# Patient Record
Sex: FEMALE | Race: WHITE | NOT HISPANIC OR LATINO | Employment: FULL TIME | URBAN - METROPOLITAN AREA
[De-identification: names, ages, dates, MRNs, and addresses within clinical notes are randomized per-mention and may not be internally consistent; named-entity substitution may affect disease eponyms.]

---

## 2017-01-30 ENCOUNTER — OFFICE VISIT (OUTPATIENT)
Dept: BARIATRICS | Facility: CLINIC | Age: 57
End: 2017-01-30
Payer: COMMERCIAL

## 2017-01-30 VITALS
DIASTOLIC BLOOD PRESSURE: 70 MMHG | SYSTOLIC BLOOD PRESSURE: 118 MMHG | HEIGHT: 62 IN | BODY MASS INDEX: 33.95 KG/M2 | WEIGHT: 184.5 LBS

## 2017-01-30 DIAGNOSIS — E66.9 OBESITY (BMI 30.0-34.9): ICD-10-CM

## 2017-01-30 DIAGNOSIS — Z98.84 HX OF BARIATRIC SURGERY: Primary | ICD-10-CM

## 2017-01-30 PROCEDURE — 99213 OFFICE O/P EST LOW 20 MIN: CPT | Mod: S$GLB,,, | Performed by: INTERNAL MEDICINE

## 2017-01-30 PROCEDURE — 99999 PR PBB SHADOW E&M-EST. PATIENT-LVL III: CPT | Mod: PBBFAC,,, | Performed by: INTERNAL MEDICINE

## 2017-01-30 PROCEDURE — 1159F MED LIST DOCD IN RCRD: CPT | Mod: S$GLB,,, | Performed by: INTERNAL MEDICINE

## 2017-01-30 RX ORDER — PHENTERMINE HYDROCHLORIDE 37.5 MG/1
TABLET ORAL
Qty: 30 TABLET | Refills: 0 | Status: SHIPPED | OUTPATIENT
Start: 2017-01-30 | End: 2017-03-06 | Stop reason: SDUPTHER

## 2017-01-30 NOTE — PROGRESS NOTES
"Subjective:       Patient ID: Kateryna Weber is a 56 y.o. female.    Chief Complaint: Follow-up    HPI Comments: Pt presents today for follow up. Getting adequate protein. She has lost 6 lbs in the past 6 weeks with getting back on track with diet. 14 lbs total. Was not quite as strict over the holidays with diet and exercise. Has been taking care of her sick mom. She has been on all of her vitamins.     Review of Systems   Constitutional: Negative for activity change, appetite change and unexpected weight change.   HENT: Negative for sore throat and voice change.    Eyes: Negative for pain and visual disturbance.   Respiratory: Negative for shortness of breath and wheezing.    Cardiovascular: Negative for palpitations and leg swelling.   Genitourinary: Negative for difficulty urinating and dysuria.        S/p hyst   Musculoskeletal: Positive for arthralgias. Negative for back pain and myalgias.        Hip and hand   Skin: Negative for pallor and rash.   Neurological: Negative for syncope, light-headedness and headaches.   Psychiatric/Behavioral: Negative for dysphoric mood and sleep disturbance. The patient is not nervous/anxious.        Objective:       Visit Vitals    /70    Ht 5' 2" (1.575 m)    Wt 83.7 kg (184 lb 8.4 oz)    BMI 33.75 kg/m2       Physical Exam   Constitutional: She is oriented to person, place, and time. She appears well-developed. No distress.   Obese   HENT:   Head: Normocephalic and atraumatic.   Eyes: EOM are normal. Pupils are equal, round, and reactive to light. No scleral icterus.   Neck: Normal range of motion. Neck supple. No thyromegaly present.   Cardiovascular: Normal rate and normal heart sounds.  Exam reveals no gallop and no friction rub.    No murmur heard.  Pulmonary/Chest: Effort normal and breath sounds normal. No respiratory distress. She has no wheezes.   Musculoskeletal: Normal range of motion. She exhibits no edema.   Neurological: She is alert and oriented to " person, place, and time. No cranial nerve deficit.   Skin: Skin is warm and dry. No erythema.   Psychiatric: She has a normal mood and affect. Her behavior is normal. Judgment normal.   Vitals reviewed.      Assessment:       1. Hx of bariatric surgery    2. Obesity (BMI 30.0-34.9)        Plan:         1. Hx of bariatric surgery  Needs to continue to cut back snacking.     2. Obesity (BMI 30.0-34.9)    - phentermine (ADIPEX-P) 37.5 mg tablet; 1/2 to one pill po daily.  Dispense: 30 tablet; Refill: 0      protein shake (at least 20-30 g, no more than 5 g)  for breakfast and lunch and 1 snack  Sensible meal-lean protein(4-6 oz fish, chicken, turkey, lean beef) and 1 cup green vegetables for dinner.     No drinking while eating. Wait 30 min.     Continue trulicity once a week.     Sip on water throughout day between meals.     Cut back artificial sweeteners by half.     Increase weight training to 2 days a week. Continue/Increase cardio.     Return in about 6 weeks               protein shake (at least 20-30 g, no more than 5 g)  for breakfast and lunch and 1 snack  Sensible meal-lean protein(4-6 oz fish, chicken, turkey, lean beef) and 1 cup green vegetables for dinner.     No drinking while eating. Wait 30 min.     Continue trulicity once a week.     Sip on water throughout day between meals.     Cut back artificial sweeteners by half.     Increase weight training to 2 days a week. Continue/Increase cardio.     Return in about 6 weeks

## 2017-01-30 NOTE — PATIENT INSTRUCTIONS
protein shake (at least 20-30 g, no more than 5 g)  for breakfast and lunch and 1 snack  Sensible meal-lean protein(4-6 oz fish, chicken, turkey, lean beef) and 1 cup green vegetables for dinner.     No drinking while eating. Wait 30 min.     Continue trulicity once a week.     Sip on water throughout day between meals.     Cut back artificial sweeteners by half.     Increase weight training to 2 days a week. Continue/Increase cardio.     Return in about 6 weeks

## 2017-01-30 NOTE — MR AVS SNAPSHOT
[facility] - Bariatric Surgery  [address] [address]  Phone: [phone]  Fax: [phone]                  Kateryna Weber   2017 9:00 AM   Office Visit    Description:  Female : 1960   Provider:  Sherry Poon MD   Department:  [facility] - Bariatric Surgery           Reason for Visit     Follow-up           Diagnoses this Visit        Comments    Hx of bariatric surgery    -  Primary     Obesity (BMI 30.0-34.9)                To Do List           Future Appointments        Provider Department Dept Phone    2017 7:00 AM Patria Yao MD [facility] - Internal Medicine [phone]    3/13/2017 9:00 AM Sherry Poon MD [facility] Bariatric Surgery [phone]      Goals (5 Years of Data)     None       These Medications        Disp Refills Start End    phentermine (ADIPEX-P) 37.5 mg tablet 30 tablet 0 2017     1/2 to one pill po daily.    Pharmacy: [place] [id number] Ph #: [phone]         [facility]     [facility] Nurse Care Line -  Assistance  Registered nurses in the [facility] provide clinical advisement, health education, appointment booking, and other advisory services.  Call for this free service at [phone].             Medications           Message regarding Medications     Verify the changes and/or additions to your medication regime listed below are the same as discussed with your clinician today.  If any of these changes or additions are incorrect, please notify your healthcare provider.        START taking these NEW medications        Refills    phentermine (ADIPEX-P) 37.5 mg tablet 0    Si/2 to one pill po daily.    Class: Print           Verify that the below list of medications is an accurate representation of the medications you are currently taking.  If none reported, the list may be blank. If incorrect, please contact your healthcare provider. Carry this  "list with you in case of emergency.           Current Medications     alprazolam (XANAX) 0.25 MG tablet TAKE ONE TABLET BY MOUTH ONCE DAILY AT NIGHT AS NEEDED FOR ANXIETY    buPROPion (WELLBUTRIN XL) 150 MG TB24 tablet TAKE ONE TABLET BY MOUTH ONCE DAILY    cholecalciferol, vitamin D3, 5,000 unit capsule Take 1 capsule by mouth Daily.    cyanocobalamin 1,000 mcg/mL injection 1 Solution Injection biweekly    dulaglutide (TRULICITY) 1.5 mg/0.5 mL PnIj Inject 1.5 mg into the skin every 7 days.    meloxicam (MOBIC) 15 MG tablet Take 1 tablet (15 mg total) by mouth once daily.    multivitamin capsule Take 1 capsule by mouth once daily.    phentermine (ADIPEX-P) 37.5 mg tablet 1/2 to one pill po daily.    scopolamine (TRANSDERM-SCOP) 1.5 mg Place 1 patch (1.5 mg total) onto the skin As instructed. Behind ear every 3 days           Clinical Reference Information           Vital Signs - Last Recorded  Most recent update: 1/30/2017  9:09 AM by Ayde Clifton MA    BP Ht Wt BMI       118/70 5' 2" (1.575 m) 83.7 kg (184 lb 8.4 oz) 33.75 kg/m2       Blood Pressure          Most Recent Value    BP  118/70      Allergies as of 1/30/2017     No Known Drug Allergies      Immunizations Administered on Date of Encounter - 1/30/2017     None      Instructions    protein shake (at least 20-30 g, no more than 5 g)  for breakfast and lunch and 1 snack  Sensible meal-lean protein(4-6 oz fish, chicken, turkey, lean beef) and 1 cup green vegetables for dinner.     No drinking while eating. Wait 30 min.     Start trulicity once a week.     Sip on water throughout day between meals.     Cut back artificial sweeteners by half.     Increase weight training to 2 days a week. Continue/Increase cardio.     Return in about 6 weeks       "

## 2017-02-06 RX ORDER — DULAGLUTIDE 1.5 MG/.5ML
INJECTION, SOLUTION SUBCUTANEOUS
Qty: 4 SYRINGE | Refills: 3 | Status: SHIPPED | OUTPATIENT
Start: 2017-02-06 | End: 2017-05-22 | Stop reason: SDUPTHER

## 2017-02-16 RX ORDER — ALPRAZOLAM 0.25 MG/1
TABLET ORAL
Qty: 30 TABLET | Refills: 0 | Status: SHIPPED | OUTPATIENT
Start: 2017-02-16 | End: 2017-02-20 | Stop reason: SDUPTHER

## 2017-02-20 ENCOUNTER — OFFICE VISIT (OUTPATIENT)
Dept: INTERNAL MEDICINE | Facility: CLINIC | Age: 57
End: 2017-02-20
Payer: COMMERCIAL

## 2017-02-20 VITALS
WEIGHT: 179.44 LBS | DIASTOLIC BLOOD PRESSURE: 82 MMHG | HEIGHT: 62 IN | BODY MASS INDEX: 33.02 KG/M2 | HEART RATE: 76 BPM | SYSTOLIC BLOOD PRESSURE: 118 MMHG

## 2017-02-20 DIAGNOSIS — M25.551 RIGHT HIP PAIN: ICD-10-CM

## 2017-02-20 DIAGNOSIS — Z82.49 FAMILY HISTORY OF EARLY CAD: ICD-10-CM

## 2017-02-20 DIAGNOSIS — Z12.83 SKIN CANCER SCREENING: ICD-10-CM

## 2017-02-20 PROCEDURE — 99999 PR PBB SHADOW E&M-EST. PATIENT-LVL III: CPT | Mod: PBBFAC,,, | Performed by: INTERNAL MEDICINE

## 2017-02-20 PROCEDURE — 99213 OFFICE O/P EST LOW 20 MIN: CPT | Mod: S$GLB,,, | Performed by: INTERNAL MEDICINE

## 2017-02-20 RX ORDER — ALPRAZOLAM 0.25 MG/1
TABLET ORAL
Qty: 30 TABLET | Refills: 0 | Status: SHIPPED | OUTPATIENT
Start: 2017-02-20 | End: 2018-02-18 | Stop reason: SDUPTHER

## 2017-02-20 RX ORDER — SCOLOPAMINE TRANSDERMAL SYSTEM 1 MG/1
1 PATCH, EXTENDED RELEASE TRANSDERMAL SEE ADMIN INSTRUCTIONS
Qty: 10 PATCH | Refills: 0 | Status: SHIPPED | OUTPATIENT
Start: 2017-02-20 | End: 2018-06-15

## 2017-02-20 NOTE — MR AVS SNAPSHOT
[facility] - Internal Medicine  [address]  [facility] [address]  Phone: [phone]  Fax: [phone]                  Kateryna Weber   2017 7:00 AM   Office Visit    Description:  Female : 1960   Provider:  Patria Yao MD   Department:  [facility] - Internal Medicine           Reason for Visit     Follow-up           Diagnoses this Visit        Comments    BMI 32.0-32.9,adult    -  Primary     Right hip pain         Skin cancer screening         Family history of early CAD                To Do List           Future Appointments        Provider Department Dept Phone    3/13/2017 9:00 AM Sherry Poon MD [facility] - Bariatric Surgery [phone]      Goals (5 Years of Data)     None      Follow-Up and Disposition     Return in about 7 months (around 2017) for Annual exam.    Follow-up and Disposition History      [facility]     [facility] -  Assistance  Registered nurses in the [facility] provide clinical advisement, health education, appointment booking, and other advisory services.  Call for this free service at [phone].             Medications           Message regarding Medications     Verify the changes and/or additions to your medication regime listed below are the same as discussed with your clinician today.  If any of these changes or additions are incorrect, please notify your healthcare provider.             Verify that the below list of medications is an accurate representation of the medications you are currently taking.  If none reported, the list may be blank. If incorrect, please contact your healthcare provider. Carry this list with you in case of emergency.           Current Medications     alprazolam (XANAX) 0.25 MG tablet TAKE ONE TABLET BY MOUTH ONCE DAILY AT  NIGHT  AS  NEEDED  FOR  ANXIETY.    buPROPion (WELLBUTRIN XL) 150 MG TB24 tablet TAKE ONE TABLET BY MOUTH ONCE DAILY    cyanocobalamin 1,000 mcg/mL  "injection 1 Solution Injection biweekly    multivitamin capsule Take 1 capsule by mouth once daily.    phentermine (ADIPEX-P) 37.5 mg tablet 1/2 to one pill po daily.    scopolamine (TRANSDERM-SCOP) 1.5 mg Place 1 patch (1.5 mg total) onto the skin As instructed. Behind ear every 3 days    TRULICITY 1.5 mg/0.5 mL PnIj INJECT ONE SYRINGE (1.5 MG) SUBCUTANEOUSLY ONCE A WEEK    cholecalciferol, vitamin D3, 5,000 unit capsule Take 1 capsule by mouth Daily.    meloxicam (MOBIC) 15 MG tablet Take 1 tablet (15 mg total) by mouth once daily.           Clinical Reference Information           Your Vitals Were     BP Pulse Height Weight BMI    118/82 76 5' 2" (1.575 m) 81.4 kg (179 lb 7.3 oz) 32.82 kg/m2      Blood Pressure          Most Recent Value    BP  118/82      Allergies as of 2/20/2017     No Known Drug Allergies      Immunizations Administered on Date of Encounter - 2/20/2017     None      Orders Placed During Today's Visit      Normal Orders This Visit    Ambulatory consult to Dermatology     Future Labs/Procedures Expected by Expires    CT Cardiac Scoring  2/20/2017 2/20/2018    CAR CT Cardiac Scoring  As directed 2/20/2018      Language Assistance Services     ATTENTION: Language assistance services are available, free of charge. Please call [phone].      ATENCIÓN: Si habla milton, tiene a huitron disposición servicios gratuitos de asistencia lingüística. Llame al [phone].     CLAUDIA Ý: N?u b?n nói Ti?ng Vi?t, có các d?ch v? h? tr? ngôn ng? mi?n phí dành cho b?n. G?i s? [phone].         [facility] complies with applicable Federal civil rights laws and does not discriminate on the basis of race, color, national origin, age, disability, or sex.        "

## 2017-02-20 NOTE — PROGRESS NOTES
Subjective:       Patient ID: Kateryna Weber is a 56 y.o. female.    Chief Complaint: Follow-up    HPI   Patient is here for a review of recent recommendations:    Bariatric Medicine: Reviewed notes and patient has lost about 18 pounds on medication and has follow up.    Orthopedic Medicine: Reviewed notes:  Quadriceps strengthening  2 injections completed and is better  Patient is told not to sit with leg under her  MRI completed in the past      Review of Systems   Constitutional: Negative for activity change, chills, fever and unexpected weight change.   Respiratory: Negative for cough, chest tightness, shortness of breath and wheezing.    Cardiovascular: Negative for chest pain, palpitations and leg swelling.       Objective:      Physical Exam   Constitutional: She appears well-developed and well-nourished.   Neck: No JVD present. No thyromegaly present.   Cardiovascular: Normal rate, normal heart sounds and intact distal pulses.    Pulmonary/Chest: Effort normal and breath sounds normal. No respiratory distress.       Assessment:       1. BMI 32.0-32.9,adult    2. Right hip pain    3. Skin cancer screening    4. Family history of early CAD        Plan:       Kateryna was seen today for follow-up.    Diagnoses and all orders for this visit:    BMI 32.0-32.9,adult    Right hip pain    Skin cancer screening  -     Ambulatory consult to Dermatology    Family history of early CAD  -     CT Cardiac Scoring; Future  -     CAR CT Cardiac Scoring; Future      Return in about 7 months (around 9/20/2017) for Annual exam.    New Prescriptions    No medications on file       Modified Medications    No medications on file       Orders Placed This Encounter   Procedures    CT Cardiac Scoring     Standing Status:   Future     Standing Expiration Date:   2/20/2018    Ambulatory consult to Dermatology     Referral Priority:   Routine     Referral Type:   Consultation     Referral Reason:   Specialty Services Required     Requested  Specialty:   Dermatology     Number of Visits Requested:   1    CAR CT Cardiac Scoring     Standing Status:   Future     Standing Expiration Date:   2/20/2018       Labs, studies and consults associated with this visit were reviewed

## 2017-03-06 ENCOUNTER — OFFICE VISIT (OUTPATIENT)
Dept: BARIATRICS | Facility: CLINIC | Age: 57
End: 2017-03-06
Payer: COMMERCIAL

## 2017-03-06 VITALS
SYSTOLIC BLOOD PRESSURE: 126 MMHG | BODY MASS INDEX: 32.22 KG/M2 | HEIGHT: 62 IN | DIASTOLIC BLOOD PRESSURE: 70 MMHG | WEIGHT: 175.06 LBS | HEART RATE: 76 BPM

## 2017-03-06 DIAGNOSIS — E66.9 OBESITY (BMI 30.0-34.9): ICD-10-CM

## 2017-03-06 DIAGNOSIS — Z98.84 HX OF BARIATRIC SURGERY: Primary | ICD-10-CM

## 2017-03-06 PROCEDURE — 99999 PR PBB SHADOW E&M-EST. PATIENT-LVL III: CPT | Mod: PBBFAC,,, | Performed by: INTERNAL MEDICINE

## 2017-03-06 PROCEDURE — 1160F RVW MEDS BY RX/DR IN RCRD: CPT | Mod: S$GLB,,, | Performed by: INTERNAL MEDICINE

## 2017-03-06 PROCEDURE — 99213 OFFICE O/P EST LOW 20 MIN: CPT | Mod: S$GLB,,, | Performed by: INTERNAL MEDICINE

## 2017-03-06 RX ORDER — PHENTERMINE HYDROCHLORIDE 37.5 MG/1
TABLET ORAL
Qty: 30 TABLET | Refills: 0 | Status: SHIPPED | OUTPATIENT
Start: 2017-03-06 | End: 2017-04-10 | Stop reason: SDUPTHER

## 2017-03-06 NOTE — PROGRESS NOTES
"Subjective:       Patient ID: Kateryna Weber is a 56 y.o. female.    Chief Complaint: Follow-up    HPI Comments: Pt presents today for follow up. Getting adequate protein. She has lost 9 lbs in the past 6 weeks with getting back on track with diet. 23 lbs total.     Review of Systems   Constitutional: Negative for activity change, appetite change and unexpected weight change.   HENT: Negative for sore throat and voice change.    Eyes: Negative for pain and visual disturbance.   Respiratory: Negative for shortness of breath and wheezing.    Cardiovascular: Negative for palpitations and leg swelling.   Genitourinary: Negative for difficulty urinating and dysuria.        S/p hyst   Musculoskeletal: Positive for arthralgias. Negative for back pain and myalgias.        Hip and hand   Skin: Negative for pallor and rash.   Neurological: Negative for syncope, light-headedness and headaches.   Psychiatric/Behavioral: Negative for dysphoric mood and sleep disturbance. The patient is not nervous/anxious.        Objective:       /70  Pulse 76  Ht 5' 2" (1.575 m)  Wt 79.4 kg (175 lb 0.7 oz)  BMI 32.02 kg/m2    Physical Exam   Constitutional: She is oriented to person, place, and time. She appears well-developed. No distress.   Obese   HENT:   Head: Normocephalic and atraumatic.   Eyes: EOM are normal. Pupils are equal, round, and reactive to light. No scleral icterus.   Neck: Normal range of motion. Neck supple.   Cardiovascular: Normal rate.    Pulmonary/Chest: Effort normal.   Musculoskeletal: Normal range of motion. She exhibits no edema.   Neurological: She is alert and oriented to person, place, and time. No cranial nerve deficit.   Skin: Skin is warm and dry. No erythema.   Psychiatric: She has a normal mood and affect. Her behavior is normal. Judgment normal.   Vitals reviewed.      Assessment:       1. Hx of bariatric surgery    2. Obesity (BMI 30.0-34.9)        Plan:         1. Hx of bariatric surgery  DOing " well with making changes. .     2. Obesity (BMI 30.0-34.9)      protein shake (at least 20-30 g, no more than 5 g)  for breakfast and lunch and 1 snack  Sensible meal-lean protein(4-6 oz fish, chicken, turkey, lean beef) and 1 cup green vegetables for dinner.     No drinking while eating. Wait 30 min.     Continue trulicity once a week.     Sip on water throughout day between meals.     Cut back artificial sweeteners by half.     Increase weight training to 2 days a week. Continue/Increase cardio.     Return in about 6 weeks

## 2017-03-06 NOTE — MR AVS SNAPSHOT
[facility] - Bariatric Surgery  [address] [facility] [address]  Phone: [phone]  Fax: [phone]                  Kateryna Weber   3/6/2017 8:45 AM   Office Visit    Description:  Female : 1960   Provider:  Sherry Poon MD   Department:  [facility] - Bariatric Surgery           Reason for Visit     Follow-up           Diagnoses this Visit        Comments    Obesity (BMI 30.0-34.9)                To Do List           Future Appointments        Provider Department Dept Phone    4/10/2017 8:45 AM Sherry Poon MD [facility] - Bariatric Surgery [phone]    4/10/2017 10:30 AM [facility] CT1 64- LIMIT 450 LBS [facility] [phone]    2017 8:30 AM Alessia Greenwood MD [place] - Dermatology [phone]      Goals (5 Years of Data)     None       These Medications        Disp Refills Start End    phentermine (ADIPEX-P) 37.5 mg tablet 30 tablet 0 3/6/2017     1/2 to one pill po daily.    Pharmacy: [facility] [address] [place] [address] #: [phone]         [facility] On Call     [facility] Care Line -  Assistance  Registered nurses in the [facility] provide clinical advisement, health education, appointment booking, and other advisory services.  Call for this free service at [phone].             Medications           Message regarding Medications     Verify the changes and/or additions to your medication regime listed below are the same as discussed with your clinician today.  If any of these changes or additions are incorrect, please notify your healthcare provider.             Verify that the below list of medications is an accurate representation of the medications you are currently taking.  If none reported, the list may be blank. If incorrect, please contact your healthcare provider. Carry this list with you in case of emergency.           Current Medications     alprazolam (XANAX) 0.25 MG tablet  "TAKE ONE TABLET BY MOUTH ONCE DAILY AT  NIGHT  AS  NEEDED  FOR  ANXIETY.    buPROPion (WELLBUTRIN XL) 150 MG TB24 tablet TAKE ONE TABLET BY MOUTH ONCE DAILY    cholecalciferol, vitamin D3, 5,000 unit capsule Take 1 capsule by mouth Daily.    cyanocobalamin 1,000 mcg/mL injection 1 Solution Injection biweekly    meloxicam (MOBIC) 15 MG tablet Take 1 tablet (15 mg total) by mouth once daily.    multivitamin capsule Take 1 capsule by mouth once daily.    phentermine (ADIPEX-P) 37.5 mg tablet 1/2 to one pill po daily.    scopolamine (TRANSDERM-SCOP) 1.5 mg (1 mg over 3 days) Place 1 patch (1.5 mg total) onto the skin As instructed. Behind ear every 3 days    TRULICITY 1.5 mg/0.5 mL PnIj INJECT ONE SYRINGE (1.5 MG) SUBCUTANEOUSLY ONCE A WEEK           Clinical Reference Information           Your Vitals Were     BP Pulse Height Weight BMI    126/70 76 5' 2" (1.575 m) 79.4 kg (175 lb 0.7 oz) 32.02 kg/m2      Blood Pressure          Most Recent Value    BP  126/70      Allergies as of 3/6/2017     No Known Drug Allergies      Immunizations Administered on Date of Encounter - 3/6/2017     None      Instructions    protein shake (at least 20-30 g, no more than 5 g)  for breakfast and lunch and 1 snack  Sensible meal-lean protein(4-6 oz fish, chicken, turkey, lean beef) and 1 cup green vegetables for dinner.     No drinking while eating. Wait 30 min.     Continue trulicity once a week.     Sip on water throughout day between meals.     Cut back artificial sweeteners by half.     Increase weight training to 2 days a week. Continue/Increase cardio.     Return in about 6 weeks           Language Assistance Services     ATTENTION: Language assistance services are available, free of charge. Please call [phone].      ATENCIÓN: Si ida rose, tiene a huitron disposición servicios gratuitos de asistencia lingüística. Llame al [phone].     CLAUDIA Ý: N?u b?n nói Ti?ng Vi?t, có các d?ch v? h? tr? ngôn ng? mi?n phí dành cho b?n. " G?i s? [phone].         [facility] complies with applicable Federal civil rights laws and does not discriminate on the basis of race, color, national origin, age, disability, or sex.

## 2017-04-10 ENCOUNTER — OFFICE VISIT (OUTPATIENT)
Dept: BARIATRICS | Facility: CLINIC | Age: 57
End: 2017-04-10
Payer: COMMERCIAL

## 2017-04-10 VITALS
DIASTOLIC BLOOD PRESSURE: 78 MMHG | WEIGHT: 173.06 LBS | HEIGHT: 62 IN | HEART RATE: 82 BPM | SYSTOLIC BLOOD PRESSURE: 130 MMHG | BODY MASS INDEX: 31.85 KG/M2

## 2017-04-10 DIAGNOSIS — E66.9 OBESITY (BMI 30.0-34.9): ICD-10-CM

## 2017-04-10 DIAGNOSIS — Z98.84 HX OF BARIATRIC SURGERY: Primary | ICD-10-CM

## 2017-04-10 PROCEDURE — 1160F RVW MEDS BY RX/DR IN RCRD: CPT | Mod: S$GLB,,, | Performed by: INTERNAL MEDICINE

## 2017-04-10 PROCEDURE — 99213 OFFICE O/P EST LOW 20 MIN: CPT | Mod: S$GLB,,, | Performed by: INTERNAL MEDICINE

## 2017-04-10 PROCEDURE — 99999 PR PBB SHADOW E&M-EST. PATIENT-LVL III: CPT | Mod: PBBFAC,,, | Performed by: INTERNAL MEDICINE

## 2017-04-10 RX ORDER — PHENTERMINE HYDROCHLORIDE 37.5 MG/1
TABLET ORAL
Qty: 30 TABLET | Refills: 0 | Status: SHIPPED | OUTPATIENT
Start: 2017-04-10 | End: 2017-05-22 | Stop reason: SDUPTHER

## 2017-04-10 NOTE — MR AVS SNAPSHOT
[facility] - Bariatric Surgery  [address]  [facility] [address]  Phone: [phone]  Fax: [phone]                  Kateryna Weber   4/10/2017 8:45 AM   Office Visit    Description:  Female : 1960   Provider:  Sherry Poon MD   Department:  [facility] - Bariatric Surgery           Reason for Visit     Follow-up           Diagnoses this Visit        Comments    Hx of bariatric surgery    -  Primary     Obesity (BMI 30.0-34.9)                To Do List           Future Appointments        Provider Department Dept Phone    4/10/2017 10:30 AM [facility]- LIMIT 450 LBS [facility] [phone]    2017 8:30 AM Alessia Greenwood MD [place] - Dermatology [phone]    2017 8:45 AM Sherry Poon MD [facility] - Bariatric Surgery [phone]      Goals (5 Years of Data)     None       These Medications        Disp Refills Start End    phentermine (ADIPEX-P) 37.5 mg tablet 30 tablet 0 4/10/2017     1/2 to one pill po daily.    Pharmacy: [facility] [address] [facility] [id number] [place] [address] #: [phone]         [facility]     [facility] Care Line -  Assistance  Unless otherwise directed by your provider, please contact [facility], our nurse care line that is available for  assistance.     Registered nurses in the [facility] provide: appointment scheduling, clinical advisement, health education, and other advisory services.  Call: [phone] (toll free)               Medications           Message regarding Medications     Verify the changes and/or additions to your medication regime listed below are the same as discussed with your clinician today.  If any of these changes or additions are incorrect, please notify your healthcare provider.             Verify that the below list of medications is an accurate representation of the medications you are currently taking.  If none reported, the list may be  "blank. If incorrect, please contact your healthcare provider. Carry this list with you in case of emergency.           Current Medications     alprazolam (XANAX) 0.25 MG tablet TAKE ONE TABLET BY MOUTH ONCE DAILY AT  NIGHT  AS  NEEDED  FOR  ANXIETY.    buPROPion (WELLBUTRIN XL) 150 MG TB24 tablet TAKE ONE TABLET BY MOUTH ONCE DAILY    cholecalciferol, vitamin D3, 5,000 unit capsule Take 1 capsule by mouth Daily.    cyanocobalamin 1,000 mcg/mL injection 1 Solution Injection biweekly    meloxicam (MOBIC) 15 MG tablet Take 1 tablet (15 mg total) by mouth once daily.    multivitamin capsule Take 1 capsule by mouth once daily.    phentermine (ADIPEX-P) 37.5 mg tablet 1/2 to one pill po daily.    scopolamine (TRANSDERM-SCOP) 1.5 mg (1 mg over 3 days) Place 1 patch (1.5 mg total) onto the skin As instructed. Behind ear every 3 days    TRULICITY 1.5 mg/0.5 mL PnIj INJECT ONE SYRINGE (1.5 MG) SUBCUTANEOUSLY ONCE A WEEK           Clinical Reference Information           Your Vitals Were     BP Pulse Height Weight BMI    130/78 82 5' 2" (1.575 m) 78.5 kg (173 lb 1 oz) 31.65 kg/m2      Blood Pressure          Most Recent Value    BP  130/78      Allergies as of 4/10/2017     No Known Drug Allergies      Immunizations Administered on Date of Encounter - 4/10/2017     None      Instructions    protein shake (at least 20-30 g, no more than 5 g)  for breakfast and lunch and 1 snack  Sensible meal-lean protein(4-6 oz fish, chicken, turkey, lean beef) and 1 cup green vegetables for dinner.     No drinking while eating. Wait 30 min.     Continue trulicity once a week.     Sip on water throughout day between meals.     Cut back artificial sweeteners by half.     Increase weight training to 2 days a week. Continue/Increase cardio.     Return in about 6 weeks       Language Assistance Services     ATTENTION: Language assistance services are available, free of charge. Please call [phone].      ATENCIÓN: leanne Padilla " disposición servicios gratuitos de asistencia lingüística. Darnelllitzy al [phone].     CLAUDIA Ý: N?u b?n nói Ti?ng Vi?t, có các d?ch v? h? tr? ngôn ng? mi?n phí dành cho b?n. G?i s? [phone].         [facility] complies with applicable Federal civil rights laws and does not discriminate on the basis of race, color, national origin, age, disability, or sex.

## 2017-04-10 NOTE — PROGRESS NOTES
"Subjective:       Patient ID: Kateryna Weber is a 56 y.o. female.    Chief Complaint: Follow-up    HPI Comments: Pt presents today for follow up. Getting adequate protein. She has lost 2 lbs in the past 5 weeks with getting back on track with diet. 25 lbs total. Has been on phentermine.WAs out of town for 3 weeks. Did miss one week of trulicity (problem with device).         Review of Systems   Constitutional: Negative for activity change, appetite change and unexpected weight change.   HENT: Negative for sore throat and voice change.    Eyes: Negative for pain and visual disturbance.   Respiratory: Negative for shortness of breath and wheezing.    Cardiovascular: Negative for palpitations and leg swelling.   Genitourinary: Negative for difficulty urinating and dysuria.        S/p hyst   Musculoskeletal: Positive for arthralgias. Negative for back pain and myalgias.        Hip and hand   Skin: Negative for pallor and rash.   Neurological: Negative for syncope, light-headedness and headaches.   Psychiatric/Behavioral: Negative for dysphoric mood and sleep disturbance. The patient is not nervous/anxious.        Objective:       /78  Pulse 82  Ht 5' 2" (1.575 m)  Wt 78.5 kg (173 lb 1 oz)  BMI 31.65 kg/m2    Physical Exam   Constitutional: She is oriented to person, place, and time. She appears well-developed. No distress.   Obese   HENT:   Head: Normocephalic and atraumatic.   Eyes: EOM are normal. Pupils are equal, round, and reactive to light. No scleral icterus.   Neck: Normal range of motion. Neck supple.   Cardiovascular: Normal rate.    Pulmonary/Chest: Effort normal.   Musculoskeletal: Normal range of motion. She exhibits no edema.   Neurological: She is alert and oriented to person, place, and time. No cranial nerve deficit.   Skin: Skin is warm and dry. No erythema.   Psychiatric: She has a normal mood and affect. Her behavior is normal. Judgment normal.   Vitals reviewed.      Assessment:       1. Hx " of bariatric surgery    2. Obesity (BMI 30.0-34.9)        Plan:         1. Hx of bariatric surgery  DOing well with making changes. .     2. Obesity (BMI 30.0-34.9)      protein shake (at least 20-30 g, no more than 5 g)  for breakfast and lunch and 1 snack  Sensible meal-lean protein(4-6 oz fish, chicken, turkey, lean beef) and 1 cup green vegetables for dinner.     No drinking while eating. Wait 30 min.     Continue trulicity once a week.     Sip on water throughout day between meals.     Cut back artificial sweeteners by half.     Increase weight training to 2 days a week. Continue/Increase cardio.     Return in about 6 weeks

## 2017-04-17 ENCOUNTER — INITIAL CONSULT (OUTPATIENT)
Dept: DERMATOLOGY | Facility: CLINIC | Age: 57
End: 2017-04-17
Payer: COMMERCIAL

## 2017-04-17 DIAGNOSIS — D22.9 NEVUS: Primary | ICD-10-CM

## 2017-04-17 DIAGNOSIS — L81.4 LENTIGO: ICD-10-CM

## 2017-04-17 DIAGNOSIS — L82.1 SK (SEBORRHEIC KERATOSIS): ICD-10-CM

## 2017-04-17 DIAGNOSIS — D18.00 ANGIOMA: ICD-10-CM

## 2017-04-17 PROCEDURE — 1160F RVW MEDS BY RX/DR IN RCRD: CPT | Mod: S$GLB,,, | Performed by: DERMATOLOGY

## 2017-04-17 PROCEDURE — 99999 PR PBB SHADOW E&M-EST. PATIENT-LVL II: CPT | Mod: PBBFAC,,, | Performed by: DERMATOLOGY

## 2017-04-17 PROCEDURE — 99203 OFFICE O/P NEW LOW 30 MIN: CPT | Mod: S$GLB,,, | Performed by: DERMATOLOGY

## 2017-04-17 NOTE — PROGRESS NOTES
"  Subjective:       Patient ID:  Kateryna Weber is a 56 y.o. female who presents for   Chief Complaint   Patient presents with    Skin Check     tbse     HPI Comments: Patient is here today for a "mole" check.   Pt has a history of extensive sun exposure in the past.   Pt recalls several blistering sunburns in the past- some  Pt has history of tanning bed use- some  Pt has  had moles removed in the past- none  Pt has history of melanoma in first degree relatives-  None    C/o lesion on right scalp months ago.  Scaly and then peeled off.  Has not recurred. Was large and raised.  Does not feel it anymore.   Uncle with melanoma on his scalp.       Review of Systems   Constitutional: Negative for fever, chills, weight loss, weight gain, fatigue, night sweats and malaise.   Musculoskeletal: Positive for arthralgias.   Skin: Positive for activity-related sunscreen use. Negative for daily sunscreen use.   Hematologic/Lymphatic: Does not bruise/bleed easily.        Objective:    Physical Exam   Constitutional: She appears well-developed and well-nourished. No distress.   Neurological: She is alert and oriented to person, place, and time. She is not disoriented.   Psychiatric: She has a normal mood and affect.   Skin:   Areas Examined (abnormalities noted in diagram):   Scalp / Hair Palpated and Inspected  Head / Face Inspection Performed  Neck Inspection Performed  Chest / Axilla Inspection Performed  Abdomen Inspection Performed  Genitals / Buttocks / Groin Inspection Performed  Back Inspection Performed  RUE Inspected  LUE Inspection Performed  RLE Inspected  LLE Inspection Performed  Nails and Digits Inspection Performed                   Diagram Legend     Erythematous scaling macule/papule c/w actinic keratosis       Vascular papule c/w angioma      Pigmented verrucoid papule/plaque c/w seborrheic keratosis      Yellow umbilicated papule c/w sebaceous hyperplasia      Irregularly shaped tan macule c/w lentigo     1-2 " mm smooth white papules consistent with Milia      Movable subcutaneous cyst with punctum c/w epidermal inclusion cyst      Subcutaneous movable cyst c/w pilar cyst      Firm pink to brown papule c/w dermatofibroma      Pedunculated fleshy papule(s) c/w skin tag(s)      Evenly pigmented macule c/w junctional nevus     Mildly variegated pigmented, slightly irregular-bordered macule c/w mildly atypical nevus      Flesh colored to evenly pigmented papule c/w intradermal nevus       Pink pearly papule/plaque c/w basal cell carcinoma      Erythematous hyperkeratotic cursted plaque c/w SCC      Surgical scar with no sign of skin cancer recurrence      Open and closed comedones      Inflammatory papules and pustules      Verrucoid papule consistent consistent with wart     Erythematous eczematous patches and plaques     Dystrophic onycholytic nail with subungual debris c/w onychomycosis     Umbilicated papule    Erythematous-base heme-crusted tan verrucoid plaque consistent with inflamed seborrheic keratosis     Erythematous Silvery Scaling Plaque c/w Psoriasis     See annotation      Assessment / Plan:        Nevus  Discussed ABCDE's of nevi.  Monitor for new mole or moles that are becoming bigger, darker, irritated, or developing irregular borders. Brochure provided.    Angioma  These are benign vascular lesions that are inherited.  Treatment is not necessary.    Lentigo  This is a benign hyperpigmented sun induced lesion. Daily sun protection will reduce the number of new lesions. Treatment of these benign lesions are considered cosmetic.  The nature of sun-induced photo-aging and skin cancers is discussed.  Sun avoidance, protective clothing, and the use of 30-SPF sunscreens is advised. Observe closely for skin damage/changes, and call if such occurs.    SK (seborrheic keratosis)  These are benign inherited growths without a malignant potential. Reassurance given to patient. No treatment is necessary.     Total body  skin examination performed today including at least 12 points as noted in physical examination. No lesions suspicious for malignancy noted.           Return in about 2 years (around 4/17/2019).

## 2017-04-17 NOTE — LETTER
April 17, 2017      Patria Yao MD  [address]  [place] [address]           [place] [facility] D[facility]ology  [address] [facility]  [place] [address]  Phone: [phone]  Fax: [phone]          Patient: Kateryna Weber   MR Number: [id number]   YOB: 1960   Date of Visit: 4/17/2017       Dear Dr. Patria Yao:    Thank you for referring Kateryna Weber to me for evaluation. Attached you will find relevant portions of my assessment and plan of care.    If you have questions, please do not hesitate to call me. I look forward to following Kateryna Weber along with you.    Sincerely,    Alessia Greenwood MD    Enclosure  CC:  No Recipients    If you would like to receive this communication electronically, please contact [email or web address] or [phone] to request more information on [employer] Link access.    For providers and/or their staff who would like to refer a patient to [facility], please contact us through our one-stop-shop provider referral line, [facility], at [phone].    If you feel you have received this communication in error or would no longer like to receive these types of communications, please e-mail [email or web address]

## 2017-05-19 ENCOUNTER — TELEPHONE (OUTPATIENT)
Dept: BARIATRICS | Facility: CLINIC | Age: 57
End: 2017-05-19

## 2017-05-22 ENCOUNTER — OFFICE VISIT (OUTPATIENT)
Dept: BARIATRICS | Facility: CLINIC | Age: 57
End: 2017-05-22
Payer: COMMERCIAL

## 2017-05-22 VITALS
WEIGHT: 167.56 LBS | BODY MASS INDEX: 30.83 KG/M2 | HEART RATE: 76 BPM | DIASTOLIC BLOOD PRESSURE: 70 MMHG | SYSTOLIC BLOOD PRESSURE: 122 MMHG | HEIGHT: 62 IN

## 2017-05-22 DIAGNOSIS — E53.8 VITAMIN B12 DEFICIENCY: ICD-10-CM

## 2017-05-22 DIAGNOSIS — E66.9 OBESITY (BMI 30.0-34.9): ICD-10-CM

## 2017-05-22 DIAGNOSIS — Z98.84 HX OF BARIATRIC SURGERY: Primary | ICD-10-CM

## 2017-05-22 PROCEDURE — 99213 OFFICE O/P EST LOW 20 MIN: CPT | Mod: S$GLB,,, | Performed by: INTERNAL MEDICINE

## 2017-05-22 PROCEDURE — 99999 PR PBB SHADOW E&M-EST. PATIENT-LVL III: CPT | Mod: PBBFAC,,, | Performed by: INTERNAL MEDICINE

## 2017-05-22 PROCEDURE — 1160F RVW MEDS BY RX/DR IN RCRD: CPT | Mod: S$GLB,,, | Performed by: INTERNAL MEDICINE

## 2017-05-22 RX ORDER — PHENTERMINE HYDROCHLORIDE 37.5 MG/1
TABLET ORAL
Qty: 30 TABLET | Refills: 0 | Status: SHIPPED | OUTPATIENT
Start: 2017-05-22 | End: 2017-07-17 | Stop reason: SDUPTHER

## 2017-05-22 NOTE — PROGRESS NOTES
"Subjective:       Patient ID: Kateryna Weber is a 56 y.o. female.    Chief Complaint: Follow-up    Pt presents today for follow up. Getting adequate protein. She has lost 5 lbs in the past 5 weeks with getting back on track with diet. 30 lbs total. Has been on phentermine. 3 Rxes in past 5 months. HAs not been taking B12 lately.      Review of Systems   Constitutional: Negative for activity change, appetite change and unexpected weight change.   HENT: Negative for sore throat and voice change.    Eyes: Negative for pain and visual disturbance.   Respiratory: Negative for shortness of breath and wheezing.    Cardiovascular: Negative for palpitations and leg swelling.   Genitourinary: Negative for difficulty urinating and dysuria.        S/p hyst   Musculoskeletal: Positive for arthralgias. Negative for back pain and myalgias.        Hip and hand   Skin: Negative for pallor and rash.   Neurological: Negative for syncope, light-headedness and headaches.   Psychiatric/Behavioral: Negative for dysphoric mood and sleep disturbance. The patient is not nervous/anxious.        Objective:       /70   Pulse 76   Ht 5' 2" (1.575 m)   Wt 76 kg (167 lb 8.8 oz)   BMI 30.65 kg/m²     Physical Exam   Constitutional: She is oriented to person, place, and time. She appears well-developed. No distress.   Obese   HENT:   Head: Normocephalic and atraumatic.   Eyes: EOM are normal. Pupils are equal, round, and reactive to light. No scleral icterus.   Neck: Normal range of motion. Neck supple.   Cardiovascular: Normal rate.    Pulmonary/Chest: Effort normal.   Musculoskeletal: Normal range of motion. She exhibits no edema.   Neurological: She is alert and oriented to person, place, and time. No cranial nerve deficit.   Skin: Skin is warm and dry. No erythema.   Psychiatric: She has a normal mood and affect. Her behavior is normal. Judgment normal.   Vitals reviewed.      Assessment:       1. Hx of bariatric surgery    2. Vitamin " B12 deficiency    3. Obesity (BMI 30.0-34.9)        Plan:             Kateryna was seen today for follow-up.    Diagnoses and all orders for this visit:    Hx of bariatric surgery    Vitamin B12 deficiency    Obesity (BMI 30.0-34.9)  -     phentermine (ADIPEX-P) 37.5 mg tablet; 1/2 to one pill po daily.    Other orders  -     dulaglutide (TRULICITY) 1.5 mg/0.5 mL PnIj; Inject 1.5 mg into the skin every 30 days.  -     cyanocobalamin, vitamin B-12, 1,000 mcg/mL Kit; Inject 1,000 mcg as directed every 28 days.        protein shake (at least 20-30 g, no more than 5 g)  for breakfast and lunch and 1 snack  Sensible meal-lean protein(4-6 oz fish, chicken, turkey, lean beef) and 1 cup green vegetables for dinner.     No drinking while eating. Wait 30 min.     Continue trulicity once a week.     Sip on water throughout day between meals.     Cut back artificial sweeteners by half.     Increase weight training to 2 days a week. Continue/Increase cardio.     Return in about 6 weeks

## 2017-05-22 NOTE — PATIENT INSTRUCTIONS
Patient warned of common side effects of phentermine including anxiety, insomnia, palpitations and increased blood pressure. It was also explained that it is for short-term usage along with diet and exercise, and that stopping the medication without making lifestyle changes will result in regain of weight. Patient states understanding.    Return in about 6 weeks (around 7/3/2017).

## 2017-06-26 RX ORDER — DULAGLUTIDE 1.5 MG/.5ML
INJECTION, SOLUTION SUBCUTANEOUS
Qty: 4 SYRINGE | Refills: 5 | Status: SHIPPED | OUTPATIENT
Start: 2017-06-26 | End: 2017-09-11

## 2017-07-17 ENCOUNTER — OFFICE VISIT (OUTPATIENT)
Dept: BARIATRICS | Facility: CLINIC | Age: 57
End: 2017-07-17
Payer: COMMERCIAL

## 2017-07-17 VITALS
BODY MASS INDEX: 30.1 KG/M2 | WEIGHT: 163.56 LBS | DIASTOLIC BLOOD PRESSURE: 78 MMHG | SYSTOLIC BLOOD PRESSURE: 110 MMHG | HEART RATE: 82 BPM | HEIGHT: 62 IN

## 2017-07-17 DIAGNOSIS — E66.3 OVERWEIGHT (BMI 25.0-29.9): Primary | ICD-10-CM

## 2017-07-17 DIAGNOSIS — E53.8 B12 DEFICIENCY: ICD-10-CM

## 2017-07-17 PROCEDURE — 99999 PR PBB SHADOW E&M-EST. PATIENT-LVL III: CPT | Mod: PBBFAC,,, | Performed by: INTERNAL MEDICINE

## 2017-07-17 PROCEDURE — 99213 OFFICE O/P EST LOW 20 MIN: CPT | Mod: S$GLB,,, | Performed by: INTERNAL MEDICINE

## 2017-07-17 RX ORDER — PHENTERMINE HYDROCHLORIDE 37.5 MG/1
TABLET ORAL
Qty: 30 TABLET | Refills: 0 | Status: SHIPPED | OUTPATIENT
Start: 2017-07-17 | End: 2017-09-11 | Stop reason: SDUPTHER

## 2017-07-17 NOTE — PATIENT INSTRUCTIONS
5-Day Menu Plan: 800-1000 Calories    DAY 1     Breakfast  4 slices deli turkey  Small apple    Snack  ¼ cup almonds    Lunch  Lean hamburger kenan  1 cup green beans    Dinner  2 skinless chicken thighs  1 cup cooked carrots    Snack  SF jello    DAY 2    Breakfast  2 eggs with 2 tbsp salsa    Snack  2 slices deli turkey  ½ cup Peaches canned (in its own juice)    Lunch  [place]: Deli chicken and mustard   Pear cup (no sugar added)    Dinner  Baked fish  1 cup cooked yellow squash    Snack  SF popsicle      DAY 3    Breakfast   Protein drink: 1 scoop whey powder + 6oz soy milk    Snack  ¼ cup almonds    Lunch  Tuna Salad: 3oz canned tuna in water, 1 egg, and 1 tsp light wellington)  Pineapple cup (no sugar added)    Dinner  Baked chicken breast  1 cup grilled eggplant    Snack  8oz Chocolate Soy Milk      DAY 4    Breakfast  2 eggs, turkey sausage kenan    Snack  4 slices deli turkey    Lunch    Snack  1/4 cup almonds  Peach cup (no sugar added)    Dinner  3oz baked pork chop  1 cup cooked green beans      DAY 5    Breakfast  Protein drink: 1 scoop whey powder + 6oz soy milk    Snack   ¼ cup almonds  1 apple    Lunch  1 cup Chicken salad: (3oz canned chicken in water, 1 egg, 1 tsp light wellington)    Snack  4 slices deli turkey  Fruit cup (no sugar added)    Dinner  Omelet: 2 eggs, grilled shrimp, bell pepper and onion        No drinking while eating. Wait 30 min.     Continue trulicity once a week.     Sip on water throughout day between meals.     Cut back artificial sweeteners by half.     Increase weight training to 2 days a week. Continue/Increase cardio.     Return in about 6-8 weeks      Patient warned of common side effects of phentermine including anxiety, insomnia, palpitations and increased blood pressure. It was also explained that it is for short-term usage along with diet and exercise, and that stopping the medication without making lifestyle changes will result in regain of weight. Patient states  understanding.     Weight loss medications are controlled substances.  They require routine follow up. Prescription or pills that are lost or destroyed will not be replaced.

## 2017-07-17 NOTE — PROGRESS NOTES
"Subjective:       Patient ID: Kateryna Weber is a 56 y.o. female.    Chief Complaint: Follow-up    Pt presents today for follow up. Getting adequate protein. She has lost 4 lbs in the past 8 weeks with getting back on track with diet. 34 lbs total. Has been on phentermine. Last Rx in May.  Has not B12 with her today.       Review of Systems   Constitutional: Negative for activity change, appetite change and unexpected weight change.   HENT: Negative for sore throat and voice change.    Eyes: Negative for pain and visual disturbance.   Respiratory: Negative for shortness of breath and wheezing.    Cardiovascular: Negative for palpitations and leg swelling.   Genitourinary: Negative for difficulty urinating and dysuria.        S/p hyst   Musculoskeletal: Positive for arthralgias. Negative for back pain and myalgias.        Hip and hand   Skin: Negative for pallor and rash.   Neurological: Negative for syncope, light-headedness and headaches.   Psychiatric/Behavioral: Negative for dysphoric mood and sleep disturbance. The patient is not nervous/anxious.        Objective:       /78   Pulse 82   Ht 5' 2" (1.575 m)   Wt 74.2 kg (163 lb 9.3 oz)   BMI 29.92 kg/m²     Physical Exam   Constitutional: She is oriented to person, place, and time. She appears well-developed. No distress.   Obese   HENT:   Head: Normocephalic and atraumatic.   Eyes: EOM are normal. Pupils are equal, round, and reactive to light. No scleral icterus.   Neck: Normal range of motion. Neck supple.   Cardiovascular: Normal rate.    Pulmonary/Chest: Effort normal.   Musculoskeletal: Normal range of motion. She exhibits no edema.   Neurological: She is alert and oriented to person, place, and time. No cranial nerve deficit.   Skin: Skin is warm and dry. No erythema.   Psychiatric: She has a normal mood and affect. Her behavior is normal. Judgment normal.   Vitals reviewed.      Assessment:       1. Overweight (BMI 25.0-29.9)    2. B12 deficiency  "       Plan:             Kateryna was seen today for follow-up.    Diagnoses and all orders for this visit:    Overweight (BMI 25.0-29.9)  Doing well. She will work on making better choices.   B12 deficiency  Had teaching from Anitra today for injection.     -     phentermine (ADIPEX-P) 37.5 mg tablet; 1/2 to one pill po daily.    800-1000 deric menu and meal ideas given.       No drinking while eating. Wait 30 min.     Continue trulicity once a week.     Sip on water throughout day between meals.     Cut back artificial sweeteners by half.     Increase weight training to 2 days a week. Continue/Increase cardio.     Return in about 6 weeks

## 2017-09-11 ENCOUNTER — OFFICE VISIT (OUTPATIENT)
Dept: BARIATRICS | Facility: CLINIC | Age: 57
End: 2017-09-11
Payer: COMMERCIAL

## 2017-09-11 VITALS
HEART RATE: 79 BPM | WEIGHT: 159.81 LBS | BODY MASS INDEX: 29.23 KG/M2 | SYSTOLIC BLOOD PRESSURE: 123 MMHG | DIASTOLIC BLOOD PRESSURE: 84 MMHG

## 2017-09-11 DIAGNOSIS — E66.3 OVERWEIGHT (BMI 25.0-29.9): ICD-10-CM

## 2017-09-11 DIAGNOSIS — Z98.84 HX OF BARIATRIC SURGERY: Primary | ICD-10-CM

## 2017-09-11 PROCEDURE — 3008F BODY MASS INDEX DOCD: CPT | Mod: S$GLB,,, | Performed by: INTERNAL MEDICINE

## 2017-09-11 PROCEDURE — 99999 PR PBB SHADOW E&M-EST. PATIENT-LVL III: CPT | Mod: PBBFAC,,, | Performed by: INTERNAL MEDICINE

## 2017-09-11 PROCEDURE — 99213 OFFICE O/P EST LOW 20 MIN: CPT | Mod: S$GLB,,, | Performed by: INTERNAL MEDICINE

## 2017-09-11 RX ORDER — PHENTERMINE HYDROCHLORIDE 37.5 MG/1
TABLET ORAL
Qty: 30 TABLET | Refills: 1 | Status: SHIPPED | OUTPATIENT
Start: 2017-09-11 | End: 2017-12-11

## 2017-09-11 NOTE — PROGRESS NOTES
Subjective:       Patient ID: Kateryna Weber is a 56 y.o. female.    Chief Complaint: Follow-up    Pt presents today for follow up. Getting adequate protein. She has lost 4 lbs in the past 8 weeks with getting back on track with diet. 38 lbs total. Has been on phentermine. Last Rx in May.  Has B12 with her today. Phentermine last filled 7/18/17.   checked today       Review of Systems   Constitutional: Negative for activity change, appetite change and unexpected weight change.   HENT: Negative for sore throat and voice change.    Eyes: Negative for pain and visual disturbance.   Respiratory: Negative for shortness of breath and wheezing.    Cardiovascular: Negative for palpitations and leg swelling.   Genitourinary: Negative for difficulty urinating and dysuria.        S/p hyst   Musculoskeletal: Positive for arthralgias. Negative for back pain and myalgias.        Hip and hand   Skin: Negative for pallor and rash.   Neurological: Negative for syncope, light-headedness and headaches.   Psychiatric/Behavioral: Negative for dysphoric mood and sleep disturbance. The patient is not nervous/anxious.        Objective:       /84   Pulse 79   Wt 72.5 kg (159 lb 13.3 oz)   BMI 29.23 kg/m²     Physical Exam   Constitutional: She is oriented to person, place, and time. She appears well-developed. No distress.   Obese   HENT:   Head: Normocephalic and atraumatic.   Eyes: EOM are normal. Pupils are equal, round, and reactive to light. No scleral icterus.   Neck: Normal range of motion. Neck supple.   Cardiovascular: Normal rate.    Pulmonary/Chest: Effort normal.   Musculoskeletal: Normal range of motion. She exhibits no edema.   Neurological: She is alert and oriented to person, place, and time. No cranial nerve deficit.   Skin: Skin is warm and dry. No erythema.   Psychiatric: She has a normal mood and affect. Her behavior is normal. Judgment normal.   Vitals reviewed.      Assessment:       1. Hx of bariatric  surgery    2. Overweight (BMI 25.0-29.9)        Plan:           1. Hx of bariatric surgery  CC results to Patria Yao MD   - CBC auto differential; Future  - Comprehensive metabolic panel; Future  - Iron and TIBC; Future  - Vitamin B1; Future  - Vitamin D; Future  - Vitamin B6; Future  - Vitamin B12; Future  - Hemoglobin A1c; Future  - TSH; Future  - Lipid panel; Future    2. Overweight (BMI 25.0-29.9)    - phentermine (ADIPEX-P) 37.5 mg tablet; 1/2 to one pill po daily.  Dispense: 30 tablet; Refill: 1      No drinking while eating. Wait 30 min.     Continue trulicity once a week.     Sip on water throughout day between meals.     Cut back artificial sweeteners by half.     Increase weight training to 2 days a week. Continue/Increase cardio.     Return in about 6 weeks

## 2017-09-11 NOTE — PATIENT INSTRUCTIONS
No drinking while eating. Wait 30 min.     Continue trulicity once a week.     Sip on water throughout day between meals.     Cut back artificial sweeteners by half.     Increase weight training to 2 days a week. Continue/Increase cardio.     Patient warned of common side effects of phentermine including anxiety, insomnia, palpitations and increased blood pressure. It was also explained that it is for short-term usage along with diet and exercise, and that stopping the medication without making lifestyle changes will result in regain of weight. Patient states understanding.     Weight loss medications are controlled substances.  They require routine follow up. Prescription or pills that are lost or destroyed will not be replaced.         Lower Carb Comfort Food Dupes      Skinny Bell Pepper Omid Boats  Yields: 18 boats  Servin boats  Calories: 145  Total Fat: 9g  Saturated Fat: 4g  Trans Fat: 0g  Cholesterol: 50mg  Sodium: 293mg  Carbohydrates: 4g  Fiber: 1g  Sugars: 2g  Protein: 13g  SmartPoints: 4     Ingredients   1 pound lean ground turkey   1 teaspoons chili powder   1 teaspoon cumin   1/2 teaspoon black pepper   1/4 teaspoon kosher or sea salt   3/4 cup salsa, no sugar added   1 cup grated cheddar cheese, reduced-fat   3 bell peppers  Directions  Remove seeds, core, and membrane from bell peppers then slice each one into 6 verticle pieces where they dip down. Set sliced bell peppers aside.  Cook ground turkey over medium-high heat, breaking up as it cooks. Cook until the turkey loses it's pink color and is cooked through. Drain off any fat.  Preheat oven to 375 degrees.  Combine cooked turkey with spices and salsa. Evenly distribute mixture into the bell pepper boats, top with cheese. Bake on a parchment lined baking sheet for 10 minutes or until cheese is melted and peppers are hot.  NOTE: If you prefer much softer bell peppers, add a few tablespoons water to the bottom of a large  casserole dish, add filled nachos, cover tightly with foil and bake 15 minutes.  Remove from the oven and add additional toppings, If desired.  Optional ingredients: sliced Jalepeno peppers, diced avocado, fat-free Greek yogurt or sour cream, or sliced green onions.    [place] Chicken Spaghetti Squash  Yields: 4 servings  Calories: 457  Total Fat: 23g  Saturated Fat: 8g  Trans Fat: 0g  Cholesterol: 201mg  Sodium: 1146mg  Carbohydrates: 19g  Fiber: 4g  Sugar: 9g  Protein: 44g  SmartPoints: 13    Ingredients   1 large spaghetti squash   1 small onion, diced   2 medium carrots, diced   2 celery stalks, diced   1 pound cooked chicken, shredded   1/2 cup hot sauce   1/4 cup Homemade Ranch dressing   1/2 teaspoon garlic powder   salt and pepper to taste   1 cup low-fat shredded cheddar cheese   2 eggs   1/4 cup green onion, chopped  Directions  Preheat oven to 400 degrees F and spray a baking sheet with cooking spray.  Slice your spaghetti squash in half lengthwise and scoop out the seeds, then spray the cut side of the squash with a little olive oil cooking spray and place cut side down on the baking pan.  Roast spaghetti squash for 30-45 minutes or until it is tender.  While the squash is cooking, sauté the onion, celery, and carrots until softened and mostly cooked through.  In a large bowl, combine the sauteed vegetables, chicken, hot sauce, ranch dressing, garlic powder, salt, pepper, eggs, and cheese.  Once the squash is cooked through, allow to cool slightly then use a fork to scrape the insides into your chicken mixture, making sure not to tear the skins.  Make sure that the spaghetti squash is well incorporated with the other ingredients, then divide the mixture between the spaghetti squash halves.  Bake at 350 degrees for 30-35 minutes, or until hot and bubbly.  Remove from the oven and garnish with the green onions and additional ranch or hot sauce if desired.    Lasagna Zucchini  Boats  Servings: 8 zucchini boats  Ingredients   Report this ad    4 medium zucchini (2 1/2 lbs), sliced into halves through the length*   1 cup (8.6 oz) part-skim ricotta cheese   1 large egg   1 1/2 Tbsp chopped fresh parsley , plus more for garnish   1 1/4 cups (5 oz) shredded mozzarella cheese   1/2 cup (2 oz) finely shredded parmesan cheese   8 oz 93% lean ground beef or lean ground turkey   4 tsp olive oil , divided   Salt and freshly ground black pepper   1 3/4 cup roasted garlic marinara sauce (low sugar)   1 Tbsp chopped fresh basil , plus more for garnish  Instructions  1. Preheat oven to 400 degrees. Using a spoon, scoop centers from zucchini while leaving a 1/4-inch rim to create boats. Set aside.  2. In a mixing bowl stir together ricotta cheese, egg and 1 1/2 Tbsp of the parsley. Season lightly with salt and pepper. Stir in 1/2 cup of the mozzarella cheese and the parmesan cheese. Set aside.  3. Heat 2 tsp of the olive oil in a large non-stick skillet over medium-high heat. Crumble beef into pan, season with salt and pepper and cook, stirring occasionally and breaking up beef when stirring, until browned (there shouldn't be any excess fat but if you happened to use a fattier beef then just drain excess rendered fat). Stir in marinara sauce and 1 Tbsp of the basil, remove from heat.  4. To assemble boats, brush both sides of of zucchini lightly with remaining 2 tsp olive oil and place in two baking pans (I used a 13 by 9 and a 9 by 9). Divide cheese mixture among zucchini spooning about 2 1/2 Tbsp into each, then spread cheese mixture into and even layer. Divide sauce among zucchini adding a few heaping spoonfuls to each. Cover baking dishes with foil and place in oven side by side and bake in preheated oven 30 minutes. Remove from oven, sprinkle tops with remaining 3/4 cup mozzarella, return to oven and bake until cheese has melted and zucchini is tender, about 5 minutes. Sprinkle tops with  with fresh basil and parsley and serve warm.  5. *Look for zucchini that is wider and more uniform in width. The skinnier zucchini won't fit much filling.  6. Recipe source: [employer]    Chicken Avocado Lime Soup  Prep Time: 15 minutes  Cook Time: 20 minutes  Ingredients   Report this ad    1 1/2 lbs boneless skinless chicken breasts*   1 Tbsp olive oil   1 cup chopped green onions (including whites, mince the whites)   2 jalapeños , seeded and minced (leave seeds if you want soup spicy, omit if you don't like heat)   2 cloves garlic , minced   4 (14.5 oz) cans low-sodium chicken broth   2 [place] tomatoes , seeded and diced   1/2 tsp ground cumin   Salt and freshly ground black pepper   1/3 cup chopped cilantro   3 Tbsp fresh lime juice   3 medium avocados , peeled, cored and diced   Tortilla chips , darrius hilda cheese, sour cream for serving (optional)    Instructions  1. In a large pot heat 1 Tbsp olive oil over medium heat. Once hot, add green onions and jalapenos and saute until tender, about 2 minutes, adding garlic during last 30 seconds of sauteing. Add chicken broth, tomatoes, cumin, season with salt and pepper to taste and add chicken breasts. Bring mixture to a boil over medium-high heat. Then reduce heat to medium, cover with lid and allow to cook, stirring occasionally, until chicken has cooked through 10 - 15 minutes (cook time will vary based on thickness of chicken breasts). Reduce burner to warm heat, remove chicken from pan and let rest on a cutting board 5 minutes, then shred chicken and return to soup. Stir in cilantro and lime juice. Add avocados to soup just before serving (if you don't plan on serving the soup right away, I would recommend adding the avocados to each bowl individually, about 1/2 an avocado per serving). Serve with tortilla chips, cheese and sour cream if desired.  2. *For thicker chicken breasts, cut breasts in half through the length (thickness) of the  "breasts, they will cook faster and more evenly.  3. Recipe Source: adapted slightly from [facility]        CAULIFLOWER "MAC" AND CHEESE    INGREDIENTS   1 small head cauliflower cut into small florets about 5-6 cups   1/2 small onion, diced   1 teaspoon olive oil   Kosher salt   Freshly ground black pepper   2 tablespoons parsley   1 teaspoon paprika   2 tablespoons butter   2 tablespoons flour   1 1/4 cups milk, (whole milk or coconut is best)   1/2 teaspoon granulated garlic   1 teaspoon mustard (optional)   1/2 teaspoon paprika   2 1/2 cups grated extra sharp cheddar cheese (reserve 1/2 cup)     PREPARATION  1. Preheat oven to 400°F. Place cauliflower florets on a baking sheet, mix with diced onion and oil, sprinkle with salt and pepper. Roast for 20-25 minutes tossing half way through until browned.  2. Warm the milk in the microwave, so it is just heated through (this helps prevent the cheese sauce from clumping). Heat a medium saucepan over medium med-high heat. Add 2 tablespoons butter and flour then whisk for 2 minutes (until a smooth carissa is made), add milk and continue whisking until sauce thickens. Once smooth add salt and pepper and other spices. Then add the cheese reserving 1/2 cup. Mix with spatula and add cauliflower, stir gently. Now add the reserved cheese, mix just enough to evenly distribute.   4. Place mixture into a 8x8 inch greased casserole dish and cover with paprika and parsley. Bake in oven for 20 minutes until toasty and bubbly.      "

## 2017-10-27 NOTE — TELEPHONE ENCOUNTER
Patient reports mood symptoms have improved and that she continue on Sertraline at 50 mg and has initiated care with behavioral health. Patient contracts for safety and agrees to call office back next week with progress report.    Pt called in requesting a refill of the pended medication to be sent to [facility].

## 2017-12-07 ENCOUNTER — TELEPHONE (OUTPATIENT)
Dept: INTERNAL MEDICINE | Facility: CLINIC | Age: 57
End: 2017-12-07

## 2017-12-07 DIAGNOSIS — Z12.31 ENCOUNTER FOR SCREENING MAMMOGRAM FOR BREAST CANCER: Primary | ICD-10-CM

## 2017-12-07 NOTE — TELEPHONE ENCOUNTER
----- Message from Heather Galindo sent at 12/7/2017  9:51 AM CST -----  Contact: pt [phone]  Pt called requesting to schedule her mammogram.  Please place orders in system so we can schedule it.    Pt can be reached at [phone]    Thanks  carol

## 2017-12-11 ENCOUNTER — OFFICE VISIT (OUTPATIENT)
Dept: BARIATRICS | Facility: CLINIC | Age: 57
End: 2017-12-11
Payer: COMMERCIAL

## 2017-12-11 VITALS
SYSTOLIC BLOOD PRESSURE: 104 MMHG | DIASTOLIC BLOOD PRESSURE: 70 MMHG | HEIGHT: 62 IN | WEIGHT: 159.81 LBS | BODY MASS INDEX: 29.41 KG/M2 | HEART RATE: 84 BPM

## 2017-12-11 DIAGNOSIS — Z98.84 HX OF BARIATRIC SURGERY: ICD-10-CM

## 2017-12-11 DIAGNOSIS — E66.3 OVERWEIGHT (BMI 25.0-29.9): Primary | ICD-10-CM

## 2017-12-11 PROCEDURE — 99999 PR PBB SHADOW E&M-EST. PATIENT-LVL III: CPT | Mod: PBBFAC,,, | Performed by: INTERNAL MEDICINE

## 2017-12-11 PROCEDURE — 99213 OFFICE O/P EST LOW 20 MIN: CPT | Mod: S$GLB,,, | Performed by: INTERNAL MEDICINE

## 2017-12-11 RX ORDER — DIETHYLPROPION HYDROCHLORIDE 75 MG/1
75 TABLET, EXTENDED RELEASE ORAL DAILY
Qty: 30 TABLET | Refills: 2 | Status: SHIPPED | OUTPATIENT
Start: 2017-12-11 | End: 2018-03-19

## 2017-12-11 NOTE — PATIENT INSTRUCTIONS
No drinking while eating. Wait 30 min.     Continue trulicity once a week.     Sip on water throughout day between meals.     Cut back artificial sweeteners by half.     Increase weight training to 2 days a week. Continue/Increase cardio.     Return in about 12 weeks      Helpful tips to survive the holidays:  - Dont save yourself for the meal: Make sure you continue to eat high protein small meals every 3-4 hours to ensure to do not become over-hungry. Avoid temptation by not showing up to a holiday party or gathering hungry.   - Plan ahead. Bring a dish to a party if you know there may not be an appropriate option.   - Choose sugar-free drinks: Stick to water or other sugar-free beverages and make sure you are getting 6-8 cups of fluid each day (but not with meals!). Avoid alcohol, carbonated beverages, and high-fat/high-sugar beverages like hot chocolate and eggnog. Try sugar-free hot cocoa made with skim milk or water, or sugar-free spiced tea to add some holiday flair to your beverage (see sugar-free mulled cider recipe below)  - Take your time: Eat mindfully. Dont graze on food throughout the day. Sit down to enjoy your small meals. Chew slowly and thoroughly. Cut your food into small pieces before eating.  - Listen to your body: Stop eating as soon as you feel full. Do not feel pressured to try certain (or all) foods or to eat all of the food on your plate. Listen to your hunger cues.   - REMEMBER: Make your holidays about spending time with family and friends instead of focusing gatherings around food.  - Keep up your exercise routine: Make sure you continue to get regular exercise throughout the holiday season. Encourage friends and family to be active by taking a walk together after a meal, to look at holiday lights, or to window-shop.    Good Holiday Meal Options:  - Roasted Turkey, NO skin. Use low sodium broth instead of gravy.   - Stuffed Bell Peppers made WITHOUT bread crumbs or Rice. Try using  parmesan cheese instead  - Gumbo, NO rice. Try picking out mostly the meat/seafood and vegetables with little broth.   - Green Bean Casserole made with 98% fat free cream of mushroom soup and crushed almonds/pecans instead of fried onions  - Side salad w/ low fat dressing. Try a different kind of salad maybe use Kale or spinach.   - Roasted non-starchy vegetables like brussel sprouts, broccoli, green beans, zucchini, butternut squash, cauliflower  - Cauliflower Mash (steam or roast cauliflower, puree w/ low fat cheese, dash of fat free milk and 2-3 sprays of I cant believe its not butter spray. Add garlic powder and black pepper to season). Use Low sodium broth instead of gravy.   - Try Loaded Cauliflower Mash (Make cauliflower like above cauliflower mash. Top with diced turkey pro, ¼ cup low fat cheddar cheese and bake @ 350* F for 5-10 minutes, until cheese is melted. Top with minced chives, black pepper and garlic to taste).   - Homemade cranberry sauce using Splenda or another alternative sweetener. Boil fresh cranberries and add splenda to taste. Boil until cranberries break open and then simmer until it reaches the consistency you want (less time for more watery sauce and simmer for longer to create a thicker sauce).   - Deviled eggs: make using low fat wellington, mustard, DILL relish (not sweet relish).   - Vegetable tray w/ Greek yogurt Ranch Dip. Mix 1 packet of [place] ranch dip mix w/ 16 oz low fat plain greek yogurt.     Good Holiday Dessert Options:  - High protein Pumpkin Cheesecake (see recipe below)  - Pumpkin Whip (see recipe below)  - Quest Apple Pie or Cinnamon Roll flavored protein bar (warm in microwave for 10-15 seconds)  - Eggnog Protein shake (see recipe below)  - Fresh fruit w/ low fat cheese  - Sugar-free Jello Parfaits. Layer Red and Green sugar-free jello in cups and top w/ 2 tbsp Sugar-free cool-whip    Pumpkin Cheesecake    8 ounces fat free cream cheese, softened   2 scoops of  vanilla protein powder (<4 g sugar per serving)   ¼ tsp Fine salt   2 eggs, at room temperature   1/3 cup fat free sour cream  1/3 cup fat free half and half  1 15 -ounce can pure pumpkin puree   1 tablespoon pumpkin pie spice, plus more for dusting   Unsalted nuts, crushed  *Add splenda to taste    Directions     1. Preheat the oven to 300 degrees F. Line 18 muffin cups with paper liners. Sprinkle 1 tsp crushed unsalted nuts at the bottom of each of muffin cup liner.     2. In a large bowl, beat the cream cheese, vanilla protein powder and 1/4 teaspoon fine salt on medium-high speed until smooth and creamy, 2 to 3 minutes. Scrape down the sides, reduce speed to low and beat in the eggs, 1 at a time, until combined. Beat in 1/3 cup fat free sour cream and fat free half and half. Stir in the pumpkin puree and pumpkin pie spice until smooth. Divide evenly among cookie-lined paper cups, filling almost all the way to the top.     3. Bake until the filling is just set, 40 to 45 minutes. A sharp knife inserted into the center will come out moist, but clean. Cool completely in tins on a wire rack. Refrigerate until cold, 4 hours, or overnight. Top with a dusting of pumpkin pie spice.    Recipe altered from the following recipe: http://www.[employer]/recipes/food-network-niko/mini-pumpkin-cheesecakes-recipe.print.html?oc=linkback    Pumpkin Whip    Box of sugar-free vanilla pudding  Can of pumpkin puree  Pumpkin Pie spice (sprinkle to taste)  ½ cup of sugar-free Cool Whip    Directions:  Make sugar-free pudding according to package directions using fat free or 1% milk. Stir in pumpkin and cool whip. Add pumpkin pie spice to taste.     Egg Nog Protein shake    8 oz skim or 1% milk  1 scoop vanilla protein powder  1 tbsp sugar-free vanilla pudding mix  ½ tsp butter flavor extract  ½ tsp rum extract  ½ tsp cinnamon     Shake together or blend with ice and serve.     Sugar-Free Mulled Cider    3 oz diet cran-apple  juice  6 oz water  1 packet sugar-free apple cider mix  ½ tsp apple pie spice  ½ tsp butter flavor extract  1 tbsp Sugar-free Syrup    Mix together. Warm if needed and serve w/ orange wedge and cinnamon stick.         Eating well to be healthy and lose weight does not have to be hard. It also does not have to be time consuming or expensive. There a lots of ways you can work in healthy choices into your day. Many of these are easy, quick and even family friendly!    Homemade hazelnut au lait  Brew your favorite brand of hazelnut flavored coffee ([employer] makes a good one). Microwave 1/2 cup of milk that fits your eating plan (whole, skim or sugar-free almond milk can all work). Add half to 1 oz sugar free hazelnut syrup.     Quick and easy breakfast  1-2 boiled eggs or mini-frittatas with a tangerine. The boiled eggs and mini-frittatas can both be made ahead and last for up to 4 days in the refrigerator. Bonus if you portion them out in ready to go containers or zipper bags.     Breakfast Egg Muffins with Pro and Spinach  Makes 12 muffins  Ingredients    6 eggs  ¼ cup milk  ¼ teaspoon salt  2 cups grated cheddar cheese  3/4 cup spinach, cooked and drained (about 8 oz fresh spinach)  6 pro slices, cooked, drained of fat, and chopped  1/2 cup grated Parmesan cheese (optional)    Instructions      Preheat oven to 350 degrees. Use a regular 12-cup muffin pan. Spray the muffin pan with non-stick cooking spray.  In a large bowl, beat eggs until smooth. Add milk, salt, Cheddar cheese and mix. Stir spinach, cooked pro into the egg mixture. Ladle the egg mixture into greased muffin cups ¾ full.  Top each muffin cup with grated Parmesan cheese.  Bake for 25 minutes. Remove from the oven, let the muffins cool for 30 minutes before removing them from the pan.      Be a brown bagger! When you make dinner, plan for an extra helping. When you serve your plate for dinner, serve an additional helping into a container that you  can take with you the next day. If you don't have a refrigerator available during the day, an insulated lunch bag and ice packs will help you safely store you lunch.     Cold Brewed Iced tea. Fill a pitcher with 64 oz filtered water. Add either 4 regular tea bags of your choice or a large iced tea bag. Refrigerate over night then remove the tea bags. The tea will not be bitter and is super flavorful. Get creative! Try combinations like green tea and hibiscus tea or black tea with lemon zinger. Add orange or lemon slices for even more flavor.     Snack wisely. Protein filled snacks will fill you up, allowing you to get by with fewer calories. String cheese, pork skins (chicharrones), turkey pepperoni, or celery with cream cheese will all fit the bill.       Ditch the take out. Turkey tacos (with or without a low carb tortilla), burgers (without the bun), or fun stir fries are all quick and easy. The whole family will be happy, and you can save calories and money.      Orange Chicken Stir bell with asparagus   Makes 6 servings  Ingredients:    1.5 lbs boneless skinless chicken breast/tenders, diced into 1-inch pieces  1 Tbsp extra virgin olive or avocado oil, divided  2 lb asparagus, end portions trimmed and remainder diced into 1 1/2-inch pieces  1 small yellow onion, sliced into thin strips  8 oz button mushrooms, sliced  1 Tbsp peeled and finely grated fresh gabbie  4 cloves garlic, minced  1/2 cup low-sodium chicken broth  Juice of 2 fresh oranges  2 Tbsp low sodium soy sauce  2 Tbsp cornstarch  Sea salt and freshly ground black pepper    Directions:    In a 12-inch non-stick wok, heat 1/2 oil over moderately high heat. Once oil is hot, add diced chicken and season lightly with salt and pepper. Sauté until cooked through, tossing occasionally, about 5-6 minutes.  Place chicken on a large plate and set aside. Return wok, reduce to medium-high heat, add remaining oil.  Once oil is hot, add asparagus, yellow onion and  mushrooms, and sauté until tender-crisp, about 4 - 5 minutes, adding in garlic and gabbie during the last 1 minute of sautéing.  Meanwhile, in a mixing bowl whisk together chicken broth, orange juice, soy sauce and cornstarch until well blended.  Pour chicken broth mixture into skillet with veggies, season with salt and pepper to taste, and bring mixture to a light boil, stirring constantly. Allow mixture to gently boil, stirring constantly, until thickened, about 1 minute.  Toss chicken into mixture and serve immediately over cauliflower rice or Shirataki noodles.      Skinny Chicken Tortilla Soup  Makes 7 servings    2 teaspoons olive oil  1 cup onion, chopped (about 1 small)  2 cups celery, sliced (about 4 medium stalks)  4 garlic cloves, minced  4 medium tomatoes, chopped  2 cups water  4 cups low-sodium organic chicken broth  3 cups chopped and/or shredded rotisserie chicken, skinless  2 cups sliced carrots (about 3 medium)  1 teaspoon dried oregano leaves  2 teaspoons chili powder  1 teaspoon garlic powder  2 teaspoons cumin  ½ teaspoon cayenne pepper (add less or omit, if you don't want a spicy soup)  ½ teaspoon sea salt + more to taste  ½ teaspoon pepper + more to taste    Directions:   Put all ingredients into a large crock pot. Cook on low for 5-6 hours.     Optional garnish with chopped avocado, chopped fresh cilantro, crumbled Cotija cheese, sour cream, Greek yogurt, your favorite hot sauce.           Vegan Avocado Banana Chocolate Pudding  Makes 4 servings  Ingredients    1 1/2 ripe avocados  2 ripe bananas  6 tbsp raw cacao powder or unsweetened cocoa powder  2-3 tbsp maple syrup (or calorie free sweetener)  1/4 cup almond milk  Instructions    Blend everything together in a  until the consistency is smooth and velvety. Taste and see if more sweetener is needed and stir to make sure everything is evenly mixed. Blend a second time if needed.  Top with banana slices, raw cacao nibs, almond  butter, or any other toppings and enjoy!

## 2017-12-11 NOTE — PROGRESS NOTES
Subjective:       Patient ID: Kateryna Weber is a 56 y.o. female.    Chief Complaint: Follow-up    Pt presents today for follow up. Getting adequate protein. She has lost 0 lbs in the past 8 weeks with getting back on track with diet. 38 lbs total. Has been on phentermine.  Has B12 with her today. Phentermine last filled 10/10/17.   checked today SOmetimes taking up to a whole pill a day.     No visits with results within 1 Day(s) from this visit.  Latest known visit with results is:  Lab Visit on 12/08/2017  WBC                                           Date: 12/08/2017  Value: 5.08        Ref range: 3.90 - 12.70 K/uL  Status: Final  RBC                                           Date: 12/08/2017  Value: 4.95        Ref range: 4.00 - 5.40 M/uL   Status: Final  Hemoglobin                                    Date: 12/08/2017  Value: 15.0        Ref range: 12.0 - 16.0 g/dL   Status: Final  Hematocrit                                    Date: 12/08/2017  Value: 43.6        Ref range: 37.0 - 48.5 %      Status: Final  MCV                                           Date: 12/08/2017  Value: 88          Ref range: 82 - 98 fL         Status: Final  MCH                                           Date: 12/08/2017  Value: 30.3        Ref range: 27.0 - 31.0 pg     Status: Final  MCHC                                          Date: 12/08/2017  Value: 34.4        Ref range: 32.0 - 36.0 g/dL   Status: Final  RDW                                           Date: 12/08/2017  Value: 12.1        Ref range: 11.5 - 14.5 %      Status: Final  Platelets                                     Date: 12/08/2017  Value: 173         Ref range: 150 - 350 K/uL     Status: Final  MPV                                           Date: 12/08/2017  Value: 10.1        Ref range: 9.2 - 12.9 fL      Status: Final  Gran #                                        Date: 12/08/2017  Value: 2.9         Ref range: 1.8 - 7.7 K/uL     Status: Final  Lymph #                                        Date: 12/08/2017  Value: 1.7         Ref range: 1.0 - 4.8 K/uL     Status: Final  Mono #                                        Date: 12/08/2017  Value: 0.3         Ref range: 0.3 - 1.0 K/uL     Status: Final  Eos #                                         Date: 12/08/2017  Value: 0.1         Ref range: 0.0 - 0.5 K/uL     Status: Final  Baso #                                        Date: 12/08/2017  Value: 0.02        Ref range: 0.00 - 0.20 K/uL   Status: Final  Gran%                                         Date: 12/08/2017  Value: 57.2        Ref range: 38.0 - 73.0 %      Status: Final  Lymph%                                        Date: 12/08/2017  Value: 33.9        Ref range: 18.0 - 48.0 %      Status: Final  Mono%                                         Date: 12/08/2017  Value: 6.5         Ref range: 4.0 - 15.0 %       Status: Final  Eosinophil%                                   Date: 12/08/2017  Value: 2.0         Ref range: 0.0 - 8.0 %        Status: Final  Basophil%                                     Date: 12/08/2017  Value: 0.4         Ref range: 0.0 - 1.9 %        Status: Final  Differential Method                           Date: 12/08/2017  Value: Automated     Status: Final  Sodium                                        Date: 12/08/2017  Value: 140         Ref range: 136 - 145 mmol/L   Status: Final  Potassium                                     Date: 12/08/2017  Value: 4.6         Ref range: 3.5 - 5.1 mmol/L   Status: Final  Chloride                                      Date: 12/08/2017  Value: 105         Ref range: 95 - 110 mmol/L    Status: Final  CO2                                           Date: 12/08/2017  Value: 29          Ref range: 23 - 29 mmol/L     Status: Final  Glucose                                       Date: 12/08/2017  Value: 94          Ref range: 70 - 110 mg/dL     Status: Final  BUN, Bld                                      Date: 12/08/2017  Value: 13           Ref range: 7 - 17 mg/dL       Status: Final  Creatinine                                    Date: 12/08/2017  Value: 0.68        Ref range: 0.50 - 1.40 mg/dL  Status: Final  Calcium                                       Date: 12/08/2017  Value: 9.5         Ref range: 8.7 - 10.5 mg/dL   Status: Final  Total Protein                                 Date: 12/08/2017  Value: 7.0         Ref range: 6.0 - 8.4 g/dL     Status: Final  Albumin                                       Date: 12/08/2017  Value: 4.3         Ref range: 3.5 - 5.2 g/dL     Status: Final  Total Bilirubin                               Date: 12/08/2017  Value: 1.3*        Ref range: 0.1 - 1.0 mg/dL    Status: Final                Comment: For infants and newborns, interpretation of results should be based  on gestational age, weight and in agreement with clinical  observations.  Premature Infant recommended reference ranges:  Up to 24 hours.............<8.0 mg/dL  Up to 48 hours............<12.0 mg/dL  3-5 days..................<15.0 mg/dL  6-29 days.................<15.0 mg/dL    Alkaline Phosphatase                          Date: 12/08/2017  Value: 72          Ref range: 38 - 126 U/L       Status: Final  AST                                           Date: 12/08/2017  Value: 23          Ref range: 15 - 46 U/L        Status: Final  ALT                                           Date: 12/08/2017  Value: 32          Ref range: 10 - 44 U/L        Status: Final  Anion Gap                                     Date: 12/08/2017  Value: 6*          Ref range: 8 - 16 mmol/L      Status: Final  eGFR if                       Date: 12/08/2017  Value: >60.0       Ref range: >60 mL/min/1.73 *  Status: Final  eGFR if non                   Date: 12/08/2017  Value: >60.0       Ref range: >60 mL/min/1.73 *  Status: Final                Comment: Calculation used to obtain the estimated glomerular filtration  rate (eGFR) is the CKD-EPI equation.      Iron                                          Date: 12/08/2017  Value: 107         Ref range: 30 - 160 ug/dL     Status: Final  Transferrin                                   Date: 12/08/2017  Value: 275         Ref range: 200 - 375 mg/dL    Status: Final  TIBC                                          Date: 12/08/2017  Value: 407         Ref range: 250 - 450 ug/dL    Status: Final  Saturated Iron                                Date: 12/08/2017  Value: 26          Ref range: 20 - 50 %          Status: Final  Vit D, 25-Hydroxy                             Date: 12/08/2017  Value: 37          Ref range: 30 - 96 ng/mL      Status: Final                Comment: Vitamin D deficiency.........<10 ng/mL                              Vitamin D insufficiency......10-29 ng/mL       Vitamin D sufficiency........> or equal to 30 ng/mL  Vitamin D toxicity............>100 ng/mL    Vitamin B-12                                  Date: 12/08/2017  Value: 598         Ref range: 210 - 950 pg/mL    Status: Final  Hemoglobin A1C                                Date: 12/08/2017  Value: 4.9         Ref range: 4.0 - 5.6 %        Status: Final                Comment: According to ADA guidelines, hemoglobin A1c <7.0% represents  optimal control in non-pregnant diabetic patients. Different  metrics may apply to specific patient populations.   Standards of Medical Care in Diabetes-2016.  For the purpose of screening for the presence of diabetes:  <5.7%     Consistent with the absence of diabetes  5.7-6.4%  Consistent with increasing risk for diabetes   (prediabetes)  >or=6.5%  Consistent with diabetes  Currently, no consensus exists for use of hemoglobin A1c  for diagnosis of diabetes for children.  This Hemoglobin A1c assay has significant interference with fetal   hemoglobin   (HbF). The results are invalid for patients with abnormal amounts of   HbF,   including those with known Hereditary Persistence   of Fetal Hemoglobin. Heterozygous  hemoglobin variants (HbAS, HbAC,   HbAD, HbAE, HbA2) do not significantly interfere with this assay;   however, presence of multiple variants in a sample may impact the %   interference.    Estimated Avg Glucose                         Date: 12/08/2017  Value: 94          Ref range: 68 - 131 mg/dL     Status: Final  TSH                                           Date: 12/08/2017  Value: 1.760       Ref range: 0.400 - 4.000 uI*  Status: Final                Comment: Warning:  Heterophilic antibodies in serum or plasma of   certain individuals are known to cause interference with   immunoassays. These antibodies may be present in blood samples   from individuals regularly exposed to animal or who have been   treated with animal products.     Cholesterol                                   Date: 12/08/2017  Value: 192         Ref range: 120 - 199 mg/dL    Status: Final                Comment: The National Cholesterol Education Program (NCEP) has set the  following guidelines (reference ranges) for Cholesterol:  Optimal.....................<200 mg/dL  Borderline High.............200-239 mg/dL  High........................> or = 240 mg/dL    Triglycerides                                 Date: 12/08/2017  Value: 55          Ref range: 30 - 150 mg/dL     Status: Final                Comment: The National Cholesterol Education Program (NCEP) has set the  following guidelines (reference values) for triglycerides:  Normal......................<150 mg/dL  Borderline High.............150-199 mg/dL  High........................200-499 mg/dL    HDL                                           Date: 12/08/2017  Value: 100*        Ref range: 40 - 75 mg/dL      Status: Final                Comment: The National Cholesterol Education Program (NCEP) has set the  following guidelines (reference values) for HDL Cholesterol:  Low...............<40 mg/dL  Optimal...........>60 mg/dL    LDL Cholesterol                               Date:  12/08/2017  Value: 81.0        Ref range: 63.0 - 159.0 mg/*  Status: Final                Comment: The National Cholesterol Education Program (NCEP) has set the  following guidelines (reference values) for LDL Cholesterol:  Optimal.......................<130 mg/dL  Borderline High...............130-159 mg/dL  High..........................160-189 mg/dL  Very High.....................>190 mg/dL    HDL/Chol Ratio                                Date: 12/08/2017  Value: 52.1*       Ref range: 20.0 - 50.0 %      Status: Final  Total Cholesterol/HDL Ratio                   Date: 12/08/2017  Value: 1.9*        Ref range: 2.0 - 5.0          Status: Final  Non-HDL Cholesterol                           Date: 12/08/2017  Value: 92          Ref range: mg/dL              Status: Final                Comment: Risk category and Non-HDL cholesterol goals:  Coronary heart disease (CHD)or equivalent (10-year risk of CHD >20%):  Non-HDL cholesterol goal     <130 mg/dL  Two or more CHD risk factors and 10-year risk of CHD <= 20%:  Non-HDL cholesterol goal     <160 mg/dL  0 to 1 CHD risk factor:  Non-HDL cholesterol goal     <190 mg/dL    ------------)      Review of Systems   Constitutional: Negative for activity change, appetite change and unexpected weight change.   HENT: Negative for sore throat and voice change.    Eyes: Negative for pain and visual disturbance.   Respiratory: Negative for shortness of breath and wheezing.    Cardiovascular: Negative for palpitations and leg swelling.   Genitourinary: Negative for difficulty urinating and dysuria.        S/p hyst   Musculoskeletal: Positive for arthralgias. Negative for back pain and myalgias.        Hip and hand   Skin: Negative for pallor and rash.   Neurological: Negative for syncope, light-headedness and headaches.   Psychiatric/Behavioral: Negative for dysphoric mood and sleep disturbance. The patient is not nervous/anxious.        Objective:       /70   Pulse 84   Ht  "5' 2" (1.575 m)   Wt 72.5 kg (159 lb 13.3 oz)   BMI 29.23 kg/m²     Physical Exam   Constitutional: She is oriented to person, place, and time. She appears well-developed. No distress.   Obese   HENT:   Head: Normocephalic and atraumatic.   Eyes: EOM are normal. Pupils are equal, round, and reactive to light. No scleral icterus.   Neck: Normal range of motion. Neck supple.   Cardiovascular: Normal rate.    Pulmonary/Chest: Effort normal.   Musculoskeletal: Normal range of motion. She exhibits no edema.   Neurological: She is alert and oriented to person, place, and time. No cranial nerve deficit.   Skin: Skin is warm and dry. No erythema.   Psychiatric: She has a normal mood and affect. Her behavior is normal. Judgment normal.   Vitals reviewed.      Assessment:       1. Overweight (BMI 25.0-29.9)    2. Hx of bariatric surgery        Plan:             1. Overweight (BMI 25.0-29.9)    - diethylpropion 75 mg TbSR; Take 75 mg by mouth once daily.  Dispense: 30 tablet; Refill: 2    2. Hx of bariatric surgery    Healthy all day and holiday tips given.     No drinking while eating. Wait 30 min.     Continue trulicity once a week.     Sip on water throughout day between meals.     Cut back artificial sweeteners by half.     Increase weight training to 2 days a week. Continue/Increase cardio.     Return in about 12 weeks                 "

## 2017-12-12 RX ORDER — BUPROPION HYDROCHLORIDE 150 MG/1
TABLET ORAL
Qty: 30 TABLET | Refills: 12 | Status: SHIPPED | OUTPATIENT
Start: 2017-12-12 | End: 2019-01-25 | Stop reason: SDUPTHER

## 2018-02-18 RX ORDER — ALPRAZOLAM 0.25 MG/1
TABLET ORAL
Qty: 30 TABLET | Refills: 0 | Status: SHIPPED | OUTPATIENT
Start: 2018-02-18 | End: 2018-05-01 | Stop reason: SDUPTHER

## 2018-03-19 ENCOUNTER — OFFICE VISIT (OUTPATIENT)
Dept: BARIATRICS | Facility: CLINIC | Age: 58
End: 2018-03-19
Payer: COMMERCIAL

## 2018-03-19 VITALS
WEIGHT: 159.19 LBS | HEART RATE: 74 BPM | DIASTOLIC BLOOD PRESSURE: 60 MMHG | BODY MASS INDEX: 29.3 KG/M2 | SYSTOLIC BLOOD PRESSURE: 110 MMHG | HEIGHT: 62 IN

## 2018-03-19 DIAGNOSIS — E66.3 OVERWEIGHT (BMI 25.0-29.9): Primary | ICD-10-CM

## 2018-03-19 DIAGNOSIS — Z98.84 S/P LAPAROSCOPIC SLEEVE GASTRECTOMY: ICD-10-CM

## 2018-03-19 PROCEDURE — 99213 OFFICE O/P EST LOW 20 MIN: CPT | Mod: S$GLB,,, | Performed by: INTERNAL MEDICINE

## 2018-03-19 PROCEDURE — 99999 PR PBB SHADOW E&M-EST. PATIENT-LVL III: CPT | Mod: PBBFAC,,, | Performed by: INTERNAL MEDICINE

## 2018-03-19 RX ORDER — PHENTERMINE HYDROCHLORIDE 37.5 MG/1
TABLET ORAL
Qty: 30 TABLET | Refills: 2 | Status: SHIPPED | OUTPATIENT
Start: 2018-03-19 | End: 2018-06-15 | Stop reason: SDUPTHER

## 2018-03-19 NOTE — PATIENT INSTRUCTIONS
.- To lose weight you want to cut 100% starchy carbohydrates out of your diet (bread, rice, pasta, potatoes, granola, flour, corn, peas, oatmeal, grits, tortillas, crackers, chips) and get  grams of protein.  Aim for 100 grams of protein daily.    - No soda, sweet tea, juices, sports drinks or lemonade     -Exercise daily    - Premier Protein (Chocolate, Bananas & Cream, Strawberries & Cream, Vanilla) [employer] or [employer]    - Syntrax [place] from [employer], www.bariatricadvantage.com, www.bariatricchoice.com. (LACTOSE FREE)    - Atkins Lift - [employer] & [employer] (LACTOSE FREE)    - Veggetti Pro from [employer], [employer], [employer]    - www.pinterest.com (cauliflower, cloud bread, quest bar cookies, eggplant, zucchini, zucchini noodles, crustless quiche, no carb meals, taco lettuce boats)    - http://theworldaccophyliciaingtoshoaibace.Variab.ly.[employer]/    Patient warned of common side effects of phentermine including anxiety, insomnia, palpitations and increased blood pressure. It was also explained that it is for short-term usage along with diet and exercise, and that stopping the medication without making lifestyle changes will result in regain of weight. Patient states understanding.     Weight loss medications are controlled substances.  They require routine follow up. Prescription or pills that are lost or destroyed will not be replaced.       Start phentermine with 1/2 pill a day for at least 1 week to see if that will control your appetite.  Go up to a full pill when needed.       Dinner in Under 30 minutes and 400 calories.     Baked Chicken breast with Broccoli Cheese Casserole  2-3 chicken breast (approximately 6-8 oz each)    2 tsp each garlic and herb Mrs. Dash seasoning   2 tsp your favorite creole seasoning  2 tablespoons of balsamic vinegar  2 - 10 oz packages of frozen broccoli  1 egg   ½ cup skim milk  ½ tsp paprika   ½ tsp cayenne pepper   1 can fat free cream of mushroom soup.   1 .5 cups of shredded  cheddar cheese    Pre-heat oven to 450 degrees. Toss 2-3 chicken breast (approximately 6-8 oz each) in 2 tsp each garlic and herb Mrs. Dash seasoning, 2 tsp your favorite creole seasoning, and 2 tablespoons of balsamic vinegar. This can also be done up to 24 hours ahead of time, and the chicken can be left in the refrigerator to marinate. Place on a baking sheet sprayed with non-stick cooking spray and bake on 450 degrees for 10 min, then reduce heat to 350 degrees and cook an addition 10-15 minutes until chicken is cooked through.   While chicken is baking, microwave safe dish, thaw 2 - 10 oz packages of frozen broccoli. Drain any excess water, season with salt, pepper, all-purpose seasoning of your choice. In another bowl, whisk together 1 egg, ½ cup skim milk, ½ tsp paprika, ½ tsp cayenne pepper and 1 can fat free cream of mushroom soup. Combine broccoli, soup mixture, 1 cup of shredded cheddar cheese in baking dish sprayed with cooking spray. Top with remaining cheese. Bake in the oven with chicken for about 20 min or until set cheese is melted and heard.     Makes 4 servings. 389 calories per serving.10 g fat, 13 g carbs, 54g protein     Sheet Pan Ida Shrimp  1 pound large shrimp, shelled, peeled and deveined.   2 tablespoon olive oil  The zest and the juice of 1 lime  ½ tsp chili powder  ½-1 tsp cayenne pepper  1 tsp cumin  ½ tsp dry oregano  1 red bell pepper  1 green bell pepper  1 red onion  2 cups mushrooms, quartered  1 avocado  Whole priya lettuce leaves  Preheat oven to 375 degrees.  In a bowl combine shrimp, lime juice, lime zest, cumin, cayenne pepper, chili powder, and a pinch of salt. Set aside.   Slice peppers and onions into thin strips. Toss in 1 tablespoon of olive oil. Sprinkle with salt and pepper and arrange in a single layer over 1/3 of a large sheet pan  Toss mushrooms with remaining olive oil, oregano, salt and pepper. Arrange in single layer on sheet pan. Place sheet pan in oven  for about 15-20 minutes until vegetables are softened, cooked about ¾ of the way through. Remove the sheet pan from oven.  Change setting on oven to low broil.  If needed, rearrange vegetables to make room for shrimp. Arrange the shrimp in a single layer on the sheet pan and return pan to oven. After 5 minutes, flip shrimp. Cook 3-5 additional minutes, or until  Shrimp are opaque.   Serve shrimp and cooked vegetables on lettuce leaves with sliced avocado.   Makes 4 servings. Calories per servin; 23g fat, 19 g carbohydrates, 27g protein.    Zoodles Primavera with Seasoned Ricotta  8 cups zucchini noodles. These can be purchased already prepared, or you can make them on your own with whole zucchini and a vegetable spiralizer.   1.5 cups cherry tomatoes, quartered  2 cups sliced mushrooms  1 cup chopped fresh broccoli  1 cup frozen peas and carrots  2 cloves garlic, finely chopped  16 oz part skim ricotta cheese  2 oz Neufchatel cream cream cheese, cut into small cubes  Juice and zest of 1 lemon  1 pinch red pepper flakes    2 tsp Italian seasoning  4-5 leaves fresh basil, thinly sliced  1 tablespoon of olive oil  Parmesan cheese for topping (optional)     In a bowl, combine ricotta cheese, 1 tsp Italian seasoning, ½ teaspoon lemon zest. Season with salt and pepper to taste. Mix well. Fold in half of fresh basil. Set aside.   Heat a large skillet to medium high. Add olive oil. Sautee mushrooms until starting to become tender. Season them with salt and pepper while cooking.  Add broccoli and peas and carrots. Reduce heat to medium. Cover pan and cook for 4-5 minutes until broccoli is tender and peas and carrots are warmed through. Add Neufchatel cheese, Italian seasoning, red pepper flakes, 1 tsp lemon zest and lemon juice. Stir until a smooth sauce is formed. Add zucchini noodles (zoodles) to skillet and toss in sauce, then add cherry tomatoes.  Separate zoodle and vegetables into 4 equal portions. Serve each  with a ¼ cup serving of seasoned ricotta cheese. Sprinkle with grated parmesan cheese or fresh basil,  if desired.   Makes 4 servings. Calories per servin calories, 32 g carbs, 33 g protein, 18 g fat

## 2018-03-19 NOTE — PROGRESS NOTES
"Subjective:       Patient ID: Kateryna Weber is a 57 y.o. female.    Chief Complaint: Follow-up    Pt presents today for follow up. Getting adequate protein. She has lost 0 lbs in the past 8 weeks with getting back on track with diet. 38 lbs total. Has been on diethylpropion  X 3 months last filled 2/23/17.  Has B12 with her today. She is at approx her lowest weight since surgery, and has been maintaining it for the past 6 months or so.  She was travelling the past few months, so feels she should have done better. Was approx in the 230's lb before her surgery.           Review of Systems   Constitutional: Negative for activity change, appetite change and unexpected weight change.   HENT: Negative for sore throat and voice change.    Eyes: Negative for pain and visual disturbance.   Respiratory: Negative for shortness of breath and wheezing.    Cardiovascular: Negative for palpitations and leg swelling.   Genitourinary: Negative for difficulty urinating and dysuria.        S/p hyst   Musculoskeletal: Positive for arthralgias. Negative for back pain and myalgias.        Hip and hand   Skin: Negative for pallor and rash.   Neurological: Negative for syncope, light-headedness and headaches.   Psychiatric/Behavioral: Negative for dysphoric mood and sleep disturbance. The patient is not nervous/anxious.        Objective:       /60   Pulse 74   Ht 5' 2" (1.575 m)   Wt 72.2 kg (159 lb 2.8 oz)   BMI 29.11 kg/m²     Physical Exam   Constitutional: She is oriented to person, place, and time. She appears well-developed. No distress.   Obese   HENT:   Head: Normocephalic and atraumatic.   Eyes: EOM are normal. Pupils are equal, round, and reactive to light. No scleral icterus.   Neck: Normal range of motion. Neck supple.   Cardiovascular: Normal rate.    Pulmonary/Chest: Effort normal.   Musculoskeletal: Normal range of motion. She exhibits no edema.   Neurological: She is alert and oriented to person, place, and time. " No cranial nerve deficit.   Skin: Skin is warm and dry. No erythema.   Psychiatric: She has a normal mood and affect. Her behavior is normal. Judgment normal.   Vitals reviewed.      Assessment:       1. Overweight (BMI 25.0-29.9)    2. S/P laparoscopic sleeve gastrectomy        Plan:             1. Overweight (BMI 25.0-29.9)    - phentermine (ADIPEX-P) 37.5 mg tablet; 1/2 -1 tab po daily  Dispense: 30 tablet; Refill: 2  Patient warned of common side effects of phentermine including anxiety, insomnia, palpitations and increased blood pressure. It was also explained that it is for short-term usage along with diet and exercise, and that stopping the medication without making lifestyle changes will result in regain of weight. Patient states understanding.     Weight loss medications are controlled substances.  They require routine follow up. Prescription or pills that are lost or destroyed will not be replaced.       Start phentermine with 1/2 pill a day for at least 1 week to see if that will control your appetite.  Go up to a full pill when needed.       2. S/P laparoscopic sleeve gastrectomy  Nutrition materials provided today.     Did discuss with pt that her weight right now is good and is considered a successful result, so even maintaining here, is good.       30 min recipes given.

## 2018-05-02 RX ORDER — ALPRAZOLAM 0.25 MG/1
TABLET ORAL
Qty: 30 TABLET | Refills: 0 | Status: SHIPPED | OUTPATIENT
Start: 2018-05-02 | End: 2018-07-12 | Stop reason: SDUPTHER

## 2018-05-08 ENCOUNTER — PATIENT MESSAGE (OUTPATIENT)
Dept: SPORTS MEDICINE | Facility: CLINIC | Age: 58
End: 2018-05-08

## 2018-05-08 ENCOUNTER — PATIENT MESSAGE (OUTPATIENT)
Dept: BARIATRICS | Facility: CLINIC | Age: 58
End: 2018-05-08

## 2018-05-09 ENCOUNTER — TELEPHONE (OUTPATIENT)
Dept: SPORTS MEDICINE | Facility: CLINIC | Age: 58
End: 2018-05-09

## 2018-05-16 PROBLEM — Z74.09 IMPAIRED FUNCTIONAL MOBILITY AND ACTIVITY TOLERANCE: Status: ACTIVE | Noted: 2018-05-16

## 2018-06-15 ENCOUNTER — OFFICE VISIT (OUTPATIENT)
Dept: BARIATRICS | Facility: CLINIC | Age: 58
End: 2018-06-15
Payer: COMMERCIAL

## 2018-06-15 VITALS
WEIGHT: 155.88 LBS | DIASTOLIC BLOOD PRESSURE: 77 MMHG | BODY MASS INDEX: 28.51 KG/M2 | SYSTOLIC BLOOD PRESSURE: 132 MMHG | HEART RATE: 81 BPM

## 2018-06-15 DIAGNOSIS — E66.3 OVERWEIGHT (BMI 25.0-29.9): ICD-10-CM

## 2018-06-15 PROCEDURE — 3008F BODY MASS INDEX DOCD: CPT | Mod: CPTII,S$GLB,, | Performed by: INTERNAL MEDICINE

## 2018-06-15 PROCEDURE — 99213 OFFICE O/P EST LOW 20 MIN: CPT | Mod: S$GLB,,, | Performed by: INTERNAL MEDICINE

## 2018-06-15 PROCEDURE — 99999 PR PBB SHADOW E&M-EST. PATIENT-LVL III: CPT | Mod: PBBFAC,,, | Performed by: INTERNAL MEDICINE

## 2018-06-15 RX ORDER — CYANOCOBALAMIN 1000 UG/ML
INJECTION, SOLUTION INTRAMUSCULAR; SUBCUTANEOUS
COMMUNITY
Start: 2018-03-19 | End: 2018-06-15 | Stop reason: SDUPTHER

## 2018-06-15 RX ORDER — PHENTERMINE HYDROCHLORIDE 37.5 MG/1
TABLET ORAL
Qty: 30 TABLET | Refills: 1 | Status: SHIPPED | OUTPATIENT
Start: 2018-07-15 | End: 2019-01-08 | Stop reason: SDUPTHER

## 2018-06-15 RX ORDER — DIETHYLPROPION HYDROCHLORIDE 75 MG/1
75 TABLET, EXTENDED RELEASE ORAL DAILY
Qty: 30 TABLET | Refills: 0 | Status: SHIPPED | OUTPATIENT
Start: 2018-06-15 | End: 2019-01-08

## 2018-06-15 RX ORDER — LANOLIN ALCOHOL/MO/W.PET/CERES
100 CREAM (GRAM) TOPICAL DAILY
COMMUNITY
End: 2021-01-26

## 2018-06-15 NOTE — PROGRESS NOTES
Subjective:       Patient ID: Kateryna Weber is a 57 y.o. female.    Chief Complaint: Follow-up    Pt presents today for follow up. Getting adequate protein. She has lost 4 lbs in the past 12 weeks with getting back on track with diet. 42 lbs total. Has been on diethylpropion  X 3 months last filled 5/22/18.  Has B12 with her today. She is at approx her lowest weight since surgery.  She was travelling the past few months, so feels she should have done better. Was approx in the 230's lb before her surgery. Has been swimming.     Gaol: 145#      Review of Systems   Constitutional: Negative for activity change, appetite change and unexpected weight change.   HENT: Negative for sore throat and voice change.    Eyes: Negative for pain and visual disturbance.   Respiratory: Negative for shortness of breath and wheezing.    Cardiovascular: Negative for palpitations and leg swelling.   Genitourinary: Negative for difficulty urinating and dysuria.        S/p hyst   Musculoskeletal: Positive for arthralgias. Negative for back pain and myalgias.        Hip and hand   Skin: Negative for pallor and rash.   Neurological: Negative for syncope, light-headedness and headaches.   Psychiatric/Behavioral: Negative for dysphoric mood and sleep disturbance. The patient is not nervous/anxious.        Objective:       /77   Pulse 81   Wt 70.7 kg (155 lb 13.8 oz)   BMI 28.51 kg/m²     Physical Exam   Constitutional: She is oriented to person, place, and time. She appears well-developed. No distress.   Obese   HENT:   Head: Normocephalic and atraumatic.   Eyes: EOM are normal. Pupils are equal, round, and reactive to light. No scleral icterus.   Neck: Normal range of motion. Neck supple.   Cardiovascular: Normal rate and regular rhythm.    Pulmonary/Chest: Effort normal and breath sounds normal.   Musculoskeletal: Normal range of motion. She exhibits no edema.   Neurological: She is alert and oriented to person, place, and time. No  cranial nerve deficit.   Skin: Skin is warm and dry. No erythema.   Psychiatric: She has a normal mood and affect. Her behavior is normal. Judgment normal.   Vitals reviewed.      Assessment:       1. Overweight (BMI 25.0-29.9)        Plan:             1. Overweight (BMI 25.0-29.9)  Now: Patient warned of common side effects of diethylpropion including anxiety, insomnia, palpitations and increased blood pressure. It was also explained that it is for short-term usage along with diet and exercise, and that stopping the medication without making lifestyle changes will result in regain of weight. Patient states understanding.    Weight loss medications are controlled substances.  They require routine follow up. Prescription or pills that are lost or destroyed will not be replaced.         Next month:  phentermine (ADIPEX-P) 37.5 mg tablet; 1/2 -1 tab po daily  Dispense: 30 tablet; Refill: 2  Patient warned of common side effects of phentermine including anxiety, insomnia, palpitations and increased blood pressure. It was also explained that it is for short-term usage along with diet and exercise, and that stopping the medication without making lifestyle changes will result in regain of weight. Patient states understanding.     Weight loss medications are controlled substances.  They require routine follow up. Prescription or pills that are lost or destroyed will not be replaced.           2. S/P laparoscopic sleeve gastrectomy  Nutrition materials provided today.     Did discuss with pt that her weight right now is good and is considered a successful result, so even maintaining here, is good.       Spring recipes given.

## 2018-06-15 NOTE — PATIENT INSTRUCTIONS
"Now: Patient warned of common side effects of diethylpropion including anxiety, insomnia, palpitations and increased blood pressure. It was also explained that it is for short-term usage along with diet and exercise, and that stopping the medication without making lifestyle changes will result in regain of weight. Patient states understanding.    Weight loss medications are controlled substances.  They require routine follow up. Prescription or pills that are lost or destroyed will not be replaced.         Next month:  phentermine (ADIPEX-P) 37.5 mg tablet; 1/2 -1 tab po daily  Dispense: 30 tablet; Refill: 2  Patient warned of common side effects of phentermine including anxiety, insomnia, palpitations and increased blood pressure. It was also explained that it is for short-term usage along with diet and exercise, and that stopping the medication without making lifestyle changes will result in regain of weight. Patient states understanding.     Weight loss medications are controlled substances.  They require routine follow up. Prescription or pills that are lost or destroyed will not be replaced.       Spring Recipes:    Henning Shake:     Ingredients  ½ cup low fat cottage cheese  ¼ cup vanilla protein powder  1/8 tsp mint extract  2-3 packets of stevia or artificial sweetener of choice  5-10 ice cubes (more or less depending on how thick you would like it)  4 oz of water  2-3 drops of green food coloring OR a small handful of spinach to make it green    Put all ingredients in  and  until desired consistency.      "Unstuffed" Cabbage Rolls:    Ingredients:  1 1/2 to 2 pounds lean ground beef or turkey  1 large onion, chopped  1 clove garlic, minced  1 small cabbage, chopped  2 cans (14.5 ounces each) diced tomatoes  1 can (8 ounces) tomato sauce  ¼ - ½ cup water depending on desired consistency  1 teaspoon ground black pepper, salt to taste    Spray large skillet with nonstick cookspray. Heat pan on " medium heat. Sauté the onions until tender, and then add the ground beef (or turkey) and cook until the meat is browned.    Add the garlic, cook an additional minute before adding the remaining ingredients. Cover, reduce the heat and simmer about 25 minutes (or until the cabbage is  fork tender)        Not so Radha's Pie:    Ingredients  2 (or more) pounds of lean ground beef, or turkey  2 heads of cauliflower or 3 bags of frozen cauliflower, chopped  1 bag of frozen mixed veggies          (NO Corn, Peas or Potatoes!)  1-2 onions  1 egg  1 teaspoon each of basil, garlic powder, pepper, oregano and a little cayenne  4-5 sprays of I cant believe its not butter spray  4 ounces of fat free cream cheese (optional)  Low fat Cheese to top (optional)    Roast cauliflower in oven on 350* F for about 15-20 minutes, until browned and fork tender. (begin brown meat while cauliflower is cooking). Place cooked cauliflower in . Add 4 ounces of fat free cream cheese, 4-5 sprays of I cant believe its not butter SPRAY, and spices and puree until creamy.     While cauliflower is roasting, brown meat in large skillet and season to taste. Set aside. Sauté diced onion in skillet until somewhat soft. Set aside with meat. Pour mixed veggies in the skillet to heat on low heat.     Mix the meat, onions, mixed veggies, raw egg and any additional seasonings and put in bottom of 9x13 baking dish. Spread mashed cauliflower mixture over it until smooth.    Bake at 350 for approximately 30 minutes. Add cheese and bake 5 additional minutes (optional). Serve warm (or reheat later).    Crock Pot Balsamic Pork Tenderloin:    Ingredients:  1 tsp dried thyme  1 tsp ground pepper  ½ tsp salt  3 tbsp dried minced onion  2 tsp dried basil  ¼ cup low sodium broth  ½ cup balsamic vinegar  2 cloves garlic, minced  2 lbs Pork Tenderloin    Mix first 5 ingredients together. Rub pork tenderloin with dry seasoning mixture.  In a large sauté pan,  heat ¼ cup balsamic vinegar and garlic on medium heat. Add pork to sauté [place] and sear, brown all sides. Add low sodium broth, 1 tbsp at a time to keep tenderloin from sticking or use nonstick cookspray.     Transfer meat to crock pot. Pour remaining balsamic vinegar into sauté pan and continue  to stir for a few minutes to deglaze the pan. Pour juices over the top of the tenderloin in crockpot. Cook on high for 3 hours or until meat thermometer says 170*. Let rest 5-10 minutes and then slice.       Side Dish: Steamed carrots w/ garlic and gabbie    Ingredients:  2 garlic cloves, minced  1 lb baby carrots  5-6 sprays of I cant believe its not butter SPRAY  1 tsp minced peeled fresh gabbie  1 tbsp chopped fresh cilantro  ½ tsp grated lime rind  1 tbsp fresh lime juice   ¼ tsp salt    Prepare garlic; let stand 10 minutes. Steam carrots, covered in pot, about 10 minutes or until tender.     In a nonstick skillet over medium heat, spray pan w/ I cant believe its not butter SPRAY. Add garlic and gabbie and cook 1 minute, stirring constantly. Remove from  heat; stir in carrots, cilantro and remaining ingredients.         Side Dish: Green Bean Almondine    Ingredients:  1 pound fresh green beans, trimmed  1 tbsp dusty vinegar  1 ½ tsp water  1 tsp Shilo Mustard  ¼ tsp salt  ¼ tsp freshly ground black pepper  1 tbsp sliced almonds, toasted    Cook green beans in boiling water for 4 minutes or until crisp-tender. Drain and plunge beans into ice water. Drain well. Pat beans dry w/ paper towel.     Combine vinegar, water, mustard, salt and pepper in a medium bowl. Add beans to vinegar mixture; toss to coat well. Sprinkle with toasted almonds.      How to Cook the Perfect Hard Boiled Egg:    Steam in water: Fill a large pot with 1 inch of water. Place steamer insert inside, cover, and bring to a boil over high heat. Add eggs to steamer basket, cover, and continue cooking 12 minute. If serving cold, immediately place eggs  in a bowl of ice water and allow to cool for at least 15 minutes before peeling under cool running water. Store in the refrigerator for up to 5 days.    OR    Purchase Dash Go Rapid Egg Boiler: available @ [employer], [employer], [employer], [employer] for ~$15-$20      Classic Egg Salad Recipe:    Ingredients:  8 hard cooked eggs, peeled and coarsely chopped  4-6 tbsp of low fat or fat free wellington  2 tbsp celery, chopped  2 tsp abimael mustard  Dash of cayenne pepper (for spice)  Black pepper, salt to taste    In a medium bowl, combine wellington, celery, mustard and cayenne pepper with chopped eggs. Season w/ pepper and salt. Serve over Lettuce leaves.     Get Creative! Add chopped turkey pro and tomato and serve on lettuce leaves for an egg-cellent BLT egg salad or mix lean diced ham, low fat cheddar and tomatoes for  egg salad    Tuna Deviled Eggs:    Ingredients:  12 hard boiled eggs  1 can of tuna packed in water  1 rib celery, diced  ¼ cup low fat wellington  1 tsp mustard  Garlic powder, black pepper to taste  Dash of paprika (for finish)    Cut eggs in half lengthwise. Remove yolk and mash in bowl. In separate bowl, mix tuna, celery, low fat wellington, mustard and seasonings.  Stir in egg yolks until blended. Stuff eggs and sprinkle dash of paprika      Pro Jalapeno Deviled Eggs:    Ingredients:  12 hard boiled eggs, peeled  ½ cup low fat wellington  1 ½ tsp rice vinegar  ¾ tsp ground mustard  ½ packet splenda  2 jalapenos, seeded and chopped, 6 pieces turkey pro, cooked crisp and crumbled  Dash of paprika (for finish)    Cut eggs in half lengthwise. Remove yolk and mash in bowl. Add wellington, vinegar, spices and Splenda until well combined. Mix in jalapenos and pro. Stuff eggs and sprinkle w/ dash of paprika      Sugar-Free High Protein Brownie Recipe:    Ingredients:  2 cups of pureed zucchini  4 oz fat free cream cheese  1 large egg  2 scoops chocolate protein powder  1 tbsp pure vanilla extract  1/3 cup unsweetened  cocoa powder    ¼ tsp salt  2 tbsp chopped nuts  *8-9 packets of splenda or artificial sweetener of choice    Preheat oven to 350* F.     Line an 8x8 pan w/ parchment paper or spray with nonstick cookspray.     In a medium bowl, blend the fat free cream cheese with egg until creamy. Add chocolate protein powder and cocoa powder until creamy. Add vanilla extract. Stir in pureed zucchini and salt.     The batter will be thick, but not dry. If batter seems dry, add 1-2 tbsp water. Fold in Nuts. Pour batter in prepared pan.     Bake for 30 minutes. Allow to cool completely. Cut into squares and chill to semi-set.     *best if eaten within 2 days but may last 3-4 days in fridge.         Lemon Whip:    Ingredients:  Small box of sugar-free vanilla instant pudding mix  1 small packet of crystal light lemonade mix  2 cups skim milk or fat free fairlife milk  Sugar-free, fat free cool whip     Make sugar-free pudding using 2 cups skim milk according to package. Stir in 1 small packet of crystal light lemonade mix.  Fold in a dollop of sugar-free, fat free cool whip and stir to combine.     Home-made Sugar-free Pudding Pops:    Ingredients:  1 small pkg of sugar-free instant pudding mix (flavor of choice!)  2 cups fat free milk     Beat ingredients with whisk for 2 minutes. Pour into 6 paper or plastic cups or into a popsicle mold. Insert wooden pop stick in center of each cup.     Freeze for 5 hours or until firm. Peel off paper or pull out of popsicle mold.     Variations: add sugar-free syrups to change up the flavor, such as sugar-free chocolate pudding + sugar free raspberry syrup = chocolate raspberry fudgesicle.

## 2018-07-12 RX ORDER — ALPRAZOLAM 0.25 MG/1
TABLET ORAL
Qty: 30 TABLET | Refills: 0 | Status: SHIPPED | OUTPATIENT
Start: 2018-07-12 | End: 2018-09-27 | Stop reason: SDUPTHER

## 2018-09-27 RX ORDER — ALPRAZOLAM 0.25 MG/1
TABLET ORAL
Qty: 30 TABLET | Refills: 0 | Status: SHIPPED | OUTPATIENT
Start: 2018-09-27 | End: 2018-12-11 | Stop reason: SDUPTHER

## 2018-11-27 ENCOUNTER — HOSPITAL ENCOUNTER (EMERGENCY)
Dept: HOSPITAL 27 - EMS | Age: 58
Discharge: HOME | End: 2018-11-27
Payer: COMMERCIAL

## 2018-11-27 VITALS — DIASTOLIC BLOOD PRESSURE: 77 MMHG | SYSTOLIC BLOOD PRESSURE: 130 MMHG

## 2018-11-27 VITALS — HEIGHT: 62 IN | WEIGHT: 110.23 LBS | BODY MASS INDEX: 20.28 KG/M2

## 2018-11-27 DIAGNOSIS — E11.9: ICD-10-CM
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
## 2018-11-27 DIAGNOSIS — S63.501A: Primary | ICD-10-CM
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
## 2018-11-27 DIAGNOSIS — F17.210: ICD-10-CM
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
## 2018-11-27 DIAGNOSIS — S20.211A: ICD-10-CM

## 2018-11-27 DIAGNOSIS — F11.90: ICD-10-CM
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
## 2018-11-27 DIAGNOSIS — F20.9: ICD-10-CM

## 2018-11-27 DIAGNOSIS — F19.90: ICD-10-CM

## 2018-11-27 LAB — GLUCOSE BLDC GLUCOMTR-MCNC: 112 MG/DL (ref 70–110)

## 2018-11-27 PROCEDURE — 73502 X-RAY EXAM HIP UNI 2-3 VIEWS: CPT

## 2018-11-27 PROCEDURE — 71101 X-RAY EXAM UNILAT RIBS/CHEST: CPT

## 2018-11-27 PROCEDURE — 99406 BEHAV CHNG SMOKING 3-10 MIN: CPT

## 2018-12-11 RX ORDER — ALPRAZOLAM 0.25 MG/1
TABLET ORAL
Qty: 30 TABLET | Refills: 0 | Status: SHIPPED | OUTPATIENT
Start: 2018-12-11 | End: 2019-02-19 | Stop reason: SDUPTHER

## 2019-01-08 ENCOUNTER — OFFICE VISIT (OUTPATIENT)
Dept: BARIATRICS | Facility: CLINIC | Age: 59
End: 2019-01-08
Payer: COMMERCIAL

## 2019-01-08 ENCOUNTER — PATIENT MESSAGE (OUTPATIENT)
Dept: INTERNAL MEDICINE | Facility: CLINIC | Age: 59
End: 2019-01-08

## 2019-01-08 ENCOUNTER — HOSPITAL ENCOUNTER (OUTPATIENT)
Dept: RADIOLOGY | Facility: HOSPITAL | Age: 59
Discharge: HOME | End: 2019-01-08
Attending: INTERNAL MEDICINE
Payer: COMMERCIAL

## 2019-01-08 ENCOUNTER — PATIENT MESSAGE (OUTPATIENT)
Dept: BARIATRICS | Facility: CLINIC | Age: 59
End: 2019-01-08

## 2019-01-08 VITALS
DIASTOLIC BLOOD PRESSURE: 66 MMHG | WEIGHT: 166.69 LBS | HEIGHT: 62 IN | SYSTOLIC BLOOD PRESSURE: 120 MMHG | BODY MASS INDEX: 30.67 KG/M2 | HEART RATE: 84 BPM

## 2019-01-08 DIAGNOSIS — Z98.84 HX OF BARIATRIC SURGERY: Primary | ICD-10-CM

## 2019-01-08 DIAGNOSIS — Z98.84 S/P LAPAROSCOPIC SLEEVE GASTRECTOMY: ICD-10-CM

## 2019-01-08 DIAGNOSIS — Z00.00 WELLNESS EXAMINATION: Primary | ICD-10-CM

## 2019-01-08 DIAGNOSIS — E66.9 OBESITY, CLASS I, BMI 30.0-34.9 (SEE ACTUAL BMI): Primary | ICD-10-CM

## 2019-01-08 DIAGNOSIS — Z12.31 ENCOUNTER FOR SCREENING MAMMOGRAM FOR BREAST CANCER: ICD-10-CM

## 2019-01-08 DIAGNOSIS — Z98.84 H/O GASTRIC BYPASS: ICD-10-CM

## 2019-01-08 PROCEDURE — 99213 PR OFFICE/OUTPT VISIT, EST, LEVL III, 20-29 MIN: ICD-10-PCS | Mod: S$GLB,,, | Performed by: INTERNAL MEDICINE

## 2019-01-08 PROCEDURE — 3008F PR BODY MASS INDEX (BMI) DOCUMENTED: ICD-10-PCS | Mod: CPTII,S$GLB,, | Performed by: INTERNAL MEDICINE

## 2019-01-08 PROCEDURE — 99999 PR PBB SHADOW E&M-EST. PATIENT-LVL III: CPT | Mod: PBBFAC,,, | Performed by: INTERNAL MEDICINE

## 2019-01-08 PROCEDURE — 99999 PR PBB SHADOW E&M-EST. PATIENT-LVL III: ICD-10-PCS | Mod: PBBFAC,,, | Performed by: INTERNAL MEDICINE

## 2019-01-08 PROCEDURE — 77067 MAMMO DIGITAL SCREENING BILAT WITH TOMOSYNTHESIS_CAD: ICD-10-PCS | Mod: 26,,, | Performed by: RADIOLOGY

## 2019-01-08 PROCEDURE — 3008F BODY MASS INDEX DOCD: CPT | Mod: CPTII,S$GLB,, | Performed by: INTERNAL MEDICINE

## 2019-01-08 PROCEDURE — 77067 SCR MAMMO BI INCL CAD: CPT | Mod: TC

## 2019-01-08 PROCEDURE — 77063 BREAST TOMOSYNTHESIS BI: CPT | Mod: 26,,, | Performed by: RADIOLOGY

## 2019-01-08 PROCEDURE — 99213 OFFICE O/P EST LOW 20 MIN: CPT | Mod: S$GLB,,, | Performed by: INTERNAL MEDICINE

## 2019-01-08 PROCEDURE — 77063 MAMMO DIGITAL SCREENING BILAT WITH TOMOSYNTHESIS_CAD: ICD-10-PCS | Mod: 26,,, | Performed by: RADIOLOGY

## 2019-01-08 PROCEDURE — 77067 SCR MAMMO BI INCL CAD: CPT | Mod: 26,,, | Performed by: RADIOLOGY

## 2019-01-08 RX ORDER — PHENTERMINE HYDROCHLORIDE 37.5 MG/1
TABLET ORAL
Qty: 30 TABLET | Refills: 2 | Status: SHIPPED | OUTPATIENT
Start: 2019-01-08 | End: 2019-04-25 | Stop reason: SDUPTHER

## 2019-01-08 NOTE — TELEPHONE ENCOUNTER
Spoke to pt and scheduled her physical 1/17 at 1pm. Can you place lab orders and flu shot so I can schedule 1/10 at 7:30am per patient

## 2019-01-08 NOTE — PATIENT INSTRUCTIONS
Patient warned of common side effects of phentermine including anxiety, insomnia, palpitations and increased blood pressure. It was also explained that it is for short-term usage along with diet and exercise, and that stopping the medication without making lifestyle changes will result in regain of weight. Patient states understanding.     Weight loss medications are controlled substances.  They require routine follow up. Prescription or pills that are lost or destroyed will not be replaced.       Start phentermine with 1/2 pill a day for at least 1 week to see if that will control your appetite.  Go up to a full pill when needed.      3 days of NO sugar, sweets, starches. Proteins and vegetables only.       Lactose-free & Artificial Sweetener-free Protein Powders    Remember, your protein shake should have less than 4 grams of sugar per serving. For whey powders, choose 100% whey pro isolate, not a blend. Blends add creatine in as a filler.    Natural Zero-calorie sweeteners  ? Stevia  ? Truvia  ? Swerve (http://www.Bizratings.com/about-swerve/)     Lactose-free Protein Powders  ? [facility] 100% Egg Protein (Vanilla & Chocolate)  ? [facility] Pro Performance 100% Soy Isolate (Vanilla & Chocolate)  ? Bluebonnet Protein Powder    Lactose-free Protein Shakes  ? Muscle Milk    Artificial Sweetener-free Protein Powders  ? Pure Protein Natural Whey Protein Powder (Vanilla, Chocolate & Strawberry)  ? Oseas Richardson Whey Pro Isolate (sweetened w/ stevia) (Vanilla, Chocolate, Strawberry, Pina Coloda & Tropical Dreamsicle)  ? Nature's Best Natural Isopure - Unflavored (zero carb, found at [facility])    Lactose & Artificial-Sweetener-Free Protein Powders  ? Oseas Richardson Egg White Protein (sweetened w/ stevia)  ? TwinLab Clean Series Soy Protein (unflavored, found at [facility])  ? About Time Whey Protein Isolate powder (from [employer], sweetened with stevia, gluten-free) (http://[employer]/shop/2lb-whey-protein-isolate/)       5-Day Menu Plan:  800-1000 Calories    DAY 1     Breakfast  1 boiled egg  Small apple    Snack  ¼ cup almonds    Lunch  Morning star kenan  1 cup green beans    Dinner  3 oz salmon  1 cup cooked carrots    Snack  SF jello    DAY 2    Breakfast  2 eggs with 2 tbsp salsa    Snack  6 oz greek yogurt  ½ cup Peaches canned (in its own juice)    Lunch  [place]:Tuna and mustard   Pear cup (no sugar added)    Dinner  Baked fish  1 cup cooked yellow squash    Snack  SF popsicle          DAY 3    Breakfast   Protein drink: 1 scoop whey powder + 6oz soy milk    Snack  ¼ cup almonds    Lunch  Tuna Salad: 3oz canned tuna in water, 1 egg, and 1 tsp light wellington)  Pineapple cup (no sugar added)    Dinner  Baked shrimp  1 cup grilled eggplant    Snack  8oz Chocolate Soy Milk      DAY 4    Breakfast  2 eggs, morning star kenan    Snack  1 cheese stick    Lunch    Snack  1/4 cup almonds  Peach cup (no sugar added)    Dinner  3oz baked fish  1 cup cooked green beans          DAY 5    Breakfast  Protein drink: 1 scoop whey powder + 6oz soy milk    Snack   ¼ cup almonds  1 apple    Lunch  1 cup tuna salad: (3oz canned tuna in water, 1 egg, 1 tsp light wellington)    Snack  3 oz greek yogurt  Fruit cup (no sugar added)    Dinner  Omelet: 2 eggs, grilled shrimp, bell pepper and onion

## 2019-01-08 NOTE — PROGRESS NOTES
"Subjective:       Patient ID: Kateryna Weber is a 58 y.o. female.    Chief Complaint: Follow-up    Pt presents today for follow up. Getting adequate protein. She has gained 11 lbs in the past 7 months,  32 lbs total. Has last been on phentermine,last filled in August. She states she is back to eating sugar. Was approx in the 230's lb before her surgery. Has been swimming. Sometimes skipping her trulicity. Has not been as consistent with her vitamins and shakes.     Goal: 145#      Review of Systems   Constitutional: Negative for activity change, appetite change and unexpected weight change.   HENT: Negative for sore throat and voice change.    Eyes: Negative for pain and visual disturbance.   Respiratory: Negative for shortness of breath and wheezing.    Cardiovascular: Negative for palpitations and leg swelling.   Genitourinary: Negative for difficulty urinating and dysuria.        S/p hyst   Musculoskeletal: Positive for arthralgias. Negative for back pain and myalgias.        Hip and hand   Skin: Negative for pallor and rash.   Neurological: Negative for syncope, light-headedness and headaches.   Psychiatric/Behavioral: Negative for dysphoric mood and sleep disturbance. The patient is not nervous/anxious.        Objective:       /66   Pulse 84   Ht 5' 2" (1.575 m)   Wt 75.6 kg (166 lb 10.7 oz)   BMI 30.48 kg/m²     Physical Exam   Constitutional: She is oriented to person, place, and time. She appears well-developed. No distress.   Obese   HENT:   Head: Normocephalic and atraumatic.   Eyes: EOM are normal. Pupils are equal, round, and reactive to light. No scleral icterus.   Neck: Normal range of motion. Neck supple.   Cardiovascular: Normal rate and regular rhythm.   Pulmonary/Chest: Effort normal and breath sounds normal.   Musculoskeletal: Normal range of motion. She exhibits no edema.   Neurological: She is alert and oriented to person, place, and time. No cranial nerve deficit.   Skin: Skin is warm " and dry. No erythema.   Psychiatric: She has a normal mood and affect. Her behavior is normal. Judgment normal.   Vitals reviewed.      Assessment:       1. Obesity, Class I, BMI 30.0-34.9 (see actual BMI)        Plan:             Kateryna was seen today for follow-up.    Diagnoses and all orders for this visit:    Obesity, Class I, BMI 30.0-34.9 (see actual BMI)  -     phentermine (ADIPEX-P) 37.5 mg tablet; 1/2 -1 tab po daily    S/P laparoscopic sleeve gastrectomy    Other orders  -     dulaglutide (TRULICITY) 1.5 mg/0.5 mL PnIj; Inject 1.5 mg into the skin every 30 days.      Labs added that she will get done with her yearly labs work.         Patient warned of common side effects of phentermine including anxiety, insomnia, palpitations and increased blood pressure. It was also explained that it is for short-term usage along with diet and exercise, and that stopping the medication without making lifestyle changes will result in regain of weight. Patient states understanding.     Weight loss medications are controlled substances.  They require routine follow up. Prescription or pills that are lost or destroyed will not be replaced.       Start phentermine with 1/2 pill a day for at least 1 week to see if that will control your appetite.  Go up to a full pill when needed.      3 days of NO sugar, sweets, starches. Proteins and vegetables only.     800-1000 deric pescatarian diet given

## 2019-01-10 ENCOUNTER — LAB VISIT (OUTPATIENT)
Dept: LAB | Facility: HOSPITAL | Age: 59
End: 2019-01-10
Attending: INTERNAL MEDICINE
Payer: COMMERCIAL

## 2019-01-10 ENCOUNTER — IMMUNIZATION (OUTPATIENT)
Dept: PHARMACY | Facility: CLINIC | Age: 59
End: 2019-01-10
Payer: COMMERCIAL

## 2019-01-10 DIAGNOSIS — Z98.84 H/O GASTRIC BYPASS: ICD-10-CM

## 2019-01-10 DIAGNOSIS — Z98.84 HX OF BARIATRIC SURGERY: ICD-10-CM

## 2019-01-10 DIAGNOSIS — Z00.00 WELLNESS EXAMINATION: ICD-10-CM

## 2019-01-10 LAB
25(OH)D3+25(OH)D2 SERPL-MCNC: 30 NG/ML
ALBUMIN SERPL BCP-MCNC: 3.9 G/DL
ALP SERPL-CCNC: 80 U/L
ALT SERPL W/O P-5'-P-CCNC: 21 U/L
ANION GAP SERPL CALC-SCNC: 7 MMOL/L
AST SERPL-CCNC: 18 U/L
BASOPHILS # BLD AUTO: 0.02 K/UL
BASOPHILS NFR BLD: 0.5 %
BILIRUB SERPL-MCNC: 1.4 MG/DL
BUN SERPL-MCNC: 14 MG/DL
CALCIUM SERPL-MCNC: 9.4 MG/DL
CHLORIDE SERPL-SCNC: 104 MMOL/L
CHOLEST SERPL-MCNC: 188 MG/DL
CHOLEST/HDLC SERPL: 1.9 {RATIO}
CO2 SERPL-SCNC: 30 MMOL/L
CREAT SERPL-MCNC: 0.7 MG/DL
DIFFERENTIAL METHOD: ABNORMAL
EOSINOPHIL # BLD AUTO: 0.1 K/UL
EOSINOPHIL NFR BLD: 2 %
ERYTHROCYTE [DISTWIDTH] IN BLOOD BY AUTOMATED COUNT: 12 %
EST. GFR  (AFRICAN AMERICAN): >60 ML/MIN/1.73 M^2
EST. GFR  (NON AFRICAN AMERICAN): >60 ML/MIN/1.73 M^2
ESTIMATED AVG GLUCOSE: 103 MG/DL
FERRITIN SERPL-MCNC: 39 NG/ML
GLUCOSE SERPL-MCNC: 89 MG/DL
HBA1C MFR BLD HPLC: 5.2 %
HCT VFR BLD AUTO: 41.7 %
HDLC SERPL-MCNC: 97 MG/DL
HDLC SERPL: 51.6 %
HGB BLD-MCNC: 13.9 G/DL
IRON SERPL-MCNC: 108 UG/DL
LDLC SERPL CALC-MCNC: 81.4 MG/DL
LYMPHOCYTES # BLD AUTO: 1.2 K/UL
LYMPHOCYTES NFR BLD: 30.7 %
MAGNESIUM SERPL-MCNC: 2 MG/DL
MCH RBC QN AUTO: 29.4 PG
MCHC RBC AUTO-ENTMCNC: 33.3 G/DL
MCV RBC AUTO: 88 FL
MONOCYTES # BLD AUTO: 0.2 K/UL
MONOCYTES NFR BLD: 5.8 %
NEUTROPHILS # BLD AUTO: 2.4 K/UL
NEUTROPHILS NFR BLD: 60.7 %
NONHDLC SERPL-MCNC: 91 MG/DL
PLATELET # BLD AUTO: 162 K/UL
PMV BLD AUTO: 10.4 FL
POTASSIUM SERPL-SCNC: 3.7 MMOL/L
PROT SERPL-MCNC: 6.4 G/DL
RBC # BLD AUTO: 4.72 M/UL
SATURATED IRON: 26 %
SODIUM SERPL-SCNC: 141 MMOL/L
TOTAL IRON BINDING CAPACITY: 408 UG/DL
TRANSFERRIN SERPL-MCNC: 276 MG/DL
TRIGL SERPL-MCNC: 48 MG/DL
TSH SERPL DL<=0.005 MIU/L-ACNC: 1.09 UIU/ML
VIT B12 SERPL-MCNC: 1268 PG/ML
WBC # BLD AUTO: 3.94 K/UL

## 2019-01-10 PROCEDURE — 84207 ASSAY OF VITAMIN B-6: CPT

## 2019-01-10 PROCEDURE — 83036 HEMOGLOBIN GLYCOSYLATED A1C: CPT

## 2019-01-10 PROCEDURE — 84443 ASSAY THYROID STIM HORMONE: CPT

## 2019-01-10 PROCEDURE — 82728 ASSAY OF FERRITIN: CPT

## 2019-01-10 PROCEDURE — 83540 ASSAY OF IRON: CPT

## 2019-01-10 PROCEDURE — 84425 ASSAY OF VITAMIN B-1: CPT

## 2019-01-10 PROCEDURE — 83735 ASSAY OF MAGNESIUM: CPT

## 2019-01-10 PROCEDURE — 80053 COMPREHEN METABOLIC PANEL: CPT

## 2019-01-10 PROCEDURE — 36415 COLL VENOUS BLD VENIPUNCTURE: CPT

## 2019-01-10 PROCEDURE — 85025 COMPLETE CBC W/AUTO DIFF WBC: CPT

## 2019-01-10 PROCEDURE — 82607 VITAMIN B-12: CPT

## 2019-01-10 PROCEDURE — 82306 VITAMIN D 25 HYDROXY: CPT

## 2019-01-10 PROCEDURE — 80061 LIPID PANEL: CPT

## 2019-01-15 LAB
PYRIDOXAL SERPL-MCNC: 38 UG/L (ref 5–50)
VIT B1 BLD-MCNC: 68 UG/L (ref 38–122)

## 2019-01-17 ENCOUNTER — OFFICE VISIT (OUTPATIENT)
Dept: INTERNAL MEDICINE | Facility: CLINIC | Age: 59
End: 2019-01-17
Payer: COMMERCIAL

## 2019-01-17 VITALS
BODY MASS INDEX: 30.44 KG/M2 | HEIGHT: 62 IN | WEIGHT: 165.38 LBS | HEART RATE: 79 BPM | SYSTOLIC BLOOD PRESSURE: 122 MMHG | OXYGEN SATURATION: 98 % | DIASTOLIC BLOOD PRESSURE: 72 MMHG

## 2019-01-17 DIAGNOSIS — E53.8 VITAMIN B12 DEFICIENCY: ICD-10-CM

## 2019-01-17 DIAGNOSIS — Z12.83 SCREENING EXAM FOR SKIN CANCER: ICD-10-CM

## 2019-01-17 DIAGNOSIS — Z00.00 PHYSICAL EXAM: Primary | ICD-10-CM

## 2019-01-17 DIAGNOSIS — Z00.00 WELLNESS EXAMINATION: ICD-10-CM

## 2019-01-17 DIAGNOSIS — Z12.11 COLON CANCER SCREENING: ICD-10-CM

## 2019-01-17 DIAGNOSIS — D12.6 TUBULOVILLOUS ADENOMA OF COLON: ICD-10-CM

## 2019-01-17 DIAGNOSIS — Z01.419 ENCOUNTER FOR ANNUAL ROUTINE GYNECOLOGICAL EXAMINATION: ICD-10-CM

## 2019-01-17 PROCEDURE — 99999 PR PBB SHADOW E&M-EST. PATIENT-LVL IV: ICD-10-PCS | Mod: PBBFAC,,, | Performed by: INTERNAL MEDICINE

## 2019-01-17 PROCEDURE — 99396 PREV VISIT EST AGE 40-64: CPT | Mod: S$GLB,,, | Performed by: INTERNAL MEDICINE

## 2019-01-17 PROCEDURE — 99999 PR PBB SHADOW E&M-EST. PATIENT-LVL IV: CPT | Mod: PBBFAC,,, | Performed by: INTERNAL MEDICINE

## 2019-01-17 PROCEDURE — 99396 PR PREVENTIVE VISIT,EST,40-64: ICD-10-PCS | Mod: S$GLB,,, | Performed by: INTERNAL MEDICINE

## 2019-01-17 NOTE — PROGRESS NOTES
Subjective:      Patient ID: Kateryna Weber is a 58 y.o. female.    Chief Complaint: Annual Exam    HPI:  HPI   Patient is here for her annual exam    Annual exam: 1/17/2019  Colonoscopy: 8/6/2015 follow up in 3 years  Mammogram: Completed mammo 1/8/2019  Gyn:hysterectomy  Optho: [facility]  Flu:done  Tetanus: up to date  Shingrix discussed  Pneumovax: not due  Vit D: 28 that was increased     Consultants:  Dr. Cameron: Bariatric Surgery ( Gastric Bypass, checks labs yearly  Dr. Poon    Reviewed lab: all good    Patient Active Problem List   Diagnosis    Encounter for screening colonoscopy    Tubulovillous adenoma of colon    Hx of bariatric surgery    Colon cancer screening    Dysphagia    Labral tear of hip, degenerative    Right hip pain    Vitamin B12 deficiency    Impaired functional mobility and activity tolerance     History reviewed. No pertinent past medical history.  Past Surgical History:   Procedure Laterality Date    ABDOMINAL SURGERY  2000    tummy tuck    APPENDECTOMY      COLONOSCOPY N/A 8/6/2015    Performed by [facility]ly[facility] at [facility] (4[facility] F[facility])    COLONOSCOPY N/A 8/5/2013    Performed by Stanley Guerrero MD at [facility] (4TH FLR)    ESOPHAGOGASTRODUODENOSCOPY (EGD) N/A 8/6/2015    Performed by [facility] at [facility] (4TH FLR)    GASTRIC BYPASS  2007    Why Weight Dr. Paul    HYSTERECTOMY       Family History   Problem Relation Age of Onset    Melanoma Neg Hx      Review of Systems   Constitutional: Negative for chills, fever and unexpected weight change.   HENT: Negative for trouble swallowing.    Respiratory: Negative for cough, shortness of breath and wheezing.    Cardiovascular: Negative for chest pain and palpitations.   Gastrointestinal: Negative for abdominal distention, abdominal pain, blood in stool and vomiting.   Musculoskeletal: Negative for back pain.     Objective:     Vitals:    01/17/19 1301   BP: 122/72   Pulse: 79   SpO2: 98%  "  Weight: 75 kg (165 lb 5.5 oz)   Height: 5' 2" (1.575 m)   PainSc:   5   PainLoc: Hand     Body mass index is 30.24 kg/m².  Physical Exam   Constitutional: She is oriented to person, place, and time. She appears well-developed and well-nourished. No distress.   Neck: Carotid bruit is not present. No thyromegaly present.   Cardiovascular: Normal rate, regular rhythm and normal heart sounds. PMI is not displaced.   Pulmonary/Chest: Effort normal and breath sounds normal. No respiratory distress.   Abdominal: Soft. Bowel sounds are normal. She exhibits no distension. There is no tenderness.   Musculoskeletal: She exhibits no edema.   Neurological: She is alert and oriented to person, place, and time.     Assessment:     1. Physical exam    2. Wellness examination    3. Colon cancer screening    4. Tubulovillous adenoma of colon    5. Vitamin B12 deficiency    6. Encounter for annual routine gynecological examination    7. Screening exam for skin cancer      Plan:   Kateryna was seen today for annual exam.    Diagnoses and all orders for this visit:    Physical exam    Wellness examination    Colon cancer screening  -     Case request GI: COLONOSCOPY    Tubulovillous adenoma of colon  Comments:  Follow up due    Vitamin B12 deficiency  Comments:  lab stable    Encounter for annual routine gynecological examination  -     Ambulatory consult to Gynecology    Screening exam for skin cancer  -     Ambulatory consult to Dermatology        Problem List Items Addressed This Visit     Tubulovillous adenoma of colon    Colon cancer screening    Relevant Orders    Case request GI: COLONOSCOPY (Completed)    Vitamin B12 deficiency      Other Visit Diagnoses     Physical exam    -  Primary    Wellness examination        Encounter for annual routine gynecological examination        Relevant Orders    Ambulatory consult to Gynecology    Screening exam for skin cancer        Relevant Orders    Ambulatory consult to Dermatology    "     Orders Placed This Encounter   Procedures    Ambulatory consult to Gynecology     Referral Priority:   Routine     Referral Type:   Consultation     Referral Reason:   Specialty Services Required     Requested Specialty:   Gynecology     Number of Visits Requested:   1    Ambulatory consult to Dermatology     Referral Priority:   Routine     Referral Type:   Consultation     Referral Reason:   Specialty Services Required     Requested Specialty:   Dermatology     Number of Visits Requested:   1    Case request GI: COLONOSCOPY     Order Specific Question:   Pre-op Diagnosis     Answer:   Screening [154744]     Order Specific Question:   CPT Code:     Answer:   UT COLORECTAL CANCER SCREEN RESULTS DOCUMENT/REVIEW [3017F]     Order Specific Question:   Case Referring Provider     Answer:   EDWINA MARISCAL [[id number]]     Order Specific Question:   CPT Code:     Answer:   UT COLON CA SCRN NOT HI RSK IND []     Order Specific Question:   Medical Necessity:     Answer:   Medically Non-Urgent [100]     No Follow-up on file.     Medication List           Accurate as of 1/17/19 11:59 PM. If you have any questions, ask your nurse or doctor.               CONTINUE taking these medications    ALPRAZolam 0.25 MG tablet  Commonly known as:  XANAX  TAKE 1 TABLET BY MOUTH ONCE DAILY AT BEDTIME AS NEEDED FOR ANXIETY     buPROPion 150 MG TB24 tablet  Commonly known as:  WELLBUTRIN XL  TAKE ONE TABLET BY MOUTH ONCE DAILY     * VITAMIN B-12 1000 MCG tablet  Generic drug:  cyanocobalamin     * cyanocobalamin (vitamin B-12) 1,000 mcg/mL Kit  Inject 1,000 mcg as directed every 28 days.     dulaglutide 1.5 mg/0.5 mL Pnij  Commonly known as:  TRULICITY  Inject 1.5 mg into the skin every 30 days.     multivitamin capsule     phentermine 37.5 mg tablet  Commonly known as:  ADIPEX-P  1/2 -1 tab po daily         * This list has 2 medication(s) that are the same as other medications prescribed for you. Read the directions carefully, and  ask your doctor or other care provider to review them with you.

## 2019-01-17 NOTE — PATIENT INSTRUCTIONS
Colonoscopy [id number]    Shingles on back order  New shingles vaccine: SHINGRIX ( 2018) not live, 90%,  2 shots, one at day zero and the 2nd at 2-6 months: any pharmacy can give it.

## 2019-01-25 ENCOUNTER — TELEPHONE (OUTPATIENT)
Dept: INTERNAL MEDICINE | Facility: CLINIC | Age: 59
End: 2019-01-25

## 2019-01-25 DIAGNOSIS — Z12.11 SPECIAL SCREENING FOR MALIGNANT NEOPLASMS, COLON: Primary | ICD-10-CM

## 2019-01-25 DIAGNOSIS — Z12.11 COLON CANCER SCREENING: Primary | ICD-10-CM

## 2019-01-25 RX ORDER — BUPROPION HYDROCHLORIDE 150 MG/1
TABLET ORAL
Qty: 30 TABLET | Refills: 12 | Status: SHIPPED | OUTPATIENT
Start: 2019-01-25 | End: 2020-02-06

## 2019-01-25 RX ORDER — SODIUM, POTASSIUM,MAG SULFATES 17.5-3.13G
1 SOLUTION, RECONSTITUTED, ORAL ORAL DAILY
Qty: 1 KIT | Refills: 0 | Status: SHIPPED | OUTPATIENT
Start: 2019-01-25 | End: 2019-04-05

## 2019-01-25 NOTE — TELEPHONE ENCOUNTER
----- Message from Dorina Vazquez sent at 1/25/2019 11:58 AM CST -----  Contact: Geron  Message from [employer], System Message sent at 1/25/2019 11:33 AM CST -----    Appointment Request From: Kateryna Weber    With Provider: Colonoscopy    Preferred Date Range: 2/14/2019 - 2/15/2019    Preferred Times: Any time    Reason for visit: Dr camp    Comments:  I am late on my test      Dr camp

## 2019-02-19 RX ORDER — ALPRAZOLAM 0.25 MG/1
TABLET ORAL
Qty: 30 TABLET | Refills: 0 | Status: SHIPPED | OUTPATIENT
Start: 2019-02-19 | End: 2019-04-26 | Stop reason: SDUPTHER

## 2019-04-08 ENCOUNTER — HOSPITAL ENCOUNTER (OUTPATIENT)
Facility: HOSPITAL | Age: 59
Discharge: HOME | End: 2019-04-08
Attending: INTERNAL MEDICINE | Admitting: INTERNAL MEDICINE
Payer: COMMERCIAL

## 2019-04-08 ENCOUNTER — ANESTHESIA (OUTPATIENT)
Dept: ENDOSCOPY | Facility: HOSPITAL | Age: 59
End: 2019-04-08
Payer: COMMERCIAL

## 2019-04-08 ENCOUNTER — ANESTHESIA EVENT (OUTPATIENT)
Dept: ENDOSCOPY | Facility: HOSPITAL | Age: 59
End: 2019-04-08
Payer: COMMERCIAL

## 2019-04-08 VITALS
TEMPERATURE: 98 F | BODY MASS INDEX: 28.89 KG/M2 | SYSTOLIC BLOOD PRESSURE: 122 MMHG | HEIGHT: 62 IN | OXYGEN SATURATION: 100 % | RESPIRATION RATE: 15 BRPM | WEIGHT: 157 LBS | HEART RATE: 73 BPM | DIASTOLIC BLOOD PRESSURE: 79 MMHG

## 2019-04-08 DIAGNOSIS — Z86.010 HISTORY OF COLON POLYPS: Primary | ICD-10-CM

## 2019-04-08 PROBLEM — Z86.0100 HISTORY OF COLON POLYPS: Status: ACTIVE | Noted: 2019-04-08

## 2019-04-08 PROCEDURE — G0105 COLORECTAL SCRN; HI RISK IND: ICD-10-PCS | Mod: ,,, | Performed by: INTERNAL MEDICINE

## 2019-04-08 PROCEDURE — 25000003 PHARM REV CODE 250: Performed by: INTERNAL MEDICINE

## 2019-04-08 PROCEDURE — 37000008 HC ANESTHESIA 1ST 15 MINUTES: Performed by: INTERNAL MEDICINE

## 2019-04-08 PROCEDURE — E9220 PRA ENDO ANESTHESIA: ICD-10-PCS | Mod: ,,, | Performed by: NURSE ANESTHETIST, CERTIFIED REGISTERED

## 2019-04-08 PROCEDURE — E9220 PRA ENDO ANESTHESIA: HCPCS | Mod: ,,, | Performed by: NURSE ANESTHETIST, CERTIFIED REGISTERED

## 2019-04-08 PROCEDURE — G0105 COLORECTAL SCRN; HI RISK IND: HCPCS | Performed by: INTERNAL MEDICINE

## 2019-04-08 PROCEDURE — 63600175 PHARM REV CODE 636 W HCPCS: Performed by: NURSE ANESTHETIST, CERTIFIED REGISTERED

## 2019-04-08 PROCEDURE — 37000009 HC ANESTHESIA EA ADD 15 MINS: Performed by: INTERNAL MEDICINE

## 2019-04-08 PROCEDURE — G0105 COLORECTAL SCRN; HI RISK IND: HCPCS | Mod: ,,, | Performed by: INTERNAL MEDICINE

## 2019-04-08 RX ORDER — PROPOFOL 10 MG/ML
VIAL (ML) INTRAVENOUS CONTINUOUS PRN
Status: DISCONTINUED | OUTPATIENT
Start: 2019-04-08 | End: 2019-04-08

## 2019-04-08 RX ORDER — LIDOCAINE HCL/PF 100 MG/5ML
SYRINGE (ML) INTRAVENOUS
Status: DISCONTINUED | OUTPATIENT
Start: 2019-04-08 | End: 2019-04-08

## 2019-04-08 RX ORDER — PROPOFOL 10 MG/ML
VIAL (ML) INTRAVENOUS
Status: DISCONTINUED | OUTPATIENT
Start: 2019-04-08 | End: 2019-04-08

## 2019-04-08 RX ORDER — SODIUM CHLORIDE 9 MG/ML
INJECTION, SOLUTION INTRAVENOUS CONTINUOUS
Status: DISCONTINUED | OUTPATIENT
Start: 2019-04-08 | End: 2019-04-08 | Stop reason: HOSPADM

## 2019-04-08 RX ADMIN — SODIUM CHLORIDE: 0.9 INJECTION, SOLUTION INTRAVENOUS at 07:04

## 2019-04-08 RX ADMIN — PROPOFOL 40 MG: 10 INJECTION, EMULSION INTRAVENOUS at 08:04

## 2019-04-08 RX ADMIN — LIDOCAINE HYDROCHLORIDE 50 MG: 20 INJECTION, SOLUTION INTRAVENOUS at 08:04

## 2019-04-08 RX ADMIN — PROPOFOL 60 MG: 10 INJECTION, EMULSION INTRAVENOUS at 08:04

## 2019-04-08 RX ADMIN — PROPOFOL 125 MCG/KG/MIN: 10 INJECTION, EMULSION INTRAVENOUS at 08:04

## 2019-04-08 NOTE — [employer] PATIENT INSTRUCTIONS
Discharge Summary/Instructions after an Endoscopic Procedure  Patient Name: Kateryna Weber  Patient MRN: [id number]  Patient YOB: 1960 Monday, April 08, 2019  Ilya Arauz MD  RESTRICTIONS:  During your procedure today, you received medications for sedation.  These   medications may affect your judgment, balance and coordination.  Therefore,   for 24 hours, you have the following restrictions:   - DO NOT drive a car, operate machinery, make legal/financial decisions,   sign important papers or drink alcohol.    ACTIVITY:  Today: no heavy lifting, straining or running due to procedural   sedation/anesthesia.  The following day: return to full activity including work.  DIET:  Eat and drink normally unless instructed otherwise.     TREATMENT FOR COMMON SIDE EFFECTS:  - Mild abdominal pain, nausea, belching, bloating or excessive gas:  rest,   eat lightly and use a heating pad.  - Sore Throat: treat with throat lozenges and/or gargle with warm salt   water.  - Because air was used during the procedure, expelling large amounts of air   from your rectum or belching is normal.  - If a bowel prep was taken, you may not have a bowel movement for 1-3 days.    This is normal.  SYMPTOMS TO WATCH FOR AND REPORT TO YOUR PHYSICIAN:  1. Abdominal pain or bloating, other than gas cramps.  2. Chest pain.  3. Back pain.  4. Signs of infection such as: chills or fever occurring within 24 hours   after the procedure.  5. Rectal bleeding, which would show as bright red, maroon, or black stools.   (A tablespoon of blood from the rectum is not serious, especially if   hemorrhoids are present.)  6. Vomiting.  7. Weakness or dizziness.  GO DIRECTLY TO THE NEAREST EMERGENCY ROOM IF YOU HAVE ANY OF THE FOLLOWING:      Difficulty breathing              Chills and/or fever over 101 F   Persistent vomiting and/or vomiting blood   Severe abdominal pain   Severe chest pain   Black, tarry stools   Bleeding- more than one tablespoon   Any  other symptom or condition that you feel may need urgent attention  Your doctor recommends these additional instructions:  If any biopsies were taken, your doctors clinic will contact you in 1 to 2   weeks with any results.  - Discharge patient to home.   - Patient has a contact number available for emergencies.  The signs and   symptoms of potential delayed complications were discussed with the   patient.  Return to normal activities tomorrow.  Written discharge   instructions were provided to the patient.   - Resume previous diet.   - Continue present medications.   - Repeat colonoscopy in 5 years for surveillance.   - Return to primary care physician.  For questions, problems or results please call your physician - Ilya Arauz MD at Work:  [phone].  [facility], EMERGENCY ROOM PHONE NUMBER: [phone]  IF A COMPLICATION OR EMERGENCY SITUATION ARISES AND YOU ARE UNABLE TO REACH   YOUR PHYSICIAN - GO DIRECTLY TO THE EMERGENCY ROOM.  Ilya Arauz MD  4/8/2019 8:40:10 AM  This report has been verified and signed electronically.  [employer]

## 2019-04-08 NOTE — ANESTHESIA POSTPROCEDURE EVALUATION
Anesthesia Post Evaluation    Patient: Kateryna Weber    Procedure(s) Performed: Procedure(s) (LRB):  COLONOSCOPY (N/A)    Final Anesthesia Type: general  Patient location during evaluation: PACU  Patient participation: Yes- Able to Participate  Level of consciousness: awake and alert and oriented  Post-procedure vital signs: reviewed and stable  Pain management: adequate  Airway patency: patent  PONV status at discharge: No PONV  Anesthetic complications: no      Cardiovascular status: blood pressure returned to baseline and hemodynamically stable  Respiratory status: unassisted, room air and spontaneous ventilation  Hydration status: euvolemic  Follow-up not needed.          Vitals Value Taken Time   /79 4/8/2019  9:02 AM   Temp 36.6 °C (97.9 °F) 4/8/2019  8:42 AM   Pulse 73 4/8/2019  9:02 AM   Resp 15 4/8/2019  9:02 AM   SpO2 100 % 4/8/2019  9:02 AM         Event Time     Out of Recovery 09:12:00          Pain/Delfina Score: Delfina Score: 10 (4/8/2019  9:06 AM)

## 2019-04-08 NOTE — DISCHARGE INSTRUCTIONS
Colonoscopy     A camera attached to a flexible tube with a viewing lens is used to take video pictures.     Colonoscopy is a test to view the inside of your lower digestive tract (colon and rectum). Sometimes it can show the last part of the small intestine (ileum). During the test, small pieces of tissue may be removed for testing. This is called a biopsy. Small growths, such as polyps, may also be removed.   Why is colonoscopy done?  The test is done to help look for colon cancer. And it can help find the source of abdominal pain, bleeding, and changes in bowel habits. It may be needed once a year, depending on factors such as your:  · Age  · Health history  · Family health history  · Symptoms  · Results from any prior colonoscopy  Risks and possible complications  These include:  · Bleeding               · A puncture or tear in the colon   · Risks of anesthesia  · A cancer lesion not being seen  Getting ready   To prepare for the test:  · Talk with your healthcare provider about the risks of the test (see below). Also ask your healthcare provider about alternatives to the test.  · Tell your healthcare provider about any medicines you take. Also tell him or her about any health conditions you may have.  · Make sure your rectum and colon are empty for the test. Follow the diet and bowel prep instructions exactly. If you dont, the test may need to be rescheduled.  · Plan for a friend or family member to drive you home after the test.     Colonoscopy provides an inside view of the entire colon.     You may discuss the results with your doctor right away or at a future visit.  During the test   The test is usually done in the hospital on an outpatient basis. This means you go home the same day. The procedure takes about 30 minutes. During that time:  · You are given relaxing (sedating) medicine through an IV line. You may be drowsy, or fully asleep.  · The healthcare provider will first give you a physical exam to  check for anal and rectal problems.  · Then the anus is lubricated and the scope inserted.  · If you are awake, you may have a feeling similar to needing to have a bowel movement. You may also feel pressure as air is pumped into the colon. Its OK to pass gas during the procedure.  · Biopsy, polyp removal, or other treatments may be done during the test.  After the test   You may have gas right after the test. It can help to try to pass it to help prevent later bloating. Your healthcare provider may discuss the results with you right away. Or you may need to schedule a follow-up visit to talk about the results. After the test, you can go back to your normal eating and other activities. You may be tired from the sedation and need to rest for a few hours.  Date Last Reviewed: 11/1/2016 © 2000-2017 [employer]. [address] [place] [address], [place] [address]. All rights reserved. This information is not intended as a substitute for professional medical care. Always follow your healthcare professional's instructions.

## 2019-04-08 NOTE — H&P
Short Stay Endoscopy History and Physical      Procedure - Colonoscopy  ASA - per anesthesia  Mallampati - per anesthesia  History of Anesthesia problems - no  Family history Anesthesia problems - no   Plan of anesthesia - MAC    HPI:  This is a 58 y.o. female here for surveillance of colon polyps.      ROS:  Constitutional: No fevers, chills, No weight loss  CV: No chest pain  Pulm: No cough, No shortness of breath  GI: see HPI    Medical History:  has no past medical history on file.    Surgical History:  has a past surgical history that includes Appendectomy; Hysterectomy; Abdominal surgery (2000); and Gastric bypass (2007).    Family History: family history is not on file.    Social History:  reports that she has never smoked. She does not have any smokeless tobacco history on file. She reports that she drinks alcohol. She reports that she does not use drugs.    Review of patient's allergies indicates:   Allergen Reactions    No known drug allergies        Medications:   Medications Prior to Admission   Medication Sig Dispense Refill Last Dose    ALPRAZolam (XANAX) 0.25 MG tablet TAKE 1 TABLET BY MOUTH AT BEDTIME AS NEEDED FOR  ANXIETY 30 tablet 0 Past Week at Unknown time    buPROPion (WELLBUTRIN XL) 150 MG TB24 tablet TAKE ONE TABLET BY MOUTH ONCE DAILY 30 tablet 12 Past Week at Unknown time    phentermine (ADIPEX-P) 37.5 mg tablet 1/2 -1 tab po daily 30 tablet 2 Past Month at Unknown time    cyanocobalamin (VITAMIN B-12) 1000 MCG tablet Take 100 mcg by mouth once daily.   More than a month at Unknown time    cyanocobalamin, vitamin B-12, 1,000 mcg/mL Kit Inject 1,000 mcg as directed every 28 days. 3 kit 4 More than a month at Unknown time    dulaglutide (TRULICITY) 1.5 mg/0.5 mL PnIj Inject 1.5 mg into the skin every 30 days. 4 Syringe 5 More than a month at Unknown time    multivitamin capsule Take 1 capsule by mouth once daily.   More than a month at Unknown time         Physical Exam:    Vital  Signs:   Vitals:    04/08/19 0729   BP: (!) 149/92   Pulse: 74   Resp: 18   Temp: 98.1 °F (36.7 °C)       General Appearance: Well appearing in no acute distress  Eyes:    No scleral icterus  ENT: Neck supple, Lips, mucosa, and tongue normal; teeth and gums normal  Lungs: CTA bilaterally  Heart:  S1, S2 normal, no murmurs heard  Abdomen: Soft, non tender, non distended with positive bowel sounds. No hepatosplenomegaly, ascites, or mass.      Labs:  Lab Results   Component Value Date    WBC 3.94 01/10/2019    HGB 13.9 01/10/2019    HCT 41.7 01/10/2019     01/10/2019    CHOL 188 01/10/2019    TRIG 48 01/10/2019    HDL 97 (H) 01/10/2019    ALT 21 01/10/2019    AST 18 01/10/2019     01/10/2019    K 3.7 01/10/2019     01/10/2019    CREATININE 0.7 01/10/2019    BUN 14 01/10/2019    CO2 30 (H) 01/10/2019    TSH 1.094 01/10/2019    HGBA1C 5.2 01/10/2019         Assessment:  58 y.o. female with history of colon polyps.    Plan:  Proceed with colonoscopy today.  I have explained the risks and benefits of endoscopy procedures to the patient including but not limited to bleeding, perforation, infection, and death.  All questions and answered.        Ilya Arauz MD

## 2019-04-08 NOTE — TRANSFER OF CARE
"Anesthesia Transfer of Care Note    Patient: Kateryna Weber    Procedure(s) Performed: Procedure(s) (LRB):  COLONOSCOPY (N/A)    Patient location: PACU    Anesthesia Type: general    Transport from OR: Transported from OR on room air with adequate spontaneous ventilation    Post pain: adequate analgesia    Post assessment: no apparent anesthetic complications and tolerated procedure well    Post vital signs: stable    Level of consciousness: awake, alert and oriented    Nausea/Vomiting: no nausea/vomiting    Complications: none    Transfer of care protocol was followed      Last vitals:   Visit Vitals  /67   Pulse 77   Temp 36.6 °C (97.9 °F)   Resp 17   Ht 5' 2" (1.575 m)   Wt 71.2 kg (157 lb)   SpO2 100%   Breastfeeding? No   BMI 28.72 kg/m²     "

## 2019-04-08 NOTE — ANESTHESIA PREPROCEDURE EVALUATION
04/08/2019  Kateryna Weber is a 58 y.o., female.    Anesthesia Evaluation    I have reviewed the Patient Summary Reports.     I have reviewed the Medications.     Review of Systems      Physical Exam   Airway/Jaw/Neck:  Airway Findings: Mallampati: II Improves to II with phonation.                 Anesthesia Plan  Type of Anesthesia, risks & benefits discussed:  Anesthesia Type:  general  Patient's Preference:   Intra-op Monitoring Plan: standard ASA monitors  Intra-op Monitoring Plan Comments:   Post Op Pain Control Plan:   Post Op Pain Control Plan Comments:   Induction:   IV  Beta Blocker:  Patient is not currently on a Beta-Blocker (No further documentation required).       Informed Consent: Patient understands risks and agrees with Anesthesia plan.  Questions answered. Anesthesia consent signed with patient.  ASA Score: 2     Day of Surgery Review of History & Physical:  There are no significant changes.          Ready For Surgery From Anesthesia Perspective.

## 2019-04-16 ENCOUNTER — TELEPHONE (OUTPATIENT)
Dept: ENDOSCOPY | Facility: HOSPITAL | Age: 59
End: 2019-04-16

## 2019-04-25 ENCOUNTER — OFFICE VISIT (OUTPATIENT)
Dept: BARIATRICS | Facility: CLINIC | Age: 59
End: 2019-04-25
Payer: COMMERCIAL

## 2019-04-25 VITALS
BODY MASS INDEX: 30.07 KG/M2 | HEART RATE: 86 BPM | WEIGHT: 163.38 LBS | DIASTOLIC BLOOD PRESSURE: 70 MMHG | SYSTOLIC BLOOD PRESSURE: 130 MMHG | HEIGHT: 62 IN

## 2019-04-25 DIAGNOSIS — Z98.84 S/P LAPAROSCOPIC SLEEVE GASTRECTOMY: ICD-10-CM

## 2019-04-25 DIAGNOSIS — E66.3 OVERWEIGHT (BMI 25.0-29.9): Primary | ICD-10-CM

## 2019-04-25 DIAGNOSIS — E66.9 OBESITY, CLASS I, BMI 30.0-34.9 (SEE ACTUAL BMI): ICD-10-CM

## 2019-04-25 PROCEDURE — 99213 OFFICE O/P EST LOW 20 MIN: CPT | Mod: S$GLB,,, | Performed by: INTERNAL MEDICINE

## 2019-04-25 PROCEDURE — 99213 PR OFFICE/OUTPT VISIT, EST, LEVL III, 20-29 MIN: ICD-10-PCS | Mod: S$GLB,,, | Performed by: INTERNAL MEDICINE

## 2019-04-25 PROCEDURE — 99999 PR PBB SHADOW E&M-EST. PATIENT-LVL III: CPT | Mod: PBBFAC,,, | Performed by: INTERNAL MEDICINE

## 2019-04-25 PROCEDURE — 99999 PR PBB SHADOW E&M-EST. PATIENT-LVL III: ICD-10-PCS | Mod: PBBFAC,,, | Performed by: INTERNAL MEDICINE

## 2019-04-25 PROCEDURE — 3008F PR BODY MASS INDEX (BMI) DOCUMENTED: ICD-10-PCS | Mod: CPTII,S$GLB,, | Performed by: INTERNAL MEDICINE

## 2019-04-25 PROCEDURE — 3008F BODY MASS INDEX DOCD: CPT | Mod: CPTII,S$GLB,, | Performed by: INTERNAL MEDICINE

## 2019-04-25 RX ORDER — PHENTERMINE HYDROCHLORIDE 37.5 MG/1
TABLET ORAL
Qty: 30 TABLET | Refills: 2 | Status: SHIPPED | OUTPATIENT
Start: 2019-05-08 | End: 2019-08-01 | Stop reason: SDUPTHER

## 2019-04-25 NOTE — PROGRESS NOTES
"Subjective:       Patient ID: Kateryna Weber is a 58 y.o. female.    Chief Complaint: Follow-up    Pt presents today for follow up. Getting adequate protein. She has lost 3 lbs in the past 3 months,  35 lbs total since seeing me. Has last been on phentermine,last filled in 3/8/19.  Was approx in the 230's lb before her surgery. Has been swimming. Sometimes skipping her trulicity. Has not been as consistent with her vitamins and shakes.     Goal: 145#    Review of Systems   Constitutional: Negative for activity change, appetite change and unexpected weight change.   HENT: Negative for sore throat and voice change.    Eyes: Negative for pain and visual disturbance.   Respiratory: Negative for shortness of breath and wheezing.    Cardiovascular: Negative for palpitations and leg swelling.   Genitourinary: Negative for difficulty urinating and dysuria.        S/p hyst   Musculoskeletal: Positive for arthralgias. Negative for back pain and myalgias.        Hip and hand   Skin: Negative for pallor and rash.   Neurological: Negative for syncope, light-headedness and headaches.   Psychiatric/Behavioral: Negative for dysphoric mood and sleep disturbance. The patient is not nervous/anxious.        Objective:       /70   Pulse 86   Ht 5' 2" (1.575 m)   Wt 74.1 kg (163 lb 5.8 oz)   BMI 29.88 kg/m²     Physical Exam   Constitutional: She is oriented to person, place, and time. She appears well-developed. No distress.   Obese   HENT:   Head: Normocephalic and atraumatic.   Eyes: Pupils are equal, round, and reactive to light. EOM are normal. No scleral icterus.   Neck: Normal range of motion. Neck supple.   Cardiovascular: Normal rate and regular rhythm.   Pulmonary/Chest: Effort normal and breath sounds normal.   Musculoskeletal: Normal range of motion. She exhibits no edema.   Neurological: She is alert and oriented to person, place, and time. No cranial nerve deficit.   Skin: Skin is warm and dry. No erythema. "   Psychiatric: She has a normal mood and affect. Her behavior is normal. Judgment normal.   Vitals reviewed.      Assessment:       1. Overweight (BMI 25.0-29.9)    2. S/P laparoscopic sleeve gastrectomy    3. Obesity, Class I, BMI 30.0-34.9 (see actual BMI)        Plan:             Kateryna was seen today for follow-up.    Diagnoses and all orders for this visit:    Overweight (BMI 25.0-29.9)    S/P laparoscopic sleeve gastrectomy    Obesity, Class I, BMI 30.0-34.9 (see actual BMI)  -     phentermine (ADIPEX-P) 37.5 mg tablet; 1/2 -1 tab po daily              Patient warned of common side effects of phentermine including anxiety, insomnia, palpitations and increased blood pressure. It was also explained that it is for short-term usage along with diet and exercise, and that stopping the medication without making lifestyle changes will result in regain of weight. Patient states understanding.     Weight loss medications are controlled substances.  They require routine follow up. Prescription or pills that are lost or destroyed will not be replaced.       Start phentermine with 1/2 pill a day for at least 1 week to see if that will control your appetite.  Go up to a full pill when needed.        800-1000 deric menu and Erika Royal recipes given

## 2019-04-25 NOTE — PATIENT INSTRUCTIONS
Patient warned of common side effects of phentermine including anxiety, insomnia, palpitations and increased blood pressure. It was also explained that it is for short-term usage along with diet and exercise, and that stopping the medication without making lifestyle changes will result in regain of weight. Patient states understanding.     Weight loss medications are controlled substances.  They require routine follow up. Prescription or pills that are lost or destroyed will not be replaced.       Start phentermine with 1/2 pill a day for at least 1 week to see if that will control your appetite.  Go up to a full pill when needed.      5-Day Menu Plan: 800-1000 Calories    DAY 1     Breakfast  4 slices deli turkey  Small apple    Snack  ¼ cup almonds    Lunch  Lean hamburger kenan  1 cup green beans    Dinner  2 skinless chicken thighs  1 cup cooked carrots    Snack  SF jello    DAY 2    Breakfast  2 eggs with 2 tbsp salsa    Snack  2 slices deli turkey  ½ cup Peaches canned (in its own juice)    Lunch  Levan: Deli chicken and mustard   Pear cup (no sugar added)    Dinner  Baked fish  1 cup cooked yellow squash    Snack  SF popsicle            DAY 3    Breakfast   Protein drink: 1 scoop whey powder + 6oz soy milk    Snack  ¼ cup almonds    Lunch  Tuna Salad: 3oz canned tuna in water, 1 egg, and 1 tsp light wellington)  Pineapple cup (no sugar added)    Dinner  Baked chicken breast  1 cup grilled eggplant    Snack  8oz Chocolate Soy Milk      DAY 4    Breakfast  2 eggs, turkey sausage kenan    Snack  4 slices deli turkey    Lunch    Snack  1/4 cup almonds  Peach cup (no sugar added)    Dinner  3oz baked pork chop  1 cup cooked green beans                    DAY 5    Breakfast  Protein drink: 1 scoop whey powder + 6oz soy milk    Snack   ¼ cup almonds  1 apple    Lunch  1 cup Chicken salad: (3oz canned chicken in water, 1 egg, 1 tsp light wellington)    Snack  4 slices deli turkey  Fruit cup (no sugar  added)    Dinner  Omelet: 2 eggs, grilled shrimp, bell pepper and onion        January 28, 2019  Hot Spinach and Artichoke Dip (Recipe)  BY LAINEY LEAL RD, CSSD    This creamy spinach dip can double as a protein-rich, gluten-free side dish, game day appetizer or parade route snack.  Calories 85 calories    Fat 3 grams saturated fat    Prep Time 5 minutes  Cook Time10 minutes  Yield Serves 5-10 people  Ingredients   1/2 cup onion, finely chopped   3 (10 ounce) packs of frozen chopped spinach, thawed and squeezed dry   1 (8 ounce) package reduced-fat cream cheese   1 (8 ounce) carton fat-free plain Greek yogurt   1/2 cup Parmesan cheese, grated   1 (14 ounce) can artichoke hearts   1/4 teaspoon salt (optional)   1/4 teaspoon black pepper   1/8 teaspoon crushed red pepper flakes  Instructions  1. Lightly coat a skillet with cooking spray.  2. Cook and stir onion over medium heat until transparent (about 5 minutes).  3. Add spinach. Cook until thoroughly heated (about 1-2 minutes).  4. Reduce heat and add cream cheese. Stir until melted and smooth.  5. Stir in Greek yogurt, Parmesan cheese and artichokes. Remove from heat.  6. Season with salt (optional) and black and red pepper.  7. Serve with raw vegetables.                          January 31, 2019  Pizza Cups (Recipe)    Trying to eat better but craving pizza? These protein-packed pizza cups will do the trick.    Calories 65 calories per cup  Fat 2.7 grams per cup  Prep Time5 minutes  Cook Time 25 minutes  Yield 12 pizza cups  Ingredients   1 ½ cups liquid egg whites   2 large eggs   1 cup fat free or low moisture shredded mozzarella cheese   37 turkey pepperonis (25 chopped and 12 sliced)   ¼ cup Parmesan cheese   ¼ tsp oregano   ¼ tsp black pepper  Instructions  1. Preheat oven to 350 degrees Fahrenheit.  2. Chop up 25 turkey pepperonis into small pieces. In a bowl, combine all ingredients except the remaining 12 sliced turkey  pepperoni.  3. Spray a muffin [place] with nonstick spray.  4. Place a whole sliced turkey pepperoni in the bottom of each of the 12 muffin molds.  5. Pour or spoon in the mixture in your bowl into each muffin mold evenly ¾ full.  6. Bake in the oven for 20-25 minutes. Place a toothpick in the center of the pizza cup to ensure all liquid egg has cooked. For a crispier pizza cup, leave in the oven a little longer.  7. Let cool for a few minutes before serving. Enjoy!

## 2019-04-26 ENCOUNTER — HOSPITAL ENCOUNTER (EMERGENCY)
Dept: HOSPITAL 27 - EMS | Age: 59
Discharge: LEFT BEFORE DISCHARGE | End: 2019-04-26
Payer: COMMERCIAL

## 2019-04-26 VITALS — SYSTOLIC BLOOD PRESSURE: 130 MMHG | DIASTOLIC BLOOD PRESSURE: 68 MMHG

## 2019-04-26 VITALS — BODY MASS INDEX: 25.82 KG/M2 | HEIGHT: 62 IN | WEIGHT: 140.3 LBS

## 2019-04-26 DIAGNOSIS — E11.9: ICD-10-CM

## 2019-04-26 DIAGNOSIS — F19.90: ICD-10-CM

## 2019-04-26 DIAGNOSIS — L03.115: Primary | ICD-10-CM

## 2019-04-26 DIAGNOSIS — F20.9: ICD-10-CM

## 2019-04-26 DIAGNOSIS — F19.10: ICD-10-CM

## 2019-04-26 DIAGNOSIS — F17.210: ICD-10-CM

## 2019-04-26 DIAGNOSIS — D64.9: ICD-10-CM

## 2019-04-26 DIAGNOSIS — F11.90: ICD-10-CM

## 2019-04-26 LAB
ALBUMIN SERPL-MCNC: 3.1 G/DL (ref 3.4–5)
ALP SERPL-CCNC: 128 U/L (ref 46–116)
ALT SERPL-CCNC: 19 U/L (ref 12–78)
ANION GAP SERPL CALCULATED.3IONS-SCNC: 9 MMOL/L (ref 8–16)
AST SERPL-CCNC: 21 U/L (ref 15–37)
BASOPHILS # BLD AUTO: 0.1 K/UL (ref 0–0.2)
BASOPHILS NFR BLD AUTO: 0.4 % (ref 0–2)
BILIRUB SERPL-MCNC: 0.4 MG/DL (ref 0.1–1)
BUN SERPL-MCNC: 15 MG/DL (ref 7–18)
CALCIUM SERPL-MCNC: 8.7 MG/DL (ref 8.8–10.5)
CHLORIDE SERPL-SCNC: 99 MMOL/L (ref 98–107)
CO2 SERPL-SCNC: 27 MMOL/L (ref 22–29)
CREAT SERPL-MCNC: 0.83 MG/DL (ref 0.6–1.3)
CRP SERPL HS-MCNC: 15.57 MG/DL (ref 0–0.3)
EOSINOPHIL # BLD AUTO: 0.1 K/UL (ref 0–0.7)
EOSINOPHIL NFR BLD AUTO: 0.5 % (ref 1–6)
ERYTHROCYTE [DISTWIDTH] IN BLOOD BY AUTOMATED COUNT: 15.4 % (ref 11.5–14.5)
ERYTHROCYTE [SEDIMENTATION RATE] IN BLOOD BY PHOTOMETRIC METHOD: 53 MM/HR (ref 0–20)
GFR SERPL CREATININE-BSD FRML MDRD: > 60 ML/MIN (ref 60–?)
GLUCOSE BLDC GLUCOMTR-MCNC: 100 MG/DL (ref 70–110)
GLUCOSE BLDC GLUCOMTR-MCNC: 215 MG/DL (ref 70–110)
GLUCOSE SERPL-MCNC: 63 MG/DL (ref 70–110)
HCT VFR BLD AUTO: 33.2 % (ref 36–46)
HGB BLD-MCNC: 10.7 G/DL (ref 12–16)
LACTATE PLASV-SCNC: 0.8 MMOL/L (ref 0.4–2)
LYMPHOCYTES # BLD AUTO: 2.4 K/UL (ref 1–4.8)
LYMPHOCYTES NFR BLD AUTO: 12.4 % (ref 22–44)
MCH RBC QN AUTO: 25 PG (ref 26–34)
MCHC RBC AUTO-ENTMCNC: 32.2 G/DL (ref 31–37)
MCV RBC AUTO: 78 FL (ref 80–100)
MONOCYTES # BLD AUTO: 0.9 K/UL (ref 0.1–1)
MONOCYTES NFR BLD AUTO: 4.6 % (ref 2–9)
NEUTROPHILS # BLD AUTO: 15.8 K/UL (ref 1.8–7.7)
NEUTROPHILS NFR BLD AUTO: 82.1 % (ref 40–70)
PLATELET # BLD AUTO: 380 K/UL (ref 150–450)
POTASSIUM SERPL-SCNC: 3.7 MMOL/L (ref 3.5–5.1)
PROT SERPL-MCNC: 8.2 G/DL (ref 6.4–8.2)
RBC # BLD AUTO: 4.28 MIL/UL (ref 4–5.2)
RBC MORPH BLD: (no result)
SODIUM SERPL-SCNC: 135 MMOL/L (ref 136–145)

## 2019-04-26 PROCEDURE — 96375 TX/PRO/DX INJ NEW DRUG ADDON: CPT

## 2019-04-26 PROCEDURE — 99284 EMERGENCY DEPT VISIT MOD MDM: CPT

## 2019-04-26 PROCEDURE — 73502 X-RAY EXAM HIP UNI 2-3 VIEWS: CPT

## 2019-04-26 PROCEDURE — 85651 RBC SED RATE NONAUTOMATED: CPT

## 2019-04-26 PROCEDURE — 96365 THER/PROPH/DIAG IV INF INIT: CPT

## 2019-04-26 PROCEDURE — 36415 COLL VENOUS BLD VENIPUNCTURE: CPT

## 2019-04-26 PROCEDURE — 99406 BEHAV CHNG SMOKING 3-10 MIN: CPT

## 2019-04-26 PROCEDURE — 83605 ASSAY OF LACTIC ACID: CPT

## 2019-04-26 PROCEDURE — 85025 COMPLETE CBC W/AUTO DIFF WBC: CPT

## 2019-04-26 PROCEDURE — 80053 COMPREHEN METABOLIC PANEL: CPT

## 2019-04-26 PROCEDURE — 96366 THER/PROPH/DIAG IV INF ADDON: CPT

## 2019-04-26 PROCEDURE — 87040 BLOOD CULTURE FOR BACTERIA: CPT

## 2019-04-26 PROCEDURE — 86140 C-REACTIVE PROTEIN: CPT

## 2019-04-26 PROCEDURE — 82962 GLUCOSE BLOOD TEST: CPT

## 2019-04-29 RX ORDER — ALPRAZOLAM 0.25 MG/1
TABLET ORAL
Qty: 30 TABLET | Refills: 0 | Status: SHIPPED | OUTPATIENT
Start: 2019-04-29 | End: 2019-07-12 | Stop reason: SDUPTHER

## 2019-07-12 RX ORDER — ALPRAZOLAM 0.25 MG/1
TABLET ORAL
Qty: 30 TABLET | Refills: 0 | Status: SHIPPED | OUTPATIENT
Start: 2019-07-12 | End: 2020-01-08

## 2019-08-01 ENCOUNTER — OFFICE VISIT (OUTPATIENT)
Dept: BARIATRICS | Facility: CLINIC | Age: 59
End: 2019-08-01
Payer: COMMERCIAL

## 2019-08-01 VITALS
SYSTOLIC BLOOD PRESSURE: 110 MMHG | BODY MASS INDEX: 28.91 KG/M2 | WEIGHT: 158.06 LBS | HEART RATE: 76 BPM | DIASTOLIC BLOOD PRESSURE: 80 MMHG

## 2019-08-01 DIAGNOSIS — E66.9 OBESITY, CLASS I, BMI 30.0-34.9 (SEE ACTUAL BMI): ICD-10-CM

## 2019-08-01 PROCEDURE — 99999 PR PBB SHADOW E&M-EST. PATIENT-LVL III: CPT | Mod: PBBFAC,,, | Performed by: INTERNAL MEDICINE

## 2019-08-01 PROCEDURE — 99999 PR PBB SHADOW E&M-EST. PATIENT-LVL III: ICD-10-PCS | Mod: PBBFAC,,, | Performed by: INTERNAL MEDICINE

## 2019-08-01 PROCEDURE — 99213 OFFICE O/P EST LOW 20 MIN: CPT | Mod: S$GLB,,, | Performed by: INTERNAL MEDICINE

## 2019-08-01 PROCEDURE — 3008F BODY MASS INDEX DOCD: CPT | Mod: CPTII,S$GLB,, | Performed by: INTERNAL MEDICINE

## 2019-08-01 PROCEDURE — 3008F PR BODY MASS INDEX (BMI) DOCUMENTED: ICD-10-PCS | Mod: CPTII,S$GLB,, | Performed by: INTERNAL MEDICINE

## 2019-08-01 PROCEDURE — 99213 PR OFFICE/OUTPT VISIT, EST, LEVL III, 20-29 MIN: ICD-10-PCS | Mod: S$GLB,,, | Performed by: INTERNAL MEDICINE

## 2019-08-01 RX ORDER — PANTOPRAZOLE SODIUM 40 MG/1
40 TABLET, DELAYED RELEASE ORAL DAILY
Qty: 30 TABLET | Refills: 1 | Status: SHIPPED | OUTPATIENT
Start: 2019-08-01 | End: 2020-06-30

## 2019-08-01 RX ORDER — PHENTERMINE HYDROCHLORIDE 37.5 MG/1
TABLET ORAL
Qty: 30 TABLET | Refills: 2 | Status: SHIPPED | OUTPATIENT
Start: 2019-08-01 | End: 2019-11-21

## 2019-08-01 NOTE — PROGRESS NOTES
Subjective:       Patient ID: Kateryna Weber is a 58 y.o. female.    Chief Complaint: Follow-up    Pt presents today for follow up. Getting adequate protein. She has lost 5 lbs in the past 3 months,  40 lbs total since seeing me. Has last been on phentermine,  checked today but not working.   Was approx in the 230's lb before her surgery. Has been swimming. Sometimes skipping her trulicity. Has not been as consistent with her vitamins and shakes.   C/o pain pain at the left chest, most days in the morning for 15-20 min in the am after drinking coffee with milk. Has been for a few months.  Describes as a tightness. She does not get it with exertion.     Goal: 145#    Review of Systems   Constitutional: Negative for activity change, appetite change and unexpected weight change.   HENT: Negative for sore throat and voice change.    Eyes: Negative for pain and visual disturbance.   Respiratory: Negative for shortness of breath and wheezing.    Cardiovascular: Negative for palpitations and leg swelling.   Genitourinary: Negative for difficulty urinating and dysuria.        S/p hyst   Musculoskeletal: Positive for arthralgias. Negative for back pain and myalgias.        Hip and hand   Skin: Negative for pallor and rash.   Neurological: Negative for syncope, light-headedness and headaches.   Psychiatric/Behavioral: Negative for dysphoric mood and sleep disturbance. The patient is not nervous/anxious.        Objective:       /80   Pulse 76   Wt 71.7 kg (158 lb 1.1 oz)   BMI 28.91 kg/m²     Physical Exam   Constitutional: She is oriented to person, place, and time. She appears well-developed. No distress.   Obese   HENT:   Head: Normocephalic and atraumatic.   Eyes: Pupils are equal, round, and reactive to light. EOM are normal. No scleral icterus.   Neck: Normal range of motion. Neck supple.   Cardiovascular: Normal rate.   Pulmonary/Chest: Effort normal.   Musculoskeletal: Normal range of motion. She exhibits no  edema.   Neurological: She is alert and oriented to person, place, and time. No cranial nerve deficit.   Skin: Skin is warm and dry. No erythema.   Psychiatric: She has a normal mood and affect. Her behavior is normal. Judgment normal.   Vitals reviewed.      Assessment:       1. Obesity, Class I, BMI 30.0-34.9 (see actual BMI)        Plan:             Kateryna was seen today for follow-up.    Diagnoses and all orders for this visit:    Obesity, Class I, BMI 30.0-34.9 (see actual BMI)  -     phentermine (ADIPEX-P) 37.5 mg tablet; 1/2 -1 tab po daily    Other orders  -     dulaglutide (TRULICITY) 1.5 mg/0.5 mL PnIj; Inject 1.5 mg into the skin every 30 days.  -     pantoprazole (PROTONIX) 40 MG tablet; Take 1 tablet (40 mg total) by mouth once daily. In the morning on empty stomach.              Patient warned of common side effects of phentermine including anxiety, insomnia, palpitations and increased blood pressure. It was also explained that it is for short-term usage along with diet and exercise, and that stopping the medication without making lifestyle changes will result in regain of weight. Patient states understanding.     Weight loss medications are controlled substances.  They require routine follow up. Prescription or pills that are lost or destroyed will not be replaced.       Start phentermine with 1/2 pill a day for at least 1 week to see if that will control your appetite.  Go up to a full pill when needed.      Cloud bread and cauliflower crust recipe given

## 2019-08-01 NOTE — PATIENT INSTRUCTIONS
Patient warned of common side effects of phentermine including anxiety, insomnia, palpitations and increased blood pressure. It was also explained that it is for short-term usage along with diet and exercise, and that stopping the medication without making lifestyle changes will result in regain of weight. Patient states understanding.     Weight loss medications are controlled substances.  They require routine follow up. Prescription or pills that are lost or destroyed will not be replaced.       Continue trulicity    Protonix in the morning on an empty stomach. Wait 20 min before eating. You can take this for at least 2 weeks, no more than 2 months.     - To lose weight you want to cut 100% starchy carbohydrates out of your diet (bread, rice, pasta, potatoes, granola, flour, corn, peas, oatmeal, grits, tortillas, crackers, chips) and get  grams of protein.  Aim for 100 grams of protein daily.    - No soda, sweet tea, juices, sports drinks or lemonade     -Exercise daily    - Premier Protein (Chocolate, Bananas & Cream, Strawberries & Cream, Vanilla) [employer] or [employer]    - Syntrax [place] from [employer], www.bariatricadvantage.com, www.bariatricchoice.com. (LACTOSE FREE)    - [employer] - [employer].com (LACTOSE FREE)    - Veggetti Pro from [employer], [employer], [employer]    - www.pinterest.com (cauliflower, cloud bread, quest bar cookies, eggplant, zucchini, zucchini noodles, crustless quiche, no carb meals, taco lettuce boats)    - http://theworldaccophyliciaingtomario.Allovue/      PILLOWY LIGHT CLOUD BREAD    Author: Davida from [employer].  Prep time:  15 mins Cook time:  30 mins Total time:  45 mins  Yield: 9     INGREDIENTS  3 large eggs,   3 tablespoons fat free cream cheese, room temperature  ¼ teaspoon cream of tartar  1 teaspoon artificial sweetener    INSTRUCTIONS  Preheat oven to 300 degrees Fahrenheit. Line a baking sheet with parchment paper.  Mix together egg yolks, cream  cheese and sweetener in a small bowl. Set aside.  Using an electric mixer, whisk egg whites and cream of tartar on high speed until stiff peaks are formed, about 5-6 minutes.  Gently fold in cream cheese mixture. Try not to deflate the egg whites.  Scoop batter onto prepared baking sheet, into even rounds, about the size of a hamburger bun.  Bake for 30 minutes, or until heard brown.  Transfer bread to wire rack and let cool.    NOTES  Store in an airtight container.    Cauliflower Pizza Crust  Recipe courtesy of Nanette Yañez    Cauliflower Pizza Crust   Total Time: 40 min   Prep: 5 min   Inactive: 10 min   Cook:25 min     Yield: 1 pizza crust   Level: Easy   Ingredients   1 head cauliflower, stalk removed    1/2 cup shredded mozzarella    1/4 cup grated Parmesan    1/2 teaspoon dried oregano    1/2 teaspoon kosher salt    1/4 teaspoon garlic powder    2 eggs, lightly beaten   Directions  Preheat the oven to 400 degrees F. Line a baking sheet with parchment paper.  Break the cauliflower into florets and pulse in a  until fine. Steam in a steamer basket and drain well. (I like to put it on a towel to get all the moisture out.) Let cool.  In a bowl, combine the cauliflower with the mozzarella, Parmesan, oregano, salt, garlic powder and eggs. Transfer to the center of the baking sheet and spread into a [place], resembling a pizza crust. Bake for 20 minutes.  Add desired toppings and bake an additional 10 minutes.  Recipe courtesy of Nanette Yañez © 2015 [employer] All Rights Reserved.

## 2019-08-21 ENCOUNTER — TELEPHONE (OUTPATIENT)
Dept: DERMATOLOGY | Facility: CLINIC | Age: 59
End: 2019-08-21

## 2019-08-21 NOTE — TELEPHONE ENCOUNTER
----- Message from Yael Tovar sent at 8/21/2019  8:09 AM CDT -----  Contact: pt   [facility] - pt Same Day Appointment Request    Was an appointment with another provider offered?n/a     Reason for [facility] appt.: pt needs to be seen today for some spots on her back that are concerning pts mom has an appt this afternoon with Dr James and would like for her daughter to be seen together   Communication Preference: can you please call pts mom at [phone]  Additional Information: pt will be with her mom when she comes to her appt     KIRAN

## 2019-08-21 NOTE — TELEPHONE ENCOUNTER
Spoke with pt's mother and explained to her that Dr. James does not have any appointments this afternoon and that we can schedule her daughter at a later date to see Dr. James. Pt's mother verbalized understanding.

## 2019-10-17 ENCOUNTER — IMMUNIZATION (OUTPATIENT)
Dept: PHARMACY | Facility: CLINIC | Age: 59
End: 2019-10-17
Payer: COMMERCIAL

## 2019-11-21 ENCOUNTER — OFFICE VISIT (OUTPATIENT)
Dept: BARIATRICS | Facility: CLINIC | Age: 59
End: 2019-11-21
Payer: COMMERCIAL

## 2019-11-21 ENCOUNTER — TELEPHONE (OUTPATIENT)
Dept: BARIATRICS | Facility: CLINIC | Age: 59
End: 2019-11-21

## 2019-11-21 VITALS
WEIGHT: 162.5 LBS | DIASTOLIC BLOOD PRESSURE: 80 MMHG | HEIGHT: 62 IN | SYSTOLIC BLOOD PRESSURE: 110 MMHG | HEART RATE: 74 BPM | BODY MASS INDEX: 29.9 KG/M2

## 2019-11-21 DIAGNOSIS — Z98.84 S/P LAPAROSCOPIC SLEEVE GASTRECTOMY: Primary | ICD-10-CM

## 2019-11-21 DIAGNOSIS — E66.3 OVERWEIGHT (BMI 25.0-29.9): ICD-10-CM

## 2019-11-21 PROCEDURE — 3008F BODY MASS INDEX DOCD: CPT | Mod: CPTII,S$GLB,, | Performed by: INTERNAL MEDICINE

## 2019-11-21 PROCEDURE — 99214 PR OFFICE/OUTPT VISIT, EST, LEVL IV, 30-39 MIN: ICD-10-PCS | Mod: S$GLB,,, | Performed by: INTERNAL MEDICINE

## 2019-11-21 PROCEDURE — 3008F PR BODY MASS INDEX (BMI) DOCUMENTED: ICD-10-PCS | Mod: CPTII,S$GLB,, | Performed by: INTERNAL MEDICINE

## 2019-11-21 PROCEDURE — 99999 PR PBB SHADOW E&M-EST. PATIENT-LVL III: CPT | Mod: PBBFAC,,, | Performed by: INTERNAL MEDICINE

## 2019-11-21 PROCEDURE — 99999 PR PBB SHADOW E&M-EST. PATIENT-LVL III: ICD-10-PCS | Mod: PBBFAC,,, | Performed by: INTERNAL MEDICINE

## 2019-11-21 PROCEDURE — 99214 OFFICE O/P EST MOD 30 MIN: CPT | Mod: S$GLB,,, | Performed by: INTERNAL MEDICINE

## 2019-11-21 RX ORDER — DIETHYLPROPION HYDROCHLORIDE 75 MG/1
75 TABLET, EXTENDED RELEASE ORAL DAILY
Qty: 30 TABLET | Refills: 0 | Status: SHIPPED | OUTPATIENT
Start: 2019-11-21 | End: 2020-01-08

## 2019-11-21 NOTE — PROGRESS NOTES
"Subjective:       Patient ID: Kateryna Weber is a 58 y.o. female.    Chief Complaint: Follow-up    Pt presents today for follow up. Getting adequate protein. She has gained 4 lbs in the past 3 months,  40 lbs total since seeing me. Has last been on phentermine,  checked today. Last filled 10/27/19.   Was approx in the 230's lb before her surgery.Sometimes skipping her trulicity. Has not been as consistent with her vitamins and shakes. Has noted some hair loss.         Follow-up   Associated symptoms include arthralgias. Pertinent negatives include no headaches, myalgias, rash or sore throat.     Review of Systems   Constitutional: Negative for activity change, appetite change and unexpected weight change.   HENT: Negative for sore throat and voice change.    Eyes: Negative for pain and visual disturbance.   Respiratory: Negative for shortness of breath and wheezing.    Cardiovascular: Negative for palpitations and leg swelling.   Genitourinary: Negative for difficulty urinating and dysuria.        S/p hyst   Musculoskeletal: Positive for arthralgias. Negative for back pain and myalgias.        Hip and hand   Skin: Negative for pallor and rash.   Neurological: Negative for syncope, light-headedness and headaches.   Psychiatric/Behavioral: Negative for dysphoric mood and sleep disturbance. The patient is not nervous/anxious.        Objective:       /80   Pulse 74   Ht 5' 2" (1.575 m)   Wt 73.7 kg (162 lb 7.7 oz)   BMI 29.72 kg/m²     Physical Exam   Constitutional: She is oriented to person, place, and time. She appears well-developed. No distress.   Obese   HENT:   Head: Normocephalic and atraumatic.   Eyes: Pupils are equal, round, and reactive to light. EOM are normal. No scleral icterus.   Neck: Normal range of motion. Neck supple.   Cardiovascular: Normal rate.   Pulmonary/Chest: Effort normal.   Musculoskeletal: Normal range of motion. She exhibits no edema.   Neurological: She is alert and oriented " to person, place, and time. No cranial nerve deficit.   Skin: Skin is warm and dry. No erythema.   Psychiatric: She has a normal mood and affect. Her behavior is normal. Judgment normal.   Vitals reviewed.      Assessment:       1. S/P laparoscopic sleeve gastrectomy    2. Overweight (BMI 25.0-29.9)        Plan:             Kateryna was seen today for follow-up.    Diagnoses and all orders for this visit:    S/P laparoscopic sleeve gastrectomy  -     Iron and TIBC; Future  -     Vitamin B1; Future  -     Comprehensive metabolic panel; Future  -     CBC auto differential; Future  -     Vitamin B12; Future  -     Vitamin B6; Future  -     Vitamin D; Future    Overweight (BMI 25.0-29.9)  -     diethylpropion 75 mg TbSR; Take 75 mg by mouth once daily.    Other orders  -     dulaglutide (TRULICITY) 1.5 mg/0.5 mL PnIj; Inject 1.5 mg into the skin every 30 days.      Vitamin list given. Will get labs in January. Better compliance with vitamin and portein encouraged. Hair loss could be related.       Patient warned of common side effects of diethylpropion including anxiety, insomnia, palpitations and increased blood pressure. It was also explained that it is for short-term usage along with diet and exercise, and that stopping the medication without making lifestyle changes will result in regain of weight. Patient states understanding.    Weight loss medications are controlled substances.  They require routine follow up. Prescription or pills that are lost or destroyed will not be replaced.       - To lose weight you want to cut 100% starchy carbohydrates out of your diet (bread, rice, pasta, potatoes, granola, flour, corn, peas, oatmeal, grits, tortillas, crackers, chips) and get  grams of protein.  Aim for 100 grams of protein daily.    - No soda, sweet tea, juices, sports drinks or lemonade     -Exercise daily    - Premier Protein (Chocolate, Bananas & Cream, Strawberries & Cream, Vanilla) [employer] or [employer]    - Syntrax  [place] from [employer], www.bariatricadvantage.com, www.bariatricchoice.com. (LACTOSE FREE)    - BiPro - Amazon.com (LACTOSE FREE)    - Veggetti Pro from [employer], [facility], [employer]    - www.pinterest.com (cauliflower, cloud bread, quest bar cookies, eggplant, zucchini, zucchini noodles, crustless quiche, no carb meals, taco lettuce boats)    - http://thewchanceaccordingtomiriamgface.UIEvolution.ActualSun/      Holiday tips given

## 2019-11-21 NOTE — TELEPHONE ENCOUNTER
----- Message from Roberto Clifton sent at 11/21/2019  9:21 AM CST -----  Contact: Moreno with [facility] in [facility]      The caller states that they need a call back on the dosage directions for the Rx.    Rx- dulaglutide (TRULICITY) 1.5 mg/0.5 mL [facility]     Pharmacy Phone # [phone]\    Pharmacy Location: [facility] [address] [place] [address]

## 2019-11-21 NOTE — TELEPHONE ENCOUNTER
Spoke with pharmacy in regards to pt. Is to inject 1.5ml into the skin Subcutaneous. Once a week.

## 2019-11-21 NOTE — PATIENT INSTRUCTIONS
Resume trulicity    Patient warned of common side effects of diethylpropion including anxiety, insomnia, palpitations and increased blood pressure. It was also explained that it is for short-term usage along with diet and exercise, and that stopping the medication without making lifestyle changes will result in regain of weight. Patient states understanding.    Weight loss medications are controlled substances.  They require routine follow up. Prescription or pills that are lost or destroyed will not be replaced.       - To lose weight you want to cut 100% starchy carbohydrates out of your diet (bread, rice, pasta, potatoes, granola, flour, corn, peas, oatmeal, grits, tortillas, crackers, chips) and get  grams of protein.  Aim for 100 grams of protein daily.    - No soda, sweet tea, juices, sports drinks or lemonade     -Exercise daily    - Premier Protein (Chocolate, Bananas & Cream, Strawberries & Cream, Vanilla) Michael or [employer]    - Syntrax [place] from redBus.in, www.bariatricadGigaMedia.com, www.bariatricchoice.com. (LACTOSE FREE)    - Cavitation Technologies - Amazon.com (LACTOSE FREE)    - Veggetti Pro from Pixspan, Jet Set Games, [employer] & Beyond    - www.pinterest.com (cauliflower, cloud bread, quest bar cookies, eggplant, zucchini, zucchini noodles, crustless quiche, no carb meals, taco lettuce boats)    - http://theworldaccordingtoeggface.Design LED Products.com/        Helpful tips to survive the holidays:  - Dont save yourself for the meal: Make sure you continue to eat high protein small meals every 3-4 hours to ensure to do not become over-hungry. Avoid temptation by not showing up to a holiday party or gathering hungry.   - Plan ahead. Bring a dish to a party if you know there may not be an appropriate option.   - Choose sugar-free drinks: Stick to water or other sugar-free beverages and make sure you are getting 6-8 cups of fluid each day (but not with meals!). Avoid alcohol, carbonated beverages, and high-fat/high-sugar  beverages like hot chocolate and eggnog. Try sugar-free hot cocoa made with skim milk or water, or sugar-free spiced tea to add some holiday flair to your beverage (see sugar-free mulled cider recipe below)  - Take your time: Eat mindfully. Dont graze on food throughout the day. Sit down to enjoy your small meals. Chew slowly and thoroughly. Cut your food into small pieces before eating.  - Listen to your body: Stop eating as soon as you feel full. Do not feel pressured to try certain (or all) foods or to eat all of the food on your plate. Listen to your hunger cues.   - REMEMBER: Make your holidays about spending time with family and friends instead of focusing gatherings around food.  - Keep up your exercise routine: Make sure you continue to get regular exercise throughout the holiday season. Encourage friends and family to be active by taking a walk together after a meal, to look at holiday lights, or to window-shop.    Good Holiday Meal Options:  - Roasted Turkey, NO skin. Use low sodium broth instead of gravy.   - Stuffed Bell Peppers made WITHOUT bread crumbs or Rice. Try using parmesan cheese instead  - Gumbo, NO rice. Try picking out mostly the meat/seafood and vegetables with little broth.   - Green Bean Casserole made with 98% fat free cream of mushroom soup and crushed almonds/pecans instead of fried onions  - Side salad w/ low fat dressing. Try a different kind of salad maybe use Kale or spinach.   - Roasted non-starchy vegetables like brussel sprouts, broccoli, green beans, zucchini, butternut squash, cauliflower  - Cauliflower Mash (steam or roast cauliflower, puree w/ low fat cheese, dash of fat free milk and 2-3 sprays of I cant believe its not butter spray. Add garlic powder and black pepper to season). Use Low sodium broth instead of gravy.   - Try Loaded Cauliflower Mash (Make cauliflower like above cauliflower mash. Top with diced turkey pro, ¼ cup low fat cheddar cheese and bake @ 350* F  for 5-10 minutes, until cheese is melted. Top with minced chives, black pepper and garlic to taste).   - Homemade cranberry sauce using Splenda or another alternative sweetener. Boil fresh cranberries and add splenda to taste. Boil until cranberries break open and then simmer until it reaches the consistency you want (less time for more watery sauce and simmer for longer to create a thicker sauce).   - Deviled eggs: make using low fat wellington, mustard, DILL relish (not sweet relish).   - Vegetable tray w/ Greek yogurt Ranch Dip. Mix 1 packet of hidden valley ranch dip mix w/ 16 oz low fat plain greek yogurt.     Good Holiday Dessert Options:  - High protein Pumpkin Cheesecake (see recipe below)  - Pumpkin Whip (see recipe below)  - Quest Apple Pie or Cinnamon Roll flavored protein bar (warm in microwave for 10-15 seconds)  - Eggnog Protein shake (see recipe below)  - Fresh fruit w/ low fat cheese  - Sugar-free Jello Parfaits. Layer Red and Green sugar-free jello in cups and top w/ 2 tbsp Sugar-free cool-whip    Pumpkin Cheesecake    8 ounces fat free cream cheese, softened   2 scoops of vanilla protein powder (<4 g sugar per serving)   ¼ tsp Fine salt   2 eggs, at room temperature   1/3 cup fat free sour cream  1/3 cup fat free half and half  1 15 -ounce can pure pumpkin puree   1 tablespoon pumpkin pie spice, plus more for dusting   Unsalted nuts, crushed  *Add splenda to taste    Directions     1. Preheat the oven to 300 degrees F. Line 18 muffin cups with paper liners. Sprinkle 1 tsp crushed unsalted nuts at the bottom of each of muffin cup liner.     2. In a large bowl, beat the cream cheese, vanilla protein powder and 1/4 teaspoon fine salt on medium-high speed until smooth and creamy, 2 to 3 minutes. Scrape down the sides, reduce speed to low and beat in the eggs, 1 at a time, until combined. Beat in 1/3 cup fat free sour cream and fat free half and half. Stir in the pumpkin puree and pumpkin pie spice until  smooth. Divide evenly among cookie-lined paper cups, filling almost all the way to the top.     3. Bake until the filling is just set, 40 to 45 minutes. A sharp knife inserted into the center will come out moist, but clean. Cool completely in tins on a wire rack. Refrigerate until cold, 4 hours, or overnight. Top with a dusting of pumpkin pie spice.    Recipe altered from the following recipe: http://www.365looks.Tower59/recipes/food-network-niko/mini-pumpkin-cheesecakes-recipe.print.html?oc=linkback    Pumpkin Whip    Box of sugar-free vanilla pudding  Can of pumpkin puree  Pumpkin Pie spice (sprinkle to taste)  ½ cup of sugar-free Cool Whip    Directions:  Make sugar-free pudding according to package directions using fat free or 1% milk. Stir in pumpkin and cool whip. Add pumpkin pie spice to taste.     Egg Nog Protein shake    8 oz skim or 1% milk  1 scoop vanilla protein powder  1 tbsp sugar-free vanilla pudding mix  ½ tsp butter flavor extract  ½ tsp rum extract  ½ tsp cinnamon     Shake together or blend with ice and serve.     Sugar-Free Mulled Cider    3 oz diet cran-apple juice  6 oz water  1 packet sugar-free apple cider mix  ½ tsp apple pie spice  ½ tsp butter flavor extract  1 tbsp Sugar-free Syrup    Mix together. Warm if needed and serve w/ orange wedge and cinnamon stick.

## 2020-01-07 ENCOUNTER — TELEPHONE (OUTPATIENT)
Dept: BARIATRICS | Facility: CLINIC | Age: 60
End: 2020-01-07

## 2020-01-07 DIAGNOSIS — E66.3 OVERWEIGHT (BMI 25.0-29.9): Primary | ICD-10-CM

## 2020-01-07 NOTE — TELEPHONE ENCOUNTER
----- Message from Yelena Nino sent at 1/7/2020 12:04 PM CST -----  Contact: Pt   Reason: Refill request for diethylpropion 75 mg TbSR     Communication: [phone]

## 2020-01-08 RX ORDER — PHENTERMINE HYDROCHLORIDE 37.5 MG/1
37.5 TABLET ORAL
Qty: 30 TABLET | Refills: 2 | Status: SHIPPED | OUTPATIENT
Start: 2020-01-08 | End: 2020-02-07

## 2020-01-08 RX ORDER — ALPRAZOLAM 0.25 MG/1
TABLET ORAL
Qty: 30 TABLET | Refills: 0 | Status: SHIPPED | OUTPATIENT
Start: 2020-01-08 | End: 2020-04-08

## 2020-01-08 NOTE — TELEPHONE ENCOUNTER
Rx. Phentermine 37.5 MG. Has been faxed to pt. Pharmacy  [id number] - [place], [place] - [address]   [address] [place] [address]   Phone: [phone] Fax: [phone]   Not a 24 hour pharmacy; exact hours not known.

## 2020-02-03 ENCOUNTER — PATIENT MESSAGE (OUTPATIENT)
Dept: INTERNAL MEDICINE | Facility: CLINIC | Age: 60
End: 2020-02-03

## 2020-02-03 ENCOUNTER — TELEPHONE (OUTPATIENT)
Dept: INTERNAL MEDICINE | Facility: CLINIC | Age: 60
End: 2020-02-03

## 2020-02-03 DIAGNOSIS — Z12.31 ENCOUNTER FOR SCREENING MAMMOGRAM FOR BREAST CANCER: Primary | ICD-10-CM

## 2020-02-06 RX ORDER — BUPROPION HYDROCHLORIDE 150 MG/1
TABLET ORAL
Qty: 30 TABLET | Refills: 12 | Status: SHIPPED | OUTPATIENT
Start: 2020-02-06 | End: 2021-02-23

## 2020-03-05 ENCOUNTER — HOSPITAL ENCOUNTER (OUTPATIENT)
Dept: RADIOLOGY | Facility: HOSPITAL | Age: 60
Discharge: HOME | End: 2020-03-05
Attending: INTERNAL MEDICINE
Payer: COMMERCIAL

## 2020-03-05 VITALS — BODY MASS INDEX: 29.63 KG/M2 | WEIGHT: 162 LBS

## 2020-03-05 DIAGNOSIS — Z12.31 ENCOUNTER FOR SCREENING MAMMOGRAM FOR BREAST CANCER: ICD-10-CM

## 2020-03-05 PROCEDURE — 77067 SCR MAMMO BI INCL CAD: CPT | Mod: TC

## 2020-03-05 PROCEDURE — 77067 MAMMO DIGITAL SCREENING BILAT WITH TOMOSYNTHESIS_CAD: ICD-10-PCS | Mod: 26,,, | Performed by: RADIOLOGY

## 2020-03-05 PROCEDURE — 77063 MAMMO DIGITAL SCREENING BILAT WITH TOMOSYNTHESIS_CAD: ICD-10-PCS | Mod: 26,,, | Performed by: RADIOLOGY

## 2020-03-05 PROCEDURE — 77063 BREAST TOMOSYNTHESIS BI: CPT | Mod: 26,,, | Performed by: RADIOLOGY

## 2020-03-05 PROCEDURE — 77067 SCR MAMMO BI INCL CAD: CPT | Mod: 26,,, | Performed by: RADIOLOGY

## 2020-03-09 ENCOUNTER — PATIENT MESSAGE (OUTPATIENT)
Dept: BARIATRICS | Facility: CLINIC | Age: 60
End: 2020-03-09

## 2020-03-09 ENCOUNTER — TELEPHONE (OUTPATIENT)
Dept: INTERNAL MEDICINE | Facility: CLINIC | Age: 60
End: 2020-03-09

## 2020-03-09 DIAGNOSIS — Z00.00 WELLNESS EXAMINATION: Primary | ICD-10-CM

## 2020-03-09 NOTE — TELEPHONE ENCOUNTER
----- Message from Yamel Ludwig sent at 3/9/2020  2:38 PM CDT -----  Contact: Self  Pt is calling to speak with Staff regarding the results of her Mammogram.  In addition, pt is requesting updated orders for blood work.    She can be reached at [phone].    Thank you.

## 2020-03-09 NOTE — TELEPHONE ENCOUNTER
Mammogram was negative    Please link a lipid under my name to the orders under Dr. Christianson  name which can be scheduled before the March 23rd appointment.

## 2020-03-10 ENCOUNTER — PATIENT MESSAGE (OUTPATIENT)
Dept: BARIATRICS | Facility: CLINIC | Age: 60
End: 2020-03-10

## 2020-03-18 ENCOUNTER — TELEPHONE (OUTPATIENT)
Dept: BARIATRICS | Facility: CLINIC | Age: 60
End: 2020-03-18

## 2020-03-18 ENCOUNTER — OFFICE VISIT (OUTPATIENT)
Dept: BARIATRICS | Facility: CLINIC | Age: 60
End: 2020-03-18
Payer: COMMERCIAL

## 2020-03-18 ENCOUNTER — PATIENT MESSAGE (OUTPATIENT)
Dept: INTERNAL MEDICINE | Facility: CLINIC | Age: 60
End: 2020-03-18

## 2020-03-18 DIAGNOSIS — E66.3 OVERWEIGHT (BMI 25.0-29.9): Primary | ICD-10-CM

## 2020-03-18 DIAGNOSIS — Z98.84 S/P LAPAROSCOPIC SLEEVE GASTRECTOMY: ICD-10-CM

## 2020-03-18 PROCEDURE — 99213 PR OFFICE/OUTPT VISIT, EST, LEVL III, 20-29 MIN: ICD-10-PCS | Mod: 95,,, | Performed by: INTERNAL MEDICINE

## 2020-03-18 PROCEDURE — 99213 OFFICE O/P EST LOW 20 MIN: CPT | Mod: 95,,, | Performed by: INTERNAL MEDICINE

## 2020-03-18 RX ORDER — DIETHYLPROPION HYDROCHLORIDE 75 MG/1
75 TABLET, EXTENDED RELEASE ORAL DAILY
Qty: 30 TABLET | Refills: 0 | Status: SHIPPED | OUTPATIENT
Start: 2020-03-18 | End: 2020-06-30

## 2020-03-18 RX ORDER — PHENTERMINE HYDROCHLORIDE 37.5 MG/1
37.5 TABLET ORAL
Qty: 30 TABLET | Refills: 2 | Status: SHIPPED | OUTPATIENT
Start: 2020-04-18 | End: 2020-05-18

## 2020-03-18 NOTE — TELEPHONE ENCOUNTER
----- Message from Massiel Hunt sent at 3/18/2020  2:26 PM CDT -----  Contact: [facility] [phone]  Calling to see if pt is to should be taking   phentermine (ADIPEX-P) 37.5 mg tablet 30 tablet     AND    diethylpropion (TENUATE) 75 mg SR tablet 30 tablet      At the same time.        [id number] - [place], [place] - [address] [address]  [address] [address]  [place] [address]  Phone: [phone] Fax: [phone]

## 2020-03-18 NOTE — TELEPHONE ENCOUNTER
Returned call from [facility]. In regards to phentermine and diethylpropion. Advised. . Will have the pt. Alternating to determine weight loss progress. Pharmacy verbally expressed understanding.

## 2020-03-18 NOTE — TELEPHONE ENCOUNTER
Called pt. Informed. At this time we are not seeing patients in clinic. Due to Co v-19. Offered to complete appointment through tele visit. Pt. Accepted. Appointment has been scheduled. Instructed pt. On how visit will work! Pt. Verbally expressed understanding.

## 2020-03-18 NOTE — TELEPHONE ENCOUNTER
----- Message from Candi Nuñez sent at 3/18/2020  1:44 PM CDT -----  Brain is calling form [facility] pharmacist and has some question about an order that was just sent over for the pt and would like for the nurse to give him a call back at [phone]

## 2020-03-18 NOTE — TELEPHONE ENCOUNTER
Rx. Diethylpropion has been faxed to pt. Pharmacy.[facility].  In [place].     Will faxed phentermine in 3 weeks!

## 2020-03-18 NOTE — TELEPHONE ENCOUNTER
----- Message from Candi Nuñez sent at 3/18/2020  9:19 AM CDT -----  Pt is calling to see if she can come in today to see sujey and would like for someone to give her a call back and would like for a refill on phentermine 37.5mg

## 2020-03-18 NOTE — TELEPHONE ENCOUNTER
Returned call from [facility]. In regards to Rx.Trulicity. Per MD.Ayah script should be written as 1.5 Mg sub weekly. [facility]. Verbally expressed understanding.

## 2020-03-18 NOTE — PATIENT INSTRUCTIONS
Now: Patient warned of common side effects of diethylpropion including anxiety, insomnia, palpitations and increased blood pressure. It was also explained that it is for short-term usage along with diet and exercise, and that stopping the medication without making lifestyle changes will result in regain of weight. Patient states understanding.    Weight loss medications are controlled substances.  They require routine follow up. Prescription or pills that are lost or destroyed will not be replaced.         Next month: Patient warned of common side effects of phentermine including anxiety, insomnia, palpitations and increased blood pressure. It was also explained that it is for short-term usage along with diet and exercise, and that stopping the medication without making lifestyle changes will result in regain of weight. Patient states understanding.     Weight loss medications are controlled substances.  They require routine follow up. Prescription or pills that are lost or destroyed will not be replaced.       Continue trulicity.     - To lose weight you want to cut 100% starchy carbohydrates out of your diet (bread, rice, pasta, potatoes, granola, flour, corn, peas, oatmeal, grits, tortillas, crackers, chips) and get  grams of protein.  Aim for 100 grams of protein daily.    - No soda, sweet tea, juices, sports drinks or lemonade     -Exercise daily    Dinner in Under 30 minutes and 400 calories.     Baked Chicken breast with Broccoli Cheese Casserole  2-3 chicken breast (approximately 6-8 oz each)    2 tsp each garlic and herb Mrs. Mancera seasoning   2 tsp your favorite creole seasoning  2 tablespoons of balsamic vinegar  2 - 10 oz packages of frozen broccoli  1 egg   ½ cup skim milk  ½ tsp paprika   ½ tsp cayenne pepper   1 can fat free cream of mushroom soup.   1 .5 cups of shredded cheddar cheese    Pre-heat oven to 450 degrees. Toss 2-3 chicken breast (approximately 6-8 oz each) in 2 tsp each garlic and  herb Mrs. Dash seasoning, 2 tsp your favorite creole seasoning, and 2 tablespoons of balsamic vinegar. This can also be done up to 24 hours ahead of time, and the chicken can be left in the refrigerator to marinate. Place on a baking sheet sprayed with non-stick cooking spray and bake on 450 degrees for 10 min, then reduce heat to 350 degrees and cook an addition 10-15 minutes until chicken is cooked through.   While chicken is baking, microwave safe dish, thaw 2 - 10 oz packages of frozen broccoli. Drain any excess water, season with salt, pepper, all-purpose seasoning of your choice. In another bowl, whisk together 1 egg, ½ cup skim milk, ½ tsp paprika, ½ tsp cayenne pepper and 1 can fat free cream of mushroom soup. Combine broccoli, soup mixture, 1 cup of shredded cheddar cheese in baking dish sprayed with cooking spray. Top with remaining cheese. Bake in the oven with chicken for about 20 min or until set cheese is melted and heard.     Makes 4 servings. 389 calories per serving.10 g fat, 13 g carbs, 54g protein     Sheet Pan [place] Shrimp  1 pound large shrimp, shelled, peeled and deveined.   2 tablespoon olive oil  The zest and the juice of 1 lime  ½ tsp chili powder  ½-1 tsp cayenne pepper  1 tsp cumin  ½ tsp dry oregano  1 red bell pepper  1 green bell pepper  1 red onion  2 cups mushrooms, quartered  1 avocado  Whole priya lettuce leaves  Preheat oven to 375 degrees.  In a bowl combine shrimp, lime juice, lime zest, cumin, cayenne pepper, chili powder, and a pinch of salt. Set aside.   Slice peppers and onions into thin strips. Toss in 1 tablespoon of olive oil. Sprinkle with salt and pepper and arrange in a single layer over 1/3 of a large sheet pan  Toss mushrooms with remaining olive oil, oregano, salt and pepper. Arrange in single layer on sheet pan. Place sheet pan in oven for about 15-20 minutes until vegetables are softened, cooked about ¾ of the way through. Remove the sheet pan from  oven.  Change setting on oven to low broil.  If needed, rearrange vegetables to make room for shrimp. Arrange the shrimp in a single layer on the sheet pan and return pan to oven. After 5 minutes, flip shrimp. Cook 3-5 additional minutes, or until  Shrimp are opaque.   Serve shrimp and cooked vegetables on lettuce leaves with sliced avocado.   Makes 4 servings. Calories per servin; 23g fat, 19 g carbohydrates, 27g protein.    Zoodles Primavera with Seasoned Ricotta  8 cups zucchini noodles. These can be purchased already prepared, or you can make them on your own with whole zucchini and a vegetable spiralizer.   1.5 cups cherry tomatoes, quartered  2 cups sliced mushrooms  1 cup chopped fresh broccoli  1 cup frozen peas and carrots  2 cloves garlic, finely chopped  16 oz part skim ricotta cheese  2 oz Neufchatel cream cream cheese, cut into small cubes  Juice and zest of 1 lemon  1 pinch red pepper flakes    2 tsp Italian seasoning  4-5 leaves fresh basil, thinly sliced  1 tablespoon of olive oil  Parmesan cheese for topping (optional)     In a bowl, combine ricotta cheese, 1 tsp Italian seasoning, ½ teaspoon lemon zest. Season with salt and pepper to taste. Mix well. Fold in half of fresh basil. Set aside.   Heat a large skillet to medium high. Add olive oil. Sautee mushrooms until starting to become tender. Season them with salt and pepper while cooking.  Add broccoli and peas and carrots. Reduce heat to medium. Cover pan and cook for 4-5 minutes until broccoli is tender and peas and carrots are warmed through. Add Neufchatel cheese, Italian seasoning, red pepper flakes, 1 tsp lemon zest and lemon juice. Stir until a smooth sauce is formed. Add zucchini noodles (zoodles) to skillet and toss in sauce, then add cherry tomatoes.  Separate zoodle and vegetables into 4 equal portions. Serve each with a ¼ cup serving of seasoned ricotta cheese. Sprinkle with grated parmesan cheese or fresh basil,  if desired.    Makes 4 servings. Calories per servin calories, 32 g carbs, 33 g protein, 18 g fat

## 2020-03-18 NOTE — PROGRESS NOTES
Subjective:       Patient ID: Kateryna Weber is a 59 y.o. female.    Chief Complaint: No chief complaint on file.    The patient location is: [facility]  The chief complaint leading to consultation is:  Follow-up  Visit type: Virtual visit with synchronous audio and video  Total time spent with patient: 13 min  Each patient to whom he or she provides medical services by telemedicine is:  (1) informed of the relationship between the physician and patient and the respective role of any other health care provider with respect to management of the patient; and (2) notified that he or she may decline to receive medical services by telemedicine and may withdraw from such care at any time.    Notes:       Pt presents today for follow up. Getting adequate protein. She has been around 154 # on her home scale. ,  40 lbs total since seeing me. Has last been on phentermine,  checked today. Last filled 3/8/2020.   Was approx in the 230's lb before her surgery.Sometimes skipping her trulicity. Has not been as consistent with her vitamins and shakes. B1 and B6 pending, but her other labs look good. 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 .         Follow-up   Associated symptoms include arthralgias. Pertinent negatives include no headaches, myalgias, rash or sore throat.     Review of Systems   Constitutional: Negative for activity change, appetite change and unexpected weight change.   HENT: Negative for sore throat and voice change.    Eyes: Negative for pain and visual disturbance.   Respiratory: Negative for shortness of breath and wheezing.    Cardiovascular: Negative for palpitations and leg swelling.   Genitourinary: Negative for difficulty urinating and dysuria.        S/p hyst   Musculoskeletal: Positive for arthralgias. Negative for back pain and myalgias.        Hip and hand   Skin: Negative for pallor and rash.   Neurological: Negative for syncope, light-headedness and headaches.    Psychiatric/Behavioral: Negative for dysphoric mood and sleep disturbance. The patient is not nervous/anxious.        Objective:       There were no vitals taken for this visit.    Physical Exam   Constitutional: She is oriented to person, place, and time. She appears well-developed.   Overweight   HENT:   Head: Normocephalic and atraumatic.   Eyes: Pupils are equal, round, and reactive to light. EOM are normal.   Neck: Normal range of motion. Neck supple.   Cardiovascular: Normal rate.   Pulmonary/Chest: Effort normal.   Neurological: She is alert and oriented to person, place, and time.   Psychiatric: She has a normal mood and affect. Her behavior is normal. Judgment normal.   Vitals reviewed.      Assessment:       1. Overweight (BMI 25.0-29.9)    2. S/P laparoscopic sleeve gastrectomy        Plan:             Diagnoses and all orders for this visit:    Overweight (BMI 25.0-29.9)  -     diethylpropion (TENUATE) 75 mg SR tablet; Take 1 tablet (75 mg total) by mouth once daily.  -     phentermine (ADIPEX-P) 37.5 mg tablet; Take 1 tablet (37.5 mg total) by mouth before breakfast.    S/P laparoscopic sleeve gastrectomy    Other orders  -     dulaglutide (TRULICITY) 1.5 mg/0.5 mL PnIj; Inject 1.5 mg into the skin every 30 days.      Now: Patient warned of common side effects of diethylpropion including anxiety, insomnia, palpitations and increased blood pressure. It was also explained that it is for short-term usage along with diet and exercise, and that stopping the medication without making lifestyle changes will result in regain of weight. Patient states understanding.    Weight loss medications are controlled substances.  They require routine follow up. Prescription or pills that are lost or destroyed will not be replaced.         Next month: Patient warned of common side effects of phentermine including anxiety, insomnia, palpitations and increased blood pressure. It was also explained that it is for short-term  usage along with diet and exercise, and that stopping the medication without making lifestyle changes will result in regain of weight. Patient states understanding.     Weight loss medications are controlled substances.  They require routine follow up. Prescription or pills that are lost or destroyed will not be replaced.       Continue trulicity.     - To lose weight you want to cut 100% starchy carbohydrates out of your diet (bread, rice, pasta, potatoes, granola, flour, corn, peas, oatmeal, grits, tortillas, crackers, chips) and get  grams of protein.  Aim for 100 grams of protein daily.    - No soda, sweet tea, juices, sports drinks or lemonade     -Exercise daily      30 min recipes given

## 2020-03-18 NOTE — Clinical Note
Schedule 3 mo follow up. [facility]. Please hold phentermine rx and fax in 3weeks.

## 2020-03-19 ENCOUNTER — TELEPHONE (OUTPATIENT)
Dept: BARIATRICS | Facility: CLINIC | Age: 60
End: 2020-03-19

## 2020-03-19 NOTE — TELEPHONE ENCOUNTER
----- Message from Sherry Poon MD sent at 3/18/2020  1:41 PM CDT -----  Schedule 3 mo follow up. Will need to fax diethylpropion to [facility] now. Please hold phentermine rx and fax in 3weeks.

## 2020-04-08 RX ORDER — ALPRAZOLAM 0.25 MG/1
TABLET ORAL
Qty: 30 TABLET | Refills: 0 | Status: SHIPPED | OUTPATIENT
Start: 2020-04-08 | End: 2020-09-09

## 2020-04-20 ENCOUNTER — TELEPHONE (OUTPATIENT)
Dept: BARIATRICS | Facility: CLINIC | Age: 60
End: 2020-04-20

## 2020-05-19 ENCOUNTER — TELEPHONE (OUTPATIENT)
Dept: DERMATOLOGY | Facility: CLINIC | Age: 60
End: 2020-05-19

## 2020-06-30 ENCOUNTER — OFFICE VISIT (OUTPATIENT)
Dept: BARIATRICS | Facility: CLINIC | Age: 60
End: 2020-06-30
Payer: COMMERCIAL

## 2020-06-30 ENCOUNTER — TELEPHONE (OUTPATIENT)
Dept: BARIATRICS | Facility: CLINIC | Age: 60
End: 2020-06-30

## 2020-06-30 DIAGNOSIS — Z98.84 S/P LAPAROSCOPIC SLEEVE GASTRECTOMY: ICD-10-CM

## 2020-06-30 DIAGNOSIS — E66.3 OVERWEIGHT (BMI 25.0-29.9): Primary | ICD-10-CM

## 2020-06-30 PROCEDURE — 99213 PR OFFICE/OUTPT VISIT, EST, LEVL III, 20-29 MIN: ICD-10-PCS | Mod: 95,,, | Performed by: INTERNAL MEDICINE

## 2020-06-30 PROCEDURE — 99213 OFFICE O/P EST LOW 20 MIN: CPT | Mod: 95,,, | Performed by: INTERNAL MEDICINE

## 2020-06-30 RX ORDER — DULAGLUTIDE 1.5 MG/.5ML
1.5 INJECTION, SOLUTION SUBCUTANEOUS
Qty: 4 SYRINGE | Refills: 5 | Status: SHIPPED | OUTPATIENT
Start: 2020-06-30 | End: 2020-12-10

## 2020-06-30 RX ORDER — PHENTERMINE HYDROCHLORIDE 37.5 MG/1
TABLET ORAL
Qty: 30 TABLET | Refills: 1 | Status: SHIPPED | OUTPATIENT
Start: 2020-06-30 | End: 2020-09-10 | Stop reason: SDUPTHER

## 2020-06-30 NOTE — TELEPHONE ENCOUNTER
Called pt. Stated  loss signal with her phone have I pad on hand, will give her a called in a min.

## 2020-06-30 NOTE — PROGRESS NOTES
Subjective:       Patient ID: Kateryna Weber is a 59 y.o. female.    Chief Complaint: Follow-up    The patient location is: [facility] In car.   The chief complaint leading to consultation is: Patient presents with:  Follow-up       Visit type: audiovisual    Face to Face time with patient: 9 min  14 minutes of total time spent on the encounter, which includes face to face time and non-face to face time preparing to see the patient (eg, review of tests), Obtaining and/or reviewing separately obtained history, Documenting clinical information in the electronic or other health record, Independently interpreting results (not separately reported) and communicating results to the patient/family/caregiver, or Care coordination (not separately reported).         Each patient to whom he or she provides medical services by telemedicine is:  (1) informed of the relationship between the physician and patient and the respective role of any other health care provider with respect to management of the patient; and (2) notified that he or she may decline to receive medical services by telemedicine and may withdraw from such care at any time.    Notes:         Pt presents today for follow up. Getting adequate protein. She has been around 152 # on her home scale. ,  42 lbs total since seeing me. Has last been on phentermine,  checked today. Last filled 6/8/2020.   Was approx in the 230's lb before her surgery.Sometimes skipping her trulicity. Has been more consistent with her vitamins and shakes.  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 Also started lifting weights. .         Follow-up  Associated symptoms include arthralgias. Pertinent negatives include no headaches, myalgias, rash or sore throat.     Review of Systems   Constitutional: Negative for activity change, appetite change and unexpected weight change.   HENT: Negative for sore throat and voice change.    Eyes: Negative for pain and visual  disturbance.   Respiratory: Negative for shortness of breath and wheezing.    Cardiovascular: Negative for palpitations and leg swelling.   Genitourinary: Negative for difficulty urinating and dysuria.        S/p hyst   Musculoskeletal: Positive for arthralgias. Negative for back pain and myalgias.        Hip and hand   Skin: Negative for pallor and rash.   Neurological: Negative for syncope, light-headedness and headaches.   Psychiatric/Behavioral: Negative for dysphoric mood and sleep disturbance. The patient is not nervous/anxious.        Objective:       There were no vitals taken for this visit.    Physical Exam  Vitals signs reviewed.   Constitutional:       Appearance: She is well-developed.   HENT:      Head: Normocephalic and atraumatic.   Pulmonary:      Effort: Pulmonary effort is normal.   Neurological:      Mental Status: She is alert and oriented to person, place, and time.   Psychiatric:         Behavior: Behavior normal.         Judgment: Judgment normal.         Assessment:       1. Overweight (BMI 25.0-29.9)    2. S/P laparoscopic sleeve gastrectomy        Plan:             Kateryna was seen today for follow-up.    Diagnoses and all orders for this visit:    Overweight (BMI 25.0-29.9)  -     phentermine (ADIPEX-P) 37.5 mg tablet; 1/2 tab -1 tab po qam    S/P laparoscopic sleeve gastrectomy    Other orders  -     dulaglutide (TRULICITY) 1.5 mg/0.5 mL PnIj; Inject 1.5 mg into the skin every 30 days.       Patient warned of common side effects of phentermine including anxiety, insomnia, palpitations and increased blood pressure. It was also explained that it is for short-term usage along with diet and exercise, and that stopping the medication without making lifestyle changes will result in regain of weight. Patient states understanding.     Weight loss medications are controlled substances.  They require routine follow up. Prescription or pills that are lost or destroyed will not be replaced.         Continue trulicity.     - To lose weight you want to cut 100% starchy carbohydrates out of your diet (bread, rice, pasta, potatoes, granola, flour, corn, peas, oatmeal, grits, tortillas, crackers, chips) and get  grams of protein.  Aim for 100 grams of protein daily.    - No soda, sweet tea, juices, sports drinks or lemonade     -Exercise daily      Quarantine tips given

## 2020-09-09 ENCOUNTER — TELEPHONE (OUTPATIENT)
Dept: BARIATRICS | Facility: CLINIC | Age: 60
End: 2020-09-09

## 2020-09-09 NOTE — TELEPHONE ENCOUNTER
Attempted to reach pt to informed follow up appointment has been converted to virtual. No answer. Left detailed message, regarding visit.

## 2020-09-10 ENCOUNTER — TELEPHONE (OUTPATIENT)
Dept: BARIATRICS | Facility: CLINIC | Age: 60
End: 2020-09-10

## 2020-09-10 ENCOUNTER — OFFICE VISIT (OUTPATIENT)
Dept: BARIATRICS | Facility: CLINIC | Age: 60
End: 2020-09-10
Payer: COMMERCIAL

## 2020-09-10 DIAGNOSIS — E66.3 OVERWEIGHT (BMI 25.0-29.9): ICD-10-CM

## 2020-09-10 PROCEDURE — 99024 PR POST-OP FOLLOW-UP VISIT: ICD-10-PCS | Mod: 95,,, | Performed by: INTERNAL MEDICINE

## 2020-09-10 PROCEDURE — 99024 POSTOP FOLLOW-UP VISIT: CPT | Mod: 95,,, | Performed by: INTERNAL MEDICINE

## 2020-09-10 RX ORDER — PHENTERMINE HYDROCHLORIDE 37.5 MG/1
TABLET ORAL
Qty: 30 TABLET | Refills: 1 | Status: SHIPPED | OUTPATIENT
Start: 2020-10-10 | End: 2020-10-23

## 2020-09-10 RX ORDER — DIETHYLPROPION HYDROCHLORIDE 75 MG/1
75 TABLET, EXTENDED RELEASE ORAL DAILY
Qty: 30 TABLET | Refills: 0 | Status: SHIPPED | OUTPATIENT
Start: 2020-09-10 | End: 2020-10-23 | Stop reason: SDUPTHER

## 2020-09-10 NOTE — TELEPHONE ENCOUNTER
Spoke with pt. Notified medication has been sent to pharmacy.Also,scheduled 3 month follow up in clinic.

## 2020-09-10 NOTE — TELEPHONE ENCOUNTER
----- Message from Leo Perez sent at 9/10/2020 12:56 PM CDT -----  Patient's pharmacy called to speak w/ someone to verify the patient is suppose to have both prescriptions for Rx diethylpropion (TENUATE) 75 mg SR tablet // phentermine (ADIPEX-P) 37.5 mg tablet filled, requesting callback      [facility] [place] - [address]  [address] [facility]  [place] [address]  Phone: [phone] Fax: [phone]

## 2020-09-10 NOTE — TELEPHONE ENCOUNTER
Phoned [facility] pharmacy in regards to clarifying rx (Diethylpropion) and (Phentermine). Informed pharmacist pt is directed to take rx(Diethypropion) now and rx(Phentermine) next mo and until next follow up appointment. Pt will not be taking medications at once.     Pharmacist verbalized understanding, stated she can only fill one at a time due to medications being a controlled substance. Pharmacist stated she is filling rx(Diethylpropion) now and will notify pt once ready for .

## 2020-09-10 NOTE — PROGRESS NOTES
Subjective:       Patient ID: Kateryna Weber is a 59 y.o. female.    Chief Complaint: Follow-up    The patient location is: [facility] In car.   The chief complaint leading to consultation is: Patient presents with:  Follow-up       Visit type: visit could not be completed 2/2 to aduio not working and connection issues. NC and pt contacted to reschedule. Will send in RF.       Follow-up  Associated symptoms include arthralgias. Pertinent negatives include no headaches, myalgias, rash or sore throat.     Review of Systems   Constitutional: Negative for activity change, appetite change and unexpected weight change.   HENT: Negative for sore throat and voice change.    Eyes: Negative for pain and visual disturbance.   Respiratory: Negative for shortness of breath and wheezing.    Cardiovascular: Negative for palpitations and leg swelling.   Genitourinary: Negative for difficulty urinating and dysuria.        S/p hyst   Musculoskeletal: Positive for arthralgias. Negative for back pain and myalgias.        Hip and hand   Skin: Negative for pallor and rash.   Neurological: Negative for syncope, light-headedness and headaches.   Psychiatric/Behavioral: Negative for dysphoric mood and sleep disturbance. The patient is not nervous/anxious.        Objective:       There were no vitals taken for this visit.    Physical Exam  Vitals signs reviewed.   Constitutional:       Appearance: She is well-developed.   HENT:      Head: Normocephalic and atraumatic.   Pulmonary:      Effort: Pulmonary effort is normal.   Neurological:      Mental Status: She is alert and oriented to person, place, and time.   Psychiatric:         Behavior: Behavior normal.         Judgment: Judgment normal.         Assessment:       1. Overweight (BMI 25.0-29.9)        Plan:             Kateryna was seen today for follow-up.    Diagnoses and all orders for this visit:    Overweight (BMI 25.0-29.9)  -     diethylpropion (TENUATE) 75 mg SR tablet; Take 1 tablet  (75 mg total) by mouth once daily.  -     phentermine (ADIPEX-P) 37.5 mg tablet; 1/2 tab -1 tab po qam      Now: Patient warned of common side effects of diethylpropion including anxiety, insomnia, palpitations and increased blood pressure. It was also explained that it is for short-term usage along with diet and exercise, and that stopping the medication without making lifestyle changes will result in regain of weight. Patient states understanding.    Weight loss medications are controlled substances.  They require routine follow up. Prescription or pills that are lost or destroyed will not be replaced.         Next month and until follow up: Patient warned of common side effects of phentermine including anxiety, insomnia, palpitations and increased blood pressure. It was also explained that it is for short-term usage along with diet and exercise, and that stopping the medication without making lifestyle changes will result in regain of weight. Patient states understanding.     Weight loss medications are controlled substances.  They require routine follow up. Prescription or pills that are lost or destroyed will not be replaced.     Continue trulicity.     - To lose weight you want to cut 100% starchy carbohydrates out of your diet (bread, rice, pasta, potatoes, granola, flour, corn, peas, oatmeal, grits, tortillas, crackers, chips) and get  grams of protein.  Aim for 100 grams of protein daily.    - No soda, sweet tea, juices, sports drinks or lemonade     -Exercise daily      Exercise handouts given

## 2020-10-05 ENCOUNTER — PATIENT MESSAGE (OUTPATIENT)
Dept: ADMINISTRATIVE | Facility: HOSPITAL | Age: 60
End: 2020-10-05

## 2020-10-16 ENCOUNTER — PATIENT MESSAGE (OUTPATIENT)
Dept: BARIATRICS | Facility: CLINIC | Age: 60
End: 2020-10-16

## 2020-10-16 NOTE — TELEPHONE ENCOUNTER
Phoned [facility] pharmacy in regards to rx(Phentermine) rf. Informed pharmacist pt is to alternate between Diethylpropion and Phentermine. Pharmacist asked why is pt taking phentermine? I stated for medical weight loss. Pharmacist asked what is the duration of (Phentermine) ? When is the end date ? When will pt stop taking both medications?     Pharmacist stated pt has a history of Phentermine back in 2018. Pharmacist stated pt should not be on Phentermine) for this length of time. Pharmacist stated he would like to to speak with provider. Informed pharmacist  is not in clinic on today. Pharmacist stated pt cannot receive Phentermine without clarifying questions previously asked.     Spoke to pt in regards to receiving (Phentermine). Pt asked what is going on with rx(Phentermine), did we figure things out.     Asked pt is she Okay due to speaking very slowly and slurring words? Pt stated Yes.      Informed pt pharmacist is requesting to speak with  before she receives Phentermine. Informed pt we will resolve this matter soon once  return's to clinic on Monday 10/19.     Pt asked if she could receive a rf for (Diethypropion) instead. Informed pt I would send this information to  to gain her advice.

## 2020-10-21 ENCOUNTER — PATIENT MESSAGE (OUTPATIENT)
Dept: BARIATRICS | Facility: CLINIC | Age: 60
End: 2020-10-21

## 2020-10-21 DIAGNOSIS — E66.3 OVERWEIGHT (BMI 25.0-29.9): ICD-10-CM

## 2020-10-23 RX ORDER — DIETHYLPROPION HYDROCHLORIDE 75 MG/1
75 TABLET, EXTENDED RELEASE ORAL DAILY
Qty: 30 TABLET | Refills: 1 | Status: SHIPPED | OUTPATIENT
Start: 2020-10-23 | End: 2020-12-10

## 2020-10-23 NOTE — TELEPHONE ENCOUNTER
Pharmacist is incorrect.Phentermine, as well as diethylpropion, are a schedule IV medications.   Per state guidelines, phentermine can be Rx'ed up to 12 weeks at a time, with no aggregate amount in a 12 month period.   He must be confusing it with schedule III meds which can only be prescribed up to 12 weeks, and with an aggregate limit of 3 months in any in 12 month period.   My patients are evaluated regularly and plans adjusted accordingly, so I cannot elaborate on future treatment plans, nor has he sent us a signed release of information from the patient to do so in such detail.

## 2020-10-23 NOTE — TELEPHONE ENCOUNTER
Phoned pharmacy in regards to 's advice. Pharmacist stated rx(Diethylpropion) is ready for  now. Informed pharmacist (Phentermine) was discontinued on today 10/23/20. Pharmacist verbalized understanding .     Phoned pt in regards to (Diethylpripion) status. Informed pt medication is ready for  at [facility] [address]. Informed pt (Phentermine) was discontinued, pt verbalized understanding.

## 2020-12-10 ENCOUNTER — OFFICE VISIT (OUTPATIENT)
Dept: BARIATRICS | Facility: CLINIC | Age: 60
End: 2020-12-10
Payer: COMMERCIAL

## 2020-12-10 ENCOUNTER — TELEPHONE (OUTPATIENT)
Dept: BARIATRICS | Facility: CLINIC | Age: 60
End: 2020-12-10

## 2020-12-10 VITALS
BODY MASS INDEX: 28.69 KG/M2 | HEART RATE: 75 BPM | SYSTOLIC BLOOD PRESSURE: 118 MMHG | WEIGHT: 155.88 LBS | HEIGHT: 62 IN | DIASTOLIC BLOOD PRESSURE: 74 MMHG

## 2020-12-10 DIAGNOSIS — E66.3 OVERWEIGHT (BMI 25.0-29.9): ICD-10-CM

## 2020-12-10 DIAGNOSIS — Z98.84 S/P LAPAROSCOPIC SLEEVE GASTRECTOMY: Primary | ICD-10-CM

## 2020-12-10 PROCEDURE — 99213 PR OFFICE/OUTPT VISIT, EST, LEVL III, 20-29 MIN: ICD-10-PCS | Mod: S$GLB,,, | Performed by: INTERNAL MEDICINE

## 2020-12-10 PROCEDURE — 99213 OFFICE O/P EST LOW 20 MIN: CPT | Mod: S$GLB,,, | Performed by: INTERNAL MEDICINE

## 2020-12-10 PROCEDURE — 3008F BODY MASS INDEX DOCD: CPT | Mod: CPTII,S$GLB,, | Performed by: INTERNAL MEDICINE

## 2020-12-10 PROCEDURE — 99999 PR PBB SHADOW E&M-EST. PATIENT-LVL III: CPT | Mod: PBBFAC,,, | Performed by: INTERNAL MEDICINE

## 2020-12-10 PROCEDURE — 99999 PR PBB SHADOW E&M-EST. PATIENT-LVL III: ICD-10-PCS | Mod: PBBFAC,,, | Performed by: INTERNAL MEDICINE

## 2020-12-10 PROCEDURE — 3008F PR BODY MASS INDEX (BMI) DOCUMENTED: ICD-10-PCS | Mod: CPTII,S$GLB,, | Performed by: INTERNAL MEDICINE

## 2020-12-10 RX ORDER — PHENTERMINE HYDROCHLORIDE 37.5 MG/1
37.5 TABLET ORAL
Qty: 30 TABLET | Refills: 2 | Status: SHIPPED | OUTPATIENT
Start: 2020-12-10 | End: 2021-01-09

## 2020-12-10 RX ORDER — DULAGLUTIDE 0.75 MG/.5ML
0.75 INJECTION, SOLUTION SUBCUTANEOUS
Qty: 6 ML | Refills: 1 | Status: SHIPPED | OUTPATIENT
Start: 2020-12-10 | End: 2021-01-26

## 2020-12-10 NOTE — TELEPHONE ENCOUNTER
Called pt to notify her of information patrick gain from pharmacy regarding medication, picked up. Pt verbalized understanding an expressed gratitude for letting her know before heading to pharmacy.

## 2020-12-10 NOTE — TELEPHONE ENCOUNTER
----- Message from Roberto Clifton sent at 12/10/2020  8:48 AM CST -----  Regarding: pt returning your call        The Pt just left your office and states that she had a missed call and would like a call back please.    Phone # [phone]

## 2020-12-10 NOTE — TELEPHONE ENCOUNTER
Returned pt called, upon request. I informed pt, MA-Bob was giving her a called, due to status of appointment was showing as arrived. Bob was unaware,  called pt to the back.

## 2020-12-10 NOTE — TELEPHONE ENCOUNTER
----- Message from Yelena Nino sent at 12/10/2020 10:13 AM CST -----  Regarding: Tu with [facility]  Reason: Calling to get medication instructions for  phentermine (ADIPEX-P) 37.5 mg tablet      Contact # [phone]

## 2020-12-10 NOTE — PATIENT INSTRUCTIONS
Patient warned of common side effects of phentermine including anxiety, insomnia, palpitations and increased blood pressure. It was also explained that it is for short-term usage along with diet and exercise, and that stopping the medication without making lifestyle changes will result in regain of weight. Patient states understanding.     Weight loss medications are controlled substances.  They require routine follow up. Prescription or pills that are lost or destroyed will not be replaced.           - To lose weight you want to cut 100% starchy carbohydrates out of your diet (bread, rice, pasta, potatoes, granola, flour, corn, peas, oatmeal, grits, tortillas, crackers, chips) and get  grams of protein.  Aim for 100 grams of protein daily.    - No soda, sweet tea, juices, sports drinks or lemonade     -Exercise daily    - Premier Protein (Chocolate, Bananas & Cream, Strawberries & Cream, Vanilla) Michael or [employer]    - Syntrax [place] from [employer], www.bariatricadZangage.com, www.bariatricchoice.com. (LACTOSE FREE)    - BiPro - Amazon.com (LACTOSE FREE)    - Veggetti Pro from [employer], [employer], [employer]    - www.pinterest.com (cauliflower, cloud bread, quest bar cookies, eggplant, zucchini, zucchini noodles, crustless quiche, no carb meals, taco lettuce boats)    - http://theworldaccophyliciaingtoeggface.OnState.com/      Helpful tips to survive the holidays:  - Dont save yourself for the meal: Make sure you continue to eat high protein small meals every 3-4 hours to ensure to do not become over-hungry. Avoid temptation by not showing up to a holiday party or gathering hungry.   - Plan ahead. Bring a dish to a party if you know there may not be an appropriate option.   - Choose sugar-free drinks: Stick to water or other sugar-free beverages and make sure you are getting 6-8 cups of fluid each day (but not with meals!). Avoid alcohol, carbonated beverages, and high-fat/high-sugar beverages like hot  chocolate and eggnog. Try sugar-free hot cocoa made with skim milk or water, or sugar-free spiced tea to add some holiday flair to your beverage (see sugar-free mulled cider recipe below)  - Take your time: Eat mindfully. Dont graze on food throughout the day. Sit down to enjoy your small meals. Chew slowly and thoroughly. Cut your food into small pieces before eating.  - Listen to your body: Stop eating as soon as you feel full. Do not feel pressured to try certain (or all) foods or to eat all of the food on your plate. Listen to your hunger cues.   - REMEMBER: Make your holidays about spending time with family and friends instead of focusing gatherings around food.  - Keep up your exercise routine: Make sure you continue to get regular exercise throughout the holiday season. Encourage friends and family to be active by taking a walk together after a meal, to look at holiday lights, or to window-shop.    Good Holiday Meal Options:  - Roasted Turkey, NO skin. Use low sodium broth instead of gravy.   - Stuffed Bell Peppers made WITHOUT bread crumbs or Rice. Try using parmesan cheese instead  - Gumbo, NO rice. Try picking out mostly the meat/seafood and vegetables with little broth.   - Green Bean Casserole made with 98% fat free cream of mushroom soup and crushed almonds/pecans instead of fried onions  - Side salad w/ low fat dressing. Try a different kind of salad maybe use Kale or spinach.   - Roasted non-starchy vegetables like brussel sprouts, broccoli, green beans, zucchini, butternut squash, cauliflower  - Cauliflower Mash (steam or roast cauliflower, puree w/ low fat cheese, dash of fat free milk and 2-3 sprays of I cant believe its not butter spray. Add garlic powder and black pepper to season). Use Low sodium broth instead of gravy.   - Try Loaded Cauliflower Mash (Make cauliflower like above cauliflower mash. Top with diced turkey pro, ¼ cup low fat cheddar cheese and bake @ 350* F for 5-10 minutes,  until cheese is melted. Top with minced chives, black pepper and garlic to taste).   - Homemade cranberry sauce using Splenda or another alternative sweetener. Boil fresh cranberries and add splenda to taste. Boil until cranberries break open and then simmer until it reaches the consistency you want (less time for more watery sauce and simmer for longer to create a thicker sauce).   - Deviled eggs: make using low fat wellington, mustard, DILL relish (not sweet relish).   - Vegetable tray w/ Greek yogurt Ranch Dip. Mix 1 packet of [place] ranch dip mix w/ 16 oz low fat plain greek yogurt.     Good Holiday Dessert Options:  - High protein Pumpkin Cheesecake (see recipe below)  - Pumpkin Whip (see recipe below)  - Quest Apple Pie or Cinnamon Roll flavored protein bar (warm in microwave for 10-15 seconds)  - Eggnog Protein shake (see recipe below)  - Fresh fruit w/ low fat cheese  - Sugar-free Jello Parfaits. Layer Red and Green sugar-free jello in cups and top w/ 2 tbsp Sugar-free cool-whip    Pumpkin Cheesecake    8 ounces fat free cream cheese, softened   2 scoops of vanilla protein powder (<4 g sugar per serving)   ¼ tsp Fine salt   2 eggs, at room temperature   1/3 cup fat free sour cream  1/3 cup fat free half and half  1 15 -ounce can pure pumpkin puree   1 tablespoon pumpkin pie spice, plus more for dusting   Unsalted nuts, crushed  *Add splenda to taste    Directions     1. Preheat the oven to 300 degrees F. Line 18 muffin cups with paper liners. Sprinkle 1 tsp crushed unsalted nuts at the bottom of each of muffin cup liner.     2. In a large bowl, beat the cream cheese, vanilla protein powder and 1/4 teaspoon fine salt on medium-high speed until smooth and creamy, 2 to 3 minutes. Scrape down the sides, reduce speed to low and beat in the eggs, 1 at a time, until combined. Beat in 1/3 cup fat free sour cream and fat free half and half. Stir in the pumpkin puree and pumpkin pie spice until smooth. Divide evenly  among cookie-lined paper cups, filling almost all the way to the top.     3. Bake until the filling is just set, 40 to 45 minutes. A sharp knife inserted into the center will come out moist, but clean. Cool completely in tins on a wire rack. Refrigerate until cold, 4 hours, or overnight. Top with a dusting of pumpkin pie spice.    Recipe altered from the following recipe: http://www.RealConnex.com.[employer]/recipes/food-network-niko/mini-pumpkin-cheesecakes-recipe.print.html?oc=linkback    Pumpkin Whip    Box of sugar-free vanilla pudding  Can of pumpkin puree  Pumpkin Pie spice (sprinkle to taste)  ½ cup of sugar-free Cool Whip    Directions:  Make sugar-free pudding according to package directions using fat free or 1% milk. Stir in pumpkin and cool whip. Add pumpkin pie spice to taste.     Egg Nog Protein shake    8 oz skim or 1% milk  1 scoop vanilla protein powder  1 tbsp sugar-free vanilla pudding mix  ½ tsp butter flavor extract  ½ tsp rum extract  ½ tsp cinnamon     Shake together or blend with ice and serve.     Sugar-Free Mulled Cider    3 oz diet cran-apple juice  6 oz water  1 packet sugar-free apple cider mix  ½ tsp apple pie spice  ½ tsp butter flavor extract  1 tbsp Sugar-free Syrup    Mix together. Warm if needed and serve w/ orange wedge and cinnamon stick.

## 2020-12-10 NOTE — TELEPHONE ENCOUNTER
Returned phone called left by pharmacistKERRI- regarding patient being on Diethylpropion an phentermine. I advised pharmacist-MD Kirk.Ayah has patient tapering off medication- Diethylpropion. PharmacistKERRI, informed me, patient picked up a Rx of Diethylpropion on November 19 th. and will not be able to picked up phentermine until December 19 th.

## 2020-12-10 NOTE — PROGRESS NOTES
Subjective:       Patient ID: Kateryna Weber is a 59 y.o. female.    Chief Complaint: No chief complaint on file.    Pt presents today for follow up. Getting adequate protein. She has lost 7 lbs sine last seen in person,  47lbs total since seeing me. Has last been on phentermine,  checked today. Last filled 11/19/2020.   Was approx in the 230's lb before her surgery.Sometimes skipping her trulicity. Has not been as consistent with her vitamins and shakes.  She has not been as consistent with exercise.         Follow-up  Associated symptoms include arthralgias. Pertinent negatives include no headaches, myalgias, rash or sore throat.     Review of Systems   Constitutional: Negative for activity change, appetite change and unexpected weight change.   HENT: Negative for sore throat and voice change.    Eyes: Negative for pain and visual disturbance.   Respiratory: Negative for shortness of breath and wheezing.    Cardiovascular: Negative for palpitations and leg swelling.   Genitourinary: Negative for difficulty urinating and dysuria.        S/p hyst   Musculoskeletal: Positive for arthralgias. Negative for back pain and myalgias.        Hip and hand   Skin: Negative for pallor and rash.   Neurological: Negative for syncope, light-headedness and headaches.   Psychiatric/Behavioral: Negative for dysphoric mood and sleep disturbance. The patient is not nervous/anxious.        Objective:       /74 (BP Location: Right arm, Patient Position: Sitting)   Pulse 75   Wt 70.7 kg (155 lb 13.8 oz)   BMI 28.51 kg/m²     Physical Exam  Vitals signs reviewed.   Constitutional:       General: She is not in acute distress.     Appearance: She is well-developed.      Comments: Obese   HENT:      Head: Normocephalic and atraumatic.   Eyes:      General: No scleral icterus.     Pupils: Pupils are equal, round, and reactive to light.   Neck:      Musculoskeletal: Normal range of motion and neck supple.   Cardiovascular:      Rate  and Rhythm: Normal rate.   Pulmonary:      Effort: Pulmonary effort is normal.   Musculoskeletal: Normal range of motion.   Skin:     General: Skin is warm and dry.      Findings: No erythema.   Neurological:      Mental Status: She is alert and oriented to person, place, and time.      Cranial Nerves: No cranial nerve deficit.   Psychiatric:         Behavior: Behavior normal.         Judgment: Judgment normal.         Assessment:       1. S/P laparoscopic sleeve gastrectomy    2. Overweight (BMI 25.0-29.9)        Plan:             Diagnoses and all orders for this visit:    S/P laparoscopic sleeve gastrectomy    Overweight (BMI 25.0-29.9)  -     phentermine (ADIPEX-P) 37.5 mg tablet; Take 1 tablet (37.5 mg total) by mouth before breakfast.    Other orders  -     dulaglutide (TRULICITY) 0.75 mg/0.5 mL pen injector; Inject 0.75 mg into the skin every 7 days.      Patient warned of common side effects of phentermine including anxiety, insomnia, palpitations and increased blood pressure. It was also explained that it is for short-term usage along with diet and exercise, and that stopping the medication without making lifestyle changes will result in regain of weight. Patient states understanding.     Weight loss medications are controlled substances.  They require routine follow up. Prescription or pills that are lost or destroyed will not be replaced.           - To lose weight you want to cut 100% starchy carbohydrates out of your diet (bread, rice, pasta, potatoes, granola, flour, corn, peas, oatmeal, grits, tortillas, crackers, chips) and get  grams of protein.  Aim for 100 grams of protein daily.    - No soda, sweet tea, juices, sports drinks or lemonade     -Exercise daily    - Premier Protein (Chocolate, Bananas & Cream, Strawberries & Cream, Vanilla) [employer] or [employer]    - Syntrax [place] from [employer], www.bariatricadvantage.com, www.bariatricchoice.com. (LACTOSE FREE)    - BiPro - [employer].com (LACTOSE  FREE)    - Sharon Pro from [employer], [employer], [employer]    - www.TheCommentor.com (cauliflower, cloud bread, quest bar cookies, eggplant, zucchini, zucchini noodles, crustless quiche, no carb meals, taco lettuce boats)    - http://theworldaccophyliciaingtoshoaibace.Pure Focus.com/      Holiday tips given

## 2020-12-23 ENCOUNTER — PATIENT MESSAGE (OUTPATIENT)
Dept: BARIATRICS | Facility: CLINIC | Age: 60
End: 2020-12-23

## 2020-12-23 NOTE — TELEPHONE ENCOUNTER
Called pt pharmacy,[facility]-to follow up on medication phentermine. Pharmacist informed me Rx. Phentermine is ready for .

## 2021-01-04 ENCOUNTER — PATIENT MESSAGE (OUTPATIENT)
Dept: ADMINISTRATIVE | Facility: HOSPITAL | Age: 61
End: 2021-01-04

## 2021-01-13 ENCOUNTER — PATIENT MESSAGE (OUTPATIENT)
Dept: PHARMACY | Facility: CLINIC | Age: 61
End: 2021-01-13

## 2021-01-13 ENCOUNTER — IMMUNIZATION (OUTPATIENT)
Dept: PHARMACY | Facility: CLINIC | Age: 61
End: 2021-01-13
Payer: COMMERCIAL

## 2021-01-26 ENCOUNTER — PATIENT OUTREACH (OUTPATIENT)
Dept: ADMINISTRATIVE | Facility: HOSPITAL | Age: 61
End: 2021-01-26

## 2021-01-26 ENCOUNTER — OFFICE VISIT (OUTPATIENT)
Dept: INTERNAL MEDICINE | Facility: CLINIC | Age: 61
End: 2021-01-26
Payer: COMMERCIAL

## 2021-01-26 ENCOUNTER — PATIENT MESSAGE (OUTPATIENT)
Dept: INTERNAL MEDICINE | Facility: CLINIC | Age: 61
End: 2021-01-26

## 2021-01-26 ENCOUNTER — LAB VISIT (OUTPATIENT)
Dept: LAB | Facility: HOSPITAL | Age: 61
End: 2021-01-26
Attending: INTERNAL MEDICINE
Payer: COMMERCIAL

## 2021-01-26 VITALS
DIASTOLIC BLOOD PRESSURE: 80 MMHG | HEART RATE: 79 BPM | OXYGEN SATURATION: 98 % | HEIGHT: 62 IN | BODY MASS INDEX: 28.48 KG/M2 | SYSTOLIC BLOOD PRESSURE: 120 MMHG | WEIGHT: 154.75 LBS

## 2021-01-26 DIAGNOSIS — Z00.00 WELLNESS EXAMINATION: Primary | ICD-10-CM

## 2021-01-26 DIAGNOSIS — Z00.00 WELLNESS EXAMINATION: ICD-10-CM

## 2021-01-26 DIAGNOSIS — Z01.00 ENCOUNTER FOR VISION SCREENING: ICD-10-CM

## 2021-01-26 DIAGNOSIS — Z12.31 ENCOUNTER FOR SCREENING MAMMOGRAM FOR BREAST CANCER: ICD-10-CM

## 2021-01-26 DIAGNOSIS — Z01.419 ENCOUNTER FOR ANNUAL ROUTINE GYNECOLOGICAL EXAMINATION: ICD-10-CM

## 2021-01-26 DIAGNOSIS — Z98.84 HISTORY OF GASTRIC BYPASS: ICD-10-CM

## 2021-01-26 LAB
ALBUMIN SERPL BCP-MCNC: 3.9 G/DL (ref 3.5–5.2)
ALP SERPL-CCNC: 84 U/L (ref 55–135)
ALT SERPL W/O P-5'-P-CCNC: 16 U/L (ref 10–44)
ANION GAP SERPL CALC-SCNC: 11 MMOL/L (ref 8–16)
AST SERPL-CCNC: 17 U/L (ref 10–40)
BASOPHILS # BLD AUTO: 0.01 K/UL (ref 0–0.2)
BASOPHILS NFR BLD: 0.2 % (ref 0–1.9)
BILIRUB SERPL-MCNC: 1 MG/DL (ref 0.1–1)
BUN SERPL-MCNC: 13 MG/DL (ref 6–20)
CALCIUM SERPL-MCNC: 9.3 MG/DL (ref 8.7–10.5)
CHLORIDE SERPL-SCNC: 105 MMOL/L (ref 95–110)
CHOLEST SERPL-MCNC: 184 MG/DL (ref 120–199)
CHOLEST/HDLC SERPL: 1.8 {RATIO} (ref 2–5)
CO2 SERPL-SCNC: 26 MMOL/L (ref 23–29)
CREAT SERPL-MCNC: 0.6 MG/DL (ref 0.5–1.4)
DIFFERENTIAL METHOD: NORMAL
EOSINOPHIL # BLD AUTO: 0.1 K/UL (ref 0–0.5)
EOSINOPHIL NFR BLD: 1.5 % (ref 0–8)
ERYTHROCYTE [DISTWIDTH] IN BLOOD BY AUTOMATED COUNT: 12.4 % (ref 11.5–14.5)
EST. GFR  (AFRICAN AMERICAN): >60 ML/MIN/1.73 M^2
EST. GFR  (NON AFRICAN AMERICAN): >60 ML/MIN/1.73 M^2
FERRITIN SERPL-MCNC: 41 NG/ML (ref 20–300)
GLUCOSE SERPL-MCNC: 83 MG/DL (ref 70–110)
HCT VFR BLD AUTO: 42.8 % (ref 37–48.5)
HDLC SERPL-MCNC: 102 MG/DL (ref 40–75)
HDLC SERPL: 55.4 % (ref 20–50)
HGB BLD-MCNC: 14.4 G/DL (ref 12–16)
IMM GRANULOCYTES # BLD AUTO: 0.01 K/UL (ref 0–0.04)
IMM GRANULOCYTES NFR BLD AUTO: 0.2 % (ref 0–0.5)
IRON SERPL-MCNC: 125 UG/DL (ref 30–160)
LDLC SERPL CALC-MCNC: 71.6 MG/DL (ref 63–159)
LYMPHOCYTES # BLD AUTO: 1.4 K/UL (ref 1–4.8)
LYMPHOCYTES NFR BLD: 29.8 % (ref 18–48)
MAGNESIUM SERPL-MCNC: 2.1 MG/DL (ref 1.6–2.6)
MCH RBC QN AUTO: 30.9 PG (ref 27–31)
MCHC RBC AUTO-ENTMCNC: 33.6 G/DL (ref 32–36)
MCV RBC AUTO: 92 FL (ref 82–98)
MONOCYTES # BLD AUTO: 0.3 K/UL (ref 0.3–1)
MONOCYTES NFR BLD: 6.3 % (ref 4–15)
NEUTROPHILS # BLD AUTO: 3 K/UL (ref 1.8–7.7)
NEUTROPHILS NFR BLD: 62 % (ref 38–73)
NONHDLC SERPL-MCNC: 82 MG/DL
NRBC BLD-RTO: 0 /100 WBC
PLATELET # BLD AUTO: 175 K/UL (ref 150–350)
PMV BLD AUTO: 10.9 FL (ref 9.2–12.9)
POTASSIUM SERPL-SCNC: 4 MMOL/L (ref 3.5–5.1)
PROT SERPL-MCNC: 6.5 G/DL (ref 6–8.4)
RBC # BLD AUTO: 4.66 M/UL (ref 4–5.4)
SATURATED IRON: 32 % (ref 20–50)
SODIUM SERPL-SCNC: 142 MMOL/L (ref 136–145)
TOTAL IRON BINDING CAPACITY: 389 UG/DL (ref 250–450)
TRANSFERRIN SERPL-MCNC: 263 MG/DL (ref 200–375)
TRIGL SERPL-MCNC: 52 MG/DL (ref 30–150)
TSH SERPL DL<=0.005 MIU/L-ACNC: 0.71 UIU/ML (ref 0.4–4)
WBC # BLD AUTO: 4.76 K/UL (ref 3.9–12.7)

## 2021-01-26 PROCEDURE — 84425 ASSAY OF VITAMIN B-1: CPT

## 2021-01-26 PROCEDURE — 99396 PR PREVENTIVE VISIT,EST,40-64: ICD-10-PCS | Mod: S$GLB,,, | Performed by: INTERNAL MEDICINE

## 2021-01-26 PROCEDURE — 3008F PR BODY MASS INDEX (BMI) DOCUMENTED: ICD-10-PCS | Mod: CPTII,S$GLB,, | Performed by: INTERNAL MEDICINE

## 2021-01-26 PROCEDURE — 99999 PR PBB SHADOW E&M-EST. PATIENT-LVL IV: CPT | Mod: PBBFAC,,, | Performed by: INTERNAL MEDICINE

## 2021-01-26 PROCEDURE — 83540 ASSAY OF IRON: CPT

## 2021-01-26 PROCEDURE — 82306 VITAMIN D 25 HYDROXY: CPT

## 2021-01-26 PROCEDURE — 80061 LIPID PANEL: CPT

## 2021-01-26 PROCEDURE — 36415 COLL VENOUS BLD VENIPUNCTURE: CPT

## 2021-01-26 PROCEDURE — 3008F BODY MASS INDEX DOCD: CPT | Mod: CPTII,S$GLB,, | Performed by: INTERNAL MEDICINE

## 2021-01-26 PROCEDURE — 85025 COMPLETE CBC W/AUTO DIFF WBC: CPT

## 2021-01-26 PROCEDURE — 83735 ASSAY OF MAGNESIUM: CPT

## 2021-01-26 PROCEDURE — 1125F PR PAIN SEVERITY QUANTIFIED, PAIN PRESENT: ICD-10-PCS | Mod: S$GLB,,, | Performed by: INTERNAL MEDICINE

## 2021-01-26 PROCEDURE — 80053 COMPREHEN METABOLIC PANEL: CPT

## 2021-01-26 PROCEDURE — 84443 ASSAY THYROID STIM HORMONE: CPT

## 2021-01-26 PROCEDURE — 99999 PR PBB SHADOW E&M-EST. PATIENT-LVL IV: ICD-10-PCS | Mod: PBBFAC,,, | Performed by: INTERNAL MEDICINE

## 2021-01-26 PROCEDURE — 82728 ASSAY OF FERRITIN: CPT

## 2021-01-26 PROCEDURE — 99396 PREV VISIT EST AGE 40-64: CPT | Mod: S$GLB,,, | Performed by: INTERNAL MEDICINE

## 2021-01-26 PROCEDURE — 82607 VITAMIN B-12: CPT

## 2021-01-26 PROCEDURE — 1125F AMNT PAIN NOTED PAIN PRSNT: CPT | Mod: S$GLB,,, | Performed by: INTERNAL MEDICINE

## 2021-01-27 LAB
25(OH)D3+25(OH)D2 SERPL-MCNC: 37 NG/ML (ref 30–96)
VIT B12 SERPL-MCNC: 1563 PG/ML (ref 210–950)

## 2021-01-29 LAB — VIT B1 BLD-MCNC: 77 UG/L (ref 38–122)

## 2021-03-03 ENCOUNTER — TELEPHONE (OUTPATIENT)
Dept: INTERNAL MEDICINE | Facility: CLINIC | Age: 61
End: 2021-03-03

## 2021-03-05 ENCOUNTER — IMMUNIZATION (OUTPATIENT)
Dept: FAMILY MEDICINE | Facility: CLINIC | Age: 61
End: 2021-03-05
Payer: COMMERCIAL

## 2021-03-05 DIAGNOSIS — Z23 NEED FOR VACCINATION: Primary | ICD-10-CM

## 2021-03-05 PROCEDURE — 91300 COVID-19, MRNA, LNP-S, PF, 30 MCG/0.3 ML DOSE VACCINE: CPT | Mod: PBBFAC | Performed by: FAMILY MEDICINE

## 2021-03-05 RX ORDER — BUPROPION HYDROCHLORIDE 150 MG/1
150 TABLET ORAL DAILY
Qty: 30 TABLET | Refills: 6 | Status: CANCELLED | OUTPATIENT
Start: 2021-03-05

## 2021-03-05 RX ORDER — ALPRAZOLAM 0.25 MG/1
TABLET ORAL
Qty: 30 TABLET | Refills: 0 | Status: CANCELLED | OUTPATIENT
Start: 2021-03-05

## 2021-03-12 ENCOUNTER — HOSPITAL ENCOUNTER (OUTPATIENT)
Dept: RADIOLOGY | Facility: HOSPITAL | Age: 61
Discharge: HOME | End: 2021-03-12
Attending: INTERNAL MEDICINE
Payer: COMMERCIAL

## 2021-03-12 VITALS — WEIGHT: 152 LBS | HEIGHT: 62 IN | BODY MASS INDEX: 27.97 KG/M2

## 2021-03-12 DIAGNOSIS — Z12.31 ENCOUNTER FOR SCREENING MAMMOGRAM FOR BREAST CANCER: ICD-10-CM

## 2021-03-12 PROCEDURE — 77063 MAMMO DIGITAL SCREENING BILAT WITH TOMO: ICD-10-PCS | Mod: 26,,, | Performed by: RADIOLOGY

## 2021-03-12 PROCEDURE — 77063 BREAST TOMOSYNTHESIS BI: CPT | Mod: 26,,, | Performed by: RADIOLOGY

## 2021-03-12 PROCEDURE — 77067 SCR MAMMO BI INCL CAD: CPT | Mod: 26,,, | Performed by: RADIOLOGY

## 2021-03-12 PROCEDURE — 77067 MAMMO DIGITAL SCREENING BILAT WITH TOMO: ICD-10-PCS | Mod: 26,,, | Performed by: RADIOLOGY

## 2021-03-12 PROCEDURE — 77067 SCR MAMMO BI INCL CAD: CPT | Mod: TC

## 2021-03-15 ENCOUNTER — OFFICE VISIT (OUTPATIENT)
Dept: BARIATRICS | Facility: CLINIC | Age: 61
End: 2021-03-15
Payer: COMMERCIAL

## 2021-03-15 ENCOUNTER — TELEPHONE (OUTPATIENT)
Dept: BARIATRICS | Facility: CLINIC | Age: 61
End: 2021-03-15

## 2021-03-15 ENCOUNTER — OFFICE VISIT (OUTPATIENT)
Dept: OPTOMETRY | Facility: CLINIC | Age: 61
End: 2021-03-15
Payer: COMMERCIAL

## 2021-03-15 VITALS
SYSTOLIC BLOOD PRESSURE: 112 MMHG | HEART RATE: 73 BPM | OXYGEN SATURATION: 98 % | WEIGHT: 153.69 LBS | DIASTOLIC BLOOD PRESSURE: 74 MMHG | HEIGHT: 62 IN | BODY MASS INDEX: 28.28 KG/M2

## 2021-03-15 DIAGNOSIS — Z80.8 FAMILY HISTORY OF MELANOMA: ICD-10-CM

## 2021-03-15 DIAGNOSIS — H52.4 PRESBYOPIA: ICD-10-CM

## 2021-03-15 DIAGNOSIS — Z83.518 FAMILY HISTORY OF MACULAR DEGENERATION: ICD-10-CM

## 2021-03-15 DIAGNOSIS — H25.13 NUCLEAR SCLEROSIS OF BOTH EYES: Primary | ICD-10-CM

## 2021-03-15 DIAGNOSIS — Z98.890 HX OF LASIK: ICD-10-CM

## 2021-03-15 DIAGNOSIS — E66.3 OVERWEIGHT (BMI 25.0-29.9): Primary | ICD-10-CM

## 2021-03-15 DIAGNOSIS — Z01.00 ENCOUNTER FOR VISION SCREENING: ICD-10-CM

## 2021-03-15 PROCEDURE — 92004 PR EYE EXAM, NEW PATIENT,COMPREHESV: ICD-10-PCS | Mod: S$GLB,,, | Performed by: OPTOMETRIST

## 2021-03-15 PROCEDURE — 99213 PR OFFICE/OUTPT VISIT, EST, LEVL III, 20-29 MIN: ICD-10-PCS | Mod: S$GLB,,, | Performed by: INTERNAL MEDICINE

## 2021-03-15 PROCEDURE — 1126F AMNT PAIN NOTED NONE PRSNT: CPT | Mod: S$GLB,,, | Performed by: OPTOMETRIST

## 2021-03-15 PROCEDURE — 99999 PR PBB SHADOW E&M-EST. PATIENT-LVL III: ICD-10-PCS | Mod: PBBFAC,,, | Performed by: OPTOMETRIST

## 2021-03-15 PROCEDURE — 99999 PR PBB SHADOW E&M-EST. PATIENT-LVL III: ICD-10-PCS | Mod: PBBFAC,,, | Performed by: INTERNAL MEDICINE

## 2021-03-15 PROCEDURE — 99999 PR PBB SHADOW E&M-EST. PATIENT-LVL III: CPT | Mod: PBBFAC,,, | Performed by: INTERNAL MEDICINE

## 2021-03-15 PROCEDURE — 3008F BODY MASS INDEX DOCD: CPT | Mod: CPTII,S$GLB,, | Performed by: INTERNAL MEDICINE

## 2021-03-15 PROCEDURE — 1126F AMNT PAIN NOTED NONE PRSNT: CPT | Mod: S$GLB,,, | Performed by: INTERNAL MEDICINE

## 2021-03-15 PROCEDURE — 99999 PR PBB SHADOW E&M-EST. PATIENT-LVL III: CPT | Mod: PBBFAC,,, | Performed by: OPTOMETRIST

## 2021-03-15 PROCEDURE — 99213 OFFICE O/P EST LOW 20 MIN: CPT | Mod: S$GLB,,, | Performed by: INTERNAL MEDICINE

## 2021-03-15 PROCEDURE — 92004 COMPRE OPH EXAM NEW PT 1/>: CPT | Mod: S$GLB,,, | Performed by: OPTOMETRIST

## 2021-03-15 PROCEDURE — 3008F PR BODY MASS INDEX (BMI) DOCUMENTED: ICD-10-PCS | Mod: CPTII,S$GLB,, | Performed by: INTERNAL MEDICINE

## 2021-03-15 PROCEDURE — 1126F PR PAIN SEVERITY QUANTIFIED, NO PAIN PRESENT: ICD-10-PCS | Mod: S$GLB,,, | Performed by: OPTOMETRIST

## 2021-03-15 PROCEDURE — 1126F PR PAIN SEVERITY QUANTIFIED, NO PAIN PRESENT: ICD-10-PCS | Mod: S$GLB,,, | Performed by: INTERNAL MEDICINE

## 2021-03-15 RX ORDER — DIETHYLPROPION HYDROCHLORIDE 75 MG/1
75 TABLET, EXTENDED RELEASE ORAL DAILY
Qty: 30 TABLET | Refills: 0 | Status: SHIPPED | OUTPATIENT
Start: 2021-03-15 | End: 2021-04-26

## 2021-03-15 RX ORDER — PHENTERMINE HYDROCHLORIDE 37.5 MG/1
37.5 TABLET ORAL
COMMUNITY
Start: 2021-02-20 | End: 2021-03-15

## 2021-03-19 ENCOUNTER — PATIENT MESSAGE (OUTPATIENT)
Dept: INTERNAL MEDICINE | Facility: CLINIC | Age: 61
End: 2021-03-19

## 2021-03-26 ENCOUNTER — IMMUNIZATION (OUTPATIENT)
Dept: FAMILY MEDICINE | Facility: CLINIC | Age: 61
End: 2021-03-26
Payer: COMMERCIAL

## 2021-03-26 DIAGNOSIS — Z23 NEED FOR VACCINATION: Primary | ICD-10-CM

## 2021-03-26 PROCEDURE — 91300 COVID-19, MRNA, LNP-S, PF, 30 MCG/0.3 ML DOSE VACCINE: CPT | Mod: PBBFAC | Performed by: FAMILY MEDICINE

## 2021-03-26 PROCEDURE — 0002A COVID-19, MRNA, LNP-S, PF, 30 MCG/0.3 ML DOSE VACCINE: CPT | Mod: PBBFAC | Performed by: FAMILY MEDICINE

## 2021-04-23 ENCOUNTER — TELEPHONE (OUTPATIENT)
Dept: BARIATRICS | Facility: CLINIC | Age: 61
End: 2021-04-23

## 2021-04-23 DIAGNOSIS — E66.3 OVERWEIGHT (BMI 25.0-29.9): Primary | ICD-10-CM

## 2021-04-26 ENCOUNTER — PATIENT MESSAGE (OUTPATIENT)
Dept: BARIATRICS | Facility: CLINIC | Age: 61
End: 2021-04-26

## 2021-04-26 RX ORDER — PHENTERMINE HYDROCHLORIDE 37.5 MG/1
37.5 TABLET ORAL
Qty: 30 TABLET | Refills: 1 | Status: SHIPPED | OUTPATIENT
Start: 2021-04-26 | End: 2021-05-26

## 2021-05-18 ENCOUNTER — HOSPITAL ENCOUNTER (EMERGENCY)
Dept: HOSPITAL 27 - EMS | Age: 61
Discharge: HOME | End: 2021-05-18
Payer: COMMERCIAL

## 2021-05-18 VITALS — DIASTOLIC BLOOD PRESSURE: 76 MMHG | SYSTOLIC BLOOD PRESSURE: 142 MMHG

## 2021-05-18 VITALS — WEIGHT: 150.31 LBS | BODY MASS INDEX: 25.04 KG/M2 | HEIGHT: 65 IN

## 2021-05-18 DIAGNOSIS — F19.10: Primary | ICD-10-CM

## 2021-05-18 DIAGNOSIS — E11.9: ICD-10-CM

## 2021-05-18 DIAGNOSIS — F17.210: ICD-10-CM

## 2021-05-18 DIAGNOSIS — F20.9: ICD-10-CM

## 2021-05-18 LAB
ALBUMIN SERPL-MCNC: 3.8 G/DL (ref 3.4–5)
ALP SERPL-CCNC: 118 U/L (ref 46–116)
ALT SERPL-CCNC: 20 U/L (ref 12–78)
ANION GAP SERPL CALCULATED.3IONS-SCNC: 11 MMOL/L (ref 8–16)
APAP SERPL-MCNC: < 2 MCG/ML (ref 10–30)
AST SERPL-CCNC: 20 U/L (ref 15–37)
BASOPHILS # BLD AUTO: 0.1 K/UL (ref 0–0.2)
BASOPHILS NFR BLD AUTO: 0.5 % (ref 0–2)
BILIRUB SERPL-MCNC: 0.4 MG/DL (ref 0.1–1)
BUN SERPL-MCNC: 19 MG/DL (ref 7–18)
CALCIUM SERPL-MCNC: 8.9 MG/DL (ref 8.8–10.5)
CHLORIDE SERPL-SCNC: 102 MMOL/L (ref 98–107)
CO2 SERPL-SCNC: 25 MMOL/L (ref 22–29)
CREAT SERPL-MCNC: 0.79 MG/DL (ref 0.6–1.3)
EOSINOPHIL # BLD AUTO: 0.2 K/UL (ref 0–0.7)
EOSINOPHIL NFR BLD AUTO: 1.3 % (ref 1–6)
ERYTHROCYTE [DISTWIDTH] IN BLOOD BY AUTOMATED COUNT: 15.5 % (ref 11.5–14.5)
GFR SERPL CREATININE-BSD FRML MDRD: > 60 ML/MIN (ref 60–?)
GLUCOSE SERPL-MCNC: 80 MG/DL (ref 70–110)
HCT VFR BLD AUTO: 36.3 % (ref 36–46)
HGB BLD-MCNC: 11.7 G/DL (ref 12–16)
LYMPHOCYTES # BLD AUTO: 4.5 K/UL (ref 1–4.8)
LYMPHOCYTES NFR BLD AUTO: 35.5 % (ref 22–44)
MCH RBC QN AUTO: 26 PG (ref 26–34)
MCHC RBC AUTO-ENTMCNC: 32.3 G/DL (ref 31–37)
MCV RBC AUTO: 80 FL (ref 80–100)
MONOCYTES # BLD AUTO: 0.7 K/UL (ref 0.1–1)
MONOCYTES NFR BLD AUTO: 5.7 % (ref 2–9)
NEUTROPHILS # BLD AUTO: 7.2 K/UL (ref 1.8–7.7)
NEUTROPHILS NFR BLD AUTO: 57 % (ref 40–70)
PLATELET # BLD AUTO: 370 K/UL (ref 150–450)
POTASSIUM SERPL-SCNC: 3.4 MMOL/L (ref 3.5–5.1)
PROT SERPL-MCNC: 8.4 G/DL (ref 6.4–8.2)
RBC # BLD AUTO: 4.52 MIL/UL (ref 4–5.2)
SALICYLATES SERPL-MCNC: 3.2 MG/DL (ref 2.8–20)
SODIUM SERPL-SCNC: 138 MMOL/L (ref 136–145)

## 2021-05-18 PROCEDURE — 80053 COMPREHEN METABOLIC PANEL: CPT

## 2021-05-18 PROCEDURE — 36415 COLL VENOUS BLD VENIPUNCTURE: CPT

## 2021-05-18 PROCEDURE — 85025 COMPLETE CBC W/AUTO DIFF WBC: CPT

## 2021-05-18 PROCEDURE — 99283 EMERGENCY DEPT VISIT LOW MDM: CPT

## 2021-06-04 ENCOUNTER — TELEPHONE (OUTPATIENT)
Dept: ORTHOPEDICS | Facility: CLINIC | Age: 61
End: 2021-06-04

## 2021-06-04 DIAGNOSIS — M79.642 LEFT HAND PAIN: Primary | ICD-10-CM

## 2021-06-10 ENCOUNTER — OFFICE VISIT (OUTPATIENT)
Dept: ORTHOPEDICS | Facility: CLINIC | Age: 61
End: 2021-06-10
Payer: COMMERCIAL

## 2021-06-10 ENCOUNTER — HOSPITAL ENCOUNTER (OUTPATIENT)
Dept: RADIOLOGY | Facility: HOSPITAL | Age: 61
Discharge: HOME | End: 2021-06-10
Attending: ORTHOPAEDIC SURGERY
Payer: COMMERCIAL

## 2021-06-10 VITALS
HEIGHT: 62 IN | SYSTOLIC BLOOD PRESSURE: 121 MMHG | WEIGHT: 153.69 LBS | BODY MASS INDEX: 28.28 KG/M2 | DIASTOLIC BLOOD PRESSURE: 73 MMHG | HEART RATE: 84 BPM

## 2021-06-10 DIAGNOSIS — M79.642 LEFT HAND PAIN: ICD-10-CM

## 2021-06-10 DIAGNOSIS — M79.642 LEFT HAND PAIN: Primary | ICD-10-CM

## 2021-06-10 PROCEDURE — 99999 PR PBB SHADOW E&M-EST. PATIENT-LVL III: CPT | Mod: PBBFAC,,, | Performed by: PHYSICIAN ASSISTANT

## 2021-06-10 PROCEDURE — 1125F AMNT PAIN NOTED PAIN PRSNT: CPT | Mod: S$GLB,,, | Performed by: PHYSICIAN ASSISTANT

## 2021-06-10 PROCEDURE — 99202 PR OFFICE/OUTPT VISIT, NEW, LEVL II, 15-29 MIN: ICD-10-PCS | Mod: S$GLB,,, | Performed by: PHYSICIAN ASSISTANT

## 2021-06-10 PROCEDURE — 3008F BODY MASS INDEX DOCD: CPT | Mod: CPTII,S$GLB,, | Performed by: PHYSICIAN ASSISTANT

## 2021-06-10 PROCEDURE — 3008F PR BODY MASS INDEX (BMI) DOCUMENTED: ICD-10-PCS | Mod: CPTII,S$GLB,, | Performed by: PHYSICIAN ASSISTANT

## 2021-06-10 PROCEDURE — 1125F PR PAIN SEVERITY QUANTIFIED, PAIN PRESENT: ICD-10-PCS | Mod: S$GLB,,, | Performed by: PHYSICIAN ASSISTANT

## 2021-06-10 PROCEDURE — 73130 X-RAY EXAM OF HAND: CPT | Mod: 26,LT,, | Performed by: RADIOLOGY

## 2021-06-10 PROCEDURE — 99999 PR PBB SHADOW E&M-EST. PATIENT-LVL III: ICD-10-PCS | Mod: PBBFAC,,, | Performed by: PHYSICIAN ASSISTANT

## 2021-06-10 PROCEDURE — 73130 X-RAY EXAM OF HAND: CPT | Mod: TC,PN,LT

## 2021-06-10 PROCEDURE — 73130 XR HAND COMPLETE 3 VIEW LEFT: ICD-10-PCS | Mod: 26,LT,, | Performed by: RADIOLOGY

## 2021-06-10 PROCEDURE — 99202 OFFICE O/P NEW SF 15 MIN: CPT | Mod: S$GLB,,, | Performed by: PHYSICIAN ASSISTANT

## 2021-06-10 RX ORDER — PHENTERMINE HYDROCHLORIDE 37.5 MG/1
37.5 TABLET ORAL DAILY
COMMUNITY
Start: 2021-05-27 | End: 2021-06-18 | Stop reason: SDUPTHER

## 2021-06-17 ENCOUNTER — TELEPHONE (OUTPATIENT)
Dept: BARIATRICS | Facility: CLINIC | Age: 61
End: 2021-06-17

## 2021-06-18 ENCOUNTER — OFFICE VISIT (OUTPATIENT)
Dept: BARIATRICS | Facility: CLINIC | Age: 61
End: 2021-06-18
Payer: COMMERCIAL

## 2021-06-18 DIAGNOSIS — E66.3 OVERWEIGHT (BMI 25.0-29.9): Primary | ICD-10-CM

## 2021-06-18 DIAGNOSIS — Z98.84 S/P LAPAROSCOPIC SLEEVE GASTRECTOMY: ICD-10-CM

## 2021-06-18 PROCEDURE — 99212 OFFICE O/P EST SF 10 MIN: CPT | Mod: 95,,, | Performed by: INTERNAL MEDICINE

## 2021-06-18 PROCEDURE — 99212 PR OFFICE/OUTPT VISIT, EST, LEVL II, 10-19 MIN: ICD-10-PCS | Mod: 95,,, | Performed by: INTERNAL MEDICINE

## 2021-06-18 RX ORDER — DIETHYLPROPION HYDROCHLORIDE 75 MG/1
75 TABLET, EXTENDED RELEASE ORAL DAILY
Qty: 30 TABLET | Refills: 0 | Status: SHIPPED | OUTPATIENT
Start: 2021-06-18 | End: 2021-08-26

## 2021-06-18 RX ORDER — PHENTERMINE HYDROCHLORIDE 37.5 MG/1
37.5 TABLET ORAL DAILY
Qty: 30 TABLET | Refills: 1 | Status: SHIPPED | OUTPATIENT
Start: 2021-07-18 | End: 2021-08-26 | Stop reason: SDUPTHER

## 2021-06-24 ENCOUNTER — TELEPHONE (OUTPATIENT)
Dept: INTERNAL MEDICINE | Facility: CLINIC | Age: 61
End: 2021-06-24

## 2021-07-13 ENCOUNTER — OFFICE VISIT (OUTPATIENT)
Dept: ORTHOPEDICS | Facility: CLINIC | Age: 61
End: 2021-07-13
Payer: COMMERCIAL

## 2021-07-13 VITALS — BODY MASS INDEX: 27.98 KG/M2 | WEIGHT: 153 LBS

## 2021-07-13 DIAGNOSIS — M18.12 PRIMARY OSTEOARTHRITIS OF FIRST CARPOMETACARPAL JOINT OF LEFT HAND: ICD-10-CM

## 2021-07-13 PROCEDURE — 99203 PR OFFICE/OUTPT VISIT, NEW, LEVL III, 30-44 MIN: ICD-10-PCS | Mod: 25,S$GLB,, | Performed by: ORTHOPAEDIC SURGERY

## 2021-07-13 PROCEDURE — 3008F BODY MASS INDEX DOCD: CPT | Mod: CPTII,S$GLB,, | Performed by: ORTHOPAEDIC SURGERY

## 2021-07-13 PROCEDURE — 20600 DRAIN/INJ JOINT/BURSA W/O US: CPT | Mod: LT,S$GLB,, | Performed by: ORTHOPAEDIC SURGERY

## 2021-07-13 PROCEDURE — 3008F PR BODY MASS INDEX (BMI) DOCUMENTED: ICD-10-PCS | Mod: CPTII,S$GLB,, | Performed by: ORTHOPAEDIC SURGERY

## 2021-07-13 PROCEDURE — 99999 PR PBB SHADOW E&M-EST. PATIENT-LVL III: ICD-10-PCS | Mod: PBBFAC,,, | Performed by: ORTHOPAEDIC SURGERY

## 2021-07-13 PROCEDURE — 1125F AMNT PAIN NOTED PAIN PRSNT: CPT | Mod: S$GLB,,, | Performed by: ORTHOPAEDIC SURGERY

## 2021-07-13 PROCEDURE — 99999 PR PBB SHADOW E&M-EST. PATIENT-LVL III: CPT | Mod: PBBFAC,,, | Performed by: ORTHOPAEDIC SURGERY

## 2021-07-13 PROCEDURE — 20600 PR DRAIN/INJECT SMALL JOINT/BURSA: ICD-10-PCS | Mod: LT,S$GLB,, | Performed by: ORTHOPAEDIC SURGERY

## 2021-07-13 PROCEDURE — 1125F PR PAIN SEVERITY QUANTIFIED, PAIN PRESENT: ICD-10-PCS | Mod: S$GLB,,, | Performed by: ORTHOPAEDIC SURGERY

## 2021-07-13 PROCEDURE — 99203 OFFICE O/P NEW LOW 30 MIN: CPT | Mod: 25,S$GLB,, | Performed by: ORTHOPAEDIC SURGERY

## 2021-07-13 RX ORDER — TRIAMCINOLONE ACETONIDE 40 MG/ML
20 INJECTION, SUSPENSION INTRA-ARTICULAR; INTRAMUSCULAR
Status: COMPLETED | OUTPATIENT
Start: 2021-07-13 | End: 2021-07-13

## 2021-07-13 RX ADMIN — TRIAMCINOLONE ACETONIDE 20 MG: 40 INJECTION, SUSPENSION INTRA-ARTICULAR; INTRAMUSCULAR at 09:07

## 2021-07-22 ENCOUNTER — PATIENT MESSAGE (OUTPATIENT)
Dept: BARIATRICS | Facility: CLINIC | Age: 61
End: 2021-07-22

## 2021-07-23 ENCOUNTER — TELEPHONE (OUTPATIENT)
Dept: BARIATRICS | Facility: CLINIC | Age: 61
End: 2021-07-23

## 2021-08-17 ENCOUNTER — TELEPHONE (OUTPATIENT)
Dept: BARIATRICS | Facility: CLINIC | Age: 61
End: 2021-08-17

## 2021-08-26 ENCOUNTER — OFFICE VISIT (OUTPATIENT)
Dept: BARIATRICS | Facility: CLINIC | Age: 61
End: 2021-08-26
Payer: COMMERCIAL

## 2021-08-26 VITALS
HEART RATE: 69 BPM | HEIGHT: 62 IN | SYSTOLIC BLOOD PRESSURE: 122 MMHG | WEIGHT: 154.75 LBS | DIASTOLIC BLOOD PRESSURE: 60 MMHG | OXYGEN SATURATION: 97 % | BODY MASS INDEX: 28.48 KG/M2

## 2021-08-26 DIAGNOSIS — E66.3 OVERWEIGHT (BMI 25.0-29.9): ICD-10-CM

## 2021-08-26 DIAGNOSIS — Z98.84 S/P LAPAROSCOPIC SLEEVE GASTRECTOMY: Primary | ICD-10-CM

## 2021-08-26 PROCEDURE — 99212 PR OFFICE/OUTPT VISIT, EST, LEVL II, 10-19 MIN: ICD-10-PCS | Mod: S$GLB,,, | Performed by: INTERNAL MEDICINE

## 2021-08-26 PROCEDURE — 3074F SYST BP LT 130 MM HG: CPT | Mod: CPTII,S$GLB,, | Performed by: INTERNAL MEDICINE

## 2021-08-26 PROCEDURE — 3008F BODY MASS INDEX DOCD: CPT | Mod: CPTII,S$GLB,, | Performed by: INTERNAL MEDICINE

## 2021-08-26 PROCEDURE — 1126F PR PAIN SEVERITY QUANTIFIED, NO PAIN PRESENT: ICD-10-PCS | Mod: CPTII,S$GLB,, | Performed by: INTERNAL MEDICINE

## 2021-08-26 PROCEDURE — 3008F PR BODY MASS INDEX (BMI) DOCUMENTED: ICD-10-PCS | Mod: CPTII,S$GLB,, | Performed by: INTERNAL MEDICINE

## 2021-08-26 PROCEDURE — 1126F AMNT PAIN NOTED NONE PRSNT: CPT | Mod: CPTII,S$GLB,, | Performed by: INTERNAL MEDICINE

## 2021-08-26 PROCEDURE — 99212 OFFICE O/P EST SF 10 MIN: CPT | Mod: S$GLB,,, | Performed by: INTERNAL MEDICINE

## 2021-08-26 PROCEDURE — 3078F DIAST BP <80 MM HG: CPT | Mod: CPTII,S$GLB,, | Performed by: INTERNAL MEDICINE

## 2021-08-26 PROCEDURE — 1160F PR REVIEW ALL MEDS BY PRESCRIBER/CLIN PHARMACIST DOCUMENTED: ICD-10-PCS | Mod: CPTII,S$GLB,, | Performed by: INTERNAL MEDICINE

## 2021-08-26 PROCEDURE — 1159F PR MEDICATION LIST DOCUMENTED IN MEDICAL RECORD: ICD-10-PCS | Mod: CPTII,S$GLB,, | Performed by: INTERNAL MEDICINE

## 2021-08-26 PROCEDURE — 99999 PR PBB SHADOW E&M-EST. PATIENT-LVL III: ICD-10-PCS | Mod: PBBFAC,,, | Performed by: INTERNAL MEDICINE

## 2021-08-26 PROCEDURE — 99999 PR PBB SHADOW E&M-EST. PATIENT-LVL III: CPT | Mod: PBBFAC,,, | Performed by: INTERNAL MEDICINE

## 2021-08-26 PROCEDURE — 3078F PR MOST RECENT DIASTOLIC BLOOD PRESSURE < 80 MM HG: ICD-10-PCS | Mod: CPTII,S$GLB,, | Performed by: INTERNAL MEDICINE

## 2021-08-26 PROCEDURE — 1159F MED LIST DOCD IN RCRD: CPT | Mod: CPTII,S$GLB,, | Performed by: INTERNAL MEDICINE

## 2021-08-26 PROCEDURE — 3074F PR MOST RECENT SYSTOLIC BLOOD PRESSURE < 130 MM HG: ICD-10-PCS | Mod: CPTII,S$GLB,, | Performed by: INTERNAL MEDICINE

## 2021-08-26 PROCEDURE — 1160F RVW MEDS BY RX/DR IN RCRD: CPT | Mod: CPTII,S$GLB,, | Performed by: INTERNAL MEDICINE

## 2021-08-26 RX ORDER — PHENTERMINE HYDROCHLORIDE 37.5 MG/1
37.5 TABLET ORAL DAILY
Qty: 30 TABLET | Refills: 0 | Status: SHIPPED | OUTPATIENT
Start: 2021-09-26 | End: 2021-11-29 | Stop reason: SDUPTHER

## 2021-11-19 ENCOUNTER — HOSPITAL ENCOUNTER (OUTPATIENT)
Dept: LAB | Facility: MEDICAL CENTER | Age: 61
End: 2021-11-19
Attending: FAMILY MEDICINE
Payer: COMMERCIAL

## 2021-11-19 LAB
ALBUMIN SERPL BCP-MCNC: 4.4 G/DL (ref 3.2–4.9)
ALBUMIN/GLOB SERPL: 1.7 G/DL
ALP SERPL-CCNC: 63 U/L (ref 30–99)
ALT SERPL-CCNC: 8 U/L (ref 2–50)
ANION GAP SERPL CALC-SCNC: 8 MMOL/L (ref 7–16)
AST SERPL-CCNC: 18 U/L (ref 12–45)
BASOPHILS # BLD AUTO: 1 % (ref 0–1.8)
BASOPHILS # BLD: 0.06 K/UL (ref 0–0.12)
BILIRUB SERPL-MCNC: 0.6 MG/DL (ref 0.1–1.5)
BUN SERPL-MCNC: 17 MG/DL (ref 8–22)
CALCIUM SERPL-MCNC: 9.1 MG/DL (ref 8.5–10.5)
CHLORIDE SERPL-SCNC: 104 MMOL/L (ref 96–112)
CHOLEST SERPL-MCNC: 175 MG/DL (ref 100–199)
CO2 SERPL-SCNC: 26 MMOL/L (ref 20–33)
CREAT SERPL-MCNC: 0.53 MG/DL (ref 0.5–1.4)
EOSINOPHIL # BLD AUTO: 0.07 K/UL (ref 0–0.51)
EOSINOPHIL NFR BLD: 1.1 % (ref 0–6.9)
ERYTHROCYTE [DISTWIDTH] IN BLOOD BY AUTOMATED COUNT: 41.1 FL (ref 35.9–50)
EST. AVERAGE GLUCOSE BLD GHB EST-MCNC: 114 MG/DL
FASTING STATUS PATIENT QL REPORTED: NORMAL
GLOBULIN SER CALC-MCNC: 2.6 G/DL (ref 1.9–3.5)
GLUCOSE SERPL-MCNC: 90 MG/DL (ref 65–99)
HBA1C MFR BLD: 5.6 % (ref 4–5.6)
HCT VFR BLD AUTO: 43.9 % (ref 37–47)
HDLC SERPL-MCNC: 68 MG/DL
HGB BLD-MCNC: 13.8 G/DL (ref 12–16)
IMM GRANULOCYTES # BLD AUTO: 0.02 K/UL (ref 0–0.11)
IMM GRANULOCYTES NFR BLD AUTO: 0.3 % (ref 0–0.9)
LDLC SERPL CALC-MCNC: 88 MG/DL
LYMPHOCYTES # BLD AUTO: 2.29 K/UL (ref 1–4.8)
LYMPHOCYTES NFR BLD: 37.4 % (ref 22–41)
MCH RBC QN AUTO: 28.7 PG (ref 27–33)
MCHC RBC AUTO-ENTMCNC: 31.4 G/DL (ref 33.6–35)
MCV RBC AUTO: 91.3 FL (ref 81.4–97.8)
MONOCYTES # BLD AUTO: 0.41 K/UL (ref 0–0.85)
MONOCYTES NFR BLD AUTO: 6.7 % (ref 0–13.4)
NEUTROPHILS # BLD AUTO: 3.28 K/UL (ref 2–7.15)
NEUTROPHILS NFR BLD: 53.5 % (ref 44–72)
NRBC # BLD AUTO: 0 K/UL
NRBC BLD-RTO: 0 /100 WBC
PLATELET # BLD AUTO: 278 K/UL (ref 164–446)
PMV BLD AUTO: 12.4 FL (ref 9–12.9)
POTASSIUM SERPL-SCNC: 4.2 MMOL/L (ref 3.6–5.5)
PROT SERPL-MCNC: 7 G/DL (ref 6–8.2)
RBC # BLD AUTO: 4.81 M/UL (ref 4.2–5.4)
SODIUM SERPL-SCNC: 138 MMOL/L (ref 135–145)
T4 FREE SERPL-MCNC: 1.91 NG/DL (ref 0.93–1.7)
TRIGL SERPL-MCNC: 95 MG/DL (ref 0–149)
TSH SERPL DL<=0.005 MIU/L-ACNC: 0.1 UIU/ML (ref 0.38–5.33)
WBC # BLD AUTO: 6.1 K/UL (ref 4.8–10.8)

## 2021-11-19 PROCEDURE — 83036 HEMOGLOBIN GLYCOSYLATED A1C: CPT

## 2021-11-19 PROCEDURE — 84439 ASSAY OF FREE THYROXINE: CPT

## 2021-11-19 PROCEDURE — 80053 COMPREHEN METABOLIC PANEL: CPT

## 2021-11-19 PROCEDURE — 82306 VITAMIN D 25 HYDROXY: CPT

## 2021-11-19 PROCEDURE — 85025 COMPLETE CBC W/AUTO DIFF WBC: CPT

## 2021-11-19 PROCEDURE — 84443 ASSAY THYROID STIM HORMONE: CPT

## 2021-11-19 PROCEDURE — 36415 COLL VENOUS BLD VENIPUNCTURE: CPT

## 2021-11-19 PROCEDURE — 80061 LIPID PANEL: CPT

## 2021-11-22 LAB — 25(OH)D3 SERPL-MCNC: 53 NG/ML (ref 30–80)

## 2021-11-29 ENCOUNTER — OFFICE VISIT (OUTPATIENT)
Dept: BARIATRICS | Facility: CLINIC | Age: 61
End: 2021-11-29
Payer: COMMERCIAL

## 2021-11-29 VITALS
HEART RATE: 62 BPM | WEIGHT: 153.88 LBS | SYSTOLIC BLOOD PRESSURE: 120 MMHG | OXYGEN SATURATION: 98 % | DIASTOLIC BLOOD PRESSURE: 82 MMHG | BODY MASS INDEX: 28.15 KG/M2

## 2021-11-29 DIAGNOSIS — Z98.84 S/P LAPAROSCOPIC SLEEVE GASTRECTOMY: Primary | ICD-10-CM

## 2021-11-29 DIAGNOSIS — E66.3 OVERWEIGHT (BMI 25.0-29.9): ICD-10-CM

## 2021-11-29 PROCEDURE — 99999 PR PBB SHADOW E&M-EST. PATIENT-LVL III: ICD-10-PCS | Mod: PBBFAC,,, | Performed by: INTERNAL MEDICINE

## 2021-11-29 PROCEDURE — 99213 PR OFFICE/OUTPT VISIT, EST, LEVL III, 20-29 MIN: ICD-10-PCS | Mod: S$GLB,,, | Performed by: INTERNAL MEDICINE

## 2021-11-29 PROCEDURE — 99213 OFFICE O/P EST LOW 20 MIN: CPT | Mod: S$GLB,,, | Performed by: INTERNAL MEDICINE

## 2021-11-29 PROCEDURE — 99999 PR PBB SHADOW E&M-EST. PATIENT-LVL III: CPT | Mod: PBBFAC,,, | Performed by: INTERNAL MEDICINE

## 2021-11-29 RX ORDER — PHENTERMINE HYDROCHLORIDE 37.5 MG/1
37.5 TABLET ORAL DAILY
Qty: 30 TABLET | Refills: 1 | Status: SHIPPED | OUTPATIENT
Start: 2021-12-29 | End: 2021-12-09 | Stop reason: SDUPTHER

## 2021-11-29 RX ORDER — DIETHYLPROPION HYDROCHLORIDE 75 MG/1
75 TABLET, EXTENDED RELEASE ORAL DAILY
Qty: 30 TABLET | Refills: 0 | Status: SHIPPED | OUTPATIENT
Start: 2021-11-29 | End: 2021-12-09 | Stop reason: SDUPTHER

## 2021-12-08 ENCOUNTER — TELEPHONE (OUTPATIENT)
Dept: BARIATRICS | Facility: CLINIC | Age: 61
End: 2021-12-08
Payer: COMMERCIAL

## 2021-12-09 ENCOUNTER — TELEPHONE (OUTPATIENT)
Dept: BARIATRICS | Facility: CLINIC | Age: 61
End: 2021-12-09
Payer: COMMERCIAL

## 2021-12-09 DIAGNOSIS — E66.3 OVERWEIGHT (BMI 25.0-29.9): ICD-10-CM

## 2021-12-09 RX ORDER — DIETHYLPROPION HYDROCHLORIDE 75 MG/1
75 TABLET, EXTENDED RELEASE ORAL DAILY
Qty: 30 TABLET | Refills: 0 | Status: SHIPPED | OUTPATIENT
Start: 2021-12-09 | End: 2022-02-22

## 2021-12-09 RX ORDER — PHENTERMINE HYDROCHLORIDE 37.5 MG/1
37.5 TABLET ORAL DAILY
Qty: 30 TABLET | Refills: 1 | Status: SHIPPED | OUTPATIENT
Start: 2022-01-09 | End: 2022-04-11 | Stop reason: SDUPTHER

## 2021-12-09 NOTE — TELEPHONE ENCOUNTER
Called pt in regards to her message. Pt states that the diethylpropion did not work as well as the phentermine but she is willing to try it. Pt states whatever Dr. Poon advises, she will be ok with.    Congratulations on going home. We hope your experience was as pleasant as possible and are happy to have the opportunity to take care of you. Please remember we are here to help you, and are a phone call away with any questions or concerns you may have after you leave the hospital.  [phone]    Activity:  · Walk regularly, avoid prolonged sitting  · Avoid sexual intercourse for 6 weeks unless otherwise indicated by your doctor    Diet:  • Advance to regular diet as tolerated, no restrictions  • You may want to increase oral fluids & fiber to avoid constipation.    Wound Care:  · Gently wash the incision with soap and water, rinse well, and pat dry  · OK to shower 48 hours after surgery, but do not take a tub bath until the incisions are completely healed and catheter removed  · Remove dressing on 12-, if dressing get urine on it before this date, then please remove dressing.    Medications:  · Take pain medication as prescribed.  Do not drive any motorized vehicle, or sign any legal documents while you are taking narcotic pain medications. This type of medication is known to cause constipation and may also contain Acetaminophen.  · OK to take acetaminophen for less severe however do not take acetaminophen if taking a medication that is already combined with such. Do not exceed the maximum daily dosage of acetaminophen which is 4g/day from all sources.    Follow up:  · Follow-up with Dr. Box in 4 weeks for post op check   · Call clinic with any questions or concerns [phone]    Reasons to Call the Physician  Your incision becomes red or swollen  The skin around your incision is warmer than elsewhere and is slightly red  There is drainage from your incision  There is an opening in your incision  There are chills or fever of 101 or more  Severe pain that is not relieved by pain medication

## 2021-12-10 ENCOUNTER — PATIENT MESSAGE (OUTPATIENT)
Dept: ORTHOPEDICS | Facility: CLINIC | Age: 61
End: 2021-12-10
Payer: COMMERCIAL

## 2022-02-17 ENCOUNTER — CLINICAL SUPPORT (OUTPATIENT)
Dept: FAMILY MEDICINE | Facility: CLINIC | Age: 62
End: 2022-02-17
Payer: COMMERCIAL

## 2022-02-17 DIAGNOSIS — Z23 NEED FOR VACCINATION: Primary | ICD-10-CM

## 2022-02-17 PROCEDURE — 90686 IIV4 VACC NO PRSV 0.5 ML IM: CPT | Mod: S$GLB,,, | Performed by: FAMILY MEDICINE

## 2022-02-17 PROCEDURE — 90686 FLU VACCINE (QUAD) GREATER THAN OR EQUAL TO 3YO PRESERVATIVE FREE IM: ICD-10-PCS | Mod: S$GLB,,, | Performed by: FAMILY MEDICINE

## 2022-02-17 PROCEDURE — 90471 IMMUNIZATION ADMIN: CPT | Mod: S$GLB,,, | Performed by: FAMILY MEDICINE

## 2022-02-17 PROCEDURE — 90471 FLU VACCINE (QUAD) GREATER THAN OR EQUAL TO 3YO PRESERVATIVE FREE IM: ICD-10-PCS | Mod: S$GLB,,, | Performed by: FAMILY MEDICINE

## 2022-02-21 ENCOUNTER — PATIENT MESSAGE (OUTPATIENT)
Dept: BARIATRICS | Facility: CLINIC | Age: 62
End: 2022-02-21
Payer: COMMERCIAL

## 2022-02-22 ENCOUNTER — OFFICE VISIT (OUTPATIENT)
Dept: BARIATRICS | Facility: CLINIC | Age: 62
End: 2022-02-22
Payer: COMMERCIAL

## 2022-02-22 DIAGNOSIS — E66.3 OVERWEIGHT (BMI 25.0-29.9): ICD-10-CM

## 2022-02-22 PROCEDURE — 1159F MED LIST DOCD IN RCRD: CPT | Mod: CPTII,95,, | Performed by: INTERNAL MEDICINE

## 2022-02-22 PROCEDURE — 1160F PR REVIEW ALL MEDS BY PRESCRIBER/CLIN PHARMACIST DOCUMENTED: ICD-10-PCS | Mod: CPTII,95,, | Performed by: INTERNAL MEDICINE

## 2022-02-22 PROCEDURE — 99212 OFFICE O/P EST SF 10 MIN: CPT | Mod: 95,,, | Performed by: INTERNAL MEDICINE

## 2022-02-22 PROCEDURE — 99212 PR OFFICE/OUTPT VISIT, EST, LEVL II, 10-19 MIN: ICD-10-PCS | Mod: 95,,, | Performed by: INTERNAL MEDICINE

## 2022-02-22 PROCEDURE — 1160F RVW MEDS BY RX/DR IN RCRD: CPT | Mod: CPTII,95,, | Performed by: INTERNAL MEDICINE

## 2022-02-22 PROCEDURE — 1159F PR MEDICATION LIST DOCUMENTED IN MEDICAL RECORD: ICD-10-PCS | Mod: CPTII,95,, | Performed by: INTERNAL MEDICINE

## 2022-02-22 RX ORDER — DIETHYLPROPION HYDROCHLORIDE 75 MG/1
75 TABLET, EXTENDED RELEASE ORAL DAILY
Qty: 30 TABLET | Refills: 0 | Status: SHIPPED | OUTPATIENT
Start: 2022-03-22 | End: 2022-04-12

## 2022-02-22 NOTE — PROGRESS NOTES
Subjective:       Patient ID: Kateryna Weber is a 61 y.o. female.    Chief Complaint: Follow-up  The patient location is: home  The chief complaint leading to consultation is:   Chief Complaint   Patient presents with    Follow-up         Visit type: audiovisual    Face to Face time with patient: 10 min  14 minutes of total time spent on the encounter, which includes face to face time and non-face to face time preparing to see the patient (eg, review of tests), Obtaining and/or reviewing separately obtained history, Documenting clinical information in the electronic or other health record, Independently interpreting results (not separately reported) and communicating results to the patient/family/caregiver, or Care coordination (not separately reported).         Each patient to whom he or she provides medical services by telemedicine is:  (1) informed of the relationship between the physician and patient and the respective role of any other health care provider with respect to management of the patient; and (2) notified that he or she may decline to receive medical services by telemedicine and may withdraw from such care at any time.    Notes:   Pt presents today for follow up. Getting adequate protein. She has lost 0 lbs since last seen in person,  55 lbs total since seeing me. Has last been on phentermine,  checked today. Last filled 2/1/21.  Was approx in the 230's lb before her surgery.  Vitamin levels look good.  She is staying active. She feels she is eating pretty well.  She has been taking care of her mom who needs a knee replacement. Does not feel she needs the medication every day.   Optho notes reviewed. No glaucoma. States HA with diethylpropion.   SHe has been off trulicity..States she has noted some mood changes with trulicity.    Had labs today. A1c pending, but FBS is 97.  Current home weight: 151#    Lab Results   Component Value Date    HGBA1C 5.2 01/10/2019    HGBA1C 4.9 12/08/2017    HGBA1C  5.3 09/22/2014     Lab Results   Component Value Date    LDLCALC 71.6 01/26/2021    CREATININE 0.57 02/22/2022         Follow-up  Associated symptoms include arthralgias. Pertinent negatives include no headaches, myalgias, rash or sore throat.     Review of Systems   Constitutional: Negative for activity change, appetite change and unexpected weight change.   HENT: Negative for sore throat and voice change.    Eyes: Negative for pain and visual disturbance.   Respiratory: Negative for shortness of breath and wheezing.    Cardiovascular: Negative for palpitations and leg swelling.   Genitourinary: Negative for difficulty urinating and dysuria.        S/p hyst   Musculoskeletal: Positive for arthralgias. Negative for back pain and myalgias.        Hip and hand   Skin: Negative for pallor and rash.   Neurological: Negative for syncope, light-headedness and headaches.   Psychiatric/Behavioral: Negative for dysphoric mood and sleep disturbance. The patient is not nervous/anxious.        Objective:       There were no vitals taken for this visit.    Physical Exam  Vitals signs reviewed.   Constitutional:       General: She is not in acute distress.     Appearance: She is well-developed.      Comments: Obese   HENT:      Head: Normocephalic and atraumatic.   Neurological:      Mental Status: She is alert and oriented to person, place, and time.      Cranial Nerves: No cranial nerve deficit.   Psychiatric:         Behavior: Behavior normal.         Judgment: Judgment normal.         Assessment:       1. Overweight (BMI 25.0-29.9)        Plan:             Kateryna was seen today for follow-up.    Diagnoses and all orders for this visit:    Overweight (BMI 25.0-29.9)  -     diethylpropion (TENUATE) 75 mg SR tablet; Take 1 tablet (75 mg total) by mouth once daily.         Use refill you have on file: Patient warned of common side effects of phentermine including anxiety, insomnia, palpitations and increased blood pressure. It was  also explained that it is for short-term usage along with diet and exercise, and that stopping the medication without making lifestyle changes will result in regain of weight. Patient states understanding.     Weight loss medications are controlled substances.  They require routine follow up. Prescription or pills that are lost or destroyed will not be replaced.      April : Patient warned of common side effects of diethylpropion including anxiety, insomnia, palpitations and increased blood pressure. It was also explained that it is for short-term usage along with diet and exercise, and that stopping the medication without making lifestyle changes will result in regain of weight. Patient states understanding.    Weight loss medications are controlled substances.  They require routine follow up. Prescription or pills that are lost or destroyed will not be replaced.       May use a half to see if that helps HA.       - To lose weight you want to cut 100% starchy carbohydrates out of your diet (bread, rice, pasta, potatoes, granola, flour, corn, peas, oatmeal, grits, tortillas, crackers, chips) and get  grams of protein.  Aim for 100 grams of protein daily.    - No soda, sweet tea, juices, sports drinks or lemonade     -Exercise daily.

## 2022-02-22 NOTE — PATIENT INSTRUCTIONS
Use refill you have on file: Patient warned of common side effects of phentermine including anxiety, insomnia, palpitations and increased blood pressure. It was also explained that it is for short-term usage along with diet and exercise, and that stopping the medication without making lifestyle changes will result in regain of weight. Patient states understanding.     Weight loss medications are controlled substances.  They require routine follow up. Prescription or pills that are lost or destroyed will not be replaced.      April : Patient warned of common side effects of diethylpropion including anxiety, insomnia, palpitations and increased blood pressure. It was also explained that it is for short-term usage along with diet and exercise, and that stopping the medication without making lifestyle changes will result in regain of weight. Patient states understanding.    Weight loss medications are controlled substances.  They require routine follow up. Prescription or pills that are lost or destroyed will not be replaced.       May use a half to see if that helps HA.       - To lose weight you want to cut 100% starchy carbohydrates out of your diet (bread, rice, pasta, potatoes, granola, flour, corn, peas, oatmeal, grits, tortillas, crackers, chips) and get  grams of protein.  Aim for 100 grams of protein daily.    - No soda, sweet tea, juices, sports drinks or lemonade     -Exercise daily.     Snack ideas     Savory  Avocado with chili powder, garlic powder and black pepper  String cheese or cheese cubes/slices  Tuna, chicken or egg salad lettuce wraps  Ham/turkey and cheese roll-up  Turkey jerky  Hard boiled egg  Steamed edamame  Olives, pickles (watch sodium)  Baked zucchini chips with salsa  Cottage cheese with tomatoes & dill  Salad with light dressing & veggies  Nuts and Seeds: Pistachios, almonds, cashews, pecans, sunflower seeds, peanuts, walnuts, pumpkin seeds, hazelnuts, brazil nuts (salt free)      Sweet  Plain yogurt: Triple Zero Oikos, Fage or Powerful with fruit, nuts, seeds, cinnamon  Sugar free jello  Sugar free popsicles  Homemade protein shake  Atkins bars/shakes  Premier protein shakes  Power crunch bar  Emerald cocoa dusted almonds (1/4 cup)     Sweet and Savory  Grilled pineapple and ham skewers  Cottage Cheese with fruit, nuts, seeds, cinnamon  Homemade smoothie with spinach, avocado, frozen fruit & unsweetened vanilla almond milk           Peanut Butter/[place] Butter  Witwith apples, ½ banana, celery, carrots, strawberries        Hummus/Guacamole/Light Cream Cheese/Greek yogurt + Ranch powder mix        cucumber, carrots, celery, bell pepper, tomato, cauliflower, broccoli, snap peas, green        beans, hard boiled egg, turkey pepperoni slices, salami slices, ham, grilled chicken                           Tips when Cravings Hit:  Drink water or sugar free Crystal Light when a craving begins.  Avoid eating blind: pre-portion foods instead of leaving them in their original package.  Eat without distractions: phone, tv, laptop etc.    Eat slowly, chew foods well (30 times).  Find snacks high in protein to keep you full longer.

## 2022-04-11 ENCOUNTER — PATIENT MESSAGE (OUTPATIENT)
Dept: BARIATRICS | Facility: CLINIC | Age: 62
End: 2022-04-11
Payer: COMMERCIAL

## 2022-04-11 DIAGNOSIS — E66.3 OVERWEIGHT (BMI 25.0-29.9): ICD-10-CM

## 2022-04-12 RX ORDER — PHENTERMINE HYDROCHLORIDE 37.5 MG/1
37.5 TABLET ORAL DAILY
Qty: 30 TABLET | Refills: 1 | Status: SHIPPED | OUTPATIENT
Start: 2022-04-12 | End: 2022-07-18 | Stop reason: SDUPTHER

## 2022-06-23 ENCOUNTER — PATIENT MESSAGE (OUTPATIENT)
Dept: BARIATRICS | Facility: CLINIC | Age: 62
End: 2022-06-23
Payer: COMMERCIAL

## 2022-06-23 RX ORDER — BUPROPION HYDROCHLORIDE 150 MG/1
150 TABLET ORAL DAILY
Qty: 90 TABLET | Refills: 1 | Status: SHIPPED | OUTPATIENT
Start: 2022-06-23 | End: 2022-12-12

## 2022-07-18 ENCOUNTER — OFFICE VISIT (OUTPATIENT)
Dept: BARIATRICS | Facility: CLINIC | Age: 62
End: 2022-07-18
Payer: COMMERCIAL

## 2022-07-18 VITALS
SYSTOLIC BLOOD PRESSURE: 122 MMHG | OXYGEN SATURATION: 98 % | TEMPERATURE: 98 F | WEIGHT: 160.25 LBS | HEART RATE: 100 BPM | DIASTOLIC BLOOD PRESSURE: 70 MMHG | BODY MASS INDEX: 29.31 KG/M2

## 2022-07-18 DIAGNOSIS — E66.3 OVERWEIGHT (BMI 25.0-29.9): ICD-10-CM

## 2022-07-18 DIAGNOSIS — Z98.84 S/P LAPAROSCOPIC SLEEVE GASTRECTOMY: ICD-10-CM

## 2022-07-18 PROCEDURE — 3078F DIAST BP <80 MM HG: CPT | Mod: CPTII,S$GLB,, | Performed by: INTERNAL MEDICINE

## 2022-07-18 PROCEDURE — 3044F HG A1C LEVEL LT 7.0%: CPT | Mod: CPTII,S$GLB,, | Performed by: INTERNAL MEDICINE

## 2022-07-18 PROCEDURE — 99999 PR PBB SHADOW E&M-EST. PATIENT-LVL III: ICD-10-PCS | Mod: PBBFAC,,, | Performed by: INTERNAL MEDICINE

## 2022-07-18 PROCEDURE — 3074F SYST BP LT 130 MM HG: CPT | Mod: CPTII,S$GLB,, | Performed by: INTERNAL MEDICINE

## 2022-07-18 PROCEDURE — 99213 PR OFFICE/OUTPT VISIT, EST, LEVL III, 20-29 MIN: ICD-10-PCS | Mod: S$GLB,,, | Performed by: INTERNAL MEDICINE

## 2022-07-18 PROCEDURE — 99999 PR PBB SHADOW E&M-EST. PATIENT-LVL III: CPT | Mod: PBBFAC,,, | Performed by: INTERNAL MEDICINE

## 2022-07-18 PROCEDURE — 1159F MED LIST DOCD IN RCRD: CPT | Mod: CPTII,S$GLB,, | Performed by: INTERNAL MEDICINE

## 2022-07-18 PROCEDURE — 1159F PR MEDICATION LIST DOCUMENTED IN MEDICAL RECORD: ICD-10-PCS | Mod: CPTII,S$GLB,, | Performed by: INTERNAL MEDICINE

## 2022-07-18 PROCEDURE — 99213 OFFICE O/P EST LOW 20 MIN: CPT | Mod: S$GLB,,, | Performed by: INTERNAL MEDICINE

## 2022-07-18 PROCEDURE — 3008F BODY MASS INDEX DOCD: CPT | Mod: CPTII,S$GLB,, | Performed by: INTERNAL MEDICINE

## 2022-07-18 PROCEDURE — 3044F PR MOST RECENT HEMOGLOBIN A1C LEVEL <7.0%: ICD-10-PCS | Mod: CPTII,S$GLB,, | Performed by: INTERNAL MEDICINE

## 2022-07-18 PROCEDURE — 3008F PR BODY MASS INDEX (BMI) DOCUMENTED: ICD-10-PCS | Mod: CPTII,S$GLB,, | Performed by: INTERNAL MEDICINE

## 2022-07-18 PROCEDURE — 3078F PR MOST RECENT DIASTOLIC BLOOD PRESSURE < 80 MM HG: ICD-10-PCS | Mod: CPTII,S$GLB,, | Performed by: INTERNAL MEDICINE

## 2022-07-18 PROCEDURE — 3074F PR MOST RECENT SYSTOLIC BLOOD PRESSURE < 130 MM HG: ICD-10-PCS | Mod: CPTII,S$GLB,, | Performed by: INTERNAL MEDICINE

## 2022-07-18 RX ORDER — TOPIRAMATE 25 MG/1
25 TABLET ORAL 2 TIMES DAILY
Qty: 180 TABLET | Refills: 0 | Status: SHIPPED | OUTPATIENT
Start: 2022-07-18 | End: 2022-10-18

## 2022-07-18 RX ORDER — PHENTERMINE HYDROCHLORIDE 37.5 MG/1
37.5 TABLET ORAL DAILY
Qty: 30 TABLET | Refills: 2 | Status: SHIPPED | OUTPATIENT
Start: 2022-07-18 | End: 2022-10-18

## 2022-07-18 NOTE — PROGRESS NOTES
Subjective:       Patient ID: Kateryna Weber is a 61 y.o. female.    Chief Complaint: Follow-up      Pt presents today for follow up. Getting adequate protein. She has gained 7 lbs since last seen in person,  55 lbs total since seeing me. Has last been on diethylpropion filled 6/17/22 and before that, phentermine,  checked today. Last filled 5/17/22.  Was approx in the 230's lb before her surgery.  Vitamin levels look good.  She is staying active. She feels she is eating pretty well.   Optho notes reviewed. No glaucoma.   SHe has been off trulicity.States she has noted some mood changes with trulicity.    Lab Results   Component Value Date    HGBA1C 5.1 02/22/2022    HGBA1C 5.2 01/10/2019    HGBA1C 4.9 12/08/2017     Lab Results   Component Value Date    LDLCALC 77.4 02/22/2022    CREATININE 0.57 02/22/2022         Follow-up  Associated symptoms include arthralgias. Pertinent negatives include no headaches, myalgias, rash or sore throat.     Review of Systems   Constitutional: Negative for activity change, appetite change and unexpected weight change.   HENT: Negative for sore throat and voice change.    Eyes: Negative for pain and visual disturbance.   Respiratory: Negative for shortness of breath and wheezing.    Cardiovascular: Negative for palpitations and leg swelling.   Genitourinary: Negative for difficulty urinating and dysuria.        S/p hyst   Musculoskeletal: Positive for arthralgias. Negative for back pain and myalgias.        Hip and hand   Skin: Negative for pallor and rash.   Neurological: Negative for syncope, light-headedness and headaches.   Psychiatric/Behavioral: Negative for dysphoric mood and sleep disturbance. The patient is not nervous/anxious.        Objective:       /70   Pulse 100   Temp 98.1 °F (36.7 °C)   Wt 72.7 kg (160 lb 4.4 oz)   SpO2 98%   BMI 29.31 kg/m²     Physical Exam  Vitals signs reviewed.   Constitutional:       General: She is not in acute distress.      Appearance: She is well-developed.      Comments: Obese   HENT:      Head: Normocephalic and atraumatic.   Neurological:      Mental Status: She is alert and oriented to person, place, and time.      Cranial Nerves: No cranial nerve deficit.   Psychiatric:         Behavior: Behavior normal.         Judgment: Judgment normal.         Assessment:       1. Overweight (BMI 25.0-29.9)        Plan:             Kateryna was seen today for follow-up.    Diagnoses and all orders for this visit:    Overweight (BMI 25.0-29.9)  -     phentermine (ADIPEX-P) 37.5 mg tablet; Take 1 tablet (37.5 mg total) by mouth once daily.    Other orders  -     topiramate (TOPAMAX) 25 MG tablet; Take 1 tablet (25 mg total) by mouth 2 (two) times daily.          Patient was informed that topiramate is used for migraine prevention and seizures. Weight loss is a common side effect that is well documented. S/he understands this. S/he was informed of the potential side effects such as serious and possibly fatal rash in which case the medication should be discontinued immediately. Paresthesias, forgetfulness, fatigue, kidney stones, GI symptoms, and changes in lab values such as electrolytes, blood counts and kidney function.      Start topiramate  in the evening for 1 week, then morning and evening.      Patient warned of common side effects of diethylpropion including anxiety, insomnia, palpitations and increased blood pressure. It was also explained that it is for short-term usage along with diet and exercise, and that stopping the medication without making lifestyle changes will result in regain of weight. Patient states understanding.    Weight loss medications are controlled substances.  They require routine follow up. Prescription or pills that are lost or destroyed will not be replaced.            - To lose weight you want to cut 100% starchy carbohydrates out of your diet (bread, rice, pasta, potatoes, granola, flour, corn, peas, oatmeal, grits,  tortillas, crackers, chips) and get  grams of protein.  Aim for 100 grams of protein daily.    - No soda, sweet tea, juices, sports drinks or lemonade     -Exercise daily.       Hurricane tips

## 2022-07-18 NOTE — PATIENT INSTRUCTIONS
Patient was informed that topiramate is used for migraine prevention and seizures. Weight loss is a common side effect that is well documented. S/he understands this. S/he was informed of the potential side effects such as serious and possibly fatal rash in which case the medication should be discontinued immediately. Paresthesias, forgetfulness, fatigue, kidney stones, GI symptoms, and changes in lab values such as electrolytes, blood counts and kidney function.      Start topiramate  in the evening for 1 week, then morning and evening.       Patient warned of common side effects of phentermine including anxiety, insomnia, palpitations and increased blood pressure. It was also explained that it is for short-term usage along with diet and exercise, and that stopping the medication without making lifestyle changes will result in regain of weight. Patient states understanding.     Weight loss medications are controlled substances.  They require routine follow up. Prescription or pills that are lost or destroyed will not be replaced.

## 2022-08-01 ENCOUNTER — OFFICE VISIT (OUTPATIENT)
Dept: PODIATRY | Facility: CLINIC | Age: 62
End: 2022-08-01
Payer: COMMERCIAL

## 2022-08-01 VITALS
DIASTOLIC BLOOD PRESSURE: 77 MMHG | HEART RATE: 85 BPM | BODY MASS INDEX: 30.55 KG/M2 | WEIGHT: 166 LBS | HEIGHT: 62 IN | SYSTOLIC BLOOD PRESSURE: 123 MMHG

## 2022-08-01 DIAGNOSIS — M72.2 PLANTAR FASCIITIS: Primary | ICD-10-CM

## 2022-08-01 PROCEDURE — 3074F PR MOST RECENT SYSTOLIC BLOOD PRESSURE < 130 MM HG: ICD-10-PCS | Mod: CPTII,S$GLB,, | Performed by: PODIATRIST

## 2022-08-01 PROCEDURE — 3008F PR BODY MASS INDEX (BMI) DOCUMENTED: ICD-10-PCS | Mod: CPTII,S$GLB,, | Performed by: PODIATRIST

## 2022-08-01 PROCEDURE — 3008F BODY MASS INDEX DOCD: CPT | Mod: CPTII,S$GLB,, | Performed by: PODIATRIST

## 2022-08-01 PROCEDURE — 99203 OFFICE O/P NEW LOW 30 MIN: CPT | Mod: S$GLB,,, | Performed by: PODIATRIST

## 2022-08-01 PROCEDURE — 3078F DIAST BP <80 MM HG: CPT | Mod: CPTII,S$GLB,, | Performed by: PODIATRIST

## 2022-08-01 PROCEDURE — 3078F PR MOST RECENT DIASTOLIC BLOOD PRESSURE < 80 MM HG: ICD-10-PCS | Mod: CPTII,S$GLB,, | Performed by: PODIATRIST

## 2022-08-01 PROCEDURE — 3074F SYST BP LT 130 MM HG: CPT | Mod: CPTII,S$GLB,, | Performed by: PODIATRIST

## 2022-08-01 PROCEDURE — 99203 PR OFFICE/OUTPT VISIT, NEW, LEVL III, 30-44 MIN: ICD-10-PCS | Mod: S$GLB,,, | Performed by: PODIATRIST

## 2022-08-01 PROCEDURE — 99999 PR PBB SHADOW E&M-EST. PATIENT-LVL III: CPT | Mod: PBBFAC,,, | Performed by: PODIATRIST

## 2022-08-01 PROCEDURE — 3044F HG A1C LEVEL LT 7.0%: CPT | Mod: CPTII,S$GLB,, | Performed by: PODIATRIST

## 2022-08-01 PROCEDURE — 3044F PR MOST RECENT HEMOGLOBIN A1C LEVEL <7.0%: ICD-10-PCS | Mod: CPTII,S$GLB,, | Performed by: PODIATRIST

## 2022-08-01 PROCEDURE — 99999 PR PBB SHADOW E&M-EST. PATIENT-LVL III: ICD-10-PCS | Mod: PBBFAC,,, | Performed by: PODIATRIST

## 2022-08-01 PROCEDURE — 1159F PR MEDICATION LIST DOCUMENTED IN MEDICAL RECORD: ICD-10-PCS | Mod: CPTII,S$GLB,, | Performed by: PODIATRIST

## 2022-08-01 PROCEDURE — 1159F MED LIST DOCD IN RCRD: CPT | Mod: CPTII,S$GLB,, | Performed by: PODIATRIST

## 2022-08-01 RX ORDER — METHYLPREDNISOLONE 4 MG/1
TABLET ORAL
Qty: 1 EACH | Refills: 0 | Status: SHIPPED | OUTPATIENT
Start: 2022-08-01 | End: 2022-08-22

## 2022-08-01 NOTE — PROGRESS NOTES
Subjective:      Patient ID: Kateryna Weber is a 61 y.o. female.    Chief Complaint: Foot Pain (Left foot and heel pain )    Kateryna is a 61 y.o. female who presents to the clinic complaining of heel pain in the left foot, especially with the first step in the morning. The pain is described as Aching and Throbbing. The onset of the pain was sudden and has worsened over the past several days. Kateryna rates the pain as 7/10. She denies a history of trauma. Prior treatments include none.    Review of Systems   Constitutional: Negative for chills, fever and malaise/fatigue.   HENT: Negative for hearing loss.    Cardiovascular: Negative for claudication.   Respiratory: Negative for shortness of breath.    Skin: Negative for flushing and rash.   Musculoskeletal: Negative for joint pain and myalgias.   Neurological: Negative for loss of balance, numbness, paresthesias and sensory change.   Psychiatric/Behavioral: Negative for altered mental status.           Objective:      Physical Exam  Vitals reviewed.   Cardiovascular:      Pulses:           Dorsalis pedis pulses are 2+ on the right side and 2+ on the left side.        Posterior tibial pulses are 2+ on the right side and 2+ on the left side.      Comments: No edema noted b/L  Musculoskeletal:         General: Tenderness present.      Comments:        Feet:      Right foot:      Protective Sensation: 5 sites tested. 5 sites sensed.      Left foot:      Protective Sensation: 5 sites tested. 5 sites sensed.   Skin:     Comments: Normal skin tugor noted.   No open lesion noted b/L  Skin temp is warm to warm from proximal to distal b/L.  Webspaces clean, dry, and intact     Neurological:      Mental Status: She is alert.      Comments: Gross sensation intact b/L         Non reducible equinus noted to left lower extremity  Pain upon palpation to the medial heel, left            Assessment:       Encounter Diagnosis   Name Primary?    Plantar fasciitis - Left Foot Yes         Plan:        Kateryna was seen today for foot pain.    Diagnoses and all orders for this visit:    Plantar fasciitis - Left Foot    Other orders  -     methylPREDNISolone (MEDROL DOSEPACK) 4 mg tablet; use as directed      I counseled the patient on her conditions, their implications and medical management.        .Etiologies of plantar fasciitis discussed with the pt. Pt advised to stretch the achilles tendon complex daily and especially before increased activity and sports. Pt advised on RICE and OTC NSAIDs for residual pain. Pt advised to purchase gel heel cups to be worn in shoes.    Patient instructed on adequate icing techniques. Patient should ice the affected area at least once per day x 10 minutes for 10 days . I advised the  patient that extra icing would also be beneficial to ensure adequate anti inflammatory effect     Gel heel cups dispensed to be worn  rx medorl    Call or return to clinic prn if these symptoms worsen or fail to improve as anticipated.

## 2022-08-03 ENCOUNTER — PATIENT MESSAGE (OUTPATIENT)
Dept: BARIATRICS | Facility: CLINIC | Age: 62
End: 2022-08-03
Payer: COMMERCIAL

## 2022-08-12 ENCOUNTER — TELEPHONE (OUTPATIENT)
Dept: ORTHOPEDICS | Facility: CLINIC | Age: 62
End: 2022-08-12
Payer: COMMERCIAL

## 2022-08-12 DIAGNOSIS — M79.642 LEFT HAND PAIN: Primary | ICD-10-CM

## 2022-08-15 ENCOUNTER — OFFICE VISIT (OUTPATIENT)
Dept: ORTHOPEDICS | Facility: CLINIC | Age: 62
End: 2022-08-15
Payer: COMMERCIAL

## 2022-08-15 ENCOUNTER — HOSPITAL ENCOUNTER (OUTPATIENT)
Dept: RADIOLOGY | Facility: HOSPITAL | Age: 62
Discharge: HOME | End: 2022-08-15
Attending: ORTHOPAEDIC SURGERY
Payer: COMMERCIAL

## 2022-08-15 VITALS — WEIGHT: 166 LBS | HEIGHT: 62 IN | BODY MASS INDEX: 30.55 KG/M2

## 2022-08-15 DIAGNOSIS — M18.12 PRIMARY OSTEOARTHRITIS OF FIRST CARPOMETACARPAL JOINT OF LEFT HAND: Primary | ICD-10-CM

## 2022-08-15 DIAGNOSIS — M79.642 LEFT HAND PAIN: ICD-10-CM

## 2022-08-15 PROCEDURE — 3044F PR MOST RECENT HEMOGLOBIN A1C LEVEL <7.0%: ICD-10-PCS | Mod: CPTII,S$GLB,, | Performed by: ORTHOPAEDIC SURGERY

## 2022-08-15 PROCEDURE — 99213 OFFICE O/P EST LOW 20 MIN: CPT | Mod: 25,S$GLB,, | Performed by: ORTHOPAEDIC SURGERY

## 2022-08-15 PROCEDURE — 1159F PR MEDICATION LIST DOCUMENTED IN MEDICAL RECORD: ICD-10-PCS | Mod: CPTII,S$GLB,, | Performed by: ORTHOPAEDIC SURGERY

## 2022-08-15 PROCEDURE — 1159F MED LIST DOCD IN RCRD: CPT | Mod: CPTII,S$GLB,, | Performed by: ORTHOPAEDIC SURGERY

## 2022-08-15 PROCEDURE — 99999 PR PBB SHADOW E&M-EST. PATIENT-LVL III: ICD-10-PCS | Mod: PBBFAC,,, | Performed by: ORTHOPAEDIC SURGERY

## 2022-08-15 PROCEDURE — 99999 PR PBB SHADOW E&M-EST. PATIENT-LVL III: CPT | Mod: PBBFAC,,, | Performed by: ORTHOPAEDIC SURGERY

## 2022-08-15 PROCEDURE — 3008F BODY MASS INDEX DOCD: CPT | Mod: CPTII,S$GLB,, | Performed by: ORTHOPAEDIC SURGERY

## 2022-08-15 PROCEDURE — 3044F HG A1C LEVEL LT 7.0%: CPT | Mod: CPTII,S$GLB,, | Performed by: ORTHOPAEDIC SURGERY

## 2022-08-15 PROCEDURE — 3008F PR BODY MASS INDEX (BMI) DOCUMENTED: ICD-10-PCS | Mod: CPTII,S$GLB,, | Performed by: ORTHOPAEDIC SURGERY

## 2022-08-15 PROCEDURE — 20600 PR DRAIN/INJECT SMALL JOINT/BURSA: ICD-10-PCS | Mod: LT,S$GLB,, | Performed by: ORTHOPAEDIC SURGERY

## 2022-08-15 PROCEDURE — 73130 X-RAY EXAM OF HAND: CPT | Mod: TC,PN,LT

## 2022-08-15 PROCEDURE — 99213 PR OFFICE/OUTPT VISIT, EST, LEVL III, 20-29 MIN: ICD-10-PCS | Mod: 25,S$GLB,, | Performed by: ORTHOPAEDIC SURGERY

## 2022-08-15 PROCEDURE — 73130 XR HAND COMPLETE 3 VIEW LEFT: ICD-10-PCS | Mod: 26,LT,, | Performed by: RADIOLOGY

## 2022-08-15 PROCEDURE — 20600 DRAIN/INJ JOINT/BURSA W/O US: CPT | Mod: LT,S$GLB,, | Performed by: ORTHOPAEDIC SURGERY

## 2022-08-15 PROCEDURE — 73130 X-RAY EXAM OF HAND: CPT | Mod: 26,LT,, | Performed by: RADIOLOGY

## 2022-08-15 RX ORDER — DIETHYLPROPION HYDROCHLORIDE 75 MG/1
75 TABLET, EXTENDED RELEASE ORAL DAILY
COMMUNITY
Start: 2022-06-17 | End: 2022-10-18 | Stop reason: SDUPTHER

## 2022-08-15 RX ORDER — TRIAMCINOLONE ACETONIDE 40 MG/ML
20 INJECTION, SUSPENSION INTRA-ARTICULAR; INTRAMUSCULAR
Status: COMPLETED | OUTPATIENT
Start: 2022-08-15 | End: 2022-08-15

## 2022-08-15 RX ADMIN — TRIAMCINOLONE ACETONIDE 20 MG: 40 INJECTION, SUSPENSION INTRA-ARTICULAR; INTRAMUSCULAR at 11:08

## 2022-08-15 NOTE — PROGRESS NOTES
Subjective:      Patient ID: Kateryna Weber is a 61 y.o. female.  Chief Complaint: Finger Pain (Left thumb cmc)      HPI  Kateryna Weber is a  61 y.o. female presenting today for follow up of left thumb arthritis.  She reports that she is having a flare-up again  It has been more than a year since her last injection she may like to consider another injection or possibly surgery no numbness or tingling reported.    Review of patient's allergies indicates:   Allergen Reactions    No known drug allergies          Current Outpatient Medications   Medication Sig Dispense Refill    ALPRAZolam (XANAX) 0.25 MG tablet TAKE 1 TABLET BY MOUTH ONCE DAILY AT BEDTIME AS NEEDED FOR ANXIETY 30 tablet 0    buPROPion (WELLBUTRIN XL) 150 MG TB24 tablet Take 1 tablet (150 mg total) by mouth once daily. 90 tablet 1    diethylpropion (TENUATE) 75 mg SR tablet Take 75 mg by mouth once daily.      flu vacc [id number] 6mos up,PF, (FLUARIX QUAD 0914-8443, PF,) 60 mcg (15 mcg x 4)/0.5 mL Syrg given by [facility] 0.5 mL 0    methylPREDNISolone (MEDROL DOSEPACK) 4 mg tablet use as directed 1 each 0    multivitamin capsule Take 1 capsule by mouth once daily.      pantoprazole (PROTONIX) 40 MG tablet TAKE 1 TABLET BY MOUTH ONCE DAILY IN THE MORNING ON  AN  EMPTY  STOMACH 90 tablet 0    phentermine (ADIPEX-P) 37.5 mg tablet Take 1 tablet (37.5 mg total) by mouth once daily. 30 tablet 2    topiramate (TOPAMAX) 25 MG tablet Take 1 tablet (25 mg total) by mouth 2 (two) times daily. 180 tablet 0     No current facility-administered medications for this visit.       No past medical history on file.    Past Surgical History:   Procedure Laterality Date    ABDOMINAL SURGERY  2000    tummy tuck    APPENDECTOMY      COLONOSCOPY N/A 4/8/2019    Procedure: COLONOSCOPY;  Surgeon: Ilya Arauz MD;  Location: [address];  Service: Endoscopy;  Laterality: N/A;  Pt requests Dr. Davonte MONAE  Pt called to r/s due to being out of town, Pt did not want  "to wait until 5/2019 for next available appt.,Pt stated she would like to keep 4/8/19- ERW3/26/19@1432    GASTRIC BYPASS  2007    Why Weight Dr. Paul    HYSTERECTOMY         OBJECTIVE:   PHYSICAL EXAM:  Height: 5' 2" (157.5 cm) Weight: 75.3 kg (166 lb 0.1 oz)  Vitals:    08/15/22 1041   Weight: 75.3 kg (166 lb 0.1 oz)   Height: 5' 2" (1.575 m)   PainSc:   8   PainLoc: Finger     Ortho/SPM Exam  Examination left hand there is tenderness at the base of left thumb at the CMC joint positive grind test mild crepitation  strength slightly decreased   Tinel sign negative sensation intact all digits    RADIOGRAPHS:  AP lateral x-ray left hand demonstrate moderate to severe degenerative changes CMC joint left thumb  Comments: I have personally reviewed the imaging and I agree with the above radiologist's report.    ASSESSMENT/PLAN:     IMPRESSION:  CMC arthritis left thumb    PLAN:  We discussed options including injection versus surgery   She would like injection today   After pause for time-out identified the left thumb base injected CMC joint with combination Kenalog 20 mg 0.5 cc xylocaine sterile technique  She tolerated the procedure well without complication   Continue thumb brace as needed Advil or Motrin by mouth   Consider surgery if symptoms persist    FOLLOW UP:  2-3 months    Disclaimer: This note has been generated using voice-recognition software. There may be typographical errors that have been missed during proof-reading.    "

## 2022-08-18 ENCOUNTER — APPOINTMENT (RX ONLY)
Dept: URBAN - METROPOLITAN AREA CLINIC 38 | Facility: CLINIC | Age: 62
Setting detail: DERMATOLOGY
End: 2022-08-18

## 2022-08-18 PROBLEM — D48.5 NEOPLASM OF UNCERTAIN BEHAVIOR OF SKIN: Status: ACTIVE | Noted: 2022-08-18

## 2022-08-18 PROCEDURE — ? BIOPSY BY SHAVE METHOD

## 2022-08-18 PROCEDURE — 11102 TANGNTL BX SKIN SINGLE LES: CPT

## 2022-09-01 ENCOUNTER — OFFICE VISIT (OUTPATIENT)
Dept: OPTOMETRY | Facility: CLINIC | Age: 62
End: 2022-09-01
Payer: COMMERCIAL

## 2022-09-01 DIAGNOSIS — H52.7 REFRACTIVE ERROR: ICD-10-CM

## 2022-09-01 DIAGNOSIS — Z80.8 FAMILY HISTORY OF MELANOMA: ICD-10-CM

## 2022-09-01 DIAGNOSIS — Z83.518 FAMILY HISTORY OF MACULAR DEGENERATION: ICD-10-CM

## 2022-09-01 DIAGNOSIS — H25.13 NUCLEAR SCLEROSIS OF BOTH EYES: Primary | ICD-10-CM

## 2022-09-01 DIAGNOSIS — Z98.890 HX OF LASIK: ICD-10-CM

## 2022-09-01 PROCEDURE — 1159F PR MEDICATION LIST DOCUMENTED IN MEDICAL RECORD: ICD-10-PCS | Mod: CPTII,S$GLB,, | Performed by: OPTOMETRIST

## 2022-09-01 PROCEDURE — 92014 COMPRE OPH EXAM EST PT 1/>: CPT | Mod: S$GLB,,, | Performed by: OPTOMETRIST

## 2022-09-01 PROCEDURE — 3044F HG A1C LEVEL LT 7.0%: CPT | Mod: CPTII,S$GLB,, | Performed by: OPTOMETRIST

## 2022-09-01 PROCEDURE — 92015 DETERMINE REFRACTIVE STATE: CPT | Mod: S$GLB,,, | Performed by: OPTOMETRIST

## 2022-09-01 PROCEDURE — 92015 PR REFRACTION: ICD-10-PCS | Mod: S$GLB,,, | Performed by: OPTOMETRIST

## 2022-09-01 PROCEDURE — 3044F PR MOST RECENT HEMOGLOBIN A1C LEVEL <7.0%: ICD-10-PCS | Mod: CPTII,S$GLB,, | Performed by: OPTOMETRIST

## 2022-09-01 PROCEDURE — 99999 PR PBB SHADOW E&M-EST. PATIENT-LVL II: ICD-10-PCS | Mod: PBBFAC,,, | Performed by: OPTOMETRIST

## 2022-09-01 PROCEDURE — 1159F MED LIST DOCD IN RCRD: CPT | Mod: CPTII,S$GLB,, | Performed by: OPTOMETRIST

## 2022-09-01 PROCEDURE — 99999 PR PBB SHADOW E&M-EST. PATIENT-LVL II: CPT | Mod: PBBFAC,,, | Performed by: OPTOMETRIST

## 2022-09-01 PROCEDURE — 92014 PR EYE EXAM, EST PATIENT,COMPREHESV: ICD-10-PCS | Mod: S$GLB,,, | Performed by: OPTOMETRIST

## 2022-09-01 NOTE — PROGRESS NOTES
HPI    CC: Pt is here today for a routine eye exam. She states she has noticed a   decrease in her near vision.    ISABEL: 2021    (+) Changes in vision , near  (-) Pain  (-) Irritation   (-) Itching   (-) Flashes  (-) Floaters  (+) Glasses wearer, reading  (-) CL wearer  (-) Uses eye gtts    Does patient want a refraction today? If needed    (-) Eye injury  (+) Eye surgery , LASIK OD only for distance   (-)POHx  (+)FOHx, AMD -- mother; Ocular melanome -- father    (-)DM               Last edited by Nina Wallace, OD on 9/1/2022 12:44 PM.            Assessment /Plan     For exam results, see Encounter Report.    Nuclear sclerosis of both eyes    Family history of macular degeneration    Family history of melanoma    Hx of LASIK    Refractive error    Educated pt on findings. Not visually significant. No need for removal at this time. Monitor yearly.      2. (+)AMD FHx, mother. No signs of AMD in patient today. Stressed importance of UV eye protection when outside. Monitor yearly.     3. (+)Ocular melanoma FHx, father. No signs of melanoma or nevus in patient today. Monitor yearly.     4. Stable OU. Monovision LASIK, OD distance and OS near. ATs prn. Monitor yearly.     5. Updated SRx. Given option of FTW PALs vs OTC reading glasses only prn for near work. Monitor yearly.        RTC in 1 year for annual eye exam or sooner if needed.

## 2022-09-02 ENCOUNTER — HOSPITAL ENCOUNTER (OUTPATIENT)
Dept: LAB | Facility: MEDICAL CENTER | Age: 62
End: 2022-09-02
Attending: FAMILY MEDICINE
Payer: COMMERCIAL

## 2022-09-02 LAB — TSH SERPL DL<=0.005 MIU/L-ACNC: 2.86 UIU/ML (ref 0.38–5.33)

## 2022-09-02 PROCEDURE — 84443 ASSAY THYROID STIM HORMONE: CPT

## 2022-09-02 PROCEDURE — 36415 COLL VENOUS BLD VENIPUNCTURE: CPT

## 2022-09-15 ENCOUNTER — OFFICE VISIT (OUTPATIENT)
Dept: PODIATRY | Facility: CLINIC | Age: 62
End: 2022-09-15
Payer: COMMERCIAL

## 2022-09-15 VITALS — WEIGHT: 162.88 LBS | BODY MASS INDEX: 29.97 KG/M2 | HEIGHT: 62 IN

## 2022-09-15 DIAGNOSIS — M72.2 PLANTAR FASCIITIS: Primary | ICD-10-CM

## 2022-09-15 DIAGNOSIS — M79.673 INFLAMMATORY HEEL PAIN, UNSPECIFIED LATERALITY: ICD-10-CM

## 2022-09-15 DIAGNOSIS — M24.573 EQUINUS CONTRACTURE OF ANKLE: ICD-10-CM

## 2022-09-15 PROCEDURE — 1159F PR MEDICATION LIST DOCUMENTED IN MEDICAL RECORD: ICD-10-PCS | Mod: CPTII,S$GLB,, | Performed by: PODIATRIST

## 2022-09-15 PROCEDURE — 20550 NJX 1 TENDON SHEATH/LIGAMENT: CPT | Mod: LT,S$GLB,, | Performed by: PODIATRIST

## 2022-09-15 PROCEDURE — 99213 OFFICE O/P EST LOW 20 MIN: CPT | Mod: 25,S$GLB,, | Performed by: PODIATRIST

## 2022-09-15 PROCEDURE — 20550 PR INJECT TENDON SHEATH/LIGAMENT: ICD-10-PCS | Mod: LT,S$GLB,, | Performed by: PODIATRIST

## 2022-09-15 PROCEDURE — 3044F HG A1C LEVEL LT 7.0%: CPT | Mod: CPTII,S$GLB,, | Performed by: PODIATRIST

## 2022-09-15 PROCEDURE — 1160F RVW MEDS BY RX/DR IN RCRD: CPT | Mod: CPTII,S$GLB,, | Performed by: PODIATRIST

## 2022-09-15 PROCEDURE — 99213 PR OFFICE/OUTPT VISIT, EST, LEVL III, 20-29 MIN: ICD-10-PCS | Mod: 25,S$GLB,, | Performed by: PODIATRIST

## 2022-09-15 PROCEDURE — 1159F MED LIST DOCD IN RCRD: CPT | Mod: CPTII,S$GLB,, | Performed by: PODIATRIST

## 2022-09-15 PROCEDURE — 3044F PR MOST RECENT HEMOGLOBIN A1C LEVEL <7.0%: ICD-10-PCS | Mod: CPTII,S$GLB,, | Performed by: PODIATRIST

## 2022-09-15 PROCEDURE — 3008F PR BODY MASS INDEX (BMI) DOCUMENTED: ICD-10-PCS | Mod: CPTII,S$GLB,, | Performed by: PODIATRIST

## 2022-09-15 PROCEDURE — 3008F BODY MASS INDEX DOCD: CPT | Mod: CPTII,S$GLB,, | Performed by: PODIATRIST

## 2022-09-15 PROCEDURE — 99999 PR PBB SHADOW E&M-EST. PATIENT-LVL III: CPT | Mod: PBBFAC,,, | Performed by: PODIATRIST

## 2022-09-15 PROCEDURE — 99999 PR PBB SHADOW E&M-EST. PATIENT-LVL III: ICD-10-PCS | Mod: PBBFAC,,, | Performed by: PODIATRIST

## 2022-09-15 PROCEDURE — 1160F PR REVIEW ALL MEDS BY PRESCRIBER/CLIN PHARMACIST DOCUMENTED: ICD-10-PCS | Mod: CPTII,S$GLB,, | Performed by: PODIATRIST

## 2022-09-15 RX ORDER — TRIAMCINOLONE ACETONIDE 40 MG/ML
40 INJECTION, SUSPENSION INTRA-ARTICULAR; INTRAMUSCULAR
Status: COMPLETED | OUTPATIENT
Start: 2022-09-15 | End: 2022-09-15

## 2022-09-15 RX ADMIN — TRIAMCINOLONE ACETONIDE 40 MG: 40 INJECTION, SUSPENSION INTRA-ARTICULAR; INTRAMUSCULAR at 11:09

## 2022-09-15 NOTE — PROGRESS NOTES
Subjective:      Patient ID: Kateryna Weber is a 61 y.o. female.    Chief Complaint: Heel Pain (Patient presents today as a new patient complaining she has left heel pain.)      61 y.o. female presenting with left heel pain.  Patient points plantar aspect of the heel for pain.  Has been dealing with this pain for last 3 months.  Patient has history of plantar fasciitis in the past. Known to Dr. Chen who prescribed medrol dose pack. Does not think it helped with pain. In casual shoes today. Aching pain. Pain is worse in the morning.     Review of Systems   Constitutional: Negative for chills, decreased appetite, fever and malaise/fatigue.   HENT:  Negative for congestion, ear discharge and sore throat.    Eyes:  Negative for discharge and pain.   Cardiovascular:  Negative for chest pain, claudication and leg swelling.   Respiratory:  Negative for cough and shortness of breath.    Skin:  Negative for color change, nail changes and rash.   Musculoskeletal:  Negative for arthritis, joint pain, joint swelling and muscle weakness.        L heel pain      Gastrointestinal:  Negative for bloating, abdominal pain, diarrhea, nausea and vomiting.   Genitourinary:  Negative for flank pain and hematuria.   Neurological:  Negative for headaches, numbness and weakness.   Psychiatric/Behavioral:  Negative for altered mental status.            No past medical history on file.    Past Surgical History:   Procedure Laterality Date    ABDOMINAL SURGERY  2000    tummy tuck    APPENDECTOMY      COLONOSCOPY N/A 4/8/2019    Procedure: COLONOSCOPY;  Surgeon: Ilya Arauz MD;  Location: [address];  Service: Endoscopy;  Laterality: N/A;  Pt requests Dr. Arauz - LETHA  Pt called to r/s due to being out of town, Pt did not want to wait until 5/2019 for next available appt.,Pt stated she would like to keep 4/8/19- LETHA3/26/19@1432    GASTRIC BYPASS  2007    Why Weight Dr. Paul    HYSTERECTOMY         Family History   Problem Relation  "Age of Onset    Melanoma Neg Hx        Social History     Socioeconomic History    Marital status:    Tobacco Use    Smoking status: Never    Smokeless tobacco: Never   Substance and Sexual Activity    Alcohol use: Yes    Drug use: No       Current Outpatient Medications   Medication Sig Dispense Refill    buPROPion (WELLBUTRIN XL) 150 MG TB24 tablet Take 1 tablet (150 mg total) by mouth once daily. 90 tablet 1    multivitamin capsule Take 1 capsule by mouth once daily.      pantoprazole (PROTONIX) 40 MG tablet TAKE 1 TABLET BY MOUTH ONCE DAILY IN THE MORNING ON  AN  EMPTY  STOMACH 90 tablet 0    phentermine (ADIPEX-P) 37.5 mg tablet Take 1 tablet (37.5 mg total) by mouth once daily. 30 tablet 2    ALPRAZolam (XANAX) 0.25 MG tablet TAKE 1 TABLET BY MOUTH ONCE DAILY AT BEDTIME AS NEEDED FOR ANXIETY (Patient not taking: Reported on 9/15/2022) 30 tablet 0    diethylpropion (TENUATE) 75 mg SR tablet Take 75 mg by mouth once daily.      flu vacc [id number] 6mos up,PF, (FLUARIX QUAD [id number], PF,) 60 mcg (15 mcg x 4)/0.5 mL Syrg given by [facility] (Patient not taking: Reported on 9/15/2022) 0.5 mL 0    topiramate (TOPAMAX) 25 MG tablet Take 1 tablet (25 mg total) by mouth 2 (two) times daily. (Patient not taking: Reported on 9/15/2022) 180 tablet 0     No current facility-administered medications for this visit.       Review of patient's allergies indicates:   Allergen Reactions    No known drug allergies        Vitals:    09/15/22 1038   Weight: 73.9 kg (162 lb 14.4 oz)   Height: 5' 2" (1.575 m)   PainSc:   6   PainLoc: Foot       Objective:      Physical Exam  Constitutional:       General: She is not in acute distress.     Appearance: She is well-developed.   HENT:      Nose: Nose normal.   Eyes:      Conjunctiva/sclera: Conjunctivae normal.   Pulmonary:      Effort: Pulmonary effort is normal.   Chest:      Chest wall: No tenderness.   Abdominal:      Tenderness: There is no abdominal tenderness.   Musculoskeletal: "      Cervical back: Normal range of motion.   Neurological:      Mental Status: She is alert and oriented to person, place, and time.   Psychiatric:         Behavior: Behavior normal.       Vascular: Distal DP/PT pulses palpable 2/4. CRT < 3 sec to tips of toes. No vericosities noted to LEs. Hair growth present LE, warm to touch LE, No edema noted to LE.    Dermatologic: No open lesions, lacerations or wounds. Interdigital spaces clean, dry and intact. No erythema, rubor, calor noted LE    Musculoskeletal: MMT 5/5 in DF/PF/Inv/Ev resistance with no reproduction of pain in any direction. Passive range of motion of ankle and pedal joints is painless. No calf tenderness LE, Compartments soft/compressible. ROM of ankles with < 10 deg DF is noted b/l.  L foot: Tender to touch to plantar heel at plantar fascia insertion and over medial tubercle of the calcaneus. No calcaneus pain upon medial and lateral squeezing.     Neurological: Light touch, proprioception, and sharp/dull sensation are all intact. Protective threshold with the [place]-Wienstein monofilament is intact. Vibratory sensation intact.         Assessment:       Encounter Diagnoses   Name Primary?    Plantar fasciitis - Left Foot Yes    Equinus contracture of ankle     Inflammatory heel pain, unspecified laterality          Plan:       Kateryna was seen today for heel pain.    Diagnoses and all orders for this visit:    Plantar fasciitis - Left Foot    Equinus contracture of ankle    Inflammatory heel pain, unspecified laterality    I counseled the patient on her conditions, their implications and medical management.    61 y.o. female with L foot plantar fasciitis.    -steroid injection. Pt tolerated well. Injection consisted of total of 2cc of kenalog 40 with 0.5% marcaine plain injected into the plantar medial left heel. Skin was prepped with alcohol and ethyl chloride spray. Patient tolerated well with no complications and related relief following injection.  Bandaid applied to injection site. Patient is to use combination of conservative treatment and return for follow up/reassessment at that time as needed. Timeout was performed with pt in the room.    -c/w stretching and water bottle exercise. Instructions for both given to patient.  -Discussed different treatment options for heel pain. including conservative and surgical.  I gave written and verbal instructions on heel cord stretching and this was demonstrated for the patient. Patient expressed understanding. Discussed wearing appropriate shoe gear and avoiding flats, slippers, sandals, barefoot, and sockfeet. Recommended arch supports. My recommendation for OTC supports is Spenco polysorb replacement insoles or patient may elect more aggressive treatment with prescription arch supports.  -c/w OTC anti inflammatory medication  -The nature of the condition, options for management, as well as potential risks and complications were discussed in detail with patient. Patient was amenable to my recommendations and left my office fully informed and will follow up as instructed or sooner if necessary.    -Patient was advised of signs and symptoms of infection including redness, drainage, purulence, odor, streaking, fever, chills and I advised patient to seek medical attention (ER or urgent care) if these symptoms arise.   -f/u prn    Note dictated with voice recognition software, please excuse any grammatical errors.

## 2022-10-06 ENCOUNTER — PATIENT MESSAGE (OUTPATIENT)
Dept: BARIATRICS | Facility: CLINIC | Age: 62
End: 2022-10-06
Payer: COMMERCIAL

## 2022-10-18 ENCOUNTER — OFFICE VISIT (OUTPATIENT)
Dept: BARIATRICS | Facility: CLINIC | Age: 62
End: 2022-10-18
Payer: COMMERCIAL

## 2022-10-18 VITALS
WEIGHT: 163.56 LBS | HEART RATE: 80 BPM | HEIGHT: 62 IN | SYSTOLIC BLOOD PRESSURE: 118 MMHG | DIASTOLIC BLOOD PRESSURE: 78 MMHG | OXYGEN SATURATION: 99 % | BODY MASS INDEX: 30.1 KG/M2

## 2022-10-18 DIAGNOSIS — Z98.84 S/P LAPAROSCOPIC SLEEVE GASTRECTOMY: Primary | ICD-10-CM

## 2022-10-18 DIAGNOSIS — E66.3 OVERWEIGHT (BMI 25.0-29.9): ICD-10-CM

## 2022-10-18 PROCEDURE — 1159F PR MEDICATION LIST DOCUMENTED IN MEDICAL RECORD: ICD-10-PCS | Mod: CPTII,S$GLB,, | Performed by: INTERNAL MEDICINE

## 2022-10-18 PROCEDURE — 3078F PR MOST RECENT DIASTOLIC BLOOD PRESSURE < 80 MM HG: ICD-10-PCS | Mod: CPTII,S$GLB,, | Performed by: INTERNAL MEDICINE

## 2022-10-18 PROCEDURE — 1159F MED LIST DOCD IN RCRD: CPT | Mod: CPTII,S$GLB,, | Performed by: INTERNAL MEDICINE

## 2022-10-18 PROCEDURE — 99214 OFFICE O/P EST MOD 30 MIN: CPT | Mod: S$GLB,,, | Performed by: INTERNAL MEDICINE

## 2022-10-18 PROCEDURE — 99999 PR PBB SHADOW E&M-EST. PATIENT-LVL IV: ICD-10-PCS | Mod: PBBFAC,,, | Performed by: INTERNAL MEDICINE

## 2022-10-18 PROCEDURE — 99999 PR PBB SHADOW E&M-EST. PATIENT-LVL IV: CPT | Mod: PBBFAC,,, | Performed by: INTERNAL MEDICINE

## 2022-10-18 PROCEDURE — 3074F PR MOST RECENT SYSTOLIC BLOOD PRESSURE < 130 MM HG: ICD-10-PCS | Mod: CPTII,S$GLB,, | Performed by: INTERNAL MEDICINE

## 2022-10-18 PROCEDURE — 3074F SYST BP LT 130 MM HG: CPT | Mod: CPTII,S$GLB,, | Performed by: INTERNAL MEDICINE

## 2022-10-18 PROCEDURE — 3044F HG A1C LEVEL LT 7.0%: CPT | Mod: CPTII,S$GLB,, | Performed by: INTERNAL MEDICINE

## 2022-10-18 PROCEDURE — 1160F PR REVIEW ALL MEDS BY PRESCRIBER/CLIN PHARMACIST DOCUMENTED: ICD-10-PCS | Mod: CPTII,S$GLB,, | Performed by: INTERNAL MEDICINE

## 2022-10-18 PROCEDURE — 3044F PR MOST RECENT HEMOGLOBIN A1C LEVEL <7.0%: ICD-10-PCS | Mod: CPTII,S$GLB,, | Performed by: INTERNAL MEDICINE

## 2022-10-18 PROCEDURE — 3078F DIAST BP <80 MM HG: CPT | Mod: CPTII,S$GLB,, | Performed by: INTERNAL MEDICINE

## 2022-10-18 PROCEDURE — 99214 PR OFFICE/OUTPT VISIT, EST, LEVL IV, 30-39 MIN: ICD-10-PCS | Mod: S$GLB,,, | Performed by: INTERNAL MEDICINE

## 2022-10-18 PROCEDURE — 1160F RVW MEDS BY RX/DR IN RCRD: CPT | Mod: CPTII,S$GLB,, | Performed by: INTERNAL MEDICINE

## 2022-10-18 RX ORDER — DIETHYLPROPION HYDROCHLORIDE 75 MG/1
75 TABLET, EXTENDED RELEASE ORAL DAILY PRN
Qty: 30 TABLET | Refills: 1 | Status: SHIPPED | OUTPATIENT
Start: 2022-10-18 | End: 2023-01-25

## 2022-10-18 RX ORDER — SEMAGLUTIDE 1.34 MG/ML
0.5 INJECTION, SOLUTION SUBCUTANEOUS
Qty: 3 PEN | Refills: 0 | Status: SHIPPED | OUTPATIENT
Start: 2022-10-18 | End: 2022-12-12

## 2022-10-18 NOTE — PATIENT INSTRUCTIONS
Start Ozempic once a week. Start with 0.25mg once a week x 4 weeks, then 0.5 mg weekly.       Decrease portions as soon as you start Ozempic. Avoid fried, greasy, fatty foods.     Some nausea in the first 2 weeks is not unusual.     If you get pain across the upper abdomen and around to your back, please call the office.       Www.[email or web address] for coupon/videos.       Patient warned of common side effects of diethylpropion including anxiety, insomnia, palpitations and increased blood pressure. It was also explained that it is for short-term usage along with diet and exercise, and that stopping the medication without making lifestyle changes will result in regain of weight. Patient states understanding.    Weight loss medications are controlled substances.  They require routine follow up. Prescription or pills that are lost or destroyed will not be replaced.           - To lose weight you want to cut 100% starchy carbohydrates out of your diet (bread, rice, pasta, potatoes, granola, flour, corn, peas, oatmeal, grits, tortillas, crackers, chips) and get  grams of protein.  Aim for 100 grams of protein daily.    - No soda, sweet tea, juices, sports drinks or lemonade     -Exercise daily.       Helpful tips to survive the holidays:  Dont save yourself for the meal: Make sure you continue to eat high protein small meals every 3-4 hours to ensure to do not become over-hungry. Avoid temptation by not showing up to a holiday party or gathering hungry.   Plan ahead. Bring a dish to a party if you know there may not be an appropriate option.   Choose sugar-free drinks: Stick to water or other sugar-free beverages and make sure you are getting 6-8 cups of fluid each day (but not with meals!). Avoid alcohol, carbonated beverages, and high-fat/high-sugar beverages like hot chocolate and eggnog. Try sugar-free hot cocoa made with skim milk or water, or sugar-free spiced tea to add some holiday flair to your  beverage (see sugar-free mulled cider recipe below)  Take your time: Eat mindfully. Dont graze on food throughout the day. Sit down to enjoy your small meals. Chew slowly and thoroughly. Cut your food into small pieces before eating.  Listen to your body: Stop eating as soon as you feel full. Do not feel pressured to try certain (or all) foods or to eat all of the food on your plate. Listen to your hunger cues.   REMEMBER: Make your holidays about spending time with family and friends instead of focusing gatherings around food.  Keep up your exercise routine: Make sure you continue to get regular exercise throughout the holiday season. Encourage friends and family to be active by taking a walk together after a meal, to look at holiday lights, or to window-shop.    Good Holiday Meal Options:  Roasted Turkey, NO skin. Use low sodium broth instead of gravy.   Stuffed Bell Peppers made WITHOUT bread crumbs or Rice. Try using parmesan cheese instead  Gumbo, NO rice. Try picking out mostly the meat/seafood and vegetables with little broth.   Green Bean Casserole made with 98% fat free cream of mushroom soup and crushed almonds/pecans instead of fried onions  Side salad w/ low fat dressing. Try a different kind of salad maybe use Kale or spinach.   Roasted non-starchy vegetables like brussel sprouts, broccoli, green beans, zucchini, butternut squash, cauliflower  Cauliflower Mash (steam or roast cauliflower, puree w/ low fat cheese, dash of fat free milk and 2-3 sprays of I cant believe its not butter spray. Add garlic powder and black pepper to season). Use Low sodium broth instead of gravy.   Try Loaded Cauliflower Mash (Make cauliflower like above cauliflower mash. Top with diced turkey pro, ¼ cup low fat cheddar cheese and bake @ 350* F for 5-10 minutes, until cheese is melted. Top with minced chives, black pepper and garlic to taste).   Homemade cranberry sauce using Splenda or another alternative sweetener. Boil  fresh cranberries and add splenda to taste. Boil until cranberries break open and then simmer until it reaches the consistency you want (less time for more watery sauce and simmer for longer to create a thicker sauce).   Deviled eggs: make using low fat wellington, mustard, DILL relish (not sweet relish).   Vegetable tray w/ Greek yogurt Ranch Dip. Mix 1 packet of [place] ranch dip mix w/ 16 oz low fat plain greek yogurt.     Good Holiday Dessert Options:  High protein Pumpkin Cheesecake (see recipe below)  Pumpkin Whip (see recipe below)  Quest Apple Pie or Cinnamon Roll flavored protein bar (warm in microwave for 10-15 seconds)  Eggnog Protein shake (see recipe below)  Fresh fruit w/ low fat cheese  Sugar-free Jello Parfaits. Layer Red and Green sugar-free jello in cups and top w/ 2 tbsp Sugar-free cool-whip    Pumpkin Cheesecake    8 ounces fat free cream cheese, softened   2 scoops of vanilla protein powder (<4 g sugar per serving)   ¼ tsp Fine salt   2 eggs, at room temperature   1/3 cup fat free sour cream  1/3 cup fat free half and half  1 15 -ounce can pure pumpkin puree   1 tablespoon pumpkin pie spice, plus more for dusting   Unsalted nuts, crushed  *Add splenda to taste    Directions     1. Preheat the oven to 300 degrees F. Line 18 muffin cups with paper liners. Sprinkle 1 tsp crushed unsalted nuts at the bottom of each of muffin cup liner.     2. In a large bowl, beat the cream cheese, vanilla protein powder and 1/4 teaspoon fine salt on medium-high speed until smooth and creamy, 2 to 3 minutes. Scrape down the sides, reduce speed to low and beat in the eggs, 1 at a time, until combined. Beat in 1/3 cup fat free sour cream and fat free half and half. Stir in the pumpkin puree and pumpkin pie spice until smooth. Divide evenly among cookie-lined paper cups, filling almost all the way to the top.     3. Bake until the filling is just set, 40 to 45 minutes. A sharp knife inserted into the center will come  out moist, but clean. Cool completely in tins on a wire rack. Refrigerate until cold, 4 hours, or overnight. Top with a dusting of pumpkin pie spice.    Recipe altered from the following recipe: http://www.[employer].com/recipes/food-network-niko/mini-pumpkin-cheesecakes-recipe.print.html?oc=linkback    Pumpkin Whip    Box of sugar-free vanilla pudding  Can of pumpkin puree  Pumpkin Pie spice (sprinkle to taste)  ½ cup of sugar-free Cool Whip    Directions:  Make sugar-free pudding according to package directions using fat free or 1% milk. Stir in pumpkin and cool whip. Add pumpkin pie spice to taste.     Egg Nog Protein shake    8 oz skim or 1% milk  1 scoop vanilla protein powder  1 tbsp sugar-free vanilla pudding mix  ½ tsp butter flavor extract  ½ tsp rum extract  ½ tsp cinnamon     Shake together or blend with ice and serve.     Sugar-Free Mulled Cider    3 oz diet cran-apple juice  6 oz water  1 packet sugar-free apple cider mix  ½ tsp apple pie spice  ½ tsp butter flavor extract  1 tbsp Sugar-free Syrup    Mix together. Warm if needed and serve w/ orange wedge and cinnamon stick.

## 2022-10-18 NOTE — PROGRESS NOTES
"Subjective:       Patient ID: aKteryna Weber is a 61 y.o. female.    Chief Complaint: Follow-up      Pt presents today for follow up. Getting adequate protein. She has gained 3 lbs since last seen in person,  52 lbs total since seeing me. Has last been on phentermine  filled 9/27/22 and before that, diethylpropion,  checked today. Last filled 6/17/22.  Also added topiramate 25 mg BID last OV. She has stopped that.  Was approx in the 230's lb before her surgery.  Vitamin levels look good.  She is staying active. She feels she is eating pretty well.   Optho notes reviewed. No glaucoma.   SHe has been off trulicity.States she has noted some mood changes with trulicity.    Lab Results   Component Value Date    HGBA1C 5.1 02/22/2022    HGBA1C 5.2 01/10/2019    HGBA1C 4.9 12/08/2017     Lab Results   Component Value Date    LDLCALC 77.4 02/22/2022    CREATININE 0.57 02/22/2022         Follow-up  Associated symptoms include arthralgias. Pertinent negatives include no headaches, myalgias, rash or sore throat.     Review of Systems   Constitutional: Negative for activity change, appetite change and unexpected weight change.   HENT: Negative for sore throat and voice change.    Eyes: Negative for pain and visual disturbance.   Respiratory: Negative for shortness of breath and wheezing.    Cardiovascular: Negative for palpitations and leg swelling.   Genitourinary: Negative for difficulty urinating and dysuria.        S/p hyst   Musculoskeletal: Positive for arthralgias. Negative for back pain and myalgias.        Hip and hand   Skin: Negative for pallor and rash.   Neurological: Negative for syncope, light-headedness and headaches.   Psychiatric/Behavioral: Negative for dysphoric mood and sleep disturbance. The patient is not nervous/anxious.        Objective:       /78 (BP Location: Left arm, Patient Position: Sitting, BP Method: Large (Manual))   Pulse 80   Ht 5' 2" (1.575 m)   Wt 74.2 kg (163 lb 9.3 oz)   SpO2 " 99%   BMI 29.92 kg/m²     Physical Exam  Vitals signs reviewed.   Constitutional:       General: She is not in acute distress.     Appearance: She is well-developed.      Comments: Obese   HENT:      Head: Normocephalic and atraumatic.   Neurological:      Mental Status: She is alert and oriented to person, place, and time.      Cranial Nerves: No cranial nerve deficit.   Psychiatric:         Behavior: Behavior normal.         Judgment: Judgment normal.         Assessment:       1. S/P laparoscopic sleeve gastrectomy    2. Overweight (BMI 25.0-29.9)          Plan:             Kateryna was seen today for follow-up.    Diagnoses and all orders for this visit:    S/P laparoscopic sleeve gastrectomy    Overweight (BMI 25.0-29.9)  -     diethylpropion (TENUATE) 75 mg SR tablet; Take 1 tablet (75 mg total) by mouth daily as needed (appetite suppression.).    Other orders  -     semaglutide (OZEMPIC) 0.25 mg or 0.5 mg(2 mg/1.5 mL) pen injector; Inject 0.5 mg into the skin every 7 days.      Start Ozempic once a week. Start with 0.25mg once a week x 4 weeks, then 0.5 mg weekly.       Decrease portions as soon as you start Ozempic. Avoid fried, greasy, fatty foods.     Some nausea in the first 2 weeks is not unusual.     If you get pain across the upper abdomen and around to your back, please call the office.       [email or web address] for coupon/videos.      Patient warned of common side effects of diethylpropion including anxiety, insomnia, palpitations and increased blood pressure. It was also explained that it is for short-term usage along with diet and exercise, and that stopping the medication without making lifestyle changes will result in regain of weight. Patient states understanding.    Weight loss medications are controlled substances.  They require routine follow up. Prescription or pills that are lost or destroyed will not be replaced.            - To lose weight you want to cut 100% starchy carbohydrates out of  your diet (bread, rice, pasta, potatoes, granola, flour, corn, peas, oatmeal, grits, tortillas, crackers, chips) and get  grams of protein.  Aim for 100 grams of protein daily.    - No soda, sweet tea, juices, sports drinks or lemonade     -Exercise daily.       Hurricane tips

## 2022-10-20 ENCOUNTER — CLINICAL SUPPORT (OUTPATIENT)
Dept: FAMILY MEDICINE | Facility: CLINIC | Age: 62
End: 2022-10-20
Payer: COMMERCIAL

## 2022-10-20 PROCEDURE — 90686 IIV4 VACC NO PRSV 0.5 ML IM: CPT | Mod: S$GLB,,, | Performed by: FAMILY MEDICINE

## 2022-10-20 PROCEDURE — 90471 FLU VACCINE (QUAD) GREATER THAN OR EQUAL TO 3YO PRESERVATIVE FREE IM: ICD-10-PCS | Mod: S$GLB,,, | Performed by: FAMILY MEDICINE

## 2022-10-20 PROCEDURE — 90471 IMMUNIZATION ADMIN: CPT | Mod: S$GLB,,, | Performed by: FAMILY MEDICINE

## 2022-10-20 PROCEDURE — 90686 FLU VACCINE (QUAD) GREATER THAN OR EQUAL TO 3YO PRESERVATIVE FREE IM: ICD-10-PCS | Mod: S$GLB,,, | Performed by: FAMILY MEDICINE

## 2022-10-20 NOTE — PROGRESS NOTES
Pt came in to receive flu vaccine. Pt tolerated vaccine well. VIS given. No sxs of adverse or allergic reaction noted. Instructed pt to wait 15 minutes after administration of vaccine. Pt verbalized understand.

## 2022-12-27 ENCOUNTER — TELEPHONE (OUTPATIENT)
Dept: BARIATRICS | Facility: CLINIC | Age: 62
End: 2022-12-27
Payer: COMMERCIAL

## 2022-12-27 NOTE — TELEPHONE ENCOUNTER
----- Message from Nina Espana sent at 12/27/2022 11:43 AM CST -----  Regarding: Pt called to speak to the nurse to request a different medication due to her current Rx being out of stock  Patient Advice:    Pt called to speak to the nurse to request a different medication due to her current Rx for semaglutide (OZEMPIC) 0.25 mg or 0.5 mg(2 mg/1.5 mL) pen injector being out of stock    Pt would like a call back today and can be reached at [phone]

## 2022-12-27 NOTE — TELEPHONE ENCOUNTER
Sent in 1 mg ozempic, she can drop the diethylpropion down to as needed once she starts the higher dose. She likely wll not need it as much.

## 2022-12-27 NOTE — TELEPHONE ENCOUNTER
Patient has been notified about medication changed and was updated on instructions on taking medication per Dr. Poon note

## 2023-01-17 ENCOUNTER — PATIENT MESSAGE (OUTPATIENT)
Dept: BARIATRICS | Facility: CLINIC | Age: 63
End: 2023-01-17
Payer: COMMERCIAL

## 2023-01-24 ENCOUNTER — PATIENT MESSAGE (OUTPATIENT)
Dept: BARIATRICS | Facility: CLINIC | Age: 63
End: 2023-01-24
Payer: COMMERCIAL

## 2023-01-25 ENCOUNTER — OFFICE VISIT (OUTPATIENT)
Dept: BARIATRICS | Facility: CLINIC | Age: 63
End: 2023-01-25
Payer: COMMERCIAL

## 2023-01-25 VITALS
SYSTOLIC BLOOD PRESSURE: 122 MMHG | WEIGHT: 163.13 LBS | HEIGHT: 62 IN | OXYGEN SATURATION: 96 % | BODY MASS INDEX: 30.02 KG/M2 | DIASTOLIC BLOOD PRESSURE: 61 MMHG | HEART RATE: 73 BPM

## 2023-01-25 DIAGNOSIS — T75.3XXS MOTION SICKNESS, SEQUELA: ICD-10-CM

## 2023-01-25 DIAGNOSIS — E53.8 VITAMIN B12 DEFICIENCY: ICD-10-CM

## 2023-01-25 DIAGNOSIS — Z00.00 ROUTINE CHECK-UP: ICD-10-CM

## 2023-01-25 DIAGNOSIS — Z98.84 S/P LAPAROSCOPIC SLEEVE GASTRECTOMY: Primary | ICD-10-CM

## 2023-01-25 PROCEDURE — 99999 PR PBB SHADOW E&M-EST. PATIENT-LVL IV: ICD-10-PCS | Mod: PBBFAC,,, | Performed by: INTERNAL MEDICINE

## 2023-01-25 PROCEDURE — 99214 OFFICE O/P EST MOD 30 MIN: CPT | Mod: S$GLB,,, | Performed by: INTERNAL MEDICINE

## 2023-01-25 PROCEDURE — 3078F PR MOST RECENT DIASTOLIC BLOOD PRESSURE < 80 MM HG: ICD-10-PCS | Mod: CPTII,S$GLB,, | Performed by: INTERNAL MEDICINE

## 2023-01-25 PROCEDURE — 3074F SYST BP LT 130 MM HG: CPT | Mod: CPTII,S$GLB,, | Performed by: INTERNAL MEDICINE

## 2023-01-25 PROCEDURE — 1160F RVW MEDS BY RX/DR IN RCRD: CPT | Mod: CPTII,S$GLB,, | Performed by: INTERNAL MEDICINE

## 2023-01-25 PROCEDURE — 99214 PR OFFICE/OUTPT VISIT, EST, LEVL IV, 30-39 MIN: ICD-10-PCS | Mod: S$GLB,,, | Performed by: INTERNAL MEDICINE

## 2023-01-25 PROCEDURE — 1159F MED LIST DOCD IN RCRD: CPT | Mod: CPTII,S$GLB,, | Performed by: INTERNAL MEDICINE

## 2023-01-25 PROCEDURE — 3078F DIAST BP <80 MM HG: CPT | Mod: CPTII,S$GLB,, | Performed by: INTERNAL MEDICINE

## 2023-01-25 PROCEDURE — 3008F PR BODY MASS INDEX (BMI) DOCUMENTED: ICD-10-PCS | Mod: CPTII,S$GLB,, | Performed by: INTERNAL MEDICINE

## 2023-01-25 PROCEDURE — 1160F PR REVIEW ALL MEDS BY PRESCRIBER/CLIN PHARMACIST DOCUMENTED: ICD-10-PCS | Mod: CPTII,S$GLB,, | Performed by: INTERNAL MEDICINE

## 2023-01-25 PROCEDURE — 1159F PR MEDICATION LIST DOCUMENTED IN MEDICAL RECORD: ICD-10-PCS | Mod: CPTII,S$GLB,, | Performed by: INTERNAL MEDICINE

## 2023-01-25 PROCEDURE — 3008F BODY MASS INDEX DOCD: CPT | Mod: CPTII,S$GLB,, | Performed by: INTERNAL MEDICINE

## 2023-01-25 PROCEDURE — 3074F PR MOST RECENT SYSTOLIC BLOOD PRESSURE < 130 MM HG: ICD-10-PCS | Mod: CPTII,S$GLB,, | Performed by: INTERNAL MEDICINE

## 2023-01-25 PROCEDURE — 99999 PR PBB SHADOW E&M-EST. PATIENT-LVL IV: CPT | Mod: PBBFAC,,, | Performed by: INTERNAL MEDICINE

## 2023-01-25 RX ORDER — PHENTERMINE HYDROCHLORIDE 37.5 MG/1
37.5 TABLET ORAL
Qty: 30 TABLET | Refills: 1 | Status: SHIPPED | OUTPATIENT
Start: 2023-01-25 | End: 2023-02-24

## 2023-01-25 RX ORDER — SCOLOPAMINE TRANSDERMAL SYSTEM 1 MG/1
1 PATCH, EXTENDED RELEASE TRANSDERMAL
Qty: 4 PATCH | Refills: 0 | Status: SHIPPED | OUTPATIENT
Start: 2023-01-25 | End: 2023-06-02

## 2023-01-25 NOTE — PROGRESS NOTES
Subjective:       Patient ID: Kateryna Weber is a 62 y.o. female.    Chief Complaint: No chief complaint on file.        Pt presents today for follow up. Getting adequate protein. She has gained 3 lbs since last seen in person,  52 lbs total since seeing me. Has last been on phentermine  filled 9/27/22 and before that, diethylpropion,  checked today. Last filled 6/17/22.  Also added topiramate 25 mg BID last OV. She has stopped that.  Was approx in the 230's lb before her surgery.  Vitamin levels look good.  She is staying active. She feels she is eating pretty well.   Optho notes reviewed. No glaucoma.   SHe has been off trulicity.States she has noted some mood changes with trulicity.  Switcd to Ozempic. Is now on the 1 mg x 2 weeks. Denies SE. 'Requests scopolamine patch for upcoming cruise and needs labs. Has not est with PCP as of yet.   Lab Results   Component Value Date    HGBA1C 5.1 02/22/2022    HGBA1C 5.2 01/10/2019    HGBA1C 4.9 12/08/2017     Lab Results   Component Value Date    LDLCALC 77.4 02/22/2022    CREATININE 0.57 02/22/2022         Follow-up  Associated symptoms include arthralgias. Pertinent negatives include no headaches, myalgias, rash or sore throat.     Review of Systems   Constitutional: Negative for activity change, appetite change and unexpected weight change.   HENT: Negative for sore throat and voice change.    Eyes: Negative for pain and visual disturbance.   Respiratory: Negative for shortness of breath and wheezing.    Cardiovascular: Negative for palpitations and leg swelling.   Genitourinary: Negative for difficulty urinating and dysuria.        S/p hyst   Musculoskeletal: Positive for arthralgias. Negative for back pain and myalgias.        Hip and hand   Skin: Negative for pallor and rash.   Neurological: Negative for syncope, light-headedness and headaches.   Psychiatric/Behavioral: Negative for dysphoric mood and sleep disturbance. The patient is not nervous/anxious.       "  Objective:       /61   Pulse 73   Ht 5' 2" (1.575 m)   Wt 74 kg (163 lb 2.3 oz)   SpO2 96%   BMI 29.84 kg/m²     Physical Exam  Vitals signs reviewed.   Constitutional:       General: She is not in acute distress.     Appearance: She is well-developed.      Comments: Obese   HENT:      Head: Normocephalic and atraumatic.   Neurological:      Mental Status: She is alert and oriented to person, place, and time.      Cranial Nerves: No cranial nerve deficit.   Psychiatric:         Behavior: Behavior normal.         Judgment: Judgment normal.         Assessment:       1. S/P laparoscopic sleeve gastrectomy    2. Vitamin B12 deficiency    3. Routine check-up          Plan:             Diagnoses and all orders for this visit:    S/P laparoscopic sleeve gastrectomy  -     CBC Auto Differential; Future  -     Comprehensive Metabolic Panel; Future  -     Iron and TIBC; Future  -     Vitamin B1; Future  -     Vitamin D; Future  -     Vitamin B6; Future  -     Vitamin B12; Future  -     Hemoglobin A1C; Future  -     Lipid Panel; Future    Vitamin B12 deficiency  -     Vitamin B12; Future    Routine check-up  -     Hemoglobin A1C; Future  -     Lipid Panel; Future  -     TSH; Future        Start Ozempic 1 mg once a week.       Decrease portions as soon as you start Ozempic. Avoid fried, greasy, fatty foods.     Some nausea in the first 2 weeks is not unusual.     If you get pain across the upper abdomen and around to your back, please call the office.       [email or web address] for coupon/videos.     Patient warned of common side effects of phentermine including anxiety, insomnia, palpitations and increased blood pressure. It was also explained that it is for short-term usage along with diet and exercise, and that stopping the medication without making lifestyle changes will result in regain of weight. Patient states understanding.     Weight loss medications are controlled substances.  They require routine " follow up. Prescription or pills that are lost or destroyed will not be replaced.          - To lose weight you want to cut 100% starchy carbohydrates out of your diet (bread, rice, pasta, potatoes, granola, flour, corn, peas, oatmeal, grits, tortillas, crackers, chips) and get  grams of protein.  Aim for 100 grams of protein daily.    - No soda, sweet tea, juices, sports drinks or lemonade     -Exercise daily.       Healthy all day tips   Lab orders today. Will contact with results. Pt will est with new PCP.

## 2023-01-25 NOTE — PATIENT INSTRUCTIONS
Start Ozempic 1 mg once a week.       Decrease portions as soon as you start Ozempic. Avoid fried, greasy, fatty foods.     Some nausea in the first 2 weeks is not unusual.     If you get pain across the upper abdomen and around to your back, please call the office.       Www.[email or web address] for coupon/videos.     Patient warned of common side effects of phentermine including anxiety, insomnia, palpitations and increased blood pressure. It was also explained that it is for short-term usage along with diet and exercise, and that stopping the medication without making lifestyle changes will result in regain of weight. Patient states understanding.     Weight loss medications are controlled substances.  They require routine follow up. Prescription or pills that are lost or destroyed will not be replaced.          - To lose weight you want to cut 100% starchy carbohydrates out of your diet (bread, rice, pasta, potatoes, granola, flour, corn, peas, oatmeal, grits, tortillas, crackers, chips) and get  grams of protein.  Aim for 100 grams of protein daily.    - No soda, sweet tea, juices, sports drinks or lemonade     -Exercise daily.     Eating well to be healthy and lose weight does not have to be hard. It also does not have to be time consuming or expensive. There a lots of ways you can work in healthy choices into your day. Many of these are easy, quick and even family friendly!    Homemade hazelnut au lait  Brew your favorite brand of hazelnut flavored coffee ([employer] makes a good one). Microwave 1/2 cup of milk that fits your eating plan (whole, skim or sugar-free almond milk can all work). Add half to 1 oz sugar free hazelnut syrup.     Quick and easy breakfast  1-2 boiled eggs or mini-frittatas with a tangerine. The boiled eggs and mini-frittatas can both be made ahead and last for up to 4 days in the refrigerator. Bonus if you portion them out in ready to go containers or zipper bags.     Breakfast  Egg Muffins with Pro and Spinach  Makes 12 muffins  Ingredients    6 eggs  ¼ cup milk  ¼ teaspoon salt  2 cups grated cheddar cheese  3/4 cup spinach, cooked and drained (about 8 oz fresh spinach)  6 pro slices, cooked, drained of fat, and chopped  1/2 cup grated Parmesan cheese (optional)    Instructions      Preheat oven to 350 degrees. Use a regular 12-cup muffin pan. Spray the muffin pan with non-stick cooking spray.  In a large bowl, beat eggs until smooth. Add milk, salt, Cheddar cheese and mix. Stir spinach, cooked pro into the egg mixture. Ladle the egg mixture into greased muffin cups ¾ full.  Top each muffin cup with grated Parmesan cheese.  Bake for 25 minutes. Remove from the oven, let the muffins cool for 30 minutes before removing them from the pan.      Be a brown bagger! When you make dinner, plan for an extra helping. When you serve your plate for dinner, serve an additional helping into a container that you can take with you the next day. If you don't have a refrigerator available during the day, an insulated lunch bag and ice packs will help you safely store you lunch.     Cold Brewed Iced tea. Fill a pitcher with 64 oz filtered water. Add either 4 regular tea bags of your choice or a large iced tea bag. Refrigerate over night then remove the tea bags. The tea will not be bitter and is super flavorful. Get creative! Try combinations like green tea and hibiscus tea or black tea with lemon zinger. Add orange or lemon slices for even more flavor.     Snack wisely. Protein filled snacks will fill you up, allowing you to get by with fewer calories. String cheese, pork skins (chicharrones), turkey pepperoni, or celery with cream cheese will all fit the bill.       Ditch the take out. Turkey tacos (with or without a low carb tortilla), burgers (without the bun), or fun stir fries are all quick and easy. The whole family will be happy, and you can save calories and money.      Orange Chicken Stir  bell with asparagus   Makes 6 servings  Ingredients:    1.5 lbs boneless skinless chicken breast/tenders, diced into 1-inch pieces  1 Tbsp extra virgin olive or avocado oil, divided  2 lb asparagus, end portions trimmed and remainder diced into 1 1/2-inch pieces  1 small yellow onion, sliced into thin strips  8 oz button mushrooms, sliced  1 Tbsp peeled and finely grated fresh gabbie  4 cloves garlic, minced  1/2 cup low-sodium chicken broth  Juice of 2 fresh oranges  2 Tbsp low sodium soy sauce  2 Tbsp cornstarch  Sea salt and freshly ground black pepper    Directions:    In a 12-inch non-stick wok, heat 1/2 oil over moderately high heat. Once oil is hot, add diced chicken and season lightly with salt and pepper. Sauté until cooked through, tossing occasionally, about 5-6 minutes.  Place chicken on a large plate and set aside. Return wok, reduce to medium-high heat, add remaining oil.  Once oil is hot, add asparagus, yellow onion and mushrooms, and sauté until tender-crisp, about 4 - 5 minutes, adding in garlic and gabbie during the last 1 minute of sautéing.  Meanwhile, in a mixing bowl whisk together chicken broth, orange juice, soy sauce and cornstarch until well blended.  Pour chicken broth mixture into skillet with veggies, season with salt and pepper to taste, and bring mixture to a light boil, stirring constantly. Allow mixture to gently boil, stirring constantly, until thickened, about 1 minute.  Toss chicken into mixture and serve immediately over cauliflower rice or Shirataki noodles.      Skinny Chicken Tortilla Soup  Makes 7 servings    2 teaspoons olive oil  1 cup onion, chopped (about 1 small)  2 cups celery, sliced (about 4 medium stalks)  4 garlic cloves, minced  4 medium tomatoes, chopped  2 cups water  4 cups low-sodium organic chicken broth  3 cups chopped and/or shredded rotisserie chicken, skinless  2 cups sliced carrots (about 3 medium)  1 teaspoon dried oregano leaves  2 teaspoons chili  powder  1 teaspoon garlic powder  2 teaspoons cumin  ½ teaspoon cayenne pepper (add less or omit, if you don't want a spicy soup)  ½ teaspoon sea salt + more to taste  ½ teaspoon pepper + more to taste    Directions:   Put all ingredients into a large crock pot. Cook on low for 5-6 hours.     Optional garnish with chopped avocado, chopped fresh cilantro, crumbled Cotija cheese, sour cream, Greek yogurt, your favorite hot sauce.           Vegan Avocado Banana Chocolate Pudding  Makes 4 servings  Ingredients    1 1/2 ripe avocados  2 ripe bananas  6 tbsp raw cacao powder or unsweetened cocoa powder  2-3 tbsp maple syrup (or calorie free sweetener)  1/4 cup almond milk  Instructions    Blend everything together in a  until the consistency is smooth and velvety. Taste and see if more sweetener is needed and stir to make sure everything is evenly mixed. Blend a second time if needed.  Top with banana slices, raw cacao nibs, almond butter, or any other toppings and enjoy!

## 2023-02-22 ENCOUNTER — PATIENT MESSAGE (OUTPATIENT)
Dept: BARIATRICS | Facility: CLINIC | Age: 63
End: 2023-02-22
Payer: COMMERCIAL

## 2023-04-03 ENCOUNTER — OFFICE VISIT (OUTPATIENT)
Dept: BARIATRICS | Facility: CLINIC | Age: 63
End: 2023-04-03
Payer: COMMERCIAL

## 2023-04-03 VITALS — BODY MASS INDEX: 28.35 KG/M2 | WEIGHT: 155 LBS

## 2023-04-03 DIAGNOSIS — Z98.84 S/P LAPAROSCOPIC SLEEVE GASTRECTOMY: ICD-10-CM

## 2023-04-03 DIAGNOSIS — E66.3 OVERWEIGHT (BMI 25.0-29.9): Primary | ICD-10-CM

## 2023-04-03 PROCEDURE — 1160F RVW MEDS BY RX/DR IN RCRD: CPT | Mod: CPTII,95,, | Performed by: INTERNAL MEDICINE

## 2023-04-03 PROCEDURE — 1160F PR REVIEW ALL MEDS BY PRESCRIBER/CLIN PHARMACIST DOCUMENTED: ICD-10-PCS | Mod: CPTII,95,, | Performed by: INTERNAL MEDICINE

## 2023-04-03 PROCEDURE — 3044F HG A1C LEVEL LT 7.0%: CPT | Mod: CPTII,95,, | Performed by: INTERNAL MEDICINE

## 2023-04-03 PROCEDURE — 3044F PR MOST RECENT HEMOGLOBIN A1C LEVEL <7.0%: ICD-10-PCS | Mod: CPTII,95,, | Performed by: INTERNAL MEDICINE

## 2023-04-03 PROCEDURE — 3008F BODY MASS INDEX DOCD: CPT | Mod: CPTII,95,, | Performed by: INTERNAL MEDICINE

## 2023-04-03 PROCEDURE — 99212 OFFICE O/P EST SF 10 MIN: CPT | Mod: 95,,, | Performed by: INTERNAL MEDICINE

## 2023-04-03 PROCEDURE — 3008F PR BODY MASS INDEX (BMI) DOCUMENTED: ICD-10-PCS | Mod: CPTII,95,, | Performed by: INTERNAL MEDICINE

## 2023-04-03 PROCEDURE — 1159F PR MEDICATION LIST DOCUMENTED IN MEDICAL RECORD: ICD-10-PCS | Mod: CPTII,95,, | Performed by: INTERNAL MEDICINE

## 2023-04-03 PROCEDURE — 99212 PR OFFICE/OUTPT VISIT, EST, LEVL II, 10-19 MIN: ICD-10-PCS | Mod: 95,,, | Performed by: INTERNAL MEDICINE

## 2023-04-03 PROCEDURE — 1159F MED LIST DOCD IN RCRD: CPT | Mod: CPTII,95,, | Performed by: INTERNAL MEDICINE

## 2023-04-03 RX ORDER — DIETHYLPROPION HYDROCHLORIDE 75 MG/1
75 TABLET, EXTENDED RELEASE ORAL DAILY
Qty: 30 TABLET | Refills: 1 | Status: SHIPPED | OUTPATIENT
Start: 2023-04-03 | End: 2023-06-02

## 2023-04-03 NOTE — PROGRESS NOTES
Subjective:       Patient ID: Kateryna Weber is a 62 y.o. female.    Chief Complaint: Follow-up    The patient location is: in  a parked car  The chief complaint leading to consultation is:   Chief Complaint   Patient presents with    Follow-up        Visit type: audiovisual    Face to Face time with patient: 11 min  15 minutes of total time spent on the encounter, which includes face to face time and non-face to face time preparing to see the patient (eg, review of tests), Obtaining and/or reviewing separately obtained history, Documenting clinical information in the electronic or other health record, Independently interpreting results (not separately reported) and communicating results to the patient/family/caregiver, or Care coordination (not separately reported).         Each patient to whom he or she provides medical services by telemedicine is:  (1) informed of the relationship between the physician and patient and the respective role of any other health care provider with respect to management of the patient; and (2) notified that he or she may decline to receive medical services by telemedicine and may withdraw from such care at any time.    Notes:      Pt presents today for follow up. Getting adequate protein. She has gained 3 lbs since last seen in person,  52 lbs total since seeing me. Has last been on phentermine  filled 9/27/22 and before that, diethylpropion,  checked today. Last filled 6/17/22.  Also added topiramate 25 mg BID last OV. She has stopped that.  Was approx in the 230's lb before her surgery.  Vitamin levels look good.  She is staying active. She feels she is eating pretty well.   Optho notes reviewed. No glaucoma.   SHe has been off trulicity.States she has noted some mood changes with trulicity.  Switcd to Ozempic. Is now on the 1 mg. Denies SE. 'Requests scopolamine patch for upcoming cruise and needs labs. Has appt with new PCP in June.   Lab Results   Component Value Date    HGBA1C  5.1 01/27/2023    HGBA1C 5.1 02/22/2022    HGBA1C 5.2 01/10/2019     Lab Results   Component Value Date    LDLCALC 83.2 01/27/2023    CREATININE 0.55 01/27/2023       Prev 163#, current home weight 155#.    Follow-up  Associated symptoms include arthralgias. Pertinent negatives include no headaches, myalgias, rash or sore throat.     Review of Systems   Constitutional: Negative for activity change, appetite change and unexpected weight change.   HENT: Negative for sore throat and voice change.    Eyes: Negative for pain and visual disturbance.   Respiratory: Negative for shortness of breath and wheezing.    Cardiovascular: Negative for palpitations and leg swelling.   Genitourinary: Negative for difficulty urinating and dysuria.        S/p hyst   Musculoskeletal: Positive for arthralgias. Negative for back pain and myalgias.        Hip and hand   Skin: Negative for pallor and rash.   Neurological: Negative for syncope, light-headedness and headaches.   Psychiatric/Behavioral: Negative for dysphoric mood and sleep disturbance. The patient is not nervous/anxious.        Objective:       Wt 70.3 kg (155 lb)   BMI 28.35 kg/m²     Physical Exam  Vitals signs reviewed.   Constitutional:       General: She is not in acute distress.     Appearance: She is well-developed.      Comments: Obese   HENT:      Head: Normocephalic and atraumatic.   Neurological:      Mental Status: She is alert and oriented to person, place, and time.      Cranial Nerves: No cranial nerve deficit.   Psychiatric:         Behavior: Behavior normal.         Judgment: Judgment normal.         Assessment:       1. Overweight (BMI 25.0-29.9)    2. S/P laparoscopic sleeve gastrectomy            Plan:             Kateryna was seen today for follow-up.    Diagnoses and all orders for this visit:    Overweight (BMI 25.0-29.9)  -     diethylpropion (TENUATE) 75 mg SR tablet; Take 1 tablet (75 mg total) by mouth once daily. As needed    S/P laparoscopic sleeve  gastrectomy    Other orders  -     semaglutide (OZEMPIC) 1 mg/dose (4 mg/3 mL); Inject 1 mg into the skin every 7 days.          Patient warned of common side effects of diethylpropion including anxiety, insomnia, palpitations and increased blood pressure. It was also explained that it is for short-term usage along with diet and exercise, and that stopping the medication without making lifestyle changes will result in regain of weight. Patient states understanding.    Weight loss medications are controlled substances.  They require routine follow up. Prescription or pills that are lost or destroyed will not be replaced.         Ozempic 1 mg once a week.       Decrease portions as soon as you start Ozempic. Avoid fried, greasy, fatty foods.     Some nausea in the first 2 weeks is not unusual.     If you get pain across the upper abdomen and around to your back, please call the office.       [email or web address] for coupon/videos.       - To lose weight you want to cut 100% starchy carbohydrates out of your diet (bread, rice, pasta, potatoes, granola, flour, corn, peas, oatmeal, grits, tortillas, crackers, chips) and get  grams of protein.  Aim for 100 grams of protein daily.    - No soda, sweet tea, juices, sports drinks or lemonade     -Exercise daily.        Erika Paige recipes given.

## 2023-04-03 NOTE — PATIENT INSTRUCTIONS
Ozempic 1 mg once a week.       Decrease portions as soon as you start Ozempic. Avoid fried, greasy, fatty foods.     Some nausea in the first 2 weeks is not unusual.     If you get pain across the upper abdomen and around to your back, please call the office.       [email or web address] for coupon/videos.       - To lose weight you want to cut 100% starchy carbohydrates out of your diet (bread, rice, pasta, potatoes, granola, flour, corn, peas, oatmeal, grits, tortillas, crackers, chips) and get  grams of protein.  Aim for 100 grams of protein daily.    - No soda, sweet tea, juices, sports drinks or lemonade     -Exercise daily.

## 2023-04-05 DIAGNOSIS — R73.01 IFG (IMPAIRED FASTING GLUCOSE): Primary | ICD-10-CM

## 2023-04-06 RX ORDER — SEMAGLUTIDE 1.34 MG/ML
INJECTION, SOLUTION SUBCUTANEOUS
Qty: 9 ML | Refills: 1 | Status: SHIPPED | OUTPATIENT
Start: 2023-04-06 | End: 2023-06-02

## 2023-04-06 NOTE — TELEPHONE ENCOUNTER
----- Message from Wilbur Gan sent at 4/6/2023  9:01 AM CDT -----  Regarding: Prescription  Contact: @[phone]  Patient is calling because her medication was not able to be filled. Patient states that [facility] told her something about new codes for ozempic.Patient would like to change her pharmacy to another location and would like to speak to someone about further assistance. Jose call and advise @[phone]

## 2023-04-24 ENCOUNTER — OFFICE VISIT (OUTPATIENT)
Dept: ORTHOPEDICS | Facility: CLINIC | Age: 63
End: 2023-04-24
Payer: COMMERCIAL

## 2023-04-24 VITALS — BODY MASS INDEX: 28.35 KG/M2 | HEIGHT: 62 IN

## 2023-04-24 DIAGNOSIS — M18.12 PRIMARY OSTEOARTHRITIS OF FIRST CARPOMETACARPAL JOINT OF LEFT HAND: Primary | ICD-10-CM

## 2023-04-24 PROCEDURE — 99999 PR PBB SHADOW E&M-EST. PATIENT-LVL II: ICD-10-PCS | Mod: PBBFAC,,, | Performed by: ORTHOPAEDIC SURGERY

## 2023-04-24 PROCEDURE — 99214 OFFICE O/P EST MOD 30 MIN: CPT | Mod: S$GLB,,, | Performed by: ORTHOPAEDIC SURGERY

## 2023-04-24 PROCEDURE — 99214 PR OFFICE/OUTPT VISIT, EST, LEVL IV, 30-39 MIN: ICD-10-PCS | Mod: S$GLB,,, | Performed by: ORTHOPAEDIC SURGERY

## 2023-04-24 PROCEDURE — 3044F PR MOST RECENT HEMOGLOBIN A1C LEVEL <7.0%: ICD-10-PCS | Mod: CPTII,S$GLB,, | Performed by: ORTHOPAEDIC SURGERY

## 2023-04-24 PROCEDURE — 3044F HG A1C LEVEL LT 7.0%: CPT | Mod: CPTII,S$GLB,, | Performed by: ORTHOPAEDIC SURGERY

## 2023-04-24 PROCEDURE — 1159F PR MEDICATION LIST DOCUMENTED IN MEDICAL RECORD: ICD-10-PCS | Mod: CPTII,S$GLB,, | Performed by: ORTHOPAEDIC SURGERY

## 2023-04-24 PROCEDURE — 3008F PR BODY MASS INDEX (BMI) DOCUMENTED: ICD-10-PCS | Mod: CPTII,S$GLB,, | Performed by: ORTHOPAEDIC SURGERY

## 2023-04-24 PROCEDURE — 99999 PR PBB SHADOW E&M-EST. PATIENT-LVL II: CPT | Mod: PBBFAC,,, | Performed by: ORTHOPAEDIC SURGERY

## 2023-04-24 PROCEDURE — 3008F BODY MASS INDEX DOCD: CPT | Mod: CPTII,S$GLB,, | Performed by: ORTHOPAEDIC SURGERY

## 2023-04-24 PROCEDURE — 1159F MED LIST DOCD IN RCRD: CPT | Mod: CPTII,S$GLB,, | Performed by: ORTHOPAEDIC SURGERY

## 2023-04-24 RX ORDER — CEFAZOLIN SODIUM 2 G/50ML
2 SOLUTION INTRAVENOUS
Status: CANCELLED | OUTPATIENT
Start: 2023-04-24

## 2023-04-24 RX ORDER — PHENTERMINE HYDROCHLORIDE 37.5 MG/1
TABLET ORAL
COMMUNITY
Start: 2023-02-27 | End: 2023-06-02 | Stop reason: SDUPTHER

## 2023-04-24 NOTE — PROGRESS NOTES
CC:  CMC arthritis left thumb        HPI:  Kateryna Weber is a very pleasant 62 y.o. female with ongoing symptoms left hand and thumb for the last 1-2 years   She has been treated with splinting and injection in the past but symptoms have gotten worse  She would like to consider surgery for the left hand   No numbness or tingling is reported         PAST MEDICAL HISTORY: No past medical history on file.  PAST SURGICAL HISTORY:   Past Surgical History:   Procedure Laterality Date    ABDOMINAL SURGERY  2000    tummy tuck    APPENDECTOMY      COLONOSCOPY N/A 4/8/2019    Procedure: COLONOSCOPY;  Surgeon: Ilya Arauz MD;  Location: [facility] ([address] [place] [address]);  Service: Endoscopy;  Laterality: N/A;  Pt requests Dr. Arauz - ERW  Pt called to r/s due to being out of town, Pt did not want to wait until 5/2019 for next available appt.,Pt stated she would like to keep 4/8/19- ERW3/26/19@1432    GASTRIC BYPASS  2007    Why Weight Dr. Paul    HYSTERECTOMY       FAMILY HISTORY:   Family History   Problem Relation Age of Onset    Melanoma Neg Hx      SOCIAL HISTORY:   Social History     Socioeconomic History    Marital status:    Tobacco Use    Smoking status: Never    Smokeless tobacco: Never   Substance and Sexual Activity    Alcohol use: Yes    Drug use: No       MEDICATIONS:   Current Outpatient Medications:     buPROPion (WELLBUTRIN XL) 150 MG TB24 tablet, Take 1 tablet by mouth once daily, Disp: 90 tablet, Rfl: 0    diethylpropion (TENUATE) 75 mg SR tablet, Take 1 tablet (75 mg total) by mouth once daily. As needed, Disp: 30 tablet, Rfl: 1    multivitamin capsule, Take 1 capsule by mouth once daily., Disp: , Rfl:     OZEMPIC 1 mg/dose (4 mg/3 mL), INJECT 1MG INTO THE SKIN EVERY 7 DAYS, Disp: 9 mL, Rfl: 1    pantoprazole (PROTONIX) 40 MG tablet, TAKE 1 TABLET BY MOUTH ONCE DAILY IN THE MORNING ON  AN  EMPTY  STOMACH, Disp: 90 tablet, Rfl: 0    scopolamine (TRANSDERM-SCOP) 1.3-1.5 mg (1 mg over 3 days), Place 1 patch onto  "the skin every 72 hours., Disp: 4 patch, Rfl: 0    ALPRAZolam (XANAX) 0.25 MG tablet, TAKE 1 TABLET BY MOUTH ONCE DAILY AT BEDTIME AS NEEDED FOR ANXIETY (Patient not taking: Reported on 9/15/2022), Disp: 30 tablet, Rfl: 0    flu vacc [id number] 6mos up,PF, (FLUARIX QUAD [id number], PF,) 60 mcg (15 mcg x 4)/0.5 mL Syrg, given by [facility] (Patient not taking: Reported on 9/15/2022), Disp: 0.5 mL, Rfl: 0    phentermine (ADIPEX-P) 37.5 mg tablet, Take by mouth., Disp: , Rfl:   ALLERGIES:   Review of patient's allergies indicates:   Allergen Reactions    No known drug allergies        Review of Systems:  Constitutional: no fever or chills  ENT: no nasal congestion or sore throat  Respiratory: no cough or shortness of breath  Cardiovascular: no chest pain or palpitations  Gastrointestinal: no nausea or vomiting, PUD, GERD, NSAID intolerance  Genitourinary: no hematuria or dysuria  Integument/Breast: no rash or pruritis  Hematologic/Lymphatic: no easy bruising or lymphadenopathy  Musculoskeletal: see HPI  Neurological: no seizures or tremors  Behavioral/Psych: no auditory or visual hallucinations      Physical Exam   Vitals:    04/24/23 1524   Height: 5' 2" (1.575 m)   PainSc:   8   PainLoc: Finger       Constitutional: Oriented to person, place, and time. Appears well-developed and well-nourished.   HENT:   Head: Normocephalic and atraumatic.   Nose: Nose normal.   Eyes: No scleral icterus.   Neck: Normal range of motion.   Cardiovascular: Normal rate and regular rhythm.    Pulses:       Radial pulses are 2+ on the right side, and 2+ on the left side.   Pulmonary/Chest: Effort normal and breath sounds normal.   Abdominal: Soft.   Neurological: Alert and oriented to person, place, and time.   Skin: Skin is warm.   Psychiatric: Normal mood and affect.     MUSCULOSKELETAL UPPER EXTREMITY:  Examination left hand there is tenderness at the base of left thumb  Some bony enlargement is noted range of motion limited  Positive grind test " "mild crepitation   strength decreased   Tinel sign negative               Diagnostic Studies:  Previous x-rays reviewed left hand shows severe arthritic change CMC joint left thumb        Assessment:  CMC arthritis left thumb severe    Plan:  She would like to proceed with surgery for CMC arthroplasty left thumb as an outpatient  I explained the type of surgery and the need for postop immobilization and therapy she understands      The risks and benefits of surgery were discussed with the patient today and they understand.  The consent was signed in the office for surgery.      Tomas Sy MD (Jay)  [facility]  Orthopedic Upper Extremity Surgery       "

## 2023-04-26 ENCOUNTER — PATIENT MESSAGE (OUTPATIENT)
Dept: BARIATRICS | Facility: CLINIC | Age: 63
End: 2023-04-26
Payer: COMMERCIAL

## 2023-05-08 ENCOUNTER — TELEPHONE (OUTPATIENT)
Dept: ORTHOPEDICS | Facility: CLINIC | Age: 63
End: 2023-05-08
Payer: COMMERCIAL

## 2023-05-08 NOTE — TELEPHONE ENCOUNTER
----- Message from Gregory Espana sent at 5/8/2023  9:51 AM CDT -----  Contact: pt  Type: Requesting to speak with nurse        Who Called: PT  Regarding: would like to reschedule surgery on 5/23 to 08/15   Would the patient rather a call back or a response via [employer]? Call back  Best Call Back Number: [phone]  Additional Information: would like to schedule injections

## 2023-05-29 ENCOUNTER — TELEPHONE (OUTPATIENT)
Dept: INTERNAL MEDICINE | Facility: CLINIC | Age: 63
End: 2023-05-29
Payer: COMMERCIAL

## 2023-05-29 DIAGNOSIS — Z12.31 SCREENING MAMMOGRAM FOR HIGH-RISK PATIENT: Primary | ICD-10-CM

## 2023-05-29 NOTE — TELEPHONE ENCOUNTER
----- Message from Ruthie Grier sent at 5/29/2023  1:28 PM CDT -----  Regarding: mammo  Contact: pt  Patient requesting orders for a mammogram.     Please advise.     Phone [phone]    Thank You

## 2023-05-30 ENCOUNTER — HOSPITAL ENCOUNTER (OUTPATIENT)
Dept: RADIOLOGY | Facility: HOSPITAL | Age: 63
Discharge: HOME | End: 2023-05-30
Attending: INTERNAL MEDICINE
Payer: COMMERCIAL

## 2023-05-30 DIAGNOSIS — Z12.31 SCREENING MAMMOGRAM FOR HIGH-RISK PATIENT: ICD-10-CM

## 2023-05-30 PROCEDURE — 77063 MAMMO DIGITAL SCREENING BILAT WITH TOMO: ICD-10-PCS | Mod: 26,,, | Performed by: RADIOLOGY

## 2023-05-30 PROCEDURE — 77067 SCR MAMMO BI INCL CAD: CPT | Mod: 26,,, | Performed by: RADIOLOGY

## 2023-05-30 PROCEDURE — 77063 BREAST TOMOSYNTHESIS BI: CPT | Mod: 26,,, | Performed by: RADIOLOGY

## 2023-05-30 PROCEDURE — 77067 MAMMO DIGITAL SCREENING BILAT WITH TOMO: ICD-10-PCS | Mod: 26,,, | Performed by: RADIOLOGY

## 2023-05-30 PROCEDURE — 77067 SCR MAMMO BI INCL CAD: CPT | Mod: TC

## 2023-06-02 ENCOUNTER — HOSPITAL ENCOUNTER (OUTPATIENT)
Dept: CARDIOLOGY | Facility: CLINIC | Age: 63
Discharge: HOME | End: 2023-06-02
Payer: COMMERCIAL

## 2023-06-02 ENCOUNTER — OFFICE VISIT (OUTPATIENT)
Dept: INTERNAL MEDICINE | Facility: CLINIC | Age: 63
End: 2023-06-02
Payer: COMMERCIAL

## 2023-06-02 VITALS
WEIGHT: 166.88 LBS | BODY MASS INDEX: 30.71 KG/M2 | DIASTOLIC BLOOD PRESSURE: 79 MMHG | SYSTOLIC BLOOD PRESSURE: 130 MMHG | HEIGHT: 62 IN | HEART RATE: 74 BPM | OXYGEN SATURATION: 99 %

## 2023-06-02 DIAGNOSIS — R07.9 LEFT-SIDED CHEST PAIN: ICD-10-CM

## 2023-06-02 DIAGNOSIS — Z00.00 ENCOUNTER FOR ANNUAL PHYSICAL EXAM: Primary | ICD-10-CM

## 2023-06-02 DIAGNOSIS — R23.2 HOT FLASHES: ICD-10-CM

## 2023-06-02 DIAGNOSIS — E66.9 CLASS 1 OBESITY WITHOUT SERIOUS COMORBIDITY WITH BODY MASS INDEX (BMI) OF 30.0 TO 30.9 IN ADULT, UNSPECIFIED OBESITY TYPE: ICD-10-CM

## 2023-06-02 PROCEDURE — 1159F MED LIST DOCD IN RCRD: CPT | Mod: CPTII,S$GLB,, | Performed by: INTERNAL MEDICINE

## 2023-06-02 PROCEDURE — 99999 PR PBB SHADOW E&M-EST. PATIENT-LVL IV: ICD-10-PCS | Mod: PBBFAC,,, | Performed by: INTERNAL MEDICINE

## 2023-06-02 PROCEDURE — 99396 PR PREVENTIVE VISIT,EST,40-64: ICD-10-PCS | Mod: S$GLB,,, | Performed by: INTERNAL MEDICINE

## 2023-06-02 PROCEDURE — 93010 EKG 12-LEAD: ICD-10-PCS | Mod: S$GLB,,, | Performed by: INTERNAL MEDICINE

## 2023-06-02 PROCEDURE — 3078F DIAST BP <80 MM HG: CPT | Mod: CPTII,S$GLB,, | Performed by: INTERNAL MEDICINE

## 2023-06-02 PROCEDURE — 3044F PR MOST RECENT HEMOGLOBIN A1C LEVEL <7.0%: ICD-10-PCS | Mod: CPTII,S$GLB,, | Performed by: INTERNAL MEDICINE

## 2023-06-02 PROCEDURE — 99396 PREV VISIT EST AGE 40-64: CPT | Mod: S$GLB,,, | Performed by: INTERNAL MEDICINE

## 2023-06-02 PROCEDURE — 1159F PR MEDICATION LIST DOCUMENTED IN MEDICAL RECORD: ICD-10-PCS | Mod: CPTII,S$GLB,, | Performed by: INTERNAL MEDICINE

## 2023-06-02 PROCEDURE — 3008F BODY MASS INDEX DOCD: CPT | Mod: CPTII,S$GLB,, | Performed by: INTERNAL MEDICINE

## 2023-06-02 PROCEDURE — 93010 ELECTROCARDIOGRAM REPORT: CPT | Mod: S$GLB,,, | Performed by: INTERNAL MEDICINE

## 2023-06-02 PROCEDURE — 3044F HG A1C LEVEL LT 7.0%: CPT | Mod: CPTII,S$GLB,, | Performed by: INTERNAL MEDICINE

## 2023-06-02 PROCEDURE — 93005 ELECTROCARDIOGRAM TRACING: CPT | Mod: S$GLB,,, | Performed by: INTERNAL MEDICINE

## 2023-06-02 PROCEDURE — 99999 PR PBB SHADOW E&M-EST. PATIENT-LVL IV: CPT | Mod: PBBFAC,,, | Performed by: INTERNAL MEDICINE

## 2023-06-02 PROCEDURE — 3075F PR MOST RECENT SYSTOLIC BLOOD PRESS GE 130-139MM HG: ICD-10-PCS | Mod: CPTII,S$GLB,, | Performed by: INTERNAL MEDICINE

## 2023-06-02 PROCEDURE — 3075F SYST BP GE 130 - 139MM HG: CPT | Mod: CPTII,S$GLB,, | Performed by: INTERNAL MEDICINE

## 2023-06-02 PROCEDURE — 3078F PR MOST RECENT DIASTOLIC BLOOD PRESSURE < 80 MM HG: ICD-10-PCS | Mod: CPTII,S$GLB,, | Performed by: INTERNAL MEDICINE

## 2023-06-02 PROCEDURE — 3008F PR BODY MASS INDEX (BMI) DOCUMENTED: ICD-10-PCS | Mod: CPTII,S$GLB,, | Performed by: INTERNAL MEDICINE

## 2023-06-02 PROCEDURE — 93005 EKG 12-LEAD: ICD-10-PCS | Mod: S$GLB,,, | Performed by: INTERNAL MEDICINE

## 2023-06-02 RX ORDER — PHENTERMINE HYDROCHLORIDE 37.5 MG/1
37.5 TABLET ORAL
Qty: 30 TABLET | Refills: 3 | Status: SHIPPED | OUTPATIENT
Start: 2023-06-02 | End: 2023-09-14 | Stop reason: SDUPTHER

## 2023-06-02 NOTE — PROGRESS NOTES
Subjective:       Patient ID: Kateryna Weber is a 62 y.o. female.    Chief Complaint: Establish Care    HPI    Presents to establish care.     Previously patient of Dr. Yao.     Has occasionally left upper chest pain. Occurs at any time and at rest. No on exertion. Off and on for year or so. Was given PPI for possible GERD but did not help.     Has been having hot flashes every so often. Feels like they are improving lately.       Hx of bariatric surgery in 2007. Follows [facility] bariatric medicine clinic. Has been on loemic, adipex, and diethypropion in the past. Not currently on any medications. Stopped ozempic when insurance stopped covering it.     Hx of depression on Wellbutrin. Is interested in weaning off medication. Does continue to have life stressors including daughter moving away and taking care of elderly parents.     Scheduled to have surgery on left thumb next month.     Health Maintenance:  Colon Cancer Screening: colonoscopy done in 2019 with polyps removed. Due again in 2024   Mammogram: done 5/30, waiting for results   Hep C: negative in 2009   Lipids: normal 2023   Pap Smear: s/p hysterectomy 2016   Vaccines:  up to date     Review of Systems   Constitutional:  Negative for activity change, appetite change and chills.   HENT:  Negative for ear pain, sinus pressure/congestion and sneezing.    Respiratory:  Negative for cough and shortness of breath.    Cardiovascular:  Negative for chest pain, palpitations and leg swelling.   Gastrointestinal:  Negative for abdominal distention, abdominal pain, constipation, diarrhea, nausea and vomiting.   Genitourinary:  Negative for dysuria and hematuria.   Musculoskeletal:  Negative for arthralgias, back pain and myalgias.   Neurological:  Negative for dizziness and headaches.   Psychiatric/Behavioral:  Negative for agitation. The patient is not nervous/anxious.          No past medical history on file.  Past Surgical History:   Procedure Laterality Date     ABDOMINAL SURGERY  2000    tummy tuck    APPENDECTOMY      COLONOSCOPY N/A 4/8/2019    Procedure: COLONOSCOPY;  Surgeon: Ilya Arauz MD;  Location: [facility] ([address] [place] [address]);  Service: Endoscopy;  Laterality: N/A;  Pt requests Dr. Arauz - ERW  Pt called to r/s due to being out of town, Pt did not want to wait until 5/2019 for next available appt.,Pt stated she would like to keep 4/8/19- ERW3/26/19@1432    GASTRIC BYPASS  2007    Why Weight Dr. Paul    HYSTERECTOMY        Patient Active Problem List   Diagnosis    Encounter for screening colonoscopy    Tubulovillous adenoma of colon    Hx of bariatric surgery    Colon cancer screening    Dysphagia    Labral tear of hip, degenerative    Right hip pain    Vitamin B12 deficiency    Impaired functional mobility and activity tolerance    History of colon polyps    Primary osteoarthritis of first carpometacarpal joint of left hand        Objective:      Physical Exam  Constitutional:       Appearance: Normal appearance.   HENT:      Head: Normocephalic.      Right Ear: Tympanic membrane normal.      Left Ear: Tympanic membrane normal.      Nose: Nose normal.   Cardiovascular:      Rate and Rhythm: Normal rate and regular rhythm.      Pulses: Normal pulses.      Heart sounds: Normal heart sounds.   Pulmonary:      Effort: Pulmonary effort is normal.      Breath sounds: Normal breath sounds.   Abdominal:      General: Abdomen is flat. Bowel sounds are normal.      Palpations: Abdomen is soft.   Musculoskeletal:         General: Normal range of motion.      Cervical back: Normal range of motion and neck supple.   Skin:     General: Skin is warm and dry.   Neurological:      General: No focal deficit present.      Mental Status: She is alert and oriented to person, place, and time.   Psychiatric:         Mood and Affect: Mood normal.       Assessment:       Problem List Items Addressed This Visit    None  Visit Diagnoses       Encounter for annual physical exam    -  Primary     Left-sided chest pain        Relevant Orders    EKG 12-lead    Hot flashes        Class 1 obesity without serious comorbidity with body mass index (BMI) of 30.0 to 30.9 in adult, unspecified obesity type        Relevant Orders    Ambulatory referral/consult to Obstetrics / Gynecology            Plan:         Kateryna was seen today for establish care.    Diagnoses and all orders for this visit:    Encounter for annual physical exam  Colon Cancer Screening: colonoscopy done in 2019 with polyps removed. Due again in 2024   Mammogram: done 5/30, waiting for results   Hep C: negative in 2009   Lipids: normal 2023   Pap Smear: s/p hysterectomy 2016   Vaccines:  up to date     Left-sided chest pain  Likely MSK but will check EKG     Hot flashes  Referral to GYN     Class 1 obesity without serious comorbidity with body mass index (BMI) of 30.0 to 30.9 in adult, unspecified obesity type  -     phentermine (ADIPEX-P) 37.5 mg tablet; Take 1 tablet (37.5 mg total) by mouth before breakfast.  Refill adipex.                Latisha Garg MD   Internal Medicine   Primary Care

## 2023-06-02 NOTE — PATIENT INSTRUCTIONS
To wean off Wellbutrin:    Take half tablet daily for one week   Take half tablet every other day for one week   Take half tablet every 3 days for one week

## 2023-06-05 ENCOUNTER — PATIENT MESSAGE (OUTPATIENT)
Dept: INTERNAL MEDICINE | Facility: CLINIC | Age: 63
End: 2023-06-05
Payer: COMMERCIAL

## 2023-06-06 ENCOUNTER — TELEPHONE (OUTPATIENT)
Dept: DERMATOLOGY | Facility: CLINIC | Age: 63
End: 2023-06-06
Payer: COMMERCIAL

## 2023-06-09 RX ORDER — BUPROPION HYDROCHLORIDE 150 MG/1
150 TABLET ORAL DAILY
Qty: 90 TABLET | Refills: 3 | Status: SHIPPED | OUTPATIENT
Start: 2023-06-09 | End: 2023-06-09 | Stop reason: SDUPTHER

## 2023-06-09 NOTE — TELEPHONE ENCOUNTER
No care due was identified.  [facility] Embedded Care Due Messages. Reference number: [id number].   6/09/2023 1:45:58 PM CDT

## 2023-06-09 NOTE — TELEPHONE ENCOUNTER
No care due was identified.  [facility] Embedded Care Due Messages. Reference number: [id number].   6/09/2023 11:35:20 AM CDT

## 2023-06-09 NOTE — TELEPHONE ENCOUNTER
----- Message from Mari Garcia sent at 6/9/2023 12:29 PM CDT -----  Contact: [phone] Patient  Requesting an RX refill or new RX.  Is this a refill or new RX: new  RX name and strength (copy/paste from chart):  buPROPion (WELLBUTRIN XL) 150 MG TB24 tablet  Is this a 30 day or 90 day RX: 90  Pharmacy name and phone # (copy/paste from chart):    [id number] - [place], [place] - [address] [facility] [address]  [address]  [facility] [address]  Phone: [phone] Fax: [phone]     The doctors have asked that we provide their patients with the following 2 reminders -- prescription refills can take up to 72 hours, and a friendly reminder that in the future you can use your [facility] account to request refills: yes

## 2023-06-11 ENCOUNTER — PATIENT MESSAGE (OUTPATIENT)
Dept: INTERNAL MEDICINE | Facility: CLINIC | Age: 63
End: 2023-06-11
Payer: COMMERCIAL

## 2023-06-12 ENCOUNTER — PATIENT MESSAGE (OUTPATIENT)
Dept: PHARMACY | Facility: CLINIC | Age: 63
End: 2023-06-12
Payer: COMMERCIAL

## 2023-06-12 RX ORDER — BUPROPION HYDROCHLORIDE 150 MG/1
150 TABLET ORAL DAILY
Qty: 90 TABLET | Refills: 3 | Status: SHIPPED | OUTPATIENT
Start: 2023-06-12 | End: 2023-09-14

## 2023-06-16 ENCOUNTER — PATIENT MESSAGE (OUTPATIENT)
Dept: ADMINISTRATIVE | Facility: OTHER | Age: 63
End: 2023-06-16
Payer: COMMERCIAL

## 2023-06-20 ENCOUNTER — OFFICE VISIT (OUTPATIENT)
Dept: OBSTETRICS AND GYNECOLOGY | Facility: CLINIC | Age: 63
End: 2023-06-20
Payer: COMMERCIAL

## 2023-06-20 VITALS
DIASTOLIC BLOOD PRESSURE: 74 MMHG | HEIGHT: 62 IN | BODY MASS INDEX: 30.46 KG/M2 | SYSTOLIC BLOOD PRESSURE: 126 MMHG | HEART RATE: 81 BPM | WEIGHT: 165.5 LBS

## 2023-06-20 DIAGNOSIS — Z01.419 WELL WOMAN EXAM WITH ROUTINE GYNECOLOGICAL EXAM: ICD-10-CM

## 2023-06-20 PROCEDURE — 99999 PR PBB SHADOW E&M-EST. PATIENT-LVL III: ICD-10-PCS | Mod: PBBFAC,,, | Performed by: STUDENT IN AN ORGANIZED HEALTH CARE EDUCATION/TRAINING PROGRAM

## 2023-06-20 PROCEDURE — 99999 PR PBB SHADOW E&M-EST. PATIENT-LVL III: CPT | Mod: PBBFAC,,, | Performed by: STUDENT IN AN ORGANIZED HEALTH CARE EDUCATION/TRAINING PROGRAM

## 2023-06-20 PROCEDURE — 1159F MED LIST DOCD IN RCRD: CPT | Mod: CPTII,S$GLB,, | Performed by: STUDENT IN AN ORGANIZED HEALTH CARE EDUCATION/TRAINING PROGRAM

## 2023-06-20 PROCEDURE — 3008F PR BODY MASS INDEX (BMI) DOCUMENTED: ICD-10-PCS | Mod: CPTII,S$GLB,, | Performed by: STUDENT IN AN ORGANIZED HEALTH CARE EDUCATION/TRAINING PROGRAM

## 2023-06-20 PROCEDURE — 3078F DIAST BP <80 MM HG: CPT | Mod: CPTII,S$GLB,, | Performed by: STUDENT IN AN ORGANIZED HEALTH CARE EDUCATION/TRAINING PROGRAM

## 2023-06-20 PROCEDURE — 1159F PR MEDICATION LIST DOCUMENTED IN MEDICAL RECORD: ICD-10-PCS | Mod: CPTII,S$GLB,, | Performed by: STUDENT IN AN ORGANIZED HEALTH CARE EDUCATION/TRAINING PROGRAM

## 2023-06-20 PROCEDURE — 3044F PR MOST RECENT HEMOGLOBIN A1C LEVEL <7.0%: ICD-10-PCS | Mod: CPTII,S$GLB,, | Performed by: STUDENT IN AN ORGANIZED HEALTH CARE EDUCATION/TRAINING PROGRAM

## 2023-06-20 PROCEDURE — 3074F SYST BP LT 130 MM HG: CPT | Mod: CPTII,S$GLB,, | Performed by: STUDENT IN AN ORGANIZED HEALTH CARE EDUCATION/TRAINING PROGRAM

## 2023-06-20 PROCEDURE — 3008F BODY MASS INDEX DOCD: CPT | Mod: CPTII,S$GLB,, | Performed by: STUDENT IN AN ORGANIZED HEALTH CARE EDUCATION/TRAINING PROGRAM

## 2023-06-20 PROCEDURE — 99386 PR PREVENTIVE VISIT,NEW,40-64: ICD-10-PCS | Mod: S$GLB,,, | Performed by: STUDENT IN AN ORGANIZED HEALTH CARE EDUCATION/TRAINING PROGRAM

## 2023-06-20 PROCEDURE — 1160F RVW MEDS BY RX/DR IN RCRD: CPT | Mod: CPTII,S$GLB,, | Performed by: STUDENT IN AN ORGANIZED HEALTH CARE EDUCATION/TRAINING PROGRAM

## 2023-06-20 PROCEDURE — 99386 PREV VISIT NEW AGE 40-64: CPT | Mod: S$GLB,,, | Performed by: STUDENT IN AN ORGANIZED HEALTH CARE EDUCATION/TRAINING PROGRAM

## 2023-06-20 PROCEDURE — 3044F HG A1C LEVEL LT 7.0%: CPT | Mod: CPTII,S$GLB,, | Performed by: STUDENT IN AN ORGANIZED HEALTH CARE EDUCATION/TRAINING PROGRAM

## 2023-06-20 PROCEDURE — 3078F PR MOST RECENT DIASTOLIC BLOOD PRESSURE < 80 MM HG: ICD-10-PCS | Mod: CPTII,S$GLB,, | Performed by: STUDENT IN AN ORGANIZED HEALTH CARE EDUCATION/TRAINING PROGRAM

## 2023-06-20 PROCEDURE — 3074F PR MOST RECENT SYSTOLIC BLOOD PRESSURE < 130 MM HG: ICD-10-PCS | Mod: CPTII,S$GLB,, | Performed by: STUDENT IN AN ORGANIZED HEALTH CARE EDUCATION/TRAINING PROGRAM

## 2023-06-20 PROCEDURE — 1160F PR REVIEW ALL MEDS BY PRESCRIBER/CLIN PHARMACIST DOCUMENTED: ICD-10-PCS | Mod: CPTII,S$GLB,, | Performed by: STUDENT IN AN ORGANIZED HEALTH CARE EDUCATION/TRAINING PROGRAM

## 2023-06-20 NOTE — PROGRESS NOTES
Subjective:    Patient ID: Kateryna Weber is a 62 y.o. y.o. female.     Chief Complaint: Annual Well Woman Exam     History of Present Illness:  Kateryna presents today for Annual Well Woman exam. She is status post hysterectomy. She denies pelvic pain.  She denies breast tenderness, masses, nipple discharge. She denies difficulty with urination or bowel movements. She admits to menopausal symptoms such as  vaginal dryness. She denies bloating, early satiety, or weight changes. She is sexually active.     Menstrual History:   No LMP recorded. Patient has had a hysterectomy..     OB History          2    Para   2    Term   2            AB        Living             SAB        IAB        Ectopic        Multiple        Live Births                     The following portions of the patient's history were reviewed and updated as appropriate: allergies, current medications, past family history, past medical history, past social history, past surgical history, and problem list.    ROS:   CONSTITUTIONAL: Negative for fever, chills, diaphoresis, weakness, fatigue, weight loss, weight gain  ENT: negative for sore throat, nasal congestion, nasal discharge, epistaxis, tinnitus, hearing loss  EYES: negative for blurry vision, decreased vision, loss of vision, eye pain, diplopia, photophobia, discharge  SKIN: Negative for rash, itching, hives  RESPIRATORY: negative for cough, hemoptysis, shortness of breath, pleuritic chest pain, wheezing  CARDIOVASCULAR: negative for chest pain, dyspnea on exertion, orthopnea, paroxysmal nocturnal dyspnea, edema, palpitations  BREAST: negative for breast  tenderness, breast mass, nipple discharge, or skin changes  GASTROINTESTINAL: negative for abdominal pain, flank pain, nausea, vomiting, diarrhea, constipation, black stool, blood in stool  GENITOURINARY: negative for abnormal vaginal bleeding, amenorrhea, decreased libido, dysuria, genital sores, hematuria, incontinence,  menorrhagia, pelvic pain, urinary frequency, vaginal discharge  HEMATOLOGIC/LYMPHATIC: negative for swollen lymph nodes, bleeding, bruising  MUSCULOSKELETAL: negative for back pain, joint pain, joint stiffness, joint swelling, muscle pain, muscle weakness  NEUROLOGICAL: negative for dizzy/vertigo, headache, focal weakness, numbness/tingling, speech problems, loss of consciousness, confusion, memory loss  BEHAVORIAL/PSYCH: negative for anxiety, depression, psychosis  ENDOCRINE: negative for polydipsia/polyuria, palpitations, skin changes, temperature intolerance, unexpected weight changes  ALLERGIC/IMMUNOLOGIC: negative for urticaria, hay fever, angioedema      Objective:    Vital Signs:  Vitals:    06/20/23 1042   BP: 126/74   Pulse: 81       Physical Exam:  General:  alert, cooperative, no distress   Skin:  Skin color, texture, turgor normal. No rashes or lesions   HEENT:  conjunctivae/corneas clear. PERRL.   Neck: supple, trachea midline, no adenopathy or thyromegally   Respiratory:  Normal effort   Breasts:  no discharge, erythema, tenderness, or palpable masses; no axillary lymphadenopathy   Abdomen:  soft, nontender, no palpable masses   Pelvis: External genitalia: normal general appearance  Urinary system: urethral meatus normal, bladder nontender  Vaginal: atrophic mucosa  Cervix: absent, removed surgically  Uterus: absent, removed surgically  Adnexa: normal size, nontender bilaterally   Extremities: Normal ROM; no edema, no cyanosis   Neurologial: Normal strength and tone. No focal numbness or weakness.   Psychiatric: normal mood, speech, dress, and thought processes     LABS:  No result found     A1C:  Recent Labs   Lab 01/27/23  0644   Hemoglobin A1C 5.1     CBC:  Recent Labs   Lab 02/22/22  0719 01/27/23  0644   WBC 4.48 5.13   RBC 4.74 5.15   Hemoglobin 14.3 15.2   Hematocrit 42.3 45.0   Platelets 183 202   MCV 89 87   MCH 30.2 29.5   MCHC 33.8 33.8     CMP:  Recent Labs   Lab 02/22/22  0719  01/27/23  0644   Glucose 97 98   Calcium 9.4 9.0   Albumin 4.2 4.5   Total Protein 6.7 7.0   Sodium 146 H 141   Potassium 4.0 4.1   CO2 32 H 31 H   Chloride 103 105   BUN 16 14   Creatinine 0.57 0.55   Alkaline Phosphatase 91 88   ALT 20 20   AST 24 24   Total Bilirubin 0.9 1.1 H     LIPIDS:  Recent Labs   Lab 01/26/21  0944 02/22/22  0719 01/27/23  0644   TSH 0.713  --  1.060    H 94 H 95 H   Cholesterol 184 182 190   Triglycerides 52 53 59   LDL Cholesterol 71.6 77.4 83.2   HDL/Cholesterol Ratio 55.4 H 51.6 H 50.0   Non-HDL Cholesterol 82 88 95   Total Cholesterol/HDL Ratio 1.8 L 1.9 L 2.0     TSH:  Recent Labs   Lab 01/26/21  0944 01/27/23  0644   TSH 0.713 1.060       Assessment:       Healthy female exam.     1. Well woman exam with routine gynecological exam          Plan:      Problem List Items Addressed This Visit    None  Visit Diagnoses       Well woman exam with routine gynecological exam                  COUNSELING:  Kateryna was counseled on STD prevention, use and side-effects of various contraceptive measures, A.C.O.G. Pap guidelines and recommendations for yearly pelvic exams in addition to recommendations for monthly self breast exams; to see her PCP for other health maintenance.

## 2023-06-26 ENCOUNTER — TELEPHONE (OUTPATIENT)
Dept: ORTHOPEDICS | Facility: CLINIC | Age: 63
End: 2023-06-26
Payer: COMMERCIAL

## 2023-06-26 ENCOUNTER — OFFICE VISIT (OUTPATIENT)
Dept: ORTHOPEDICS | Facility: CLINIC | Age: 63
End: 2023-06-26
Payer: COMMERCIAL

## 2023-06-26 VITALS — HEIGHT: 62 IN | BODY MASS INDEX: 30.27 KG/M2

## 2023-06-26 DIAGNOSIS — M18.12 PRIMARY OSTEOARTHRITIS OF FIRST CARPOMETACARPAL JOINT OF LEFT HAND: Primary | ICD-10-CM

## 2023-06-26 PROCEDURE — 99499 UNLISTED E&M SERVICE: CPT | Mod: S$GLB,,, | Performed by: ORTHOPAEDIC SURGERY

## 2023-06-26 PROCEDURE — 99999 PR PBB SHADOW E&M-EST. PATIENT-LVL II: CPT | Mod: PBBFAC,,, | Performed by: ORTHOPAEDIC SURGERY

## 2023-06-26 PROCEDURE — 99499 NO LOS: ICD-10-PCS | Mod: S$GLB,,, | Performed by: ORTHOPAEDIC SURGERY

## 2023-06-26 PROCEDURE — 99999 PR PBB SHADOW E&M-EST. PATIENT-LVL II: ICD-10-PCS | Mod: PBBFAC,,, | Performed by: ORTHOPAEDIC SURGERY

## 2023-06-26 RX ORDER — IBUPROFEN 600 MG/1
600 TABLET ORAL 2 TIMES DAILY WITH MEALS
Qty: 60 TABLET | Refills: 1 | Status: SHIPPED | OUTPATIENT
Start: 2023-06-26 | End: 2023-09-05

## 2023-06-26 NOTE — TELEPHONE ENCOUNTER
----- Message from Elbert Portillo sent at 6/26/2023  8:43 AM CDT -----  Contact: Pt  .Type:  Needs Medical Advice    Who Called: pt    Would the patient rather a call back or a response via [facility]?  Call back  Best Call Back Number: [phone]  Additional Information:  Pt may be 10 minutes late accident on the bridge

## 2023-06-26 NOTE — PROGRESS NOTES
"Subjective:      Patient ID: Kateryna Weber is a 62 y.o. female.  Chief Complaint: Finger Pain (Left thumb injection)      HPI  Kateryna Weber is a  62 y.o. female presenting today for follow up of left hand and thumb arthritis.  She reports that she is currently scheduled for surgery she was wondering about injection but she is scheduled for just 6 weeks out from surgery so I think we should hold off on the injection.    Review of patient's allergies indicates:   Allergen Reactions    No known drug allergies          Current Outpatient Medications   Medication Sig Dispense Refill    buPROPion (WELLBUTRIN XL) 150 MG TB24 tablet Take 1 tablet (150 mg total) by mouth once daily. 90 tablet 3    multivitamin capsule Take 1 capsule by mouth once daily.      phentermine (ADIPEX-P) 37.5 mg tablet Take 1 tablet (37.5 mg total) by mouth before breakfast. 30 tablet 3    flu vacc [id number] 6mos up,PF, (FLUARIX QUAD 0107-0847, PF,) 60 mcg (15 mcg x 4)/0.5 mL Syrg given by [facility] (Patient not taking: Reported on 6/2/2023) 0.5 mL 0    ibuprofen (ADVIL,MOTRIN) 600 MG tablet Take 1 tablet (600 mg total) by mouth 2 (two) times daily with meals. 60 tablet 1     No current facility-administered medications for this visit.       No past medical history on file.    Past Surgical History:   Procedure Laterality Date    ABDOMINAL SURGERY  2000    tummy tuck    APPENDECTOMY      COLONOSCOPY N/A 4/8/2019    Procedure: COLONOSCOPY;  Surgeon: Ilya Arauz MD;  Location: [address]);  Service: Endoscopy;  Laterality: N/A;  Pt requests Dr. Arauz - ERW  Pt called to r/s due to being out of town, Pt did not want to wait until 5/2019 for next available appt.,Pt stated she would like to keep 4/8/19- ERW3/26/19@7722    GASTRIC BYPASS  2007    Why Weight Dr. Paul    HYSTERECTOMY         OBJECTIVE:   PHYSICAL EXAM:  Height: 5' 2" (157.5 cm)    Vitals:    06/26/23 0913   Height: 5' 2" (1.575 m)   PainSc:   4   PainLoc: Finger     Ortho/SPM " Exam   today   Examination left hand there is tenderness at the base of left thumb some loss of motion positive grind test    RADIOGRAPHS:  None  Comments: I have personally reviewed the imaging and I agree with the above radiologist's report.    ASSESSMENT/PLAN:     IMPRESSION:  CMC arthritis left thumb    PLAN:  Proceed with surgery in August   I have ordered some Voltaren gel and Motrin until then also use of the brace    FOLLOW UP:  For surgery    Disclaimer: This note has been generated using voice-recognition software. There may be typographical errors that have been missed during proof-reading.

## 2023-07-13 ENCOUNTER — TELEPHONE (OUTPATIENT)
Dept: OBSTETRICS AND GYNECOLOGY | Facility: CLINIC | Age: 63
End: 2023-07-13
Payer: COMMERCIAL

## 2023-07-13 ENCOUNTER — TELEPHONE (OUTPATIENT)
Dept: ORTHOPEDICS | Facility: CLINIC | Age: 63
End: 2023-07-13
Payer: COMMERCIAL

## 2023-07-13 NOTE — TELEPHONE ENCOUNTER
----- Message from Nina Barlow sent at 7/13/2023  9:34 AM CDT -----  Type:  Needs Medical Advice    Who Called: pt  Would the patient rather a call back or a response via [facility]? call  Best Call Back Number: [phone]  Additional Information: pt would like to check on getting hormone cream

## 2023-07-13 NOTE — TELEPHONE ENCOUNTER
----- Message from Nina Barlow sent at 7/13/2023  9:32 AM CDT -----  Type:  Needs Medical Advice    Who Called: pt  Would the patient rather a call back or a response via [facility]? call  Best Call Back Number: [phone]  Additional Information: pt would like to speak to someone regarding surgery on 8/15

## 2023-07-13 NOTE — TELEPHONE ENCOUNTER
Spoke with Mrs. Avendaño, in regards to questions she had about surgery. Mrs. Avendaño inquired about getting an ice machine and pillow for her hand, post op. I informed patient that unfortunately, ice machines are not covered by insurance, therefore not prescribed at discharge. I also informed patient that she would not receive pillows, and would have to use pillows at home for elevation. Mrs. Avendaño also inquired about someone from Pre-Op giving her a call for scheduling, as she may be out of town prior to surgery. Message sent over to [facility] for scheduling. All questions answered and patient verbalized understanding.

## 2023-07-13 NOTE — TELEPHONE ENCOUNTER
Contacted pt.  States hormone cream was discussed at last visit and she would like to try it.  Pt is requesting medication be sent to [facility].   She is aware Dr Patel will return to clinic tomorrow.

## 2023-07-14 DIAGNOSIS — N95.2 VAGINAL ATROPHY: Primary | ICD-10-CM

## 2023-07-14 RX ORDER — ESTRADIOL 0.1 MG/G
1 CREAM VAGINAL DAILY
Qty: 42.5 G | Refills: 6 | Status: SHIPPED | OUTPATIENT
Start: 2023-07-14 | End: 2024-07-13

## 2023-08-08 ENCOUNTER — ANESTHESIA EVENT (OUTPATIENT)
Dept: SURGERY | Facility: HOSPITAL | Age: 63
End: 2023-08-08
Payer: COMMERCIAL

## 2023-08-08 ENCOUNTER — HOSPITAL ENCOUNTER (OUTPATIENT)
Dept: PREADMISSION TESTING | Facility: HOSPITAL | Age: 63
Discharge: HOME | End: 2023-08-08
Attending: NURSE PRACTITIONER
Payer: COMMERCIAL

## 2023-08-08 NOTE — ANESTHESIA PREPROCEDURE EVALUATION
08/08/2023  Kateryna Weber is a 62 y.o., female scheduled for INTERPOSITION ARTHROPLASTY, [facility] JOINT (Left) 8/15/23.         Patient Active Problem List   Diagnosis    Encounter for screening colonoscopy    Tubulovillous adenoma of colon    Hx of bariatric surgery    Colon cancer screening    Dysphagia    Labral tear of hip, degenerative    Right hip pain    Vitamin B12 deficiency    Impaired functional mobility and activity tolerance    History of colon polyps    Primary osteoarthritis of first carpometacarpal joint of left hand       History reviewed. No pertinent past medical history.    ECHO: No results found for this or any previous visit.      Body mass index is 30.18 kg/m².    Tobacco Use: Low Risk  (8/15/2023)    Patient History     Smoking Tobacco Use: Never     Smokeless Tobacco Use: Never     Passive Exposure: Not on file       Social History     Substance and Sexual Activity   Drug Use No        Alcohol Use: Unknown (3/19/2021)    AUDIT-C     Frequency of Alcohol Consumption: Patient refused     Average Number of Drinks: Patient refused     Frequency of Binge Drinking: Patient refused       Review of patient's allergies indicates:   Allergen Reactions    No known drug allergies          Airway:  No value filed.       No past medical history on file.     Past Surgical History:   Procedure Laterality Date    ABDOMINAL SURGERY  2000    tummy tuck    APPENDECTOMY      COLONOSCOPY N/A 4/8/2019    Procedure: COLONOSCOPY;  Surgeon: Ilya Arauz MD;  Location: [facility] ([address] [place] [address]);  Service: Endoscopy;  Laterality: N/A;  Pt requests Dr. Arauz - LETHA  Pt called to r/s due to being out of town, Pt did not want to wait until 5/2019 for next available appt.,Pt stated she would like to keep 4/8/19- LETHA3/26/19@1432    GASTRIC BYPASS  2007    Why Weight Dr. Paul    HYSTERECTOMY        Review of patient's allergies indicates:   Allergen Reactions    No known drug allergies        Current Outpatient Medications   Medication Instructions    buPROPion (WELLBUTRIN XL) 150 mg, Oral, Daily    estradioL (ESTRACE) 1 g, Vaginal, Daily, Place 1 g vaginally every night for two weeks then twice per week    flu vacc [id number] 6mos up,PF, (FLUARIX QUAD 4019-0358, PF,) 60 mcg (15 mcg x 4)/0.5 mL Syrg given by [facility]    ibuprofen (ADVIL,MOTRIN) 600 mg, Oral, 2 times daily with meals    multivitamin capsule 1 capsule, Daily    phentermine (ADIPEX-P) 37.5 mg, Oral, Before breakfast       Pre-op Assessment    I have reviewed the Patient Summary Reports.     I have reviewed the Nursing Notes. I have reviewed the NPO Status.   I have reviewed the Medications.     Review of Systems  Anesthesia Hx:  No problems with previous Anesthesia  History of prior surgery of interest to airway management or planning: gastric bypass.  Denies Personal Hx of Anesthesia complications.   Social:  Former Smoker, Social Alcohol Use    Hematology/Oncology:  Hematology Normal        Cardiovascular:  Cardiovascular Normal Exercise tolerance: good  Denies Pacemaker.   Denies Angina.    Pulmonary:  Pulmonary Normal  Denies Shortness of breath.    Renal/:  Renal/ Normal     Hepatic/GI:  Hepatic/GI Normal    Musculoskeletal:   Arthritis     Neurological:  Neurology Normal Denies TIA.  Denies CVA. Denies Seizures.    Endocrine:  Endocrine Normal        Physical Exam  General: Oriented, Cooperative, Well nourished, Alert and Anxious    Airway:  Mallampati: II / I  Mouth Opening: Normal  TM Distance: Normal  Tongue: Normal  Neck ROM: Normal ROM    Dental:  Intact      Lab Results   Component Value Date    WBC 5.13 01/27/2023    HGB 15.2 01/27/2023    HCT 45.0 01/27/2023     01/27/2023    CHOL 190 01/27/2023    TRIG 59 01/27/2023    HDL 95 (H) 01/27/2023    ALT 20 01/27/2023    AST 24 01/27/2023     01/27/2023    K 4.1  01/27/2023     01/27/2023    CREATININE 0.55 01/27/2023    BUN 14 01/27/2023    CO2 31 (H) 01/27/2023    TSH 1.060 01/27/2023    HGBA1C 5.1 01/27/2023     Results for orders placed or performed during the hospital encounter of 06/02/23   EKG 12-lead    Collection Time: 06/02/23 10:30 AM    Narrative    Test Reason : R07.9,    Vent. Rate : 070 BPM     Atrial Rate : 070 BPM     P-R Int : 158 ms          QRS Dur : 092 ms      QT Int : 402 ms       P-R-T Axes : 070 043 054 degrees     QTc Int : 434 ms    Normal sinus rhythm  Normal ECG  When compared with ECG of 15-DEC-2009 15:14,  No significant change was found  Confirmed by Justin Chaves MD ([id number]) on 6/5/2023 9:01:55 AM    Referred By: SHAN PAIZ           Confirmed By:Justin Chaves MD         Anesthesia Plan  Type of Anesthesia, risks & benefits discussed:    Anesthesia Type: Regional  Intra-op Monitoring Plan: Standard ASA Monitors  Post Op Pain Control Plan: multimodal analgesia and IV/PO Opioids PRN  Induction:  IV  Informed Consent: Patient consented to blood products? No  ASA Score: 2  Day of Surgery Review of History & Physical: H&P Update referred to the surgeon/provider.  Anesthesia Plan Notes: Anesthesia consent to be signed prior to surgery 8/15/23      Ready For Surgery From Anesthesia Perspective.     .

## 2023-08-08 NOTE — PRE-PROCEDURE INSTRUCTIONS
Allergies, medical, surgical, family and psychosocial histories reviewed with patient. Periop plan of care reviewed. Preop instructions given, including medications to take and to hold. Hibiclens soap and instructions on use given. Time allotted for questions to be addressed.  Patient verbalized understanding.      Arrival time 0615

## 2023-08-08 NOTE — DISCHARGE INSTRUCTIONS
Your surgery is scheduled for 8/15/23.    Please report to Hospital [facility] on the 1st Floor at 615 a.m.    THIS TIME IS SUBJECT TO CHANGE.  YOU WILL RECEIVE A PHONE CALL THE DAY BEFORE SURGERY BY 3:30 PM TO CONFIRM YOUR TIME OF ARRIVAL.  IF YOU HAVE NOT RECEIVED A PHONE CALL BY 3:30 PM THE DAY BEFORE YOUR SURGERY PLEASE CALL [phone]     INSTRUCTIONS IMPORTANT!!!  ¨ Do not eat or drink after 12 midnight-including water, candy, gum, & mints. OK to brush teeth.      ____  Proceed to [facility] on *** for additional testing.        ____  Do not wear makeup, including mascara.  ____  No powder, lotions or creams to surgical area.  ____  Please remove all jewelry, including piercings and leave at home.  ____  No money or valuables needed. Please leave at home.  ____  Please bring any documents given by your doctor.  ____  If going home the same day, arrange for a ride home. You will not be able to             drive if Anesthesia was used.  ____  Children under 18 years require a parent / guardian present the entire time             they are in surgery / recovery.  ____  Wear loose fitting clothing. Allow for dressings, bandages.  ____  Stop Aspirin, Ibuprofen, Motrin, Aleve, Goody's/BC powders, Excedrine and Naproxen (NSAIDS) at least 3-5 days before surgery, unless otherwise instructed by your doctor, or the nurse.   You MAY use Tylenol/acetaminophen until day of surgery.  ____  Wash the surgical area with Hibiclens or Dial Antibacterial bar soap the night before surgery, and again the             morning of surgery.  Be sure to rinse hibiclens or Dial Antibacterial bar soap off completely (if instructed by   nurse).  ____  If you take diabetic medication including Metformin, Glimepiride, Glipizide, Glyburide, Byetta, Januvia, Actos, do not take am of surgery unless instructed by Doctor.  ____ Hold Invokana, Farxiga, and Jardiance for 3 days prior to surgery.   ____  Call MD for temperature  above 101 degrees or any other signs of infection such as Urinary (bladder) infection, Upper respiratory infection, skin boils, etc.  ____ Stop taking any Fish Oil supplement or any Vitamins that contain Vitamin E at least 5 days prior to surgery.  ____ Do Not wear your contact lenses the day of your procedure.  You may wear your glasses.      ____Do not shave surgical site for 3 days prior to surgery.  ____ Practice Good hand washing before, during, and after procedure.      I have read or had read and explained to me, and understand the above information.  Additional comments or instructions:  For additional questions call [phone]      ANESTHESIA SIDE EFFECTS  -For the first 24 hours after surgery:  Do not drive, use heavy equipment, make important decisions, or drink alcohol  -It is normal to feel sleepy for several hours.  Rest until you are more awake.  -Have someone stay with you, if needed.  They can watch for problems and help keep you safe.  -Some possible post anesthesia side effects include: nausea and vomiting, sore throat and hoarseness, sleepiness, and dizziness.        Pre-Op Bathing Instructions    Before surgery, you can play an important role in your own health.    Because skin is not sterile, we need to be sure that your skin is as free of germs as possible. By following the instructions below, you can reduce the number of germs on your skin before surgery.    IMPORTANT: You will need to shower with a special soap called Hibiclens*, available at any pharmacy.  If you are allergic to Chlorhexidine (the antiseptic in Hibiclens), use an antibacterial soap such as Dial Soap for your preoperative shower.  You will shower with Hibiclens both the night before your surgery and the morning of your surgery.  Do not use Hibiclens on the head, face or genitals to avoid injury to those areas.    STEP #1: THE NIGHT BEFORE YOUR SURGERY     Do not shave the area of your body where your surgery will be  performed.  Shower and wash your hair and body as usual with your normal soap and shampoo.  Rinse your hair and body thoroughly after you shower to remove all soap residue.  With your hand, apply one packet of Hibiclens soap to the surgical site.   Wash the site gently for five (5) minutes. Do not scrub your skin too hard.   Do not wash with your regular soap after Hibiclens is used.  Rinse your body thoroughly.  Pat yourself dry with a clean, soft towel.  Do not use lotion, cream, or powder.  Wear clean clothes.    STEP #2: THE MORNING OF YOUR SURGERY     Repeat Step #1.    * Not to be used by people allergic to Chlorhexidine.

## 2023-08-15 ENCOUNTER — ANESTHESIA (OUTPATIENT)
Dept: SURGERY | Facility: HOSPITAL | Age: 63
End: 2023-08-15
Payer: COMMERCIAL

## 2023-08-15 ENCOUNTER — HOSPITAL ENCOUNTER (OUTPATIENT)
Facility: HOSPITAL | Age: 63
Discharge: HOME | End: 2023-08-15
Attending: ORTHOPAEDIC SURGERY | Admitting: ORTHOPAEDIC SURGERY
Payer: COMMERCIAL

## 2023-08-15 VITALS
BODY MASS INDEX: 30.36 KG/M2 | SYSTOLIC BLOOD PRESSURE: 115 MMHG | WEIGHT: 165 LBS | HEART RATE: 73 BPM | RESPIRATION RATE: 16 BRPM | HEIGHT: 62 IN | DIASTOLIC BLOOD PRESSURE: 64 MMHG | TEMPERATURE: 98 F | OXYGEN SATURATION: 100 %

## 2023-08-15 DIAGNOSIS — M18.12 PRIMARY OSTEOARTHRITIS OF FIRST CARPOMETACARPAL JOINT OF LEFT HAND: ICD-10-CM

## 2023-08-15 PROCEDURE — C1889 IMPLANT/INSERT DEVICE, NOC: HCPCS | Performed by: ORTHOPAEDIC SURGERY

## 2023-08-15 PROCEDURE — D9220A PRA ANESTHESIA: Mod: ANES,,, | Performed by: STUDENT IN AN ORGANIZED HEALTH CARE EDUCATION/TRAINING PROGRAM

## 2023-08-15 PROCEDURE — 63600175 PHARM REV CODE 636 W HCPCS: Performed by: NURSE ANESTHETIST, CERTIFIED REGISTERED

## 2023-08-15 PROCEDURE — 63600175 PHARM REV CODE 636 W HCPCS: Performed by: UROLOGY

## 2023-08-15 PROCEDURE — 64415 NJX AA&/STRD BRCH PLXS IMG: CPT | Mod: 59,LT,, | Performed by: UROLOGY

## 2023-08-15 PROCEDURE — 36000708 HC OR TIME LEV III 1ST 15 MIN: Performed by: ORTHOPAEDIC SURGERY

## 2023-08-15 PROCEDURE — D9220A PRA ANESTHESIA: ICD-10-PCS | Mod: CRNA,,, | Performed by: NURSE ANESTHETIST, CERTIFIED REGISTERED

## 2023-08-15 PROCEDURE — 25000003 PHARM REV CODE 250: Performed by: NURSE ANESTHETIST, CERTIFIED REGISTERED

## 2023-08-15 PROCEDURE — 37000008 HC ANESTHESIA 1ST 15 MINUTES: Performed by: ORTHOPAEDIC SURGERY

## 2023-08-15 PROCEDURE — D9220A PRA ANESTHESIA: Mod: CRNA,,, | Performed by: NURSE ANESTHETIST, CERTIFIED REGISTERED

## 2023-08-15 PROCEDURE — 25447 PR REPAIR INTERCARP/CARP-METACARP JT: ICD-10-PCS | Mod: LT,,, | Performed by: ORTHOPAEDIC SURGERY

## 2023-08-15 PROCEDURE — 76942 ECHO GUIDE FOR BIOPSY: CPT | Performed by: UROLOGY

## 2023-08-15 PROCEDURE — 27201423 OPTIME MED/SURG SUP & DEVICES STERILE SUPPLY: Performed by: ORTHOPAEDIC SURGERY

## 2023-08-15 PROCEDURE — D9220A PRA ANESTHESIA: ICD-10-PCS | Mod: ANES,,, | Performed by: STUDENT IN AN ORGANIZED HEALTH CARE EDUCATION/TRAINING PROGRAM

## 2023-08-15 PROCEDURE — 36000709 HC OR TIME LEV III EA ADD 15 MIN: Performed by: ORTHOPAEDIC SURGERY

## 2023-08-15 PROCEDURE — 71000015 HC POSTOP RECOV 1ST HR: Performed by: ORTHOPAEDIC SURGERY

## 2023-08-15 PROCEDURE — 25447 ARTHRP NTRCRP/CRP/MTCR NTRPS: CPT | Mod: LT,,, | Performed by: ORTHOPAEDIC SURGERY

## 2023-08-15 PROCEDURE — 37000009 HC ANESTHESIA EA ADD 15 MINS: Performed by: ORTHOPAEDIC SURGERY

## 2023-08-15 PROCEDURE — C1713 ANCHOR/SCREW BN/BN,TIS/BN: HCPCS | Performed by: ORTHOPAEDIC SURGERY

## 2023-08-15 PROCEDURE — 64415: ICD-10-PCS | Mod: 59,LT,, | Performed by: UROLOGY

## 2023-08-15 PROCEDURE — 71000016 HC POSTOP RECOV ADDL HR: Performed by: ORTHOPAEDIC SURGERY

## 2023-08-15 DEVICE — 2.4 SINGLE LOADED SUTURETAPE S-TAK ASSY
Type: IMPLANTABLE DEVICE | Site: THUMB | Status: FUNCTIONAL
Brand: ARTHREX®

## 2023-08-15 RX ORDER — FENTANYL CITRATE 50 UG/ML
25 INJECTION, SOLUTION INTRAMUSCULAR; INTRAVENOUS EVERY 5 MIN PRN
Status: DISCONTINUED | OUTPATIENT
Start: 2023-08-15 | End: 2023-08-15 | Stop reason: HOSPADM

## 2023-08-15 RX ORDER — FENTANYL CITRATE 50 UG/ML
INJECTION, SOLUTION INTRAMUSCULAR; INTRAVENOUS
Status: DISCONTINUED | OUTPATIENT
Start: 2023-08-15 | End: 2023-08-15

## 2023-08-15 RX ORDER — ONDANSETRON 2 MG/ML
INJECTION INTRAMUSCULAR; INTRAVENOUS
Status: DISCONTINUED | OUTPATIENT
Start: 2023-08-15 | End: 2023-08-15

## 2023-08-15 RX ORDER — MEPERIDINE HYDROCHLORIDE 50 MG/ML
12.5 INJECTION INTRAMUSCULAR; INTRAVENOUS; SUBCUTANEOUS ONCE AS NEEDED
Status: DISCONTINUED | OUTPATIENT
Start: 2023-08-15 | End: 2023-08-15 | Stop reason: HOSPADM

## 2023-08-15 RX ORDER — CEFAZOLIN SODIUM 1 G/3ML
INJECTION, POWDER, FOR SOLUTION INTRAMUSCULAR; INTRAVENOUS
Status: DISCONTINUED | OUTPATIENT
Start: 2023-08-15 | End: 2023-08-15

## 2023-08-15 RX ORDER — OXYCODONE AND ACETAMINOPHEN 5; 325 MG/1; MG/1
1 TABLET ORAL
Status: DISCONTINUED | OUTPATIENT
Start: 2023-08-15 | End: 2023-08-15 | Stop reason: HOSPADM

## 2023-08-15 RX ORDER — BUPIVACAINE HYDROCHLORIDE 5 MG/ML
INJECTION, SOLUTION EPIDURAL; INTRACAUDAL
Status: COMPLETED | OUTPATIENT
Start: 2023-08-15 | End: 2023-08-15

## 2023-08-15 RX ORDER — LIDOCAINE HYDROCHLORIDE 10 MG/ML
1 INJECTION, SOLUTION EPIDURAL; INFILTRATION; INTRACAUDAL; PERINEURAL ONCE
Status: ACTIVE | OUTPATIENT
Start: 2023-08-15

## 2023-08-15 RX ORDER — MIDAZOLAM HYDROCHLORIDE 1 MG/ML
0.5 INJECTION INTRAMUSCULAR; INTRAVENOUS
Status: DISCONTINUED | OUTPATIENT
Start: 2023-08-15 | End: 2023-08-15 | Stop reason: HOSPADM

## 2023-08-15 RX ORDER — KETOROLAC TROMETHAMINE 30 MG/ML
30 INJECTION, SOLUTION INTRAMUSCULAR; INTRAVENOUS ONCE
Status: DISCONTINUED | OUTPATIENT
Start: 2023-08-15 | End: 2023-08-15 | Stop reason: HOSPADM

## 2023-08-15 RX ORDER — HYDROCODONE BITARTRATE AND ACETAMINOPHEN 7.5; 325 MG/1; MG/1
1 TABLET ORAL EVERY 4 HOURS PRN
Qty: 30 TABLET | Refills: 0 | Status: SHIPPED | OUTPATIENT
Start: 2023-08-15 | End: 2023-09-14

## 2023-08-15 RX ORDER — PROCHLORPERAZINE EDISYLATE 5 MG/ML
5 INJECTION INTRAMUSCULAR; INTRAVENOUS EVERY 30 MIN PRN
Status: DISCONTINUED | OUTPATIENT
Start: 2023-08-15 | End: 2023-08-15 | Stop reason: HOSPADM

## 2023-08-15 RX ORDER — PHENYLEPHRINE HYDROCHLORIDE 10 MG/ML
INJECTION INTRAVENOUS
Status: DISCONTINUED | OUTPATIENT
Start: 2023-08-15 | End: 2023-08-15

## 2023-08-15 RX ORDER — ACETAMINOPHEN 325 MG/1
650 TABLET ORAL EVERY 4 HOURS PRN
Status: DISCONTINUED | OUTPATIENT
Start: 2023-08-15 | End: 2023-08-15 | Stop reason: HOSPADM

## 2023-08-15 RX ORDER — HYDROMORPHONE HYDROCHLORIDE 2 MG/ML
0.2 INJECTION, SOLUTION INTRAMUSCULAR; INTRAVENOUS; SUBCUTANEOUS EVERY 5 MIN PRN
Status: DISCONTINUED | OUTPATIENT
Start: 2023-08-15 | End: 2023-08-15 | Stop reason: HOSPADM

## 2023-08-15 RX ORDER — CEFAZOLIN SODIUM 2 G/50ML
2 SOLUTION INTRAVENOUS
Status: DISCONTINUED | OUTPATIENT
Start: 2023-08-15 | End: 2023-08-15 | Stop reason: HOSPADM

## 2023-08-15 RX ORDER — PROPOFOL 10 MG/ML
VIAL (ML) INTRAVENOUS
Status: DISCONTINUED | OUTPATIENT
Start: 2023-08-15 | End: 2023-08-15

## 2023-08-15 RX ORDER — LIDOCAINE HYDROCHLORIDE 20 MG/ML
INJECTION INTRAVENOUS
Status: DISCONTINUED | OUTPATIENT
Start: 2023-08-15 | End: 2023-08-15

## 2023-08-15 RX ORDER — ONDANSETRON 8 MG/1
8 TABLET, ORALLY DISINTEGRATING ORAL EVERY 8 HOURS PRN
Status: DISCONTINUED | OUTPATIENT
Start: 2023-08-15 | End: 2023-08-15 | Stop reason: HOSPADM

## 2023-08-15 RX ORDER — MIDAZOLAM HYDROCHLORIDE 1 MG/ML
INJECTION INTRAMUSCULAR; INTRAVENOUS
Status: COMPLETED
Start: 2023-08-15 | End: 2023-08-15

## 2023-08-15 RX ORDER — OXYCODONE HYDROCHLORIDE 5 MG/1
10 TABLET ORAL EVERY 4 HOURS PRN
Status: DISCONTINUED | OUTPATIENT
Start: 2023-08-15 | End: 2023-08-15 | Stop reason: HOSPADM

## 2023-08-15 RX ORDER — SODIUM CHLORIDE, SODIUM LACTATE, POTASSIUM CHLORIDE, CALCIUM CHLORIDE 600; 310; 30; 20 MG/100ML; MG/100ML; MG/100ML; MG/100ML
INJECTION, SOLUTION INTRAVENOUS CONTINUOUS
Status: ACTIVE | OUTPATIENT
Start: 2023-08-15

## 2023-08-15 RX ORDER — ONDANSETRON 2 MG/ML
4 INJECTION INTRAMUSCULAR; INTRAVENOUS DAILY PRN
Status: DISCONTINUED | OUTPATIENT
Start: 2023-08-15 | End: 2023-08-15 | Stop reason: HOSPADM

## 2023-08-15 RX ADMIN — PHENYLEPHRINE HYDROCHLORIDE 150 MCG: 10 INJECTION INTRAVENOUS at 10:08

## 2023-08-15 RX ADMIN — PHENYLEPHRINE HYDROCHLORIDE 200 MCG: 10 INJECTION INTRAVENOUS at 10:08

## 2023-08-15 RX ADMIN — CEFAZOLIN 2 G: 330 INJECTION, POWDER, FOR SOLUTION INTRAMUSCULAR; INTRAVENOUS at 10:08

## 2023-08-15 RX ADMIN — ONDANSETRON 4 MG: 2 INJECTION, SOLUTION INTRAMUSCULAR; INTRAVENOUS at 10:08

## 2023-08-15 RX ADMIN — MIDAZOLAM HYDROCHLORIDE 2 MG: 1 INJECTION INTRAMUSCULAR; INTRAVENOUS at 08:08

## 2023-08-15 RX ADMIN — PHENYLEPHRINE HYDROCHLORIDE 150 MCG: 10 INJECTION INTRAVENOUS at 11:08

## 2023-08-15 RX ADMIN — PHENYLEPHRINE HYDROCHLORIDE 100 MCG: 10 INJECTION INTRAVENOUS at 10:08

## 2023-08-15 RX ADMIN — LIDOCAINE HYDROCHLORIDE 50 MG: 20 INJECTION, SOLUTION INTRAVENOUS at 10:08

## 2023-08-15 RX ADMIN — FENTANYL CITRATE 25 MCG: 0.05 INJECTION, SOLUTION INTRAMUSCULAR; INTRAVENOUS at 10:08

## 2023-08-15 RX ADMIN — PROPOFOL 100 MCG/KG/MIN: 10 INJECTION, EMULSION INTRAVENOUS at 10:08

## 2023-08-15 RX ADMIN — SODIUM CHLORIDE, SODIUM LACTATE, POTASSIUM CHLORIDE, AND CALCIUM CHLORIDE: .6; .31; .03; .02 INJECTION, SOLUTION INTRAVENOUS at 10:08

## 2023-08-15 RX ADMIN — PROPOFOL 50 MG: 10 INJECTION, EMULSION INTRAVENOUS at 10:08

## 2023-08-15 RX ADMIN — BUPIVACAINE HYDROCHLORIDE 30 ML: 5 INJECTION, SOLUTION EPIDURAL; INTRACAUDAL; PERINEURAL at 08:08

## 2023-08-15 NOTE — DISCHARGE INSTRUCTIONS
After Hand Surgery  After surgery, the better you take care of yourself--especially your hand--the sooner it will heal. Follow your surgeons instructions. Try not to bump your hand, and dont move or lift anything while youre still wearing bandages, a splint, or a cast.  Care for your hand    Keep your hand elevated above heart level as much as possible for the first several days after surgery. This helps reduce swelling and pain.  To help prevent infection and speed healing, take care not to get your cast or bandages wet.  Relieve pain as directed  Your surgeon may prescribe pain medicine or suggest you take an anti-inflammatory medicine. You might also be instructed to apply ice (or another cold source) to your hand. If you use ice cubes, put them in a plastic bag and rest it on top of your bandages. Leave the cold source on your hand for as long as its comfortable. Do this several times a day for the first few days after surgery. It may take several minutes before you can feel the cold through the cast or bandages.  Follow up with your surgeon  During a follow-up visit after surgery, your surgeon will check your progress. The stitches, bandages, splint, or cast may be removed. A new cast or splint may be placed. If your hand has healed enough, your surgeon may prescribe exercises.  Do prescribed hand exercises  Your surgeon may recommend that you do exercises. These may be done under the guidance of a physical or occupational therapist. The exercises strengthen your hand, help you regain flexibility, and restore proper function. Do the exercises as advised.  Call your surgeon if you have...  A fever higher than 100.4°F (38.0°C) taken by mouth  Side effects from your medicine, such as prolonged nausea  A wet or loose dressing, or a dressing that is too tight  Excessive bleeding  Increased, ongoing pain or numbness  Signs of infection (such as drainage, warmth, or redness) at the incision site          ANESTHESIA  -For the first 24 hours after surgery:  Do not drive, use heavy equipment, make important decisions, or drink alcohol  -It is normal to feel sleepy for several hours.  Rest until you are more awake.  -Have someone stay with you, if needed.  They can watch for problems and help keep you safe.  -Some possible post anesthesia side effects include: nausea and vomiting, sore throat and hoarseness, sleepiness, and dizziness.    PAIN  -If you have pain after surgery, pain medicine will help you feel better.  Take it as directed, before pain becomes severe.  Most pain relievers taken by mouth need at least 20-30 minutes to start working.  -Do not drive or drink alcohol while taking pain medicine.  -Pain medication can upset your stomach.  Taking them with a little food may help.  -Other ways to help control pain: elevation, ice, and relaxation  -Call your surgeon if still having unmanageable pain an hour after taking pain medicine.  -Pain medicine can cause constipation.  Taking an over-the counter stool softener while on prescription pain medicine and drinking plenty of fluids can prevent this side effect.  -Call your surgeon if you have severe side effects like: breathing problems, trouble waking up, dizziness, confusion, or severe constipation.    NAUSEA  -Some people have nausea after surgery.  This is often because of anesthesia, pain, pain medicine, or the stress of surgery.  -Do not push yourself to eat.  Start off with clear liquids and soup.  Slowly move to solid foods.  Don't eat fatty, rich, spicy foods at first.  Eat smaller amounts.  -If you develop persistent nausea and vomiting please notify your surgeon immediately.    BLEEDING  -Different types of surgery require different types of care and dressing changes.  It is important to follow all instructions and advice from your surgeon.  Change dressing as directed.  Call your surgeon for any concerns regarding postop bleeding.    SIGNS OF  INFECTION  -Signs of infection include: fever, swelling, drainage, and redness  -Notify your surgeon if you have a fever of 100.4 F (38.0 C) or higher.  -Notify your surgeon if you notice redness, swelling, increased pain, pus, or a foul smell at the incision site.

## 2023-08-15 NOTE — ANESTHESIA PROCEDURE NOTES
Left Supraclavicular Block SS    Patient location during procedure: pre-op    Reason for block: primary anesthetic    Diagnosis: Left Wrist Osteoarthritis   Start time: 8/15/2023 8:54 AM  Timeout: 8/15/2023 8:54 AM   End time: 8/15/2023 8:59 AM    Staffing  Authorizing Provider: Daniel Vegas MD  Performing Provider: Daniel Vegas MD    Staffing  Performed by: Daniel Vegas MD  Authorized by: Daniel Vegas MD    Preanesthetic Checklist  Completed: patient identified, IV checked, site marked, risks and benefits discussed, surgical consent, monitors and equipment checked, pre-op evaluation and timeout performed  Peripheral Block  Patient position: supine  Prep: ChloraPrep  Patient monitoring: heart rate, cardiac monitor, continuous pulse ox, continuous capnometry and frequent blood pressure checks  Block type: supraclavicular  Laterality: left  Injection technique: single shot  Needle  Needle type: Stimuplex   Needle gauge: 22 G  Needle length: 2 in  Needle localization: anatomical landmarks and ultrasound guidance   -ultrasound image captured on disc.  Assessment  Injection assessment: negative aspiration, negative parasthesia and local visualized surrounding nerve  Paresthesia pain: none  Heart rate change: no  Slow fractionated injection: yes  Pain Tolerance: comfortable throughout block and no complaints  Medications:    Medications: bupivacaine (pf) (MARCAINE) injection 0.5% - Perineural   30 mL - 8/15/2023 8:59:00 AM    Additional Notes  VSS.  [facility] RN monitoring vitals throughout procedure.  Patient tolerated procedure well.

## 2023-08-15 NOTE — TRANSFER OF CARE
"Anesthesia Transfer of Care Note    Patient: Kateryna Weber    Procedure(s) Performed: Procedure(s) (LRB):  INTERPOSITION ARTHROPLASTY, CMC JOINT (Left)    Patient location: OPS    Anesthesia Type: general    Transport from OR: Transported from OR on room air with adequate spontaneous ventilation    Post pain: adequate analgesia    Post assessment: no apparent anesthetic complications and tolerated procedure well    Post vital signs: stable    Level of consciousness: awake, alert and oriented    Nausea/Vomiting: no nausea/vomiting    Complications: none    Transfer of care protocol was followed      Last vitals:   Visit Vitals  BP (!) 98/53   Pulse 86   Temp 37 °C (98.6 °F) (Skin)   Resp 16   Ht 5' 2" (1.575 m)   Wt 74.8 kg (165 lb)   SpO2 100%   Breastfeeding No   BMI 30.18 kg/m²     "

## 2023-08-15 NOTE — OP NOTE
[facility] - [facility] (Hospital)  Operative Note      Date of Procedure: 8/15/2023     Procedure: Procedure(s) (LRB):  INTERPOSITION ARTHROPLASTY, CMC JOINT (Left)     Surgeon(s) and Role:     * Tomas Sy Jr., MD - Primary    Assisting Surgeon: None    Pre-Operative Diagnosis: Primary osteoarthritis of first carpometacarpal joint of left hand [M18.12]    Post-Operative Diagnosis: Post-Op Diagnosis Codes:     * Primary osteoarthritis of first carpometacarpal joint of left hand [M18.12]    Anesthesia: Regional    Operative Findings (including complications, if any):  CMC arthritis left thumb    Description of Technical Procedures:     Preop diagnosis:  Carpometacarpal arthritis left thumb    Postop diagnosis: Same.      Operative procedure:  Carpometacarpal arthroplasty left thumb using Arthrex swivel lock and suture anchor including capsular interposition.      Surgeon: Yossi.      Anesthesia:  Regional block.      EBL:  Minimal.      Specimen:  None.      Complications:  None.      Operative procedure in detail as follows:    After operative consent was obtained patient brought the operating room placed supine operating room table.  Anesthesia by regional block performed by the anesthesia staff.  Tourniquet applied left arm left upper extremity prepped and draped out in the normal sterile fashion.  The Esmarch used to exsanguinated the limb and the tourniquet inflated 225 mmHg.  Following this a curved dorsal incision made at the base of left thumb with a 15 blade.  Full-thickness skin flaps raised hemostasis achieved with the Bovie superficial nerves identified and protected.  The 1st dorsal compartment was opened longitudinally and the radial artery was identified and traced from volar to dorsal crossing branches coagulated in the main artery protected.  A T-shaped arthrotomy then made over the CMC joint with the Cahto blade and Bovie and full-thickness flaps were raised.  The trapezium was identified and  noted to be degenerative on both sides.  It was removed in several large fragments with the oscillating saw and rongeur.  All loose bodies and osteophytes also removed.  Following this a K-wire was drilled into the base of the 2nd metacarpal at a 45 degree angle exiting on the far cortex.  After over drilling a suture anchor was was placed with good capture on the far cortex.  2nd drill hole made at the base of the 1st metacarpal at a 45 degree angle and following this insertion of the swivel lock anchor was used to capture the suture and secured the 1st and 2nd metacarpals in a functional position with good stability.  The anchor placed and cut short and range of motion check noted to be good.      Capsular flaps were then created and double breasted over the base of the 1st metacarpal and sewn into the trapezoid with a small suture anchor giving further stability and interposition with capsular flaps.  Hemostasis achieved with the Bovie the wound irrigated thoroughly with antibiotic saline solution and closed with a running 5 O nylon horizontal mattress suture on the skin.  Sterile dressing applied followed by light wrap and a thumb spica splint.  The tourniquet deflated and the patient brought to the recovery room in stable condition all sponge needle counts reported as correct no complications    Significant Surgical Tasks Conducted by the Assistant(s), if Applicable: na    Estimated Blood Loss (EBL): * No values recorded between 8/15/2023 10:29 AM and 8/15/2023 11:23 AM *           Implants:   Implant Name Type Inv. Item Serial No.  Lot No. LRB No. Used Action   SUTURE ANCHOR, BIOCOMPOSITE SUTURE GRZEGORZ     [id number] Left 1 Implanted   FIBERLOCK SUSPENSION IMPLANT KIT     [id number] Left 1 Implanted       Specimens:   Specimen (24h ago, onward)      None                    Condition: Good    Disposition: PACU - hemodynamically stable.    Attestation: I was present and scrubbed for the entire  procedure.    Discharge Note    OUTCOME: Patient tolerated treatment/procedure well without complication and is now ready for discharge.    DISPOSITION: Home or Self Care    FINAL DIAGNOSIS:  Primary osteoarthritis of first carpometacarpal joint of left hand    FOLLOWUP: In clinic    DISCHARGE INSTRUCTIONS:    Discharge Procedure Orders   Diet general     Leave dressing on - Keep it clean, dry, and intact until clinic visit     Call MD for:  temperature >100.4     Call MD for:  persistent nausea and vomiting     Call MD for:  severe uncontrolled pain     Keep surgical extremity elevated

## 2023-08-15 NOTE — H&P
CC:  CMC arthritis left thumb           HPI:  Kateryna Weber is a very pleasant 62 y.o. female with ongoing symptoms left hand and thumb for the last 1-2 years   She has been treated with splinting and injection in the past but symptoms have gotten worse  She would like to consider surgery for the left hand   No numbness or tingling is reported            PAST MEDICAL HISTORY: No past medical history on file.  PAST SURGICAL HISTORY:         Past Surgical History:   Procedure Laterality Date    ABDOMINAL SURGERY   2000     tummy tuck    APPENDECTOMY        COLONOSCOPY N/A 4/8/2019     Procedure: COLONOSCOPY;  Surgeon: Ilya Arauz MD;  Location: [facility] ([address] [place] [address]);  Service: Endoscopy;  Laterality: N/A;  Pt requests Dr. Arauz - ERW  Pt called to r/s due to being out of town, Pt did not want to wait until 5/2019 for next available appt.,Pt stated she would like to keep 4/8/19- ERW3/26/19@1432    GASTRIC BYPASS   2007     Why Weight Dr. Paul    HYSTERECTOMY          FAMILY HISTORY:         Family History   Problem Relation Age of Onset    Melanoma Neg Hx        SOCIAL HISTORY:   Social History              Socioeconomic History    Marital status:    Tobacco Use    Smoking status: Never    Smokeless tobacco: Never   Substance and Sexual Activity    Alcohol use: Yes    Drug use: No            MEDICATIONS:   Current Outpatient Medications:     buPROPion (WELLBUTRIN XL) 150 MG TB24 tablet, Take 1 tablet by mouth once daily, Disp: 90 tablet, Rfl: 0    diethylpropion (TENUATE) 75 mg SR tablet, Take 1 tablet (75 mg total) by mouth once daily. As needed, Disp: 30 tablet, Rfl: 1    multivitamin capsule, Take 1 capsule by mouth once daily., Disp: , Rfl:     OZEMPIC 1 mg/dose (4 mg/3 mL), INJECT 1MG INTO THE SKIN EVERY 7 DAYS, Disp: 9 mL, Rfl: 1    pantoprazole (PROTONIX) 40 MG tablet, TAKE 1 TABLET BY MOUTH ONCE DAILY IN THE MORNING ON  AN  EMPTY  STOMACH, Disp: 90 tablet, Rfl: 0    scopolamine (TRANSDERM-SCOP)  "1.3-1.5 mg (1 mg over 3 days), Place 1 patch onto the skin every 72 hours., Disp: 4 patch, Rfl: 0    ALPRAZolam (XANAX) 0.25 MG tablet, TAKE 1 TABLET BY MOUTH ONCE DAILY AT BEDTIME AS NEEDED FOR ANXIETY (Patient not taking: Reported on 9/15/2022), Disp: 30 tablet, Rfl: 0    flu vacc [id number] 6mos up,PF, (FLUARIX QUAD [id number], PF,) 60 mcg (15 mcg x 4)/0.5 mL Syrg, given by [facility] (Patient not taking: Reported on 9/15/2022), Disp: 0.5 mL, Rfl: 0    phentermine (ADIPEX-P) 37.5 mg tablet, Take by mouth., Disp: , Rfl:   ALLERGIES:        Review of patient's allergies indicates:   Allergen Reactions    No known drug allergies           Review of Systems:  Constitutional: no fever or chills  ENT: no nasal congestion or sore throat  Respiratory: no cough or shortness of breath  Cardiovascular: no chest pain or palpitations  Gastrointestinal: no nausea or vomiting, PUD, GERD, NSAID intolerance  Genitourinary: no hematuria or dysuria  Integument/Breast: no rash or pruritis  Hematologic/Lymphatic: no easy bruising or lymphadenopathy  Musculoskeletal: see HPI  Neurological: no seizures or tremors  Behavioral/Psych: no auditory or visual hallucinations        Physical Exam       Vitals:     04/24/23 1524   Height: 5' 2" (1.575 m)   PainSc:   8   PainLoc: Finger         Constitutional: Oriented to person, place, and time. Appears well-developed and well-nourished.   HENT:   Head: Normocephalic and atraumatic.   Nose: Nose normal.   Eyes: No scleral icterus.   Neck: Normal range of motion.   Cardiovascular: Normal rate and regular rhythm.    Pulses:       Radial pulses are 2+ on the right side, and 2+ on the left side.   Pulmonary/Chest: Effort normal and breath sounds normal.   Abdominal: Soft.   Neurological: Alert and oriented to person, place, and time.   Skin: Skin is warm.   Psychiatric: Normal mood and affect.      MUSCULOSKELETAL UPPER EXTREMITY:  Examination left hand there is tenderness at the base of left thumb  Some " "bony enlargement is noted range of motion limited  Positive grind test mild crepitation   strength decreased   Tinel sign negative                     Diagnostic Studies:  Previous x-rays reviewed left hand shows severe arthritic change CMC joint left thumb           Assessment:  CMC arthritis left thumb severe     Plan:  She would like to proceed with surgery for CMC arthroplasty left thumb as an outpatient  I explained the type of surgery and the need for postop immobilization and therapy she understands        The risks and benefits of surgery were discussed with the patient today and they understand.  The consent was signed in the office for surgery.        Tomas Sy MD (Jay)  [facility]  Orthopedic Upper Extremity Surgery        "

## 2023-08-16 ENCOUNTER — TELEPHONE (OUTPATIENT)
Dept: ORTHOPEDICS | Facility: CLINIC | Age: 63
End: 2023-08-16
Payer: COMMERCIAL

## 2023-08-16 NOTE — ANESTHESIA POSTPROCEDURE EVALUATION
Anesthesia Post Evaluation    Patient: Kateryna Wbeer    Procedure(s) Performed: Procedure(s) (LRB):  INTERPOSITION ARTHROPLASTY, CMC JOINT (Left)    Final Anesthesia Type: regional      Patient location during evaluation: PACU  Patient participation: Yes- Able to Participate  Level of consciousness: awake and alert  Post-procedure vital signs: reviewed and stable  Pain management: adequate  Airway patency: patent  SUBHASH mitigation strategies: Multimodal analgesia  PONV status at discharge: No PONV  Anesthetic complications: no      Cardiovascular status: hemodynamically stable  Respiratory status: spontaneous ventilation and room air  Hydration status: euvolemic  Follow-up not needed.          Vitals Value Taken Time   /64 08/15/23 1230   Temp 36.5 °C (97.7 °F) 08/15/23 1230   Pulse 73 08/15/23 1230   Resp 16 08/15/23 1230   SpO2 100 % 08/15/23 1230         No case tracking events are documented in the log.      Pain/Delfina Score: Delfina Score: 10 (8/15/2023 12:30 PM)

## 2023-08-16 NOTE — TELEPHONE ENCOUNTER
----- Message from Elbert Portillo sent at 8/16/2023  2:41 PM CDT -----  Contact: Pt  .Type:  Needs Medical Advice    Who Called: pt  Symptoms (please be specific):  hand is swollen/ pain  How long has patient had these symptoms:   1 day  Would the patient rather a call back or a response via [facility]?  Call back  Best Call Back Number: [phone]  Additional Information: Pt. Is calling regarding her left hand is swollen/cold and she is pain.   She had surgery  on yesterday and would like to know if this is normal

## 2023-08-28 ENCOUNTER — OFFICE VISIT (OUTPATIENT)
Dept: ORTHOPEDICS | Facility: CLINIC | Age: 63
End: 2023-08-28
Payer: COMMERCIAL

## 2023-08-28 VITALS — HEIGHT: 62 IN | BODY MASS INDEX: 30.36 KG/M2 | WEIGHT: 165 LBS

## 2023-08-28 DIAGNOSIS — M18.12 PRIMARY OSTEOARTHRITIS OF FIRST CARPOMETACARPAL JOINT OF LEFT HAND: Primary | ICD-10-CM

## 2023-08-28 PROCEDURE — 3044F PR MOST RECENT HEMOGLOBIN A1C LEVEL <7.0%: ICD-10-PCS | Mod: CPTII,S$GLB,, | Performed by: ORTHOPAEDIC SURGERY

## 2023-08-28 PROCEDURE — 99024 POSTOP FOLLOW-UP VISIT: CPT | Mod: S$GLB,,, | Performed by: ORTHOPAEDIC SURGERY

## 2023-08-28 PROCEDURE — 99999 PR PBB SHADOW E&M-EST. PATIENT-LVL III: ICD-10-PCS | Mod: PBBFAC,,, | Performed by: ORTHOPAEDIC SURGERY

## 2023-08-28 PROCEDURE — 3008F BODY MASS INDEX DOCD: CPT | Mod: CPTII,S$GLB,, | Performed by: ORTHOPAEDIC SURGERY

## 2023-08-28 PROCEDURE — 1159F PR MEDICATION LIST DOCUMENTED IN MEDICAL RECORD: ICD-10-PCS | Mod: CPTII,S$GLB,, | Performed by: ORTHOPAEDIC SURGERY

## 2023-08-28 PROCEDURE — 99024 PR POST-OP FOLLOW-UP VISIT: ICD-10-PCS | Mod: S$GLB,,, | Performed by: ORTHOPAEDIC SURGERY

## 2023-08-28 PROCEDURE — 3008F PR BODY MASS INDEX (BMI) DOCUMENTED: ICD-10-PCS | Mod: CPTII,S$GLB,, | Performed by: ORTHOPAEDIC SURGERY

## 2023-08-28 PROCEDURE — 3044F HG A1C LEVEL LT 7.0%: CPT | Mod: CPTII,S$GLB,, | Performed by: ORTHOPAEDIC SURGERY

## 2023-08-28 PROCEDURE — 99999 PR PBB SHADOW E&M-EST. PATIENT-LVL III: CPT | Mod: PBBFAC,,, | Performed by: ORTHOPAEDIC SURGERY

## 2023-08-28 PROCEDURE — 1159F MED LIST DOCD IN RCRD: CPT | Mod: CPTII,S$GLB,, | Performed by: ORTHOPAEDIC SURGERY

## 2023-08-28 NOTE — PROGRESS NOTES
Subjective:      Patient ID: Kateryna Weber is a 62 y.o. female.    Chief Complaint: Post-op Evaluation of the Left Hand      HPI  Kateryna Weber is a  62 y.o. female presenting today for postop CMC arthroplasty left thumb now 2 weeks postop.  There was not a history of trauma.  Onset of symptoms began 2 weeks ago she is 2 weeks postop now pain moderate.      Review of patient's allergies indicates:   Allergen Reactions    No known drug allergies          Current Outpatient Medications   Medication Sig Dispense Refill    buPROPion (WELLBUTRIN XL) 150 MG TB24 tablet Take 1 tablet (150 mg total) by mouth once daily. 90 tablet 3    estradioL (ESTRACE) 0.01 % (0.1 mg/gram) vaginal cream Place 1 g vaginally once daily. Place 1 g vaginally every night for two weeks then twice per week 42.5 g 6    flu vacc [id number] 6mos up,PF, (FLUARIX QUAD 6825-0568, PF,) 60 mcg (15 mcg x 4)/0.5 mL Syrg given by [facility] 0.5 mL 0    HYDROcodone-acetaminophen (NORCO) 7.5-325 mg per tablet Take 1 tablet by mouth every 4 (four) hours as needed for Pain. 30 tablet 0    ibuprofen (ADVIL,MOTRIN) 600 MG tablet Take 1 tablet (600 mg total) by mouth 2 (two) times daily with meals. 60 tablet 1    multivitamin capsule Take 1 capsule by mouth once daily.      phentermine (ADIPEX-P) 37.5 mg tablet Take 1 tablet (37.5 mg total) by mouth before breakfast. 30 tablet 3     No current facility-administered medications for this visit.     Facility-Administered Medications Ordered in Other Visits   Medication Dose Route Frequency Provider Last Rate Last Admin    lactated ringers infusion   Intravenous Continuous Tamara Pantoja DNP        LIDOcaine (PF) 10 mg/ml (1%) injection 10 mg  1 mL Intradermal Once Tamara Pantoja DNP           No past medical history on file.    Past Surgical History:   Procedure Laterality Date    ABDOMINAL SURGERY  2000    tummy tuck    APPENDECTOMY      COLONOSCOPY N/A 4/8/2019    Procedure: COLONOSCOPY;  Surgeon: Ilya Arauz,  "MD;  Location: [facility] ([address] [place] [address]);  Service: Endoscopy;  Laterality: N/A;  Pt requests Dr. Arauz - ERW  Pt called to r/s due to being out of town, Pt did not want to wait until 5/2019 for next available appt.,Pt stated she would like to keep 4/8/19- ERW3/26/19@1432    GASTRIC BYPASS  2007    Why Weight Dr. Paul    HYSTERECTOMY      INTERPOSITION ARTHROPLASTY OF CARPOMETACARPAL JOINTS Left 8/15/2023    Procedure: INTERPOSITION ARTHROPLASTY, CMC JOINT;  Surgeon: Tomas Sy Jr., MD;  Location: [facility] OR;  Service: Orthopedics;  Laterality: Left;  need arthrex tightrope cal notified cc       Review of Systems:  ROS    OBJECTIVE:     PHYSICAL EXAM:  Height: 5' 2" (157.5 cm) Weight: 74.8 kg (165 lb)  Vitals:    08/28/23 0919   Weight: 74.8 kg (165 lb)   Height: 5' 2" (1.575 m)   PainSc:   1     Well developed, well nourished female in no acute distress  Alert and oriented x 3  HEENT- Normal exam  Lungs- Clear to auscultation  Heart- Regular rate and rhythm  Abdomen- Soft nontender  Extremity exam- examination left hand incision looks good healing well sutures in place slight bruising no evidence of infection thumb is in a good functional position range of motion fingers full  strength decreased no evidence of infection    RADIOGRAPHS:  None  Comments: I have personally reviewed the imaging and I agree with the above radiologist's report.    ASSESSMENT/PLAN:     IMPRESSION:  Status post CMC arthroplasty left thumb    PLAN:  Sutures removed instructed in appropriate wound care  Given a thumb spica splint part-time use   OT ordered   Advil or Tylenol for pain   Follow-up 3-4 weeks       - We talked at length about the anatomy and pathophysiology of   Encounter Diagnosis   Name Primary?    Primary osteoarthritis of first carpometacarpal joint of left hand Yes           Disclaimer: This note has been generated using voice-recognition software. There may be typographical errors that have been missed during " proof-reading.

## 2023-09-05 RX ORDER — IBUPROFEN 600 MG/1
TABLET ORAL
Qty: 60 TABLET | Refills: 1 | Status: SHIPPED | OUTPATIENT
Start: 2023-09-05 | End: 2024-01-17

## 2023-09-12 PROBLEM — R53.1 WEAKNESS: Status: ACTIVE | Noted: 2023-09-12

## 2023-09-12 PROBLEM — M25.60 DECREASED RANGE OF MOTION: Status: ACTIVE | Noted: 2023-09-12

## 2023-09-13 ENCOUNTER — OFFICE VISIT (OUTPATIENT)
Dept: OPTOMETRY | Facility: CLINIC | Age: 63
End: 2023-09-13
Payer: COMMERCIAL

## 2023-09-13 DIAGNOSIS — H52.7 REFRACTIVE ERROR: ICD-10-CM

## 2023-09-13 DIAGNOSIS — H35.362 DRUSEN (DEGENERATIVE) OF RETINA, LEFT: ICD-10-CM

## 2023-09-13 DIAGNOSIS — Z83.518 FAMILY HISTORY OF MACULAR DEGENERATION: ICD-10-CM

## 2023-09-13 DIAGNOSIS — H25.13 NUCLEAR SCLEROSIS OF BOTH EYES: Primary | ICD-10-CM

## 2023-09-13 DIAGNOSIS — Z80.8 FAMILY HISTORY OF MELANOMA: ICD-10-CM

## 2023-09-13 DIAGNOSIS — H04.123 DRY EYE SYNDROME OF BOTH EYES: ICD-10-CM

## 2023-09-13 DIAGNOSIS — Z98.890 HX OF LASIK: ICD-10-CM

## 2023-09-13 PROCEDURE — 92014 COMPRE OPH EXAM EST PT 1/>: CPT | Mod: S$GLB,,, | Performed by: OPTOMETRIST

## 2023-09-13 PROCEDURE — 1159F PR MEDICATION LIST DOCUMENTED IN MEDICAL RECORD: ICD-10-PCS | Mod: CPTII,S$GLB,, | Performed by: OPTOMETRIST

## 2023-09-13 PROCEDURE — 92015 PR REFRACTION: ICD-10-PCS | Mod: S$GLB,,, | Performed by: OPTOMETRIST

## 2023-09-13 PROCEDURE — 92014 PR EYE EXAM, EST PATIENT,COMPREHESV: ICD-10-PCS | Mod: S$GLB,,, | Performed by: OPTOMETRIST

## 2023-09-13 PROCEDURE — 92015 DETERMINE REFRACTIVE STATE: CPT | Mod: S$GLB,,, | Performed by: OPTOMETRIST

## 2023-09-13 PROCEDURE — 3044F HG A1C LEVEL LT 7.0%: CPT | Mod: CPTII,S$GLB,, | Performed by: OPTOMETRIST

## 2023-09-13 PROCEDURE — 99999 PR PBB SHADOW E&M-EST. PATIENT-LVL III: ICD-10-PCS | Mod: PBBFAC,,, | Performed by: OPTOMETRIST

## 2023-09-13 PROCEDURE — 1159F MED LIST DOCD IN RCRD: CPT | Mod: CPTII,S$GLB,, | Performed by: OPTOMETRIST

## 2023-09-13 PROCEDURE — 99999 PR PBB SHADOW E&M-EST. PATIENT-LVL III: CPT | Mod: PBBFAC,,, | Performed by: OPTOMETRIST

## 2023-09-13 PROCEDURE — 3044F PR MOST RECENT HEMOGLOBIN A1C LEVEL <7.0%: ICD-10-PCS | Mod: CPTII,S$GLB,, | Performed by: OPTOMETRIST

## 2023-09-13 NOTE — PROGRESS NOTES
HPI     Cataract     Additional comments: 1 yr chk           Comments    CC: Pt is here today for a routine eye exam. She states she has noticed   some more trouble with her near [place], even with her current readers.    ISABEL: 9/2022    (+) Changes in vision, near  (-) Pain  (-) Irritation   (-) Itching   (-) Flashes  (-) Floaters  (+) Glasses wearer, reading - didn't bring with her today  (-) CL wearer  (-) Uses eye gtts    Does patient want a refraction today? Yes    (-) Eye injury  (+) Eye surgery, LASIK OD only for distance   (-)POHx  (+)FOHx, AMD -- mother; Ocular melanoma -- father    (-)DM                       Last edited by Nina Wallace, OD on 9/13/2023 11:10 AM.            Assessment /Plan     For exam results, see Encounter Report.    Nuclear sclerosis of both eyes    Drusen (degenerative) of retina, left  -     OCT, Retina - OU - Both Eyes; Future    Family history of macular degeneration  -     OCT, Retina - OU - Both Eyes; Future    Family history of melanoma    Hx of LASIK    Dry eye syndrome of both eyes    Refractive error      Educated pt on findings. Not visually significant. No need for removal at this time. Monitor yearly.      2-3. Educated pt on findings. Small area of para-foveal drusen noted OS. BCVA 20/20. No hemes noted. (+)AMD FHx, mother. Stressed importance of UV eye protection and adding green leafy vegetables/carrots to diet.  Okay to monitor yearly unless vision changes are noted sooner. Will get Mac OCT with next annual.     4. (+)Ocular melanoma FHx, father. No signs of melanoma or nevus in patient today. Monitor yearly.     5. Stable OU. Recommend ATs for added lubrication. Monitor yearly.     6. Educated pt on findings. Recommended ATs TID-QID (especially with extended near work) for added lubrication and comfort. If symptoms worsen or dont improve, RTC. Monitor.       7. Updated SRx. Optional specs. Monitor yearly.        RTC in 1 year for annual eye exam or sooner if needed.

## 2023-09-14 ENCOUNTER — OFFICE VISIT (OUTPATIENT)
Dept: INTERNAL MEDICINE | Facility: CLINIC | Age: 63
End: 2023-09-14
Payer: COMMERCIAL

## 2023-09-14 VITALS
HEIGHT: 62 IN | HEART RATE: 72 BPM | DIASTOLIC BLOOD PRESSURE: 62 MMHG | WEIGHT: 165.13 LBS | BODY MASS INDEX: 30.39 KG/M2 | SYSTOLIC BLOOD PRESSURE: 118 MMHG | OXYGEN SATURATION: 100 %

## 2023-09-14 DIAGNOSIS — Z98.84 HX OF BARIATRIC SURGERY: ICD-10-CM

## 2023-09-14 DIAGNOSIS — F32.9 REACTIVE DEPRESSION: ICD-10-CM

## 2023-09-14 DIAGNOSIS — E66.9 CLASS 1 OBESITY WITHOUT SERIOUS COMORBIDITY WITH BODY MASS INDEX (BMI) OF 30.0 TO 30.9 IN ADULT, UNSPECIFIED OBESITY TYPE: Primary | ICD-10-CM

## 2023-09-14 PROCEDURE — 99999 PR PBB SHADOW E&M-EST. PATIENT-LVL III: CPT | Mod: PBBFAC,,, | Performed by: INTERNAL MEDICINE

## 2023-09-14 PROCEDURE — 3008F BODY MASS INDEX DOCD: CPT | Mod: CPTII,S$GLB,, | Performed by: INTERNAL MEDICINE

## 2023-09-14 PROCEDURE — 3044F PR MOST RECENT HEMOGLOBIN A1C LEVEL <7.0%: ICD-10-PCS | Mod: CPTII,S$GLB,, | Performed by: INTERNAL MEDICINE

## 2023-09-14 PROCEDURE — 3074F SYST BP LT 130 MM HG: CPT | Mod: CPTII,S$GLB,, | Performed by: INTERNAL MEDICINE

## 2023-09-14 PROCEDURE — 3074F PR MOST RECENT SYSTOLIC BLOOD PRESSURE < 130 MM HG: ICD-10-PCS | Mod: CPTII,S$GLB,, | Performed by: INTERNAL MEDICINE

## 2023-09-14 PROCEDURE — 3078F PR MOST RECENT DIASTOLIC BLOOD PRESSURE < 80 MM HG: ICD-10-PCS | Mod: CPTII,S$GLB,, | Performed by: INTERNAL MEDICINE

## 2023-09-14 PROCEDURE — 3044F HG A1C LEVEL LT 7.0%: CPT | Mod: CPTII,S$GLB,, | Performed by: INTERNAL MEDICINE

## 2023-09-14 PROCEDURE — 3078F DIAST BP <80 MM HG: CPT | Mod: CPTII,S$GLB,, | Performed by: INTERNAL MEDICINE

## 2023-09-14 PROCEDURE — 3008F PR BODY MASS INDEX (BMI) DOCUMENTED: ICD-10-PCS | Mod: CPTII,S$GLB,, | Performed by: INTERNAL MEDICINE

## 2023-09-14 PROCEDURE — 99214 OFFICE O/P EST MOD 30 MIN: CPT | Mod: S$GLB,,, | Performed by: INTERNAL MEDICINE

## 2023-09-14 PROCEDURE — 99999 PR PBB SHADOW E&M-EST. PATIENT-LVL III: ICD-10-PCS | Mod: PBBFAC,,, | Performed by: INTERNAL MEDICINE

## 2023-09-14 PROCEDURE — 99214 PR OFFICE/OUTPT VISIT, EST, LEVL IV, 30-39 MIN: ICD-10-PCS | Mod: S$GLB,,, | Performed by: INTERNAL MEDICINE

## 2023-09-14 RX ORDER — PHENTERMINE HYDROCHLORIDE 37.5 MG/1
37.5 TABLET ORAL
Qty: 30 TABLET | Refills: 3 | Status: CANCELLED | OUTPATIENT
Start: 2023-09-14

## 2023-09-14 RX ORDER — BUPROPION HYDROCHLORIDE 300 MG/1
300 TABLET ORAL DAILY
Qty: 30 TABLET | Refills: 11 | Status: SHIPPED | OUTPATIENT
Start: 2023-09-14 | End: 2024-01-16 | Stop reason: SDUPTHER

## 2023-09-14 RX ORDER — PHENTERMINE HYDROCHLORIDE 37.5 MG/1
37.5 TABLET ORAL
Qty: 30 TABLET | Refills: 3 | Status: SHIPPED | OUTPATIENT
Start: 2023-09-14 | End: 2024-01-16 | Stop reason: SDUPTHER

## 2023-09-14 NOTE — PROGRESS NOTES
Subjective:       Patient ID: Kateryna Weber is a 62 y.o. female.    Chief Complaint: Follow-up    HPI    Presents for follow up.      Hx of bariatric surgery in 2007. Follows [facility] bariatric medicine clinic. Has been on loemic, adipex, and diethypropion in the past. Not currently on any medications. Stopped ozempic when insurance stopped covering it.      Hx of depression on Wellbutrin. Tried to wean off medication but depression become worse. Now taking two tablets of 150 mg Does continue to have life stressors including daughter moving away and taking care of elderly parents.      Had surgery on thumb in August, now doing PT.      Health Maintenance:  Colon Cancer Screening: colonoscopy done in 2019 with polyps removed. Due again in 2024   Mammogram: normal May 2023   Hep C: negative in 2009   Lipids: normal 2023   Pap Smear: s/p hysterectomy 2016   Vaccines:  up to date        Review of Systems   Constitutional:  Negative for activity change, appetite change and chills.   HENT:  Negative for ear pain, sinus pressure/congestion and sneezing.    Respiratory:  Negative for cough and shortness of breath.    Cardiovascular:  Negative for chest pain, palpitations and leg swelling.   Gastrointestinal:  Negative for abdominal distention, abdominal pain, constipation, diarrhea, nausea and vomiting.   Genitourinary:  Negative for dysuria and hematuria.   Musculoskeletal:  Negative for arthralgias, back pain and myalgias.   Neurological:  Negative for dizziness and headaches.   Psychiatric/Behavioral:  Negative for agitation. The patient is not nervous/anxious.            No past medical history on file.  Past Surgical History:   Procedure Laterality Date    ABDOMINAL SURGERY  2000    tummy tuck    APPENDECTOMY      COLONOSCOPY N/A 4/8/2019    Procedure: COLONOSCOPY;  Surgeon: Ilya Arauz MD;  Location: [facility] ([address] [place] [address];  Service: Endoscopy;  Laterality: N/A;  Pt requests Dr. Davonte MONAE  Pt called to r/s due to being  out of town, Pt did not want to wait until 5/2019 for next available appt.,Pt stated she would like to keep 4/8/19- ERW3/26/19@1432    GASTRIC BYPASS  2007    Why Weight Dr. Paul    HYSTERECTOMY      INTERPOSITION ARTHROPLASTY OF CARPOMETACARPAL JOINTS Left 8/15/2023    Procedure: INTERPOSITION ARTHROPLASTY, CMC JOINT;  Surgeon: Tomas Sy Jr., MD;  Location: [facility];  Service: Orthopedics;  Laterality: Left;  need arthrex tightrope cal notified cc      Patient Active Problem List   Diagnosis    Encounter for screening colonoscopy    Tubulovillous adenoma of colon    Hx of bariatric surgery    Colon cancer screening    Dysphagia    Labral tear of hip, degenerative    Right hip pain    Vitamin B12 deficiency    Impaired functional mobility and activity tolerance    History of colon polyps    Primary osteoarthritis of first carpometacarpal joint of left hand    Decreased range of motion    Weakness        Objective:      Physical Exam  Constitutional:       Appearance: Normal appearance.   HENT:      Head: Normocephalic.      Right Ear: Tympanic membrane normal.      Left Ear: Tympanic membrane normal.      Nose: Nose normal.   Cardiovascular:      Rate and Rhythm: Normal rate and regular rhythm.      Pulses: Normal pulses.      Heart sounds: Normal heart sounds.   Pulmonary:      Effort: Pulmonary effort is normal.      Breath sounds: Normal breath sounds.   Abdominal:      General: Abdomen is flat. Bowel sounds are normal.      Palpations: Abdomen is soft.   Musculoskeletal:         General: Normal range of motion.      Cervical back: Normal range of motion and neck supple.   Skin:     General: Skin is warm and dry.   Neurological:      General: No focal deficit present.      Mental Status: She is alert and oriented to person, place, and time.   Psychiatric:         Mood and Affect: Mood normal.         Assessment:       Problem List Items Addressed This Visit          Endocrine    Hx of bariatric  surgery    Relevant Orders    Comprehensive Metabolic Panel    CBC Auto Differential    Vitamin D 25 hydroxy    T4, FREE    VITAMIN B12    VITAMIN B1    VITAMIN B6    IRON AND TIBC     Other Visit Diagnoses       Class 1 obesity without serious comorbidity with body mass index (BMI) of 30.0 to 30.9 in adult, unspecified obesity type    -  Primary    Relevant Orders    Lipid Panel    Hemoglobin A1C    TSH            Plan:         Kateryna was seen today for follow-up.    Diagnoses and all orders for this visit:    Class 1 obesity without serious comorbidity with body mass index (BMI) of 30.0 to 30.9 in adult, unspecified obesity type  -     phentermine (ADIPEX-P) 37.5 mg tablet; Take 1 tablet (37.5 mg total) by mouth before breakfast  Doing well on Adipex     Hx of bariatric surgery  -     Comprehensive Metabolic Panel; Future  -     CBC Auto Differential; Future  -     Vitamin D 25 hydroxy; Future  -     T4, FREE; Future  -     VITAMIN B12; Future  -     VITAMIN B1; Future  -     VITAMIN B6; Future  -     IRON AND TIBC; Future  Check labs before next visit.     Reactive Depression   -     buPROPion (WELLBUTRIN XL) 300 MG 24 hr tablet; Take 1 tablet (300 mg total) by mouth once daily.  Will increase to 300 mg daily. Has been doing well on increased dose.        Follow up in 5 months             Latisha Garg MD   Internal Medicine   Primary Care

## 2023-09-28 ENCOUNTER — OFFICE VISIT (OUTPATIENT)
Dept: ORTHOPEDICS | Facility: CLINIC | Age: 63
End: 2023-09-28
Payer: COMMERCIAL

## 2023-09-28 VITALS — BODY MASS INDEX: 30.36 KG/M2 | WEIGHT: 165 LBS | HEIGHT: 62 IN

## 2023-09-28 DIAGNOSIS — M18.12 PRIMARY OSTEOARTHRITIS OF FIRST CARPOMETACARPAL JOINT OF LEFT HAND: Primary | ICD-10-CM

## 2023-09-28 PROCEDURE — 99024 PR POST-OP FOLLOW-UP VISIT: ICD-10-PCS | Mod: S$GLB,,, | Performed by: ORTHOPAEDIC SURGERY

## 2023-09-28 PROCEDURE — 1159F PR MEDICATION LIST DOCUMENTED IN MEDICAL RECORD: ICD-10-PCS | Mod: CPTII,S$GLB,, | Performed by: ORTHOPAEDIC SURGERY

## 2023-09-28 PROCEDURE — 99024 POSTOP FOLLOW-UP VISIT: CPT | Mod: S$GLB,,, | Performed by: ORTHOPAEDIC SURGERY

## 2023-09-28 PROCEDURE — 1159F MED LIST DOCD IN RCRD: CPT | Mod: CPTII,S$GLB,, | Performed by: ORTHOPAEDIC SURGERY

## 2023-09-28 PROCEDURE — 3044F PR MOST RECENT HEMOGLOBIN A1C LEVEL <7.0%: ICD-10-PCS | Mod: CPTII,S$GLB,, | Performed by: ORTHOPAEDIC SURGERY

## 2023-09-28 PROCEDURE — 99999 PR PBB SHADOW E&M-EST. PATIENT-LVL III: ICD-10-PCS | Mod: PBBFAC,,, | Performed by: ORTHOPAEDIC SURGERY

## 2023-09-28 PROCEDURE — 3008F PR BODY MASS INDEX (BMI) DOCUMENTED: ICD-10-PCS | Mod: CPTII,S$GLB,, | Performed by: ORTHOPAEDIC SURGERY

## 2023-09-28 PROCEDURE — 99999 PR PBB SHADOW E&M-EST. PATIENT-LVL III: CPT | Mod: PBBFAC,,, | Performed by: ORTHOPAEDIC SURGERY

## 2023-09-28 PROCEDURE — 3008F BODY MASS INDEX DOCD: CPT | Mod: CPTII,S$GLB,, | Performed by: ORTHOPAEDIC SURGERY

## 2023-09-28 PROCEDURE — 3044F HG A1C LEVEL LT 7.0%: CPT | Mod: CPTII,S$GLB,, | Performed by: ORTHOPAEDIC SURGERY

## 2023-09-28 NOTE — PROGRESS NOTES
Subjective:      Patient ID: Kateryna Weber is a 62 y.o. female.  Chief Complaint: Post-op Evaluation of the Left Wrist      HPI  Kateryna Weber is a  62 y.o. female presenting today for post op visit.  She is s/p CMC arthroplasty left thumb now 6 weeks postop she is doing well currently in therapy pain minimal.     Review of patient's allergies indicates:   Allergen Reactions    No known drug allergies          Current Outpatient Medications   Medication Sig Dispense Refill    buPROPion (WELLBUTRIN XL) 300 MG 24 hr tablet Take 1 tablet (300 mg total) by mouth once daily. 30 tablet 11    estradioL (ESTRACE) 0.01 % (0.1 mg/gram) vaginal cream Place 1 g vaginally once daily. Place 1 g vaginally every night for two weeks then twice per week 42.5 g 6    flu vacc [id number] 6mos up,PF, (FLUARIX QUAD 5465-3267, PF,) 60 mcg (15 mcg x 4)/0.5 mL Syrg given by [facility] 0.5 mL 0    ibuprofen (ADVIL,MOTRIN) 600 MG tablet TAKE 1 TABLET(600 MG) BY MOUTH TWICE DAILY WITH MEALS 60 tablet 1    multivitamin capsule Take 1 capsule by mouth once daily.      phentermine (ADIPEX-P) 37.5 mg tablet Take 1 tablet (37.5 mg total) by mouth before breakfast. 30 tablet 3     No current facility-administered medications for this visit.     Facility-Administered Medications Ordered in Other Visits   Medication Dose Route Frequency Provider Last Rate Last Admin    lactated ringers infusion   Intravenous Continuous Tamara Pantoja DNP        LIDOcaine (PF) 10 mg/ml (1%) injection 10 mg  1 mL Intradermal Once Tamara Pantoja DNP           No past medical history on file.    Past Surgical History:   Procedure Laterality Date    ABDOMINAL SURGERY  2000    tummy tuck    APPENDECTOMY      COLONOSCOPY N/A 4/8/2019    Procedure: COLONOSCOPY;  Surgeon: Ilya Arauz MD;  Location[facility] ([address] [place] [address]);  Service: Endoscopy;  Laterality: N/A;  Pt requests Dr. Davonte MONAE  Pt called to r/s due to being out of town, Pt did not want to wait until 5/2019 for next  "available appt.,Pt stated she would like to keep 4/8/19- ERW3/26/19@1432    GASTRIC BYPASS  2007    Why Weight Dr. Paul    HYSTERECTOMY      INTERPOSITION ARTHROPLASTY OF CARPOMETACARPAL JOINTS Left 8/15/2023    Procedure: INTERPOSITION ARTHROPLASTY, CMC JOINT;  Surgeon: Tomas Sy Jr., MD;  Location: [facility];  Service: Orthopedics;  Laterality: Left;  need arthrex tightrope cal notified cc       OBJECTIVE:   PHYSICAL EXAM:  Height: 5' 2" (157.5 cm) Weight: 74.8 kg (165 lb)  Vitals:    09/28/23 1106   Weight: 74.8 kg (165 lb)   Height: 5' 2" (1.575 m)   PainSc:   2     Ortho/SPM Exam  Examination left thumb incision well healed thumb good position range of motion improving good stability sensation intact no evidence of infection       RADIOGRAPHS:  None  Comments: I have personally reviewed the imaging and I agree with the above radiologist's report.    ASSESSMENT/PLAN:     IMPRESSION:  Status post CMC arthroplasty left thumb    PLAN:  Continue therapy  Wean out of the splint   Increase activities as tolerated   She may want to consider surgery for the right thumb before the end of the year    FOLLOW UP:  1 month    Disclaimer: This note has been generated using voice-recognition software. There may be typographical errors that have been missed during proof-reading.     "

## 2023-11-02 ENCOUNTER — HOSPITAL ENCOUNTER (OUTPATIENT)
Dept: RADIOLOGY | Facility: HOSPITAL | Age: 63
Discharge: HOME | End: 2023-11-02
Attending: ORTHOPAEDIC SURGERY
Payer: COMMERCIAL

## 2023-11-02 ENCOUNTER — OFFICE VISIT (OUTPATIENT)
Dept: ORTHOPEDICS | Facility: CLINIC | Age: 63
End: 2023-11-02
Payer: COMMERCIAL

## 2023-11-02 VITALS — HEIGHT: 62 IN | BODY MASS INDEX: 30.18 KG/M2

## 2023-11-02 DIAGNOSIS — M18.0 PRIMARY OSTEOARTHRITIS OF BOTH FIRST CARPOMETACARPAL JOINTS: Primary | ICD-10-CM

## 2023-11-02 DIAGNOSIS — M79.641 RIGHT HAND PAIN: Primary | ICD-10-CM

## 2023-11-02 DIAGNOSIS — M79.641 RIGHT HAND PAIN: ICD-10-CM

## 2023-11-02 PROCEDURE — 3008F PR BODY MASS INDEX (BMI) DOCUMENTED: ICD-10-PCS | Mod: CPTII,S$GLB,, | Performed by: ORTHOPAEDIC SURGERY

## 2023-11-02 PROCEDURE — 1159F MED LIST DOCD IN RCRD: CPT | Mod: CPTII,S$GLB,, | Performed by: ORTHOPAEDIC SURGERY

## 2023-11-02 PROCEDURE — 99999 PR PBB SHADOW E&M-EST. PATIENT-LVL II: ICD-10-PCS | Mod: PBBFAC,,, | Performed by: ORTHOPAEDIC SURGERY

## 2023-11-02 PROCEDURE — 99214 OFFICE O/P EST MOD 30 MIN: CPT | Mod: 24,S$GLB,, | Performed by: ORTHOPAEDIC SURGERY

## 2023-11-02 PROCEDURE — 99999 PR PBB SHADOW E&M-EST. PATIENT-LVL II: CPT | Mod: PBBFAC,,, | Performed by: ORTHOPAEDIC SURGERY

## 2023-11-02 PROCEDURE — 1159F PR MEDICATION LIST DOCUMENTED IN MEDICAL RECORD: ICD-10-PCS | Mod: CPTII,S$GLB,, | Performed by: ORTHOPAEDIC SURGERY

## 2023-11-02 PROCEDURE — 3044F PR MOST RECENT HEMOGLOBIN A1C LEVEL <7.0%: ICD-10-PCS | Mod: CPTII,S$GLB,, | Performed by: ORTHOPAEDIC SURGERY

## 2023-11-02 PROCEDURE — 73130 XR HAND COMPLETE 3 VIEW RIGHT: ICD-10-PCS | Mod: 26,RT,, | Performed by: RADIOLOGY

## 2023-11-02 PROCEDURE — 73130 X-RAY EXAM OF HAND: CPT | Mod: TC,PN,RT

## 2023-11-02 PROCEDURE — 99214 PR OFFICE/OUTPT VISIT, EST, LEVL IV, 30-39 MIN: ICD-10-PCS | Mod: 24,S$GLB,, | Performed by: ORTHOPAEDIC SURGERY

## 2023-11-02 PROCEDURE — 3008F BODY MASS INDEX DOCD: CPT | Mod: CPTII,S$GLB,, | Performed by: ORTHOPAEDIC SURGERY

## 2023-11-02 PROCEDURE — 3044F HG A1C LEVEL LT 7.0%: CPT | Mod: CPTII,S$GLB,, | Performed by: ORTHOPAEDIC SURGERY

## 2023-11-02 PROCEDURE — 73130 X-RAY EXAM OF HAND: CPT | Mod: 26,RT,, | Performed by: RADIOLOGY

## 2023-11-02 RX ORDER — CEFAZOLIN SODIUM 2 G/50ML
2 SOLUTION INTRAVENOUS
Status: CANCELLED | OUTPATIENT
Start: 2023-11-02

## 2023-11-02 NOTE — H&P (VIEW-ONLY)
CC:  CMC arthritis right thumb        HPI:  Kateryna Weber is a very pleasant 62 y.o. female with ongoing symptoms right thumb at the base related to gripping   She has pain at the base of the right thumb   No numbness or tingling reported   She did have left thumb CMC arthroplasty about 3 months ago with good results she is very pleased with the results on her left hand  She would like to proceed with surgery for the right thumb and hand         PAST MEDICAL HISTORY: No past medical history on file.  PAST SURGICAL HISTORY:   Past Surgical History:   Procedure Laterality Date    ABDOMINAL SURGERY  2000    tummy tuck    APPENDECTOMY      COLONOSCOPY N/A 4/8/2019    Procedure: COLONOSCOPY;  Surgeon: Ilya Arauz MD;  Location: [facility] ([address] [place] [address]);  Service: Endoscopy;  Laterality: N/A;  Pt requests Dr. Arauz - SHANTELW  Pt called to r/s due to being out of town, Pt did not want to wait until 5/2019 for next available appt.,Pt stated she would like to keep 4/8/19- ERW3/26/19@1432    GASTRIC BYPASS  2007    Why Weight Dr. Paul    HYSTERECTOMY      INTERPOSITION ARTHROPLASTY OF CARPOMETACARPAL JOINTS Left 8/15/2023    Procedure: INTERPOSITION ARTHROPLASTY, CMC JOINT;  Surgeon: Tomas Sy Jr., MD;  Location: [facility];  Service: Orthopedics;  Laterality: Left;  need arthrex tightrope cal notified cc     FAMILY HISTORY:   Family History   Problem Relation Age of Onset    Melanoma Neg Hx      SOCIAL HISTORY:   Social History     Socioeconomic History    Marital status:    Tobacco Use    Smoking status: Never    Smokeless tobacco: Never   Substance and Sexual Activity    Alcohol use: Yes     Comment: occ    Drug use: No    Sexual activity: Yes     Partners: Male     Birth control/protection: See Surgical Hx     Comment:      Social Determinants of Health     Financial Resource Strain: Unknown (3/19/2021)    Overall Financial Resource Strain (CARDIA)     Difficulty of Paying Living Expenses: Patient  refused   Food Insecurity: Unknown (3/19/2021)    Hunger Vital Sign     Worried About Running Out of Food in the Last Year: Patient refused     Ran Out of Food in the Last Year: Patient refused   Transportation Needs: Unknown (3/19/2021)    PRAPARE - Transportation     Lack of Transportation (Medical): Patient refused     Lack of Transportation (Non-Medical): Patient refused   Physical Activity: Unknown (3/19/2021)    Exercise Vital Sign     Days of Exercise per Week: Patient refused   Stress: No Stress Concern Present (1/25/2021)    [employer] - Occupational Stress Questionnaire     Feeling of Stress : Only a little   Social Connections: Unknown (3/19/2021)    Social Connection and Isolation Panel [NHANES]     Frequency of Communication with Friends and Family: Patient refused     Frequency of Social Gatherings with Friends and Family: Patient refused     Active Member of Clubs or Organizations: Patient refused     Attends Club or Organization Meetings: Patient refused     Marital Status: Patient refused       MEDICATIONS:   Current Outpatient Medications:     buPROPion (WELLBUTRIN XL) 300 MG 24 hr tablet, Take 1 tablet (300 mg total) by mouth once daily., Disp: 30 tablet, Rfl: 11    estradioL (ESTRACE) 0.01 % (0.1 mg/gram) vaginal cream, Place 1 g vaginally once daily. Place 1 g vaginally every night for two weeks then twice per week, Disp: 42.5 g, Rfl: 6    flu vacc [id number] 6mos up,PF, (FLUARIX QUAD 9940-8917, PF,) 60 mcg (15 mcg x 4)/0.5 mL Syrg, given by [facility], Disp: 0.5 mL, Rfl: 0    ibuprofen (ADVIL,MOTRIN) 600 MG tablet, TAKE 1 TABLET(600 MG) BY MOUTH TWICE DAILY WITH MEALS, Disp: 60 tablet, Rfl: 1    multivitamin capsule, Take 1 capsule by mouth once daily., Disp: , Rfl:     phentermine (ADIPEX-P) 37.5 mg tablet, Take 1 tablet (37.5 mg total) by mouth before breakfast., Disp: 30 tablet, Rfl: 3  No current facility-administered medications for this  "visit.    Facility-Administered Medications Ordered in Other Visits:     lactated ringers infusion, , Intravenous, Continuous, Tamara Pantoja DNP    LIDOcaine (PF) 10 mg/ml (1%) injection 10 mg, 1 mL, Intradermal, Once, Tamara Pantoja DNP  ALLERGIES:   Review of patient's allergies indicates:   Allergen Reactions    No known drug allergies        Review of Systems:  Constitutional: no fever or chills  ENT: no nasal congestion or sore throat  Respiratory: no cough or shortness of breath  Cardiovascular: no chest pain or palpitations  Gastrointestinal: no nausea or vomiting, PUD, GERD, NSAID intolerance  Genitourinary: no hematuria or dysuria  Integument/Breast: no rash or pruritis  Hematologic/Lymphatic: no easy bruising or lymphadenopathy  Musculoskeletal: see HPI  Neurological: no seizures or tremors  Behavioral/Psych: no auditory or visual hallucinations      Physical Exam   Vitals:    11/02/23 1017   Height: 5' 2" (1.575 m)   PainSc: 0-No pain   PainLoc: Hand       Constitutional: Oriented to person, place, and time. Appears well-developed and well-nourished.   HENT:   Head: Normocephalic and atraumatic.   Nose: Nose normal.   Eyes: No scleral icterus.   Neck: Normal range of motion.   Cardiovascular: Normal rate and regular rhythm.    Pulses:       Radial pulses are 2+ on the right side, and 2+ on the left side.   Pulmonary/Chest: Effort normal and breath sounds normal.   Abdominal: Soft.   Neurological: Alert and oriented to person, place, and time.   Skin: Skin is warm.   Psychiatric: Normal mood and affect.     MUSCULOSKELETAL UPPER EXTREMITY:  Examination right hand there is tenderness at the base of the right thumb bony enlargement is noted positive grind test mild crepitation   strength decreased   Tinel sign negative               Diagnostic Studies:  AP lateral x-ray right hand show severe arthritic change CMC joint right thumb        Assessment:  CMC arthritis right thumb severe    Plan:  She " "would like to set up surgery outpatient for CMC arthroplasty right thumb      The risks and benefits of surgery were discussed with the patient today and they understand.  The consent was signed in the office for surgery.      Tomas Sy MD (Jay)  [facility]  Orthopedic Upper Extremity Surgery       "

## 2023-11-24 ENCOUNTER — HOSPITAL ENCOUNTER (OUTPATIENT)
Facility: HOSPITAL | Age: 63
Discharge: HOME | End: 2023-11-24
Attending: ORTHOPAEDIC SURGERY | Admitting: ORTHOPAEDIC SURGERY
Payer: COMMERCIAL

## 2023-11-24 ENCOUNTER — ANESTHESIA EVENT (OUTPATIENT)
Dept: SURGERY | Facility: HOSPITAL | Age: 63
End: 2023-11-24
Payer: COMMERCIAL

## 2023-11-24 ENCOUNTER — ANESTHESIA (OUTPATIENT)
Dept: SURGERY | Facility: HOSPITAL | Age: 63
End: 2023-11-24
Payer: COMMERCIAL

## 2023-11-24 VITALS
BODY MASS INDEX: 30.34 KG/M2 | RESPIRATION RATE: 18 BRPM | OXYGEN SATURATION: 98 % | SYSTOLIC BLOOD PRESSURE: 133 MMHG | TEMPERATURE: 98 F | WEIGHT: 164.88 LBS | HEIGHT: 62 IN | DIASTOLIC BLOOD PRESSURE: 71 MMHG | HEART RATE: 72 BPM

## 2023-11-24 DIAGNOSIS — M18.0 PRIMARY OSTEOARTHRITIS OF BOTH FIRST CARPOMETACARPAL JOINTS: ICD-10-CM

## 2023-11-24 PROCEDURE — 27201423 OPTIME MED/SURG SUP & DEVICES STERILE SUPPLY: Performed by: ORTHOPAEDIC SURGERY

## 2023-11-24 PROCEDURE — C1889 IMPLANT/INSERT DEVICE, NOC: HCPCS | Performed by: ORTHOPAEDIC SURGERY

## 2023-11-24 PROCEDURE — 63600175 PHARM REV CODE 636 W HCPCS: Performed by: NURSE ANESTHETIST, CERTIFIED REGISTERED

## 2023-11-24 PROCEDURE — 64415: ICD-10-PCS | Mod: 59,RT,, | Performed by: ANESTHESIOLOGY

## 2023-11-24 PROCEDURE — 25447 PR REPAIR INTERCARP/CARP-METACARP JT: ICD-10-PCS | Mod: RT,,, | Performed by: ORTHOPAEDIC SURGERY

## 2023-11-24 PROCEDURE — C1713 ANCHOR/SCREW BN/BN,TIS/BN: HCPCS | Performed by: ORTHOPAEDIC SURGERY

## 2023-11-24 PROCEDURE — 64415 NJX AA&/STRD BRCH PLXS IMG: CPT | Mod: 59,RT | Performed by: ANESTHESIOLOGY

## 2023-11-24 PROCEDURE — 36000708 HC OR TIME LEV III 1ST 15 MIN: Performed by: ORTHOPAEDIC SURGERY

## 2023-11-24 PROCEDURE — 71000016 HC POSTOP RECOV ADDL HR: Performed by: ORTHOPAEDIC SURGERY

## 2023-11-24 PROCEDURE — 25000003 PHARM REV CODE 250: Performed by: NURSE ANESTHETIST, CERTIFIED REGISTERED

## 2023-11-24 PROCEDURE — D9220A PRA ANESTHESIA: ICD-10-PCS | Mod: ANES,,, | Performed by: STUDENT IN AN ORGANIZED HEALTH CARE EDUCATION/TRAINING PROGRAM

## 2023-11-24 PROCEDURE — 37000009 HC ANESTHESIA EA ADD 15 MINS: Performed by: ORTHOPAEDIC SURGERY

## 2023-11-24 PROCEDURE — 37000008 HC ANESTHESIA 1ST 15 MINUTES: Performed by: ORTHOPAEDIC SURGERY

## 2023-11-24 PROCEDURE — 25447 ARTHRP NTRCRP/CRP/MTCR NTRPS: CPT | Mod: RT,,, | Performed by: ORTHOPAEDIC SURGERY

## 2023-11-24 PROCEDURE — 36000709 HC OR TIME LEV III EA ADD 15 MIN: Performed by: ORTHOPAEDIC SURGERY

## 2023-11-24 PROCEDURE — 63600175 PHARM REV CODE 636 W HCPCS: Performed by: ANESTHESIOLOGY

## 2023-11-24 PROCEDURE — D9220A PRA ANESTHESIA: ICD-10-PCS | Mod: CRNA,,, | Performed by: NURSE ANESTHETIST, CERTIFIED REGISTERED

## 2023-11-24 PROCEDURE — D9220A PRA ANESTHESIA: Mod: CRNA,,, | Performed by: NURSE ANESTHETIST, CERTIFIED REGISTERED

## 2023-11-24 PROCEDURE — 71000015 HC POSTOP RECOV 1ST HR: Performed by: ORTHOPAEDIC SURGERY

## 2023-11-24 PROCEDURE — D9220A PRA ANESTHESIA: Mod: ANES,,, | Performed by: STUDENT IN AN ORGANIZED HEALTH CARE EDUCATION/TRAINING PROGRAM

## 2023-11-24 DEVICE — IMPLANTABLE DEVICE: Type: IMPLANTABLE DEVICE | Site: HAND | Status: FUNCTIONAL

## 2023-11-24 RX ORDER — HYDROCODONE BITARTRATE AND ACETAMINOPHEN 7.5; 325 MG/1; MG/1
1 TABLET ORAL EVERY 4 HOURS PRN
Qty: 30 TABLET | Refills: 0 | Status: SHIPPED | OUTPATIENT
Start: 2023-11-24

## 2023-11-24 RX ORDER — ACETAMINOPHEN 325 MG/1
650 TABLET ORAL EVERY 4 HOURS PRN
Status: DISCONTINUED | OUTPATIENT
Start: 2023-11-24 | End: 2023-11-24 | Stop reason: HOSPADM

## 2023-11-24 RX ORDER — MIDAZOLAM HYDROCHLORIDE 1 MG/ML
INJECTION, SOLUTION INTRAMUSCULAR; INTRAVENOUS
Status: DISCONTINUED | OUTPATIENT
Start: 2023-11-24 | End: 2023-11-24

## 2023-11-24 RX ORDER — ONDANSETRON 8 MG/1
8 TABLET, ORALLY DISINTEGRATING ORAL EVERY 8 HOURS PRN
Status: DISCONTINUED | OUTPATIENT
Start: 2023-11-24 | End: 2023-11-24 | Stop reason: HOSPADM

## 2023-11-24 RX ORDER — CEFAZOLIN SODIUM 2 G/50ML
2 SOLUTION INTRAVENOUS
Status: DISCONTINUED | OUTPATIENT
Start: 2023-11-24 | End: 2023-11-24 | Stop reason: HOSPADM

## 2023-11-24 RX ORDER — CEFAZOLIN SODIUM 1 G/3ML
INJECTION, POWDER, FOR SOLUTION INTRAMUSCULAR; INTRAVENOUS
Status: DISCONTINUED | OUTPATIENT
Start: 2023-11-24 | End: 2023-11-24

## 2023-11-24 RX ORDER — ONDANSETRON 2 MG/ML
INJECTION INTRAMUSCULAR; INTRAVENOUS
Status: DISCONTINUED | OUTPATIENT
Start: 2023-11-24 | End: 2023-11-24

## 2023-11-24 RX ORDER — PROPOFOL 10 MG/ML
VIAL (ML) INTRAVENOUS
Status: DISCONTINUED | OUTPATIENT
Start: 2023-11-24 | End: 2023-11-24

## 2023-11-24 RX ORDER — OXYCODONE HYDROCHLORIDE 5 MG/1
10 TABLET ORAL EVERY 4 HOURS PRN
Status: DISCONTINUED | OUTPATIENT
Start: 2023-11-24 | End: 2023-11-24 | Stop reason: HOSPADM

## 2023-11-24 RX ORDER — KETOROLAC TROMETHAMINE 30 MG/ML
30 INJECTION, SOLUTION INTRAMUSCULAR; INTRAVENOUS ONCE
Status: DISCONTINUED | OUTPATIENT
Start: 2023-11-24 | End: 2023-11-24 | Stop reason: HOSPADM

## 2023-11-24 RX ORDER — ACETAMINOPHEN 10 MG/ML
INJECTION, SOLUTION INTRAVENOUS
Status: DISCONTINUED | OUTPATIENT
Start: 2023-11-24 | End: 2023-11-24

## 2023-11-24 RX ORDER — PROPOFOL 10 MG/ML
VIAL (ML) INTRAVENOUS CONTINUOUS PRN
Status: DISCONTINUED | OUTPATIENT
Start: 2023-11-24 | End: 2023-11-24

## 2023-11-24 RX ADMIN — CEFAZOLIN 2 G: 330 INJECTION, POWDER, FOR SOLUTION INTRAMUSCULAR; INTRAVENOUS at 11:11

## 2023-11-24 RX ADMIN — ACETAMINOPHEN 1000 MG: 10 INJECTION, SOLUTION INTRAVENOUS at 11:11

## 2023-11-24 RX ADMIN — PROPOFOL 150 MCG/KG/MIN: 10 INJECTION, EMULSION INTRAVENOUS at 11:11

## 2023-11-24 RX ADMIN — PROPOFOL 100 MG: 10 INJECTION, EMULSION INTRAVENOUS at 11:11

## 2023-11-24 RX ADMIN — MIDAZOLAM 2 MG: 1 INJECTION INTRAMUSCULAR; INTRAVENOUS at 10:11

## 2023-11-24 RX ADMIN — SODIUM CHLORIDE: 0.9 INJECTION, SOLUTION INTRAVENOUS at 11:11

## 2023-11-24 RX ADMIN — ONDANSETRON 4 MG: 2 INJECTION, SOLUTION INTRAMUSCULAR; INTRAVENOUS at 12:11

## 2023-11-24 NOTE — ANESTHESIA PROCEDURE NOTES
Right supraclav    Patient location during procedure: pre-op   Block not for primary anesthetic.  Reason for block: at surgeon's request and post-op pain management   Post-op Pain Location: right hand   Start time: 11/24/2023 10:22 AM  Timeout: 11/24/2023 10:21 AM   End time: 11/24/2023 10:25 AM    Staffing  Authorizing Provider: Juan Glez MD  Performing Provider: Juan Glez MD    Staffing  Performed by: Juan Glez MD  Authorized by: Juan Glez MD    Preanesthetic Checklist  Completed: patient identified, IV checked, site marked, risks and benefits discussed, surgical consent, monitors and equipment checked, pre-op evaluation and timeout performed  Peripheral Block  Patient position: supine  Prep: ChloraPrep  Patient monitoring: heart rate, cardiac monitor, continuous pulse ox, continuous capnometry and frequent blood pressure checks  Block type: supraclavicular  Laterality: right  Injection technique: single shot  Needle  Needle type: Stimuplex   Needle gauge: 22 G  Needle length: 2 in  Needle localization: anatomical landmarks and ultrasound guidance  Needle insertion depth: 3 cm   -ultrasound image captured on disc.  Assessment  Injection assessment: negative aspiration, negative parasthesia and local visualized surrounding nerve  Paresthesia pain: none  Heart rate change: no  Slow fractionated injection: yes        Additional Notes  VSS.  [facility] RN monitoring vitals throughout procedure.  Patient tolerated procedure well.

## 2023-11-24 NOTE — ANESTHESIA PREPROCEDURE EVALUATION
11/24/2023  Kateryna Weber is a 62 y.o., female.      Pre-op Assessment     I have reviewed the Nursing Notes.    I have reviewed the Medications.     Review of Systems  Anesthesia Hx:  No problems with previous Anesthesia             Denies Family Hx of Anesthesia complications.     Social:  Non-Smoker, No Alcohol Use       Hematology/Oncology:  Hematology Normal   Oncology Normal                                   EENT/Dental:  EENT/Dental Normal           Cardiovascular:  Cardiovascular Normal Exercise tolerance: good                                           Pulmonary:  Pulmonary Normal                       Renal/:  Renal/ Normal                 Hepatic/GI:  Hepatic/GI Normal                 Musculoskeletal:  Musculoskeletal Normal                Neurological:  Neurology Normal                                      Endocrine:  Endocrine Normal                Physical Exam  General: Well nourished    Airway:  Mallampati: II / II  Mouth Opening: Normal  TM Distance: Normal  Tongue: Normal  Neck ROM: Normal ROM    Dental:  Intact        Anesthesia Plan  Type of Anesthesia, risks & benefits discussed:    Anesthesia Type: Gen Natural Airway, Regional  Intra-op Monitoring Plan: Standard ASA Monitors  Post Op Pain Control Plan: multimodal analgesia  Induction:  IV  Airway Plan: Direct, Post-Induction  Informed Consent: Informed consent signed with the Patient and all parties understand the risks and agree with anesthesia plan.  All questions answered.   ASA Score: 2    Ready For Surgery From Anesthesia Perspective.     .

## 2023-11-24 NOTE — OP NOTE
[facility] - [facility] (Hospital)  Operative Note      Date of Procedure: 11/24/2023     Procedure: Procedure(s) (LRB):  INTERPOSITION ARTHROPLASTY, CMC JOINT (Right)     Surgeon(s) and Role:     * Tomas Sy Jr., MD - Primary    Assisting Surgeon: None    Pre-Operative Diagnosis: Primary osteoarthritis of both first carpometacarpal joints [M18.0]    Post-Operative Diagnosis: Post-Op Diagnosis Codes:     * Primary osteoarthritis of both first carpometacarpal joints [M18.0]    Anesthesia: Regional    Operative Findings (including complications, if any):  CMC arthritis right thumb    Description of Technical Procedures:     Preop diagnosis:  Carpometacarpal arthritis right thumb.      Postop diagnosis: Same.      Operative procedure:  Carpometacarpal arthroplasty right thumb using Arthrex swivel lock suspension plasty and capsular interposition.      Surgeon: Yossi.      First assistant: Astrid    Anesthesia:  Regional block.      EBL:  Minimal.      Complications:  None.      Specimen:  None.      Operative procedure in detail as follows:     After operative consent was obtained patient brought the operating room placed supine operating table.  Anesthesia by regional block performed by the anesthesia staff.  Tourniquet applied to the right arm right upper extremity prepped and draped out in the normal sterile fashion.  Esmarch used to exsanguinated tourniquet inflated 225 mmHg.      Following this a curved L-shaped incision made over the base of the right thumb with a 15 blade.  Full-thickness skin flaps carefully raised superficial nerve branches identified and protected.  The 1st dorsal compartment opened longitudinally and the APL and EPB tendons retracted to either side.  The radial artery was identified and traced from volar to dorsal crossing branches coagulated the main artery protected.  T-shaped arthrotomy made over the CMC joint with the [place] blade.  Subperiosteal dissection used to shell out the  trapezium which was degenerative on both sides.  Was removed in several large fragments with the oscillating saw and rongeur.      Hemostasis achieved with the Bovie.  A K-wire then drilled to the base of the 2nd metacarpal across to the far cortex.  After over drilling with the cannulated drill the all suture Arthrex anchor was carefully impacted achieving good purchase on the far cortex of the 2nd metacarpal.  A 2nd drill hole then made at the base of the 1st metacarpal at a 45 degree angle on the radial side and following this insertion of the SwiveLock anchor with the SutureTape made a construct which gave suspension to the base of the 1st metacarpal.  Good stability was achieved with good range of motion.      Capsular flaps were then created at the base of the 1st metacarpal and a suture anchor was placed into the trapezoid.  The capsular flaps were double breasted over the base of the 1st metacarpal and then sewn into the suture anchor in the depths of the wound.  This gave stability to the 1st metacarpal with capsular interposition.  The wound then irrigated with antibiotic saline solution remaining capsule closed with 4-0 Vicryl and the skin closed with a running 5 O nylon horizontal mattress suture sterile dressing applied followed by well-padded thumb spica splint.  The tourniquet deflated and the patient brought to the recovery room in stable condition.  All sponge needle counts reported as correct no complications    Significant Surgical Tasks Conducted by the Assistant(s), if Applicable: retraction    Estimated Blood Loss (EBL): * No values recorded between 11/24/2023 11:48 AM and 11/24/2023 12:50 PM *           Implants:   Implant Name Type Inv. Item Serial No.  Lot No. LRB No. Used Action   Suture [place], [employer] SutureTak    ARTHREX [id number] Right 1 Implanted   FiberLock Suspension Implant Kit    ARTHREX [id number] Right 2 Implanted       Specimens:   Specimen (24h ago, onward)       None                    Condition: Good    Disposition: PACU - hemodynamically stable.    Attestation: I was present and scrubbed for the entire procedure.    Discharge Note    OUTCOME: Patient tolerated treatment/procedure well without complication and is now ready for discharge.    DISPOSITION: Home or Self Care    FINAL DIAGNOSIS:  Primary osteoarthritis of both first carpometacarpal joints    FOLLOWUP: In clinic    DISCHARGE INSTRUCTIONS:    Discharge Procedure Orders   Diet general     Leave dressing on - Keep it clean, dry, and intact until clinic visit     Call MD for:  temperature >100.4     Call MD for:  persistent nausea and vomiting     Call MD for:  severe uncontrolled pain     Keep surgical extremity elevated

## 2023-11-24 NOTE — TRANSFER OF CARE
"Anesthesia Transfer of Care Note    Patient: Kateryna Weber    Procedure(s) Performed: Procedure(s) (LRB):  INTERPOSITION ARTHROPLASTY, CMC JOINT (Right)    Patient location: OPS    Anesthesia Type: general    Transport from OR: Transported from OR on room air with adequate spontaneous ventilation    Post pain: adequate analgesia    Post assessment: no apparent anesthetic complications and tolerated procedure well    Post vital signs: stable    Level of consciousness: awake, alert and oriented    Nausea/Vomiting: no nausea/vomiting    Complications: none    Transfer of care protocol was followed    Last vitals: Visit Vitals  /72   Pulse 71   Temp 36.7 °C (98.1 °F) (Skin)   Resp 15   Ht 5' 2" (1.575 m)   Wt 74.8 kg (164 lb 14.5 oz)   SpO2 98%   Breastfeeding No   BMI 30.16 kg/m²     "

## 2023-11-24 NOTE — ANESTHESIA POSTPROCEDURE EVALUATION
Anesthesia Post Evaluation    Patient: Kateryna Weber    Procedure(s) Performed: Procedure(s) (LRB):  INTERPOSITION ARTHROPLASTY, CMC JOINT (Right)    Final Anesthesia Type: general      Patient location during evaluation: PACU  Patient participation: Yes- Able to Participate  Level of consciousness: awake and alert  Post-procedure vital signs: reviewed and stable  Pain management: adequate  Airway patency: patent    PONV status at discharge: No PONV  Anesthetic complications: no      Cardiovascular status: stable  Respiratory status: room air  Hydration status: euvolemic  Follow-up not needed.          Vitals Value Taken Time   /71 11/24/23 1345   Temp 36.7 °C (98.1 °F) 11/24/23 1345   Pulse 72 11/24/23 1345   Resp 18 11/24/23 1345   SpO2 96 11/24/23 1613         No case tracking events are documented in the log.      Pain/Delfina Score: Delfina Score: 10 (11/24/2023  1:54 PM)

## 2023-12-06 ENCOUNTER — OFFICE VISIT (OUTPATIENT)
Dept: ORTHOPEDICS | Facility: CLINIC | Age: 63
End: 2023-12-06
Payer: COMMERCIAL

## 2023-12-06 VITALS — WEIGHT: 164 LBS | BODY MASS INDEX: 30.18 KG/M2 | HEIGHT: 62 IN

## 2023-12-06 DIAGNOSIS — M18.0 PRIMARY OSTEOARTHRITIS OF BOTH FIRST CARPOMETACARPAL JOINTS: Primary | ICD-10-CM

## 2023-12-06 PROCEDURE — 3044F PR MOST RECENT HEMOGLOBIN A1C LEVEL <7.0%: ICD-10-PCS | Mod: CPTII,S$GLB,, | Performed by: ORTHOPAEDIC SURGERY

## 2023-12-06 PROCEDURE — 3044F HG A1C LEVEL LT 7.0%: CPT | Mod: CPTII,S$GLB,, | Performed by: ORTHOPAEDIC SURGERY

## 2023-12-06 PROCEDURE — 99999 PR PBB SHADOW E&M-EST. PATIENT-LVL III: CPT | Mod: PBBFAC,,, | Performed by: ORTHOPAEDIC SURGERY

## 2023-12-06 PROCEDURE — 99024 PR POST-OP FOLLOW-UP VISIT: ICD-10-PCS | Mod: S$GLB,,, | Performed by: ORTHOPAEDIC SURGERY

## 2023-12-06 PROCEDURE — 1159F MED LIST DOCD IN RCRD: CPT | Mod: CPTII,S$GLB,, | Performed by: ORTHOPAEDIC SURGERY

## 2023-12-06 PROCEDURE — 99999 PR PBB SHADOW E&M-EST. PATIENT-LVL III: ICD-10-PCS | Mod: PBBFAC,,, | Performed by: ORTHOPAEDIC SURGERY

## 2023-12-06 PROCEDURE — 99024 POSTOP FOLLOW-UP VISIT: CPT | Mod: S$GLB,,, | Performed by: ORTHOPAEDIC SURGERY

## 2023-12-06 PROCEDURE — 1159F PR MEDICATION LIST DOCUMENTED IN MEDICAL RECORD: ICD-10-PCS | Mod: CPTII,S$GLB,, | Performed by: ORTHOPAEDIC SURGERY

## 2023-12-06 NOTE — PROGRESS NOTES
Subjective:      Patient ID: Kateryna Weber is a 62 y.o. female.  Chief Complaint: Post-op Evaluation ([facility] )      HPI  Kateryna Weber is a  62 y.o. female presenting today for post op visit.  She is s/p CMC arthroplasty of the right thumb now 2 weeks postop  She is doing well pain minimal.     Review of patient's allergies indicates:   Allergen Reactions    No known drug allergies          Current Outpatient Medications   Medication Sig Dispense Refill    buPROPion (WELLBUTRIN XL) 300 MG 24 hr tablet Take 1 tablet (300 mg total) by mouth once daily. 30 tablet 11    estradioL (ESTRACE) 0.01 % (0.1 mg/gram) vaginal cream Place 1 g vaginally once daily. Place 1 g vaginally every night for two weeks then twice per week 42.5 g 6    ibuprofen (ADVIL,MOTRIN) 600 MG tablet TAKE 1 TABLET(600 MG) BY MOUTH TWICE DAILY WITH MEALS 60 tablet 1    multivitamin capsule Take 1 capsule by mouth once daily.      phentermine (ADIPEX-P) 37.5 mg tablet Take 1 tablet (37.5 mg total) by mouth before breakfast. 30 tablet 3    flu vacc [id number] 6mos up,PF, (FLUARIX QUAD [id number], PF,) 60 mcg (15 mcg x 4)/0.5 mL Syrg given by [facility] (Patient not taking: Reported on 12/6/2023) 0.5 mL 0    HYDROcodone-acetaminophen (NORCO) 7.5-325 mg per tablet Take 1 tablet by mouth every 4 (four) hours as needed for Pain. (Patient not taking: Reported on 12/6/2023) 30 tablet 0     No current facility-administered medications for this visit.     Facility-Administered Medications Ordered in Other Visits   Medication Dose Route Frequency Provider Last Rate Last Admin    lactated ringers infusion   Intravenous Continuous Tamara Pantoja DNP        LIDOcaine (PF) 10 mg/ml (1%) injection 10 mg  1 mL Intradermal Once Tamara Pantoja DNP           History reviewed. No pertinent past medical history.    Past Surgical History:   Procedure Laterality Date    ABDOMINAL SURGERY  2000    tummy tuck    APPENDECTOMY      COLONOSCOPY N/A 4/8/2019    Procedure: COLONOSCOPY;  " Surgeon: Ilya Arauz MD;  [facility]  Service: Endoscopy;  Laterality: N/A;  Pt requests Dr. Arauz - ERW  Pt called to r/s due to being out of town, Pt did not want to wait until 5/2019 for next available appt.,Pt stated she would like to keep 4/8/19- ERW3/26/19@1432    GASTRIC BYPASS  2007    Why Weight Dr. Paul    HYSTERECTOMY      INTERPOSITION ARTHROPLASTY OF CARPOMETACARPAL JOINTS Left 8/15/2023    Procedure: INTERPOSITION ARTHROPLASTY, CMC JOINT;  Surgeon: Tomas Sy Jr., MD;  Location: [facility];  Service: Orthopedics;  Laterality: Left;  need arthrex tightrope cal notified cc    INTERPOSITION ARTHROPLASTY OF CARPOMETACARPAL JOINTS Right 11/24/2023    Procedure: INTERPOSITION ARTHROPLASTY, CMC JOINT;  Surgeon: Tomsa Sy Jr., MD;  Location: [facility] OR;  Service: Orthopedics;  Laterality: Right;       OBJECTIVE:   PHYSICAL EXAM:  Height: 5' 2" (157.5 cm) Weight: 74.4 kg (164 lb)  Vitals:    12/06/23 1422   Weight: 74.4 kg (164 lb)   Height: 5' 2" (1.575 m)   PainSc: 0-No pain     Ortho/SPM Exam  Examination right hand incision looks good healing well sutures in place slight swelling minimal bruising no evidence of infection    RADIOGRAPHS:  None  Comments: I have personally reviewed the imaging and I agree with the above radiologist's report.    ASSESSMENT/PLAN:     IMPRESSION:  Status post CMC arthroplasty right thumb    PLAN:  Sutures removed instructed in appropriate wound care  Given a thumb spica splint part-time use   OT ordered   No heavy lifting       FOLLOW UP:  3-4 weeks    Disclaimer: This note has been generated using voice-recognition software. There may be typographical errors that have been missed during proof-reading.    " Pt owns a walker which she uses occasionally/assisted/supportive

## 2023-12-07 ENCOUNTER — TELEPHONE (OUTPATIENT)
Dept: ORTHOPEDICS | Facility: CLINIC | Age: 63
End: 2023-12-07
Payer: COMMERCIAL

## 2023-12-07 NOTE — TELEPHONE ENCOUNTER
----- Message from Elbert Portillo sent at 12/7/2023  8:15 AM CST -----  Contact: Pt  .Type:  Needs Medical Advice    Who Called: pt  Would the patient rather a call back or a response via [facility]?  Call back  Best Call Back Number: [phone]   Additional Information: Pt. Is calling to see if a brace is ready for  at the [place] location.

## 2024-01-10 ENCOUNTER — OFFICE VISIT (OUTPATIENT)
Dept: ORTHOPEDICS | Facility: CLINIC | Age: 64
End: 2024-01-10
Payer: COMMERCIAL

## 2024-01-10 DIAGNOSIS — M18.9 OSTEOARTHRITIS OF CARPOMETACARPAL (CMC) JOINT OF THUMB, UNSPECIFIED LATERALITY, UNSPECIFIED OSTEOARTHRITIS TYPE: Primary | ICD-10-CM

## 2024-01-10 DIAGNOSIS — M18.0 PRIMARY OSTEOARTHRITIS OF BOTH FIRST CARPOMETACARPAL JOINTS: ICD-10-CM

## 2024-01-10 PROCEDURE — 99024 POSTOP FOLLOW-UP VISIT: CPT | Mod: 95,,, | Performed by: ORTHOPAEDIC SURGERY

## 2024-01-10 RX ORDER — IBUPROFEN 600 MG/1
600 TABLET ORAL EVERY 6 HOURS PRN
Qty: 40 TABLET | Refills: 1 | Status: SHIPPED | OUTPATIENT
Start: 2024-01-10 | End: 2024-01-17

## 2024-01-16 ENCOUNTER — OFFICE VISIT (OUTPATIENT)
Dept: INTERNAL MEDICINE | Facility: CLINIC | Age: 64
End: 2024-01-16
Payer: COMMERCIAL

## 2024-01-16 DIAGNOSIS — E53.8 VITAMIN B12 DEFICIENCY: ICD-10-CM

## 2024-01-16 DIAGNOSIS — F32.9 REACTIVE DEPRESSION: ICD-10-CM

## 2024-01-16 DIAGNOSIS — Z98.84 HX OF BARIATRIC SURGERY: Primary | ICD-10-CM

## 2024-01-16 DIAGNOSIS — E66.9 CLASS 1 OBESITY WITHOUT SERIOUS COMORBIDITY WITH BODY MASS INDEX (BMI) OF 30.0 TO 30.9 IN ADULT, UNSPECIFIED OBESITY TYPE: ICD-10-CM

## 2024-01-16 PROCEDURE — 99214 OFFICE O/P EST MOD 30 MIN: CPT | Mod: 95,,, | Performed by: INTERNAL MEDICINE

## 2024-01-16 NOTE — PROGRESS NOTES
Subjective:       Patient ID: Kateryna Weber is a 63 y.o. female.    Chief Complaint: No chief complaint on file.      The patient location is: [place]  The chief complaint leading to consultation is: follow up     Visit type: audiovisual    Face to Face time with patient: 15 minutes  15 minutes of total time spent on the encounter, which includes face to face time and non-face to face time preparing to see the patient (eg, review of tests), Obtaining and/or reviewing separately obtained history, Documenting clinical information in the electronic or other health record, Independently interpreting results (not separately reported) and communicating results to the patient/family/caregiver, or Care coordination (not separately reported).         Each patient to whom he or she provides medical services by telemedicine is:  (1) informed of the relationship between the physician and patient and the respective role of any other health care provider with respect to management of the patient; and (2) notified that he or she may decline to receive medical services by telemedicine and may withdraw from such care at any time.    Notes:     HPI    Presents for follow up.      Hx of bariatric surgery in 2007. Follows [facility] bariatric medicine clinic. Has been on loemic, adipex, and diethypropion in the past. Not currently on any medications. Stopped ozempic when insurance stopped covering it.      Hx of depression on Wellbutrin. Tried to wean off medication but depression become worse. Now taking two tablets of 150 mg Does continue to have life stressors including daughter moving away and taking care of elderly parents.      Had surgery on thumb in August, now doing PT. Now starting to have pain in left thumb and plan is to start PT/OT      Health Maintenance:  Colon Cancer Screening: colonoscopy done in 2019 with polyps removed. Due again in 2024   Mammogram: normal May 2023   Hep C: negative in 2009   Lipids: normal 2023   Pap Smear:  s/p hysterectomy 2016   Vaccines:  up to date          Review of Systems   Constitutional:  Negative for activity change, appetite change and chills.   HENT:  Negative for ear pain, sinus pressure/congestion and sneezing.    Respiratory:  Negative for cough and shortness of breath.    Cardiovascular:  Negative for chest pain, palpitations and leg swelling.   Gastrointestinal:  Negative for abdominal distention, abdominal pain, constipation, diarrhea, nausea and vomiting.   Genitourinary:  Negative for dysuria and hematuria.   Musculoskeletal:  Negative for arthralgias, back pain and myalgias.   Neurological:  Negative for dizziness and headaches.   Psychiatric/Behavioral:  Negative for agitation. The patient is not nervous/anxious.          Objective:      Physical Exam  Constitutional:       General: She is not in acute distress.     Appearance: Normal appearance. She is not ill-appearing.   HENT:      Head: Normocephalic and atraumatic.   Pulmonary:      Effort: Pulmonary effort is normal.   Neurological:      Mental Status: She is alert.   Psychiatric:         Mood and Affect: Mood normal.         Behavior: Behavior normal.         Assessment:       Problem List Items Addressed This Visit          Endocrine    Vitamin B12 deficiency    Hx of bariatric surgery - Primary     Other Visit Diagnoses       Reactive depression        Class 1 obesity without serious comorbidity with body mass index (BMI) of 30.0 to 30.9 in adult, unspecified obesity type                Plan:       Diagnoses and all orders for this visit:    Hx of bariatric surgery  Class 1 obesity without serious comorbidity with body mass index (BMI) of 30.0 to 30.9 in adult, unspecified obesity type  -     phentermine (ADIPEX-P) 37.5 mg tablet; Take 1 tablet (37.5 mg total) by mouth before breakfast.  Restart phentermine today. Had come off for a few months and has gained some weight.   Follow up in bariatric clinic as needed.   Check labs end of month      Reactive depression  -     buPROPion (WELLBUTRIN XL) 300 MG 24 hr tablet; Take 1 tablet (300 mg total) by mouth once daily.  Doing well on wellbutrin.   Refilled today       Vitamin B12 deficiency  Check labs at the end of the month.          SAMUEL Aldridge

## 2024-01-17 ENCOUNTER — TELEPHONE (OUTPATIENT)
Dept: INTERNAL MEDICINE | Facility: CLINIC | Age: 64
End: 2024-01-17
Payer: COMMERCIAL

## 2024-01-17 RX ORDER — PHENTERMINE HYDROCHLORIDE 37.5 MG/1
37.5 TABLET ORAL
Qty: 30 TABLET | Refills: 3 | Status: SHIPPED | OUTPATIENT
Start: 2024-01-17

## 2024-01-17 RX ORDER — BUPROPION HYDROCHLORIDE 300 MG/1
300 TABLET ORAL DAILY
Qty: 90 TABLET | Refills: 3 | Status: SHIPPED | OUTPATIENT
Start: 2024-01-17 | End: 2025-01-16

## 2024-01-17 NOTE — TELEPHONE ENCOUNTER
----- Message from Latisha Garg MD sent at 1/17/2024  8:44 AM CST -----  Hey I ordered labs for her in September. Can we help her schedule for 1/28 thanks!

## 2024-01-26 ENCOUNTER — PATIENT MESSAGE (OUTPATIENT)
Dept: INTERNAL MEDICINE | Facility: CLINIC | Age: 64
End: 2024-01-26
Payer: COMMERCIAL

## 2024-02-07 ENCOUNTER — TELEPHONE (OUTPATIENT)
Dept: ORTHOPEDICS | Facility: CLINIC | Age: 64
End: 2024-02-07
Payer: COMMERCIAL

## 2024-02-07 NOTE — TELEPHONE ENCOUNTER
----- Message from Ruthie Grier sent at 2/7/2024 12:41 PM CST -----  Regarding: Reschedule Appt  Contact: Pt  Pt would like to Reschedule Post-op    Please call to advise    Phone [phone]    Thank You

## 2024-02-28 ENCOUNTER — TELEPHONE (OUTPATIENT)
Dept: ORTHOPEDICS | Facility: CLINIC | Age: 64
End: 2024-02-28
Payer: COMMERCIAL

## 2024-02-28 DIAGNOSIS — M79.643 PAIN OF HAND, UNSPECIFIED LATERALITY: Primary | ICD-10-CM

## 2024-06-10 ENCOUNTER — PATIENT MESSAGE (OUTPATIENT)
Dept: INTERNAL MEDICINE | Facility: CLINIC | Age: 64
End: 2024-06-10
Payer: COMMERCIAL

## 2024-06-12 DIAGNOSIS — Z12.31 OTHER SCREENING MAMMOGRAM: ICD-10-CM

## 2024-06-13 ENCOUNTER — OFFICE VISIT (OUTPATIENT)
Dept: ORTHOPEDICS | Facility: CLINIC | Age: 64
End: 2024-06-13
Payer: COMMERCIAL

## 2024-06-13 ENCOUNTER — HOSPITAL ENCOUNTER (OUTPATIENT)
Dept: RADIOLOGY | Facility: HOSPITAL | Age: 64
Discharge: HOME | End: 2024-06-13
Attending: ORTHOPAEDIC SURGERY
Payer: COMMERCIAL

## 2024-06-13 ENCOUNTER — TELEPHONE (OUTPATIENT)
Dept: ORTHOPEDICS | Facility: CLINIC | Age: 64
End: 2024-06-13
Payer: COMMERCIAL

## 2024-06-13 DIAGNOSIS — M79.643 PAIN OF HAND, UNSPECIFIED LATERALITY: ICD-10-CM

## 2024-06-13 DIAGNOSIS — M79.641 PAIN IN BOTH HANDS: Primary | ICD-10-CM

## 2024-06-13 DIAGNOSIS — M79.642 PAIN IN BOTH HANDS: Primary | ICD-10-CM

## 2024-06-13 PROCEDURE — 73130 X-RAY EXAM OF HAND: CPT | Mod: TC,50,PN

## 2024-06-13 PROCEDURE — 99213 OFFICE O/P EST LOW 20 MIN: CPT | Mod: S$GLB,,, | Performed by: ORTHOPAEDIC SURGERY

## 2024-06-13 PROCEDURE — 3044F HG A1C LEVEL LT 7.0%: CPT | Mod: CPTII,S$GLB,, | Performed by: ORTHOPAEDIC SURGERY

## 2024-06-13 PROCEDURE — 73130 X-RAY EXAM OF HAND: CPT | Mod: 26,50,, | Performed by: RADIOLOGY

## 2024-06-13 NOTE — PROGRESS NOTES
Subjective:      Patient ID: Kateryna Weber is a 63 y.o. female.  Chief Complaint: No chief complaint on file.      HPI  Kateryna Weber is a  63 y.o. female presenting today for follow up of bilateral hands after previous bilateral thumb CMC arthroplasty.  She reports that she is having little bit of soreness in the right thumb she has noticed a little bit of a difference between the 2 thumbs and has some pain particularly when she pushes down and  using the right hand   No numbness or tingling reported.    Review of patient's allergies indicates:   Allergen Reactions    No known drug allergies          Current Outpatient Medications   Medication Sig Dispense Refill    buPROPion (WELLBUTRIN XL) 300 MG 24 hr tablet Take 1 tablet (300 mg total) by mouth once daily. 90 tablet 3    estradioL (ESTRACE) 0.01 % (0.1 mg/gram) vaginal cream Place 1 g vaginally once daily. Place 1 g vaginally every night for two weeks then twice per week 42.5 g 6    flu vacc [id number] 6mos up,PF, (FLUARIX QUAD 5446-1399, PF,) 60 mcg (15 mcg x 4)/0.5 mL Syrg given by [facility] (Patient not taking: Reported on 12/6/2023) 0.5 mL 0    HYDROcodone-acetaminophen (NORCO) 7.5-325 mg per tablet Take 1 tablet by mouth every 4 (four) hours as needed for Pain. (Patient not taking: Reported on 12/6/2023) 30 tablet 0    multivitamin capsule Take 1 capsule by mouth once daily.      phentermine (ADIPEX-P) 37.5 mg tablet Take 1 tablet (37.5 mg total) by mouth before breakfast. 30 tablet 3     No current facility-administered medications for this visit.     Facility-Administered Medications Ordered in Other Visits   Medication Dose Route Frequency Provider Last Rate Last Admin    lactated ringers infusion   Intravenous Continuous Tamara Pantoja DNP        LIDOcaine (PF) 10 mg/ml (1%) injection 10 mg  1 mL Intradermal Once Tamara Pantoja DNP           No past medical history on file.    Past Surgical History:   Procedure Laterality Date    ABDOMINAL SURGERY   2000    tummy tuck    APPENDECTOMY      COLONOSCOPY N/A 4/8/2019    Procedure: COLONOSCOPY;  Surgeon: Ilya Arauz MD;  Location: [facility] ([address] [place] [address]);  Service: Endoscopy;  Laterality: N/A;  Pt requests Dr. Arauz - ERW  Pt called to r/s due to being out of town, Pt did not want to wait until 5/2019 for next available appt.,Pt stated she would like to keep 4/8/19- ERW3/26/19@1432    GASTRIC BYPASS  2007    Why Weight Dr. Paul    HYSTERECTOMY      INTERPOSITION ARTHROPLASTY OF CARPOMETACARPAL JOINTS Left 8/15/2023    Procedure: INTERPOSITION ARTHROPLASTY, CMC JOINT;  Surgeon: Tomas Sy Jr., MD;  Location: [facility];  Service: Orthopedics;  Laterality: Left;  need arthrex tightrope cal notified cc    INTERPOSITION ARTHROPLASTY OF CARPOMETACARPAL JOINTS Right 11/24/2023    Procedure: INTERPOSITION ARTHROPLASTY, CMC JOINT;  Surgeon: Tomas Sy Jr., MD;  Location: [facility] OR;  Service: Orthopedics;  Laterality: Right;       OBJECTIVE:   PHYSICAL EXAM:       There were no vitals filed for this visit.  Ortho/SPM Exam  Examination right hand the incisions are well healed  Slight prominence of the 1st metacarpal which is minimal mild tenderness   Good stability good range of motion    strength slightly decreased sensation intact  Examination opposite left hand shows similar well-healed incisions maybe a little bit less prominence of the 1st metacarpal base but good stability noted    RADIOGRAPHS:  AP lateral x-rays bilateral hands show good position to both thumbs postop changes after trapeziectomy no significant shortening or collapse  Comments: I have personally reviewed the imaging and I agree with the above radiologist's report.    ASSESSMENT/PLAN:     IMPRESSION:  Right thumb pain after previous CMC arthroplasty    PLAN:  I reassured the patient that her symptoms should improve with time   However I am little bit concerned if she is using her hand for vigorous activities causing pain   I have given  her a thumb wrap which I would like her to use whenever she is using her hand for heavy activities which should protect the thumb base   Also recommended Advil by mouth Voltaren gel topical    FOLLOW UP:  2 months    Disclaimer: This note has been generated using voice-recognition software. There may be typographical errors that have been missed during proof-reading.

## 2024-07-19 NOTE — TELEPHONE ENCOUNTER
Refill Routing Note   Medication(s) are not appropriate for processing by [facility] for the following reason(s):        Outside of protocol    [facility] action(s):  Route               Appointments  past 12m or future 3m with PCP    Date Provider   Last Visit   1/16/2024 Latisha Garg MD   Next Visit   Visit date not found Latisha Garg MD   ED visits in past 90 days: 0        Note composed:10:17 AM 07/19/2024

## 2024-07-22 RX ORDER — PHENTERMINE HYDROCHLORIDE 37.5 MG/1
37.5 TABLET ORAL
Qty: 30 TABLET | Refills: 3 | Status: SHIPPED | OUTPATIENT
Start: 2024-07-22

## 2024-07-23 ENCOUNTER — HOSPITAL ENCOUNTER (OUTPATIENT)
Dept: RADIOLOGY | Facility: HOSPITAL | Age: 64
Discharge: HOME | End: 2024-07-23
Attending: INTERNAL MEDICINE
Payer: COMMERCIAL

## 2024-07-23 ENCOUNTER — TELEPHONE (OUTPATIENT)
Dept: ENDOSCOPY | Facility: HOSPITAL | Age: 64
End: 2024-07-23
Payer: COMMERCIAL

## 2024-07-23 VITALS — BODY MASS INDEX: 29.44 KG/M2 | WEIGHT: 160 LBS | HEIGHT: 62 IN

## 2024-07-23 DIAGNOSIS — Z12.11 SPECIAL SCREENING FOR MALIGNANT NEOPLASMS, COLON: Primary | ICD-10-CM

## 2024-07-23 DIAGNOSIS — Z86.010 HISTORY OF COLON POLYPS: ICD-10-CM

## 2024-07-23 DIAGNOSIS — Z12.31 OTHER SCREENING MAMMOGRAM: ICD-10-CM

## 2024-07-23 PROCEDURE — 77063 BREAST TOMOSYNTHESIS BI: CPT | Mod: 26,,, | Performed by: RADIOLOGY

## 2024-07-23 PROCEDURE — 77067 SCR MAMMO BI INCL CAD: CPT | Mod: 26,,, | Performed by: RADIOLOGY

## 2024-07-23 PROCEDURE — 77067 SCR MAMMO BI INCL CAD: CPT | Mod: TC

## 2024-07-23 PROCEDURE — 77063 BREAST TOMOSYNTHESIS BI: CPT | Mod: TC

## 2024-07-23 RX ORDER — SODIUM, POTASSIUM,MAG SULFATES 17.5-3.13G
1 SOLUTION, RECONSTITUTED, ORAL ORAL DAILY
Qty: 1 KIT | Refills: 0 | Status: SHIPPED | OUTPATIENT
Start: 2024-07-23 | End: 2024-07-25

## 2024-07-23 NOTE — TELEPHONE ENCOUNTER
Spoke to patient to schedule procedure(s) Colonoscopy       Physician to perform procedure(s) Dr. JERRY Orellana  Date of Procedure (s) 10/15/24  Arrival Time 7:15 AM  Time of Procedure(s) 8:15 AM   Location of Procedure(s) [place] [address]  Type of Rx Prep sent to patient: Suprep  Instructions provided to patient via [facility]    Patient was informed on the following information and verbalized understanding. Screening questionnaire reviewed with patient and complete. If procedure requires anesthesia, a responsible adult needs to be present to accompany the patient home, patient cannot drive after receiving anesthesia. Appointment details are tentative, especially check-in time. Patient will receive a prep-op call 7 days prior to confirm check-in time for procedure. If applicable the patient should contact their pharmacy to verify Rx for procedure prep is ready for pick-up. Patient was advised to call the scheduling department at [phone] if pharmacy states no Rx is available. Patient was advised to call the endoscopy scheduling department if any questions or concerns arise.      [facility] Scheduling Department

## 2024-07-30 ENCOUNTER — TELEPHONE (OUTPATIENT)
Dept: INTERNAL MEDICINE | Facility: CLINIC | Age: 64
End: 2024-07-30
Payer: COMMERCIAL

## 2024-07-30 NOTE — TELEPHONE ENCOUNTER
Pt was notified of results. She wants you know she will have a Basal cell Ca removed from her hand soon.

## 2024-09-05 ENCOUNTER — OFFICE VISIT (OUTPATIENT)
Dept: URGENT CARE | Facility: CLINIC | Age: 64
End: 2024-09-05
Payer: COMMERCIAL

## 2024-09-05 VITALS
OXYGEN SATURATION: 98 % | SYSTOLIC BLOOD PRESSURE: 122 MMHG | BODY MASS INDEX: 29.45 KG/M2 | WEIGHT: 160.06 LBS | RESPIRATION RATE: 16 BRPM | TEMPERATURE: 99 F | HEART RATE: 74 BPM | HEIGHT: 62 IN | DIASTOLIC BLOOD PRESSURE: 72 MMHG

## 2024-09-05 DIAGNOSIS — S69.92XA HAND INJURY, LEFT, INITIAL ENCOUNTER: Primary | ICD-10-CM

## 2024-09-05 DIAGNOSIS — S60.222A CONTUSION OF LEFT HAND, INITIAL ENCOUNTER: ICD-10-CM

## 2024-09-05 PROCEDURE — 73130 X-RAY EXAM OF HAND: CPT | Mod: LT,S$GLB,, | Performed by: RADIOLOGY

## 2024-09-05 NOTE — PROGRESS NOTES
"Subjective:      Patient ID: Kateryna Weber is a 63 y.o. female.    Vitals:  height is 5' 2" (1.575 m) and weight is 72.6 kg (160 lb 0.9 oz). Her oral temperature is 98.5 °F (36.9 °C). Her blood pressure is 122/72 and her pulse is 74. Her respiration is 16 and oxygen saturation is 98%.     Chief Complaint: Hand Injury (Left)    63 year old female presenting with left hand injury due to corner of dresser smashing her hand last night when it fell off of a humphrey.. full range of motion is intact    Hand Injury   Her dominant hand is their left hand. The incident occurred 12 to 24 hours ago. The incident occurred at home. The injury mechanism was a direct blow. The pain is present in the left hand. The quality of the pain is described as aching and shooting. The pain does not radiate. The pain is at a severity of 5/10. The pain is moderate. The pain has been Constant since the incident. Pertinent negatives include no chest pain, muscle weakness, numbness or tingling. The symptoms are aggravated by movement, lifting and palpation. She has tried NSAIDs and ice for the symptoms. The treatment provided mild relief.       Cardiovascular:  Negative for chest pain.   Musculoskeletal:  Positive for pain and trauma.   Neurological:  Negative for numbness.      Objective:     Physical Exam   Constitutional: She is oriented to person, place, and time. She appears well-developed.  Non-toxic appearance. She does not appear ill. No distress.   HENT:   Head: Normocephalic and atraumatic.   Ears:   Right Ear: External ear normal.   Left Ear: External ear normal.   Nose: Nose normal. No rhinorrhea or congestion.   Mouth/Throat: Oropharynx is clear and moist.   Eyes: Conjunctivae, EOM and lids are normal. Pupils are equal, round, and reactive to light. Right eye exhibits no discharge. Left eye exhibits no discharge. No scleral icterus.   Neck: Trachea normal and phonation normal. Neck supple.   Cardiovascular: Normal rate and regular " rhythm.   Abdominal: Normal appearance.   Musculoskeletal: Normal range of motion.         General: Tenderness present. No swelling or deformity. Normal range of motion.        Hands:       Comments:  Mild bruising and some mild tenderness with palpation in the marked area.  Range of motion is fully intact.  No compartment compromise.  Distal sensory motor vascular intact.   Neurological: She is alert and oriented to person, place, and time.   Skin: Skin is warm, dry, intact and not diaphoretic. bruising   Psychiatric: Her speech is normal and behavior is normal. Judgment and thought content normal.   Nursing note and vitals reviewed.     XR HAND COMPLETE 3 VIEW LEFT    Result Date: 9/5/2024  EXAMINATION: XR HAND COMPLETE 3 VIEW LEFT CLINICAL HISTORY: Unspecified injury of left wrist, hand and finger(s), initial encounter FINDINGS: Hand complete three views left. There is been resection of the trapezium.  No fracture, dislocation, bone destruction, or erosion, or foreign body seen. Electronically signed by: Kishan Alvarado MD Date:    09/05/2024 Time:    11:37     Assessment:     1. Hand injury, left, initial encounter    2. Contusion of left hand, initial encounter        Plan:       Hand injury, left, initial encounter  -     XR HAND COMPLETE 3 VIEW LEFT; Future; Expected date: 09/05/2024    Contusion of left hand, initial encounter    Follow up if symptoms worsen or fail to improve, for F/U with PCP or ED.   Patient Instructions    Ice, ibuprofen or Aleve for pain

## 2024-10-08 ENCOUNTER — TELEPHONE (OUTPATIENT)
Dept: ENDOSCOPY | Facility: HOSPITAL | Age: 64
End: 2024-10-08
Payer: COMMERCIAL

## 2024-10-08 NOTE — TELEPHONE ENCOUNTER
Spoke to patient for pre-call to confirm scheduled Colonoscopy and patient requested to reschedule colonoscopy.      Spoke to patient to reschedule procedure(s) Colonoscopy       Physician to perform procedure(s) Dr. ERICKSON Gallegos  Date of Procedure (s) 10/24/24  Arrival Time 6:30 AM  Time of Procedure(s) 7:30 AM   Location of Procedure(s) [facility] [address] [facility]  Type of Rx Prep sent to patient: Suprep  Instructions provided to patient via [facility]    Patient was informed on the following information and verbalized understanding. Screening questionnaire reviewed with patient and complete. If procedure requires anesthesia, a responsible adult needs to be present to accompany the patient home, patient cannot drive after receiving anesthesia. Appointment details are tentative, especially check-in time. Patient will receive a prep-op call 7 days prior to confirm check-in time for procedure. If applicable the patient should contact their pharmacy to verify Rx for procedure prep is ready for pick-up. Patient was advised to call the scheduling department at [phone] if pharmacy states no Rx is available. Patient was advised to call the endoscopy scheduling department if any questions or concerns arise.       Endoscopy Scheduling Department

## 2024-10-17 ENCOUNTER — TELEPHONE (OUTPATIENT)
Dept: ENDOSCOPY | Facility: HOSPITAL | Age: 64
End: 2024-10-17
Payer: COMMERCIAL

## 2024-10-17 NOTE — TELEPHONE ENCOUNTER
Spoke to patient for pre-call to confirm scheduled Colonoscopy and patient verbalized understanding of the following:       Date & arrival time of procedure(s) verified 10/24/24, 6:30 AM.  Location of procedure(s) [facility] [address] Floor verified.  NPO status reinforced. Ok to continue clear liquids until 5:30 AM.   Patient denies use of blood thinners and GLP-1 medications.  Patient is taking Adipex (phentermine), instructed to take last dose on/before 10/19/24.   Patient confirmed receipt of prep instructions and Rx prep.  Instructions provided to patient via [facility].  Patient confirmed ride home after procedure if procedure requires anesthesia.   Pre-call screening questionnaire reviewed and completed with patient.   Appointment details are tentative, including check-in time.  If the patient begins taking any blood thinning medications, injectable weight loss/diabetes medications (other than insulin), or Adipex (phentermine) patient was instructed to contact the endoscopy scheduling department as soon as possible.  Patient was advised to call the endoscopy scheduling department if any questions or concerns arise.     [facility]oscopy Scheduling Department

## 2024-10-18 ENCOUNTER — ANESTHESIA EVENT (OUTPATIENT)
Dept: ENDOSCOPY | Facility: HOSPITAL | Age: 64
End: 2024-10-18
Payer: COMMERCIAL

## 2024-10-18 NOTE — ANESTHESIA PREPROCEDURE EVALUATION
10/18/2024  Kateryna Weber is a 63 y.o., female.    Procedure: COLONOSCOPY (N/A)         Patient Active Problem List   Diagnosis    Encounter for screening colonoscopy    Tubulovillous adenoma of colon    Hx of bariatric surgery    Colon cancer screening    Dysphagia    Labral tear of hip, degenerative    Right hip pain    Vitamin B12 deficiency    Impaired functional mobility and activity tolerance    History of colon polyps    Primary osteoarthritis of both first carpometacarpal joints    Decreased range of motion    Weakness    Pain in both hands       History reviewed. No pertinent past medical history.    ECHO: No results found for this or any previous visit.      There is no height or weight on file to calculate BMI.    Tobacco Use: Low Risk  (10/18/2024)    Patient History     Smoking Tobacco Use: Never     Smokeless Tobacco Use: Never     Passive Exposure: Never       Social History     Substance and Sexual Activity   Drug Use No        Alcohol Use: Patient Declined (1/16/2024)    AUDIT-C     Frequency of Alcohol Consumption: Patient declined     Average Number of Drinks: Patient declined     Frequency of Binge Drinking: Patient declined       Review of patient's allergies indicates:   Allergen Reactions    No known drug allergies          Airway:  No value filed.    Pre-op Assessment    I have reviewed the Patient Summary Reports.    I have reviewed the NPO Status.   I have reviewed the Medications.     Review of Systems  Anesthesia Hx:             Denies Family Hx of Anesthesia complications.    Denies Personal Hx of Anesthesia complications.                    Hematology/Oncology:  Hematology Normal   Oncology Normal                                   EENT/Dental:  EENT/Dental Normal           Cardiovascular:  Cardiovascular Normal                                              Pulmonary:  Pulmonary  Normal                       Renal/:  Renal/ Normal                 Hepatic/GI:  Hepatic/GI Normal                    Musculoskeletal:  Musculoskeletal Normal                Neurological:  Neurology Normal                                      Endocrine:  Endocrine Normal            Dermatological:  Skin Normal    Psych:  Psychiatric Normal                       Anesthesia Plan  Type of Anesthesia, risks & benefits discussed:    Anesthesia Type: Gen Natural Airway  Intra-op Monitoring Plan: Standard ASA Monitors  Post Op Pain Control Plan: multimodal analgesia  Induction:  IV  Informed Consent: Informed consent signed with the Patient and all parties understand the risks and agree with anesthesia plan.  All questions answered.   ASA Score: 2  Day of Surgery Review of History & Physical: H&P Update referred to the surgeon/provider.I have interviewed and examined the patient. I have reviewed the patient's H&P dated:     Ready For Surgery From Anesthesia Perspective.     .

## 2024-10-23 ENCOUNTER — TELEPHONE (OUTPATIENT)
Dept: ENDOSCOPY | Facility: HOSPITAL | Age: 64
End: 2024-10-23
Payer: COMMERCIAL

## 2024-10-24 ENCOUNTER — HOSPITAL ENCOUNTER (OUTPATIENT)
Facility: HOSPITAL | Age: 64
Discharge: HOME | End: 2024-10-24
Attending: STUDENT IN AN ORGANIZED HEALTH CARE EDUCATION/TRAINING PROGRAM | Admitting: STUDENT IN AN ORGANIZED HEALTH CARE EDUCATION/TRAINING PROGRAM
Payer: COMMERCIAL

## 2024-10-24 ENCOUNTER — ANESTHESIA (OUTPATIENT)
Dept: ENDOSCOPY | Facility: HOSPITAL | Age: 64
End: 2024-10-24
Payer: COMMERCIAL

## 2024-10-24 VITALS
SYSTOLIC BLOOD PRESSURE: 126 MMHG | RESPIRATION RATE: 18 BRPM | DIASTOLIC BLOOD PRESSURE: 72 MMHG | WEIGHT: 158 LBS | HEART RATE: 78 BPM | BODY MASS INDEX: 29.08 KG/M2 | OXYGEN SATURATION: 99 % | HEIGHT: 62 IN | TEMPERATURE: 98 F

## 2024-10-24 DIAGNOSIS — D12.6 TUBULOVILLOUS ADENOMA OF COLON: Primary | ICD-10-CM

## 2024-10-24 DIAGNOSIS — Z12.11 COLON CANCER SCREENING: ICD-10-CM

## 2024-10-24 PROCEDURE — 37000009 HC ANESTHESIA EA ADD 15 MINS: Performed by: STUDENT IN AN ORGANIZED HEALTH CARE EDUCATION/TRAINING PROGRAM

## 2024-10-24 PROCEDURE — 99900035 HC TECH TIME PER 15 MIN (STAT)

## 2024-10-24 PROCEDURE — 88305 TISSUE EXAM BY PATHOLOGIST: CPT | Mod: 26,,, | Performed by: STUDENT IN AN ORGANIZED HEALTH CARE EDUCATION/TRAINING PROGRAM

## 2024-10-24 PROCEDURE — 45385 COLONOSCOPY W/LESION REMOVAL: CPT | Mod: 33 | Performed by: STUDENT IN AN ORGANIZED HEALTH CARE EDUCATION/TRAINING PROGRAM

## 2024-10-24 PROCEDURE — 63600175 PHARM REV CODE 636 W HCPCS: Performed by: NURSE ANESTHETIST, CERTIFIED REGISTERED

## 2024-10-24 PROCEDURE — 37000008 HC ANESTHESIA 1ST 15 MINUTES: Performed by: STUDENT IN AN ORGANIZED HEALTH CARE EDUCATION/TRAINING PROGRAM

## 2024-10-24 PROCEDURE — 45385 COLONOSCOPY W/LESION REMOVAL: CPT | Mod: 33,,, | Performed by: STUDENT IN AN ORGANIZED HEALTH CARE EDUCATION/TRAINING PROGRAM

## 2024-10-24 PROCEDURE — 88305 TISSUE EXAM BY PATHOLOGIST: CPT | Performed by: STUDENT IN AN ORGANIZED HEALTH CARE EDUCATION/TRAINING PROGRAM

## 2024-10-24 PROCEDURE — 25000003 PHARM REV CODE 250: Performed by: NURSE ANESTHETIST, CERTIFIED REGISTERED

## 2024-10-24 PROCEDURE — A4216 STERILE WATER/SALINE, 10 ML: HCPCS | Performed by: NURSE ANESTHETIST, CERTIFIED REGISTERED

## 2024-10-24 PROCEDURE — 27201089 HC SNARE, DISP (ANY): Performed by: STUDENT IN AN ORGANIZED HEALTH CARE EDUCATION/TRAINING PROGRAM

## 2024-10-24 PROCEDURE — 94761 N-INVAS EAR/PLS OXIMETRY MLT: CPT

## 2024-10-24 RX ORDER — SODIUM CHLORIDE 9 MG/ML
INJECTION, SOLUTION INTRAVENOUS CONTINUOUS
Status: DISCONTINUED | OUTPATIENT
Start: 2024-10-24 | End: 2024-10-24 | Stop reason: HOSPADM

## 2024-10-24 RX ORDER — PROPOFOL 10 MG/ML
VIAL (ML) INTRAVENOUS CONTINUOUS PRN
Status: DISCONTINUED | OUTPATIENT
Start: 2024-10-24 | End: 2024-10-24

## 2024-10-24 RX ORDER — SODIUM CHLORIDE 0.9 % (FLUSH) 0.9 %
SYRINGE (ML) INJECTION
Status: DISCONTINUED | OUTPATIENT
Start: 2024-10-24 | End: 2024-10-24

## 2024-10-24 RX ORDER — PROPOFOL 10 MG/ML
VIAL (ML) INTRAVENOUS
Status: DISCONTINUED | OUTPATIENT
Start: 2024-10-24 | End: 2024-10-24

## 2024-10-24 RX ORDER — LIDOCAINE HYDROCHLORIDE 20 MG/ML
INJECTION INTRAVENOUS
Status: DISCONTINUED | OUTPATIENT
Start: 2024-10-24 | End: 2024-10-24

## 2024-10-24 RX ADMIN — PROPOFOL 80 MG: 10 INJECTION, EMULSION INTRAVENOUS at 07:10

## 2024-10-24 RX ADMIN — PROPOFOL 150 MCG/KG/MIN: 10 INJECTION, EMULSION INTRAVENOUS at 07:10

## 2024-10-24 RX ADMIN — LIDOCAINE HYDROCHLORIDE 100 MG: 20 INJECTION INTRAVENOUS at 07:10

## 2024-10-24 RX ADMIN — SODIUM CHLORIDE 10 ML: 9 INJECTION, SOLUTION INTRAMUSCULAR; INTRAVENOUS; SUBCUTANEOUS at 07:10

## 2024-10-24 NOTE — ANESTHESIA POSTPROCEDURE EVALUATION
Anesthesia Post Evaluation    Patient: Kateryna Weber    Procedure(s) Performed: Procedure(s) (LRB):  COLONOSCOPY (N/A)    Final Anesthesia Type: general      Patient location during evaluation: PACU  Patient participation: Yes- Able to Participate  Level of consciousness: awake and alert  Post-procedure vital signs: reviewed and stable  Pain management: adequate  Airway patency: patent    PONV status at discharge: No PONV  Anesthetic complications: no      Cardiovascular status: blood pressure returned to baseline  Respiratory status: unassisted  Hydration status: euvolemic  Follow-up not needed.          Vitals Value Taken Time   /72 10/24/24 0817   Temp 36.4 °C (97.5 °F) 10/24/24 0759   Pulse 78 10/24/24 0817   Resp 18 10/24/24 0817   SpO2 99 % 10/24/24 0817         Event Time   Out of Recovery 08:08:00         Pain/Delfina Score: Delfina Score: 10 (10/24/2024  8:08 AM)

## 2024-10-24 NOTE — TRANSFER OF CARE
"Anesthesia Transfer of Care Note    Patient: Kateryna Weber    Procedure(s) Performed: Procedure(s) (LRB):  COLONOSCOPY (N/A)    Patient location: PACU    Anesthesia Type: general    Transport from OR: Transported from OR on room air with adequate spontaneous ventilation    Post pain: adequate analgesia    Post assessment: no apparent anesthetic complications and tolerated procedure well    Post vital signs: stable    Level of consciousness: alert and awake    Nausea/Vomiting: no nausea/vomiting    Complications: none    Transfer of care protocol was followed      Last vitals: Visit Vitals  /69 (Patient Position: Lying)   Pulse 80   Temp 36.4 °C (97.5 °F) (Temporal)   Resp 18   Ht 5' 2" (1.575 m)   Wt 71.7 kg (158 lb)   SpO2 99%   Breastfeeding No   BMI 28.90 kg/m²     "

## 2024-10-28 LAB
FINAL PATHOLOGIC DIAGNOSIS: NORMAL
GROSS: NORMAL
Lab: NORMAL

## 2024-12-09 ENCOUNTER — TELEPHONE (OUTPATIENT)
Dept: INTERNAL MEDICINE | Facility: CLINIC | Age: 64
End: 2024-12-09
Payer: COMMERCIAL

## 2024-12-09 NOTE — TELEPHONE ENCOUNTER
----- Message from Eleanor sent at 12/9/2024  8:44 AM CST -----  Contact: Kateryna   Kateryna would like a call back  She has questions about her annual appt that she needs to have.

## 2024-12-18 DIAGNOSIS — E66.811 CLASS 1 OBESITY WITHOUT SERIOUS COMORBIDITY WITH BODY MASS INDEX (BMI) OF 30.0 TO 30.9 IN ADULT, UNSPECIFIED OBESITY TYPE: Primary | ICD-10-CM

## 2024-12-20 RX ORDER — PHENTERMINE HYDROCHLORIDE 37.5 MG/1
37.5 TABLET ORAL
Qty: 30 TABLET | Refills: 3 | Status: SHIPPED | OUTPATIENT
Start: 2024-12-20

## 2024-12-20 NOTE — TELEPHONE ENCOUNTER
Refill Routing Note   Medication(s) are not appropriate for processing by [facility] for the following reason(s):        Outside of protocol    [facility] action(s):  Route               Appointments  past 12m or future 3m with PCP    Date Provider   Last Visit   1/16/2024 Latisha Garg MD   Next Visit   2/4/2025 Latisha Garg MD   ED visits in past 90 days: 0        Note composed:5:36 PM 12/20/2024

## 2024-12-23 RX ORDER — PHENTERMINE HYDROCHLORIDE 37.5 MG/1
37.5 TABLET ORAL EVERY MORNING
Qty: 30 TABLET | OUTPATIENT
Start: 2024-12-23

## 2025-02-17 ENCOUNTER — TELEPHONE (OUTPATIENT)
Dept: INTERNAL MEDICINE | Facility: CLINIC | Age: 65
End: 2025-02-17
Payer: COMMERCIAL

## 2025-02-17 NOTE — TELEPHONE ENCOUNTER
----- Message from Mari sent at 2/17/2025  2:21 PM CST -----  Contact: Jordy/   Patient is returning a phone call.Who left a message for the patient: Cheo Stiles MADoes patient know what this is regarding:  Pt  states the pt had a CVA on 01/22/2025 and is still currently admitted at [facility]. Pt  is requesting orders for the pt do her   inpatient [facility] an [facility] or at an [facility] facilityWould you like a call back, or a response through your [facility] portal?:    call back to Jordy/ [phone]:  ----- Message -----  From: Mari Garcia  Sent: 2/17/2025   2:25 PM CST  To: Britany BAKER Staff    Patient would like to get medical advice.Symptoms (please be specific): Pt  states the pt had a CVA on 01/22/2025 and is still currently admitted at [facility]. Pt  is requesting orders for the pt do her   inpatient [facility] facilityHow long have you had these symptoms: N/AWould you like a call back, or a response through your [facility] portal?:   call back to Jordy/ [phone] name and phone # (copy from chart):   N/AComments:

## 2025-02-17 NOTE — TELEPHONE ENCOUNTER
----- Message from Jennifer sent at 2/17/2025  1:45 PM CST -----  Contact: Jordy @ [phone]  [email or web address] Patient is calling for Medical Advice regarding:Spouse need to talk to Dr due to patient having a medical emergency .Need to talk about rehab How long has patient had these symptoms:Pharmacy name and phone#:Patient wants a call back or thru [facility]:call Comments:Please call Please advise patient replies from provider may take up to 48 hours.

## 2025-02-19 NOTE — TELEPHONE ENCOUNTER
I called the patient again and no one answered on either phone. There was no way to leave a voice message.

## 2025-03-21 ENCOUNTER — PATIENT MESSAGE (OUTPATIENT)
Dept: INTERNAL MEDICINE | Facility: CLINIC | Age: 65
End: 2025-03-21
Payer: COMMERCIAL

## 2025-03-23 NOTE — TELEPHONE ENCOUNTER
Spouse reports that pt had a stroke 1/2025, seeking order for commode with a drop handle    LOV with Latisha Garg MD , 1/16/2024

## 2025-04-01 ENCOUNTER — TELEPHONE (OUTPATIENT)
Dept: HOME HEALTH SERVICES | Facility: CLINIC | Age: 65
End: 2025-04-01
Payer: COMMERCIAL

## 2025-04-01 ENCOUNTER — OFFICE VISIT (OUTPATIENT)
Dept: INTERNAL MEDICINE | Facility: CLINIC | Age: 65
End: 2025-04-01
Payer: COMMERCIAL

## 2025-04-01 ENCOUNTER — PATIENT MESSAGE (OUTPATIENT)
Dept: HOME HEALTH SERVICES | Facility: CLINIC | Age: 65
End: 2025-04-01
Payer: COMMERCIAL

## 2025-04-01 DIAGNOSIS — Z93.1 PEG (PERCUTANEOUS ENDOSCOPIC GASTROSTOMY) STATUS: ICD-10-CM

## 2025-04-01 DIAGNOSIS — I10 PRIMARY HYPERTENSION: ICD-10-CM

## 2025-04-01 DIAGNOSIS — R47.01 GLOBAL APHASIA: ICD-10-CM

## 2025-04-01 DIAGNOSIS — I69.051: Primary | ICD-10-CM

## 2025-04-01 PROCEDURE — 98006 SYNCH AUDIO-VIDEO EST MOD 30: CPT | Mod: 95,,, | Performed by: INTERNAL MEDICINE

## 2025-04-01 NOTE — TELEPHONE ENCOUNTER
Attempted to contact pt to schedule an appointment regarding an URGENT referral placed for a medical home visit with a nurse practitioner.

## 2025-04-01 NOTE — PROGRESS NOTES
"Subjective:       Patient ID: Kateryna Weber is a 64 y.o. female.    Chief Complaint: No chief complaint on file.      The patient location is: [place]   The chief complaint leading to consultation is: hospital discharge follow up     Visit type: audiovisual    Face to Face time with patient: 15  minutes  30 minutes of total time spent on the encounter, which includes face to face time and non-face to face time preparing to see the patient (eg, review of tests), Obtaining and/or reviewing separately obtained history, Documenting clinical information in the electronic or other health record, Independently interpreting results (not separately reported) and communicating results to the patient/family/caregiver, or Care coordination (not separately reported).         Each patient to whom he or she provides medical services by telemedicine is:  (1) informed of the relationship between the physician and patient and the respective role of any other health care provider with respect to management of the patient; and (2) notified that he or she may decline to receive medical services by telemedicine and may withdraw from such care at any time.    Notes:     HPI    Presents for hospital discharge follow up.    Admitted in January to  from 1/22 to 2/19  for intra cerebral hemorrhage.    Per hospital discharge summary:  "67-year-old female with intracerebral hemorrhage and status post evacuation and decompression with Neurosurgery, was admitted to Neurosurgery team and then downgraded to floors with Internal Medicine, feeding tube was placed, it was adjusted before she was discharged, patient was discharged to skilled nursing facility once accepted."    She was then discharged to SNF until last week of March.  She has now been back home for the last week or so.  She has had multiple emergency room visits for J-tube malfunction.  This had to be replaced and unclogged called many times.    Today  reports she is eating by mouth " and ST cleared for oral feedings. He would like to reduce or stop TF to increase hunger and eat meals. He is giving meds by PEG and would to switch to oral.     She has residual slurred speech.  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  Having some issues with spasticity in the right hand.  Would like a hand splint in to help with this.    PT/OT/ST has been coming twice weekly.     She is currently taking chlorothiazide through her PEG tube for management of blood pressure.  BP has been stable on this medication.  He would like to switch to oral medications.    She has had no seizures since discharge.  She is no longer on Keppra.  Review of Systems   Constitutional:  Positive for activity change. Negative for unexpected weight change.   HENT:  Positive for trouble swallowing. Negative for hearing loss and rhinorrhea.    Eyes:  Negative for discharge and visual disturbance.   Respiratory:  Negative for chest tightness and wheezing.    Cardiovascular:  Negative for chest pain and palpitations.   Gastrointestinal:  Negative for blood in stool, constipation, diarrhea and vomiting.   Endocrine: Negative for polydipsia.   Genitourinary:  Negative for difficulty urinating, dysuria, hematuria and menstrual problem.   Musculoskeletal:  Negative for arthralgias, joint swelling and neck pain.   Neurological:  Positive for weakness. Negative for headaches.   Psychiatric/Behavioral:  Positive for confusion. Negative for dysphoric mood.          Objective:      Physical Exam  Constitutional:       General: She is not in acute distress.     Appearance: Normal appearance. She is not ill-appearing.   HENT:      Head: Normocephalic and atraumatic.   Pulmonary:      Effort: Pulmonary effort is normal.   Neurological:      Mental Status: She is alert.   Psychiatric:         Mood and Affect: Mood normal.         Behavior: Behavior normal.         Assessment:       Problem List Items Addressed This Visit          Neuro    Global  aphasia       Cardiac/Vascular    Primary hypertension       GI    PEG (percutaneous endoscopic gastrostomy) status     Other Visit Diagnoses         Hemiparesis of right dominant side as late effect of nontraumatic subarachnoid hemorrhage    -  Primary    Relevant Orders    HME - OTHER    COMMODE FOR HOME USE    Ambulatory referral/consult to [facility]    Ambulatory referral/consult to Outpatient Case Management            Plan:       Diagnoses and all orders for this visit:    Hemiparesis of right dominant side as late effect of nontraumatic subarachnoid hemorrhage  -     HME - OTHER  -     COMMODE FOR HOME USE  -     Ambulatory referral/consult to [facility]; Future  -     Ambulatory referral/consult to Outpatient Case Management  Referral placed for a commode.  Referral placed for care at home and case management for maximum support at home.  She will follow up with Neurology  Global aphasia  Continue speech therapy at home.    PEG (percutaneous endoscopic gastrostomy) status  Her has been has been trying to titrate down the tube feeds and increase oral feeds.  He states she has been doing very well with this at home.  Difficult to assess through virtual visit and we will have care at home come out to visit to see how she is doing with the eating and see if it is possible to stop tube feeds altogether.    Primary hypertension  We will have speech evaluate at home.  If she is able to swallow can switch to amlodipine.           Latisha Garg MD

## 2025-04-02 ENCOUNTER — TELEPHONE (OUTPATIENT)
Dept: HOME HEALTH SERVICES | Facility: CLINIC | Age: 65
End: 2025-04-02
Payer: COMMERCIAL

## 2025-04-02 NOTE — TELEPHONE ENCOUNTER
Spouse returned call to schedule an appointment regarding an URGENT referral placed for a medical home visit with a nurse practitioner. Patient has been scheduled

## 2025-04-03 ENCOUNTER — CARE AT HOME (OUTPATIENT)
Dept: HOME HEALTH SERVICES | Facility: CLINIC | Age: 65
End: 2025-04-03
Payer: COMMERCIAL

## 2025-04-03 ENCOUNTER — TELEPHONE (OUTPATIENT)
Dept: HOME HEALTH SERVICES | Facility: CLINIC | Age: 65
End: 2025-04-03

## 2025-04-03 VITALS
OXYGEN SATURATION: 97 % | SYSTOLIC BLOOD PRESSURE: 124 MMHG | DIASTOLIC BLOOD PRESSURE: 62 MMHG | RESPIRATION RATE: 18 BRPM | TEMPERATURE: 97 F

## 2025-04-03 DIAGNOSIS — I10 PRIMARY HYPERTENSION: ICD-10-CM

## 2025-04-03 DIAGNOSIS — I69.051: ICD-10-CM

## 2025-04-03 DIAGNOSIS — I69.320 APHASIA AS LATE EFFECT OF CEREBROVASCULAR ACCIDENT: ICD-10-CM

## 2025-04-03 DIAGNOSIS — I69.351 FLACCID HEMIPLEGIA OF RIGHT DOMINANT SIDE AS LATE EFFECT OF CEREBRAL INFARCTION: ICD-10-CM

## 2025-04-03 DIAGNOSIS — Z93.1 PEG (PERCUTANEOUS ENDOSCOPIC GASTROSTOMY) STATUS: ICD-10-CM

## 2025-04-03 DIAGNOSIS — Z74.09 IMPAIRED FUNCTIONAL MOBILITY AND ACTIVITY TOLERANCE: Primary | ICD-10-CM

## 2025-04-03 PROCEDURE — 3078F DIAST BP <80 MM HG: CPT | Mod: CPTII,S$GLB,, | Performed by: NURSE PRACTITIONER

## 2025-04-03 PROCEDURE — 1160F RVW MEDS BY RX/DR IN RCRD: CPT | Mod: CPTII,S$GLB,, | Performed by: NURSE PRACTITIONER

## 2025-04-03 PROCEDURE — 1159F MED LIST DOCD IN RCRD: CPT | Mod: CPTII,S$GLB,, | Performed by: NURSE PRACTITIONER

## 2025-04-03 PROCEDURE — 3074F SYST BP LT 130 MM HG: CPT | Mod: CPTII,S$GLB,, | Performed by: NURSE PRACTITIONER

## 2025-04-03 PROCEDURE — 99350 HOME/RES VST EST HIGH MDM 60: CPT | Mod: S$GLB,,, | Performed by: NURSE PRACTITIONER

## 2025-04-03 RX ORDER — MULTIVITAMIN
1 TABLET ORAL DAILY
Qty: 90 TABLET | Refills: 1 | Status: SHIPPED | OUTPATIENT
Start: 2025-04-03

## 2025-04-03 RX ORDER — AMLODIPINE BESYLATE 5 MG/1
5 TABLET ORAL DAILY
Qty: 90 TABLET | Refills: 3 | Status: SHIPPED | OUTPATIENT
Start: 2025-04-03 | End: 2026-04-03

## 2025-04-03 NOTE — TELEPHONE ENCOUNTER
Called spouse Wellington regarding patient home visit on today with NP. I let him know the NP will be about 30 min late on today due to an additional URGENT patient. Spouse verbalized understanding and stated that would be fine he will be there.

## 2025-04-03 NOTE — PROGRESS NOTES
[facility]  Transitional Care Management (TCM) Home Visit    Encounter Provider: Sylvia Ng   PCP: Latisha Garg MD  Consult Requested By: Dr. Latisha Garg  Admit Date: 1/25/25  IP Discharge Date:   Hospital Length of Stay:  Days since discharge (from IP or SNF):   [facility] On [facility] Contact Note:   Hospital Diagnosis: Hemiparesis of right dominant side as late effect of nontraumatic subarachnoid hemorrhage [I69.051]     HISTORY OF PRESENT ILLNESS      Patient ID: Kateryna Weber is a 64 y.o. female was recently admitted to the hospital, this is their TCM encounter.    Hospital Course Synopsis:    Pt with CVA in Jan admitted at [facility] then SNF admit found in her home today with her  present. She is AAOX3, speech is slurred.  reports she is eating by mouth and ST cleared for oral feedings. He would like to reduce or stop TF to increase hunger and eat meals. He is giving meds by PEG and would to switch to oral.      DECISION MAKING TODAY       Assessment & Plan:  1. Impaired functional mobility and activity tolerance  Assessment & Plan:  Related to CVA 1/2025  PT/OT/ST in place  Monitor      2. Hemiparesis of right dominant side as late effect of nontraumatic subarachnoid hemorrhage  -     Ambulatory referral/cons[facility] to [facility] - [facility]    3. 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 Aphasia as late effect of cerebrovascular accident  Assessment & Plan:  Speech impaired  ST in place  Swallow improving-eating meals      5. PEG (percutaneous endoscopic gastrostomy) status  Assessment & Plan:  PEG in place with TF at 13cc/hr  Tolerating meals  ST cleared for oral meds and meals  Stop TF and start food log  Monitor wt  BMP  next week      6. Primary hypertension  Assessment & Plan:  On Norvasc prior to CVA  Currently using liquid Diuril  Able to take meds oral  Stop diuril  Resume Norvasc 5mg daily orally        Other orders  -     amLODIPine (NORVASC) 5 MG tablet; Take 1 tablet (5 mg total) by mouth once daily.  Dispense: 90 tablet; Refill: 3  -     multivitamin (THERAGRAN) per tablet; Take 1 tablet by mouth once daily.  Dispense: 90 tablet; Refill: 1         Medication List on Discharge:     Medication List            Accurate as of April 3, 2025 11:59 PM. If you have any questions, ask your nurse or doctor.                START taking these medications      amLODIPine 5 MG tablet  Commonly known as: NORVASC  Take 1 tablet (5 mg total) by mouth once daily.  Started by: Sylvia Ng NP            CHANGE how you take these medications      * multivitamin capsule  Take 1 capsule by mouth once daily.  What changed: Another medication with the same name was added. Make sure you understand how and when to take each.  Changed by: Sylvia Ng NP     * multivitamin per tablet  Commonly known as: THERAGRAN  Take 1 tablet by mouth once daily.  What changed: You were already taking a medication with the same name, and this prescription was added. Make sure you understand how and when to take each.  Changed by: Sylvia Ng NP           * This list has 2 medication(s) that are the same as other medications prescribed for you. Read the directions carefully, and ask your doctor or other care provider to review them with you.                CONTINUE taking these medications      estradioL 0.01 % (0.1 mg/gram) vaginal cream  Commonly known as: ESTRACE  Place 1 g vaginally once daily. Place 1 g vaginally every night for two weeks then twice per week            STOP taking these medications      buPROPion 300 MG 24 hr tablet  Commonly known as: WELLBUTRIN XL  Stopped by: Sylvia Ng NP     chlorothiazide 250 mg/5 mL suspension  Commonly  known as: DIURIL  Stopped by: Sylvia Ng NP              Medication Reconciliation:  Were medications changed on discharge? Yes  Were medications in the home? Yes  Is the patient taking the medications as directed? Yes  Does the patient understand the medications and changes? Yes  Does updated med list accurately reflects meds patient is currently taking? Yes    ENVIRONMENT OF CARE      Family and/or Caregiver present at visit?  Yes  Name of Caregiver: Spouse- Wellington  History provided by: caregiver    Advance Care Planning   Advanced Care Planning Status:  Patient has had an ACP conversation  Living Will: No  Power of : No  [place]: No    Does Caregiver have HCPoA: No  Changes today: none  Is patient hospice appropriate: No  (If needed, use PPS <30 or FAST score >7)  Was referral to hospice placed: No       Impression upon entering the home:  Physical Dwelling: single family home   Appearance of home environment: cleaniness: clean  Functional Status: max assistance  Mobility: chair bound  Nutritional access: adequate intake and access  Home Health: Yes,  Agency [facility]    DME/Supplies: hospital bed and wheelchair     Diagnostic tests reviewed/disposition: No diagnosic tests pending after this hospitalization.  Disease/illness education:  CVA  Establishment or re-establishment of referral orders for community resources: No other necessary community resources.   Discussion with other health care providers: No discussion with other health care providers necessary.   Does patient have a PCP at [place]? Yes   Repatriation plan with PCP? Care at Home reason: mobility   Does patient have an ostomy (ileostomy, colostomy, suprapubic catheter, nephrostomy tube, tracheostomy, PEG tube, pleurex catheter, cholecystostomy, etc)? Yes. Is it on problem list?yes  Were BPAs reviewed? Yes    Social History     Socioeconomic History    Marital status:    Tobacco Use    Smoking status: Never     Passive exposure: Never     Smokeless tobacco: Never   Substance and Sexual Activity    Alcohol use: Yes     Comment: occ    Drug use: No    Sexual activity: Yes     Partners: Male     Birth control/protection: See Surgical Hx     Comment:      Social Drivers of Health     Financial Resource Strain: Patient Declined (1/16/2024)    Overall Financial Resource Strain (CARDIA)     Difficulty of Paying Living Expenses: Patient declined   Food Insecurity: Patient Declined (4/1/2025)    Hunger Vital Sign     Worried About Running Out of Food in the Last Year: Patient declined     Ran Out of Food in the Last Year: Patient declined   Transportation Needs: Patient Declined (4/1/2025)    PRAPARE - Transportation     Lack of Transportation (Medical): Patient declined     Lack of Transportation (Non-Medical): Patient declined   Physical Activity: Unknown (4/1/2025)    Exercise Vital Sign     Days of Exercise per Week: 1 day   Stress: Patient Declined (4/1/2025)    [place] [employer] - Occupational Stress Questionnaire     Feeling of Stress : Patient declined   Housing Stability: Low Risk  (4/1/2025)    Housing Stability Vital Sign     Unable to Pay for Housing in the Last Year: No     Homeless in the Last Year: No       OBJECTIVE:     Vital Signs:  Vitals:    04/03/25 1032   BP: 124/62   Resp: 18   Temp: 97.4 °F (36.3 °C)       Review of Systems   Constitutional:  Positive for activity change and appetite change. Negative for fatigue and fever.   HENT: Negative.     Eyes: Negative.    Respiratory:  Negative for chest tightness.    Cardiovascular: Negative.  Negative for leg swelling.   Gastrointestinal: Negative.    Genitourinary: Negative.    Musculoskeletal:  Positive for gait problem.   Skin:  Positive for wound (right ear, sacrum).   Neurological:  Positive for speech difficulty and weakness (left side).   Hematological: Negative.    Psychiatric/Behavioral: Negative.  Negative for agitation.    All other systems reviewed  and are negative.      Physical Exam:  Physical Exam  Vitals reviewed.   Constitutional:       General: She is not in acute distress.     Appearance: She is well-developed.   HENT:      Head: Normocephalic and atraumatic.      Ears:      Comments: See photo     Nose: Nose normal.      Mouth/Throat:      Mouth: Mucous membranes are dry.   Eyes:      Pupils: Pupils are equal, round, and reactive to light.   Cardiovascular:      Rate and Rhythm: Normal rate and regular rhythm.      Heart sounds: Normal heart sounds.   Pulmonary:      Effort: Pulmonary effort is normal.      Breath sounds: Normal breath sounds.   Abdominal:      General: Bowel sounds are normal.      Palpations: Abdomen is soft.      Comments: J tube in place   Musculoskeletal:      Cervical back: Normal range of motion and neck supple.      Comments: Flaccid hemiplegia right side-splints in place to hand and foot   Skin:     General: Skin is warm and dry.   Neurological:      Mental Status: She is alert and oriented to person, place, and time.      Cranial Nerves: No cranial nerve deficit.   Psychiatric:         Behavior: Behavior normal.         Thought Content: Thought content normal.         Judgment: Judgment normal.         INSTRUCTIONS FOR PATIENT:     Scheduled Follow-up, Appts Reviewed with Modifications if Needed: Yes  No future appointments.      Signature: Sylvia Ng NP    Transition of Care Visit:  I have reviewed and updated the history and problem list.  I have reconciled the medication list.  I have discussed the hospitalization and current medical issues, prognosis and plans with the patient/family.

## 2025-04-07 ENCOUNTER — PATIENT MESSAGE (OUTPATIENT)
Dept: INTERNAL MEDICINE | Facility: CLINIC | Age: 65
End: 2025-04-07
Payer: COMMERCIAL

## 2025-04-08 ENCOUNTER — TELEPHONE (OUTPATIENT)
Dept: INTERNAL MEDICINE | Facility: CLINIC | Age: 65
End: 2025-04-08
Payer: COMMERCIAL

## 2025-04-08 DIAGNOSIS — I69.051: Primary | ICD-10-CM

## 2025-04-08 PROBLEM — I69.351 FLACCID HEMIPLEGIA OF RIGHT DOMINANT SIDE AS LATE EFFECT OF CEREBRAL INFARCTION: Status: ACTIVE | Noted: 2025-04-08

## 2025-04-08 PROBLEM — I69.320 APHASIA AS LATE EFFECT OF CEREBROVASCULAR ACCIDENT: Status: ACTIVE | Noted: 2025-04-08

## 2025-04-08 PROBLEM — Z93.1 PEG (PERCUTANEOUS ENDOSCOPIC GASTROSTOMY) STATUS: Status: ACTIVE | Noted: 2025-04-08

## 2025-04-08 NOTE — ASSESSMENT & PLAN NOTE
PEG in place with TF at 13cc/hr  Tolerating meals  ST cleared for oral meds and meals  Stop TF and start food log  Monitor wt  BMP next week

## 2025-04-08 NOTE — ASSESSMENT & PLAN NOTE
CVA 1/2025  Recent d/c SNF  Up to WC with assistance  Turn and reposition frequently  May benefit from trapeze-will ask PT for recs    No statin or ASA in place at this time  Will order labs

## 2025-04-09 ENCOUNTER — PATIENT OUTREACH (OUTPATIENT)
Dept: ADMINISTRATIVE | Facility: OTHER | Age: 65
End: 2025-04-09
Payer: COMMERCIAL

## 2025-04-09 PROBLEM — I10 PRIMARY HYPERTENSION: Status: ACTIVE | Noted: 2025-04-09

## 2025-04-09 NOTE — ASSESSMENT & PLAN NOTE
On Norvasc prior to CVA  Currently using liquid Diuril  Able to take meds oral  Stop diuril  Resume Norvasc 5mg daily orally

## 2025-04-09 NOTE — PROGRESS NOTES
CHW - Initial Contact    This Community Health Worker the Social Determinant of Health questionnaire with patient's spouse Wellington via telephone today.      Pt identified barriers of most importance are: Wellington stated pt has a medical appointment on 4/19/25 at [facility]  Wants to know if there's any medical transportation    CHW informed Wellington since the appointment is outside of the [place] their local [employer] won't bring them due to mileage limits    CHW will call [employer] and [facility] that [facility]     Support and Services: CHW called [employer] rep stated pt's plan only provides emergency transportation, routine non emergency transportation isn't a benefit on pt's plan    CHW sent portal message stating no other options CHW knows of besides ride share or private pay transportation    Follow up required: yes    Follow-up Outreach - Due: 4/15/2025

## 2025-04-14 ENCOUNTER — PATIENT MESSAGE (OUTPATIENT)
Dept: INTERNAL MEDICINE | Facility: CLINIC | Age: 65
End: 2025-04-14
Payer: COMMERCIAL

## 2025-04-14 DIAGNOSIS — E87.6 HYPOKALEMIA: Primary | ICD-10-CM

## 2025-04-14 DIAGNOSIS — I10 PRIMARY HYPERTENSION: ICD-10-CM

## 2025-04-14 NOTE — TELEPHONE ENCOUNTER
LOV with Latisha Garg MD , 4/1/2025  Pt has [facility].  Pt would like to know who will decide when to d/c PEG tube and diet recommendations based on lab results.

## 2025-04-20 ENCOUNTER — PATIENT MESSAGE (OUTPATIENT)
Dept: INTERNAL MEDICINE | Facility: CLINIC | Age: 65
End: 2025-04-20
Payer: COMMERCIAL

## 2025-04-21 ENCOUNTER — TELEPHONE (OUTPATIENT)
Dept: INTERNAL MEDICINE | Facility: CLINIC | Age: 65
End: 2025-04-21
Payer: COMMERCIAL

## 2025-04-21 NOTE — TELEPHONE ENCOUNTER
----- Message from Lupe sent at 4/21/2025 10:47 AM CDT -----  Contact: Jenn with [facility] @ [phone]  .[email or web address] Patient is calling for Medical Advice regarding: Jenn with [facility] @ [phone] need doctor notes on patient faxed to  about elbow brace.

## 2025-04-23 ENCOUNTER — PATIENT MESSAGE (OUTPATIENT)
Dept: INTERNAL MEDICINE | Facility: CLINIC | Age: 65
End: 2025-04-23
Payer: COMMERCIAL

## 2025-04-23 ENCOUNTER — RESULTS FOLLOW-UP (OUTPATIENT)
Dept: INTERNAL MEDICINE | Facility: CLINIC | Age: 65
End: 2025-04-23

## 2025-04-24 NOTE — TELEPHONE ENCOUNTER
Family requesting orders for inpatient occupational, physical and speech therapy due to stroke? States Dr Khoobehi put these request in and then told she was to f/u with PCP?     LOV with Latisha Garg MD , 4/1/2025

## 2025-04-24 NOTE — TELEPHONE ENCOUNTER
LOV with Latisha Garg MD , 4/1/2025  Pt's proxy inquiring why the pt's Potassium was low in September 2024.  CMP and Mg resulted 4/23/24. K was 3.3.

## 2025-04-30 ENCOUNTER — TELEPHONE (OUTPATIENT)
Dept: INTERNAL MEDICINE | Facility: CLINIC | Age: 65
End: 2025-04-30
Payer: COMMERCIAL

## 2025-04-30 NOTE — TELEPHONE ENCOUNTER
----- Message from Xiomara sent at 4/30/2025  2:50 PM CDT -----  Contact: Jennifer peacock/ [facility] [phone]  .[email or web address] Patient is calling for Medical Advice regarding: Jennifer peacock/ [facility]  would like to request extension of occupational and physical therapy for pt.How long has patient had these symptoms:Pharmacy name and phone#:Patient wants a call back or [facility]: call back Comments: still waiting since 04/22/2025Please advise patient replies from provider may take up to 48 hours.

## 2025-05-06 ENCOUNTER — PATIENT MESSAGE (OUTPATIENT)
Dept: INTERNAL MEDICINE | Facility: CLINIC | Age: 65
End: 2025-05-06
Payer: COMMERCIAL

## 2025-05-08 ENCOUNTER — PATIENT MESSAGE (OUTPATIENT)
Dept: INTERNAL MEDICINE | Facility: CLINIC | Age: 65
End: 2025-05-08
Payer: COMMERCIAL

## 2025-05-08 NOTE — PROGRESS NOTES
Outpatient Rehab    Occupational Therapy Evaluation    Patient Name: Kateryna Weber  MRN: [id number]  YOB: 1960  Encounter Date: 5/9/2025    Therapy Diagnosis:   Encounter Diagnoses   Name Primary?    Left hemiparesis Yes    Spasticity     Impaired mobility and ADLs      Physician: Latisha Garg MD    Physician Orders: Eval and Treat  Medical Diagnosis: Hemiparesis of right dominant side as late effect of nontraumatic subarachnoid hemorrhage    Visit # / Visits Authorized: 1 / 1  Insurance Authorization Period: 5/5/2025 to 12/31/2025  Date of Evaluation: 5/9/2025  Plan of Care Certification: 5/9/2025 to 7/18/2025     Time In: 0850   Time Out: 0930  Total Time (in minutes): 40   Total Billable Time (in minutes): 40    Intake Outcome Measure for FOTO Survey    Therapist reviewed FOTO scores for Kateryna Weber on 5/9/2025.   FOTO report - see Media section or FOTO account episode details.     Intake Score: 28%    Precautions     Standard, fall, aphasia       Subjective   History of Present Illness  Kateryna is a 64 y.o. female who reports to occupational therapy with a chief concern of Right side weakness and aphasia.                 Dominant Hand: Right  History of Present Condition/Illness: Pt with stroke on January 22, 2025 - she was admitted to [facility] for care for ~2 months. Following acute care, she was sent home with home health OT, PT and speech language pathology  - which was discharged yesterday. Pt recently saw physical medicine rehab MD for botox consult for right upper extremity - spouse reported they are going to try for inpatient rehab but want to do as much outpatient until they hopefully get in. She is now on a regular diet.     Activities of Daily Living  Social history was obtained from Patient and Spouse.    General Prior Level of Function Comments: active and indep; requiring no assistance  General Current Level of Function Comments: requiring max(A) for all ADLs, IADLs  and mobility at this analy       Previously independent with activities of daily living? Yes     Currently independent with activities of daily living? No          Previously independent with instrumental activities of daily living? Yes     Currently independent with instrumental activities of daily living? No              Pain       Pain at worst is reported as 10/10.   Location: unable to voice; use of numerical and facial visual scale         Living Arrangements  Pt lives with spouse in single level home with threshold to enter; ramp at back door. Whirlpool tub and walk in shower. DME: shower bench, hand held shower, grab bars near toilet, manual wheelchair, right upper extremity spasticity splint for for elbow. Had a hospital bed; got rid of yesterday.       Employment  Patient reports: Does the patient's condition impact their ability to work?  Employment Status: Retired   School board; .       Past Medical History/Physical Systems Review:   Kateryna Weber  has no past medical history on file.    Kateryna Weber  has a past surgical history that includes Appendectomy; Hysterectomy; Abdominal surgery (2000); Gastric bypass (2007); Colonoscopy (N/A, 4/8/2019); Interposition arthroplasty of carpometacarpal joints (Left, 8/15/2023); Interposition arthroplasty of carpometacarpal joints (Right, 11/24/2023); and Colonoscopy (N/A, 10/24/2024).    Kateryna has a current medication list which includes the following prescription(s): amlodipine, estradiol, multivitamin, and multivitamin, and the following Facility-Administered Medications: lactated ringers and lidocaine (pf) 10 mg/ml (1%).    Review of patient's allergies indicates:   Allergen Reactions    No known drug allergies         Objective   Cognition  Alert throughout session; answering to name. Unable to verbal orientation due to aphasia  Following minimal verbal commands/request; following minimal 1 step commands with visual cues   Communication: Mixed Aphasia      Modified Jesus Scale  Muscle Tested: Right upper extremity. Score: 4                     Shoulder Range of Motion     Shoulder, Elbow, or Forearm Range of Motion Details: Input here: PROM: right elbow         Activities of Daily Living (ADL) Assessment  Bathing/Showering Assistance: Maximal assist  Upper Body Dressing Assistance: Maximal assist  Lower Body Dressing Assistance: Maximal assist  Feeding Assistance: Minimal assist  Functional Mobility Assistance: Maximal assist  Toileting and Toilet Hygiene Assistance: Maximal assist    Instrumental Activities of Daily Living (IADL) Assessment  Community Mobility Assistance: Maximal assist    Transfers Assessment  Sit to Stand Assistance: Minimal assist  Chair to Bed Assistance: Moderate assist  Chair to Bed Assistance Details: stand pivot            Right upper extremity:   Passive Range of motion:   Shoulder flex: 105*  Elbow: 90*  Resting posture: Shoulder internal rotation with elbow flexion and supination; wrist in mild flexion and fisting present     , pinch and coordination assessment for right upper extremity: unable to complete     Functional Mobility:   -sit<>stand: min(A) from wheelchair with right knee block  -stand pivot transfer to mat from w/c to left side: mod(A)  -Lateral scooting: moderate(A)    Static sitting edge of mat: stand by assist  Dynamic sitting edge of mat: contact guard assist  Static standing: mod(A) -- requiring upper body support       Treatment:  Kateryna participated in neuromuscular re-education activities to improve: Balance, Coordination, Proprioception, and Posture for 10 minutes. The following activities were included:  Seated edge of mat   -PROM for gentle end range as tolerated for right upper extremity; provided vibration input to tricep and bicep for relaxation of tone with fair response    -PROM for left scapular mobilizations for elevation, depression, protraction and retraction        Time Entry(in minutes):  OT  Evaluation (Moderate) Time Entry: 30  Neuromuscular Re-Education Time Entry: 10    Assessment & Plan   Assessment  Kateryna presents with a condition of Moderate complexity.   Presentation of Symptoms: Evolving  Will Comorbidities Impact Care: Yes       ADL Limitations : Bathing/showering, Dressing, Functional mobility, Personal hygiene and grooming, Sexual activity, Toileting and toilet hygiene  IADL Limitations: Care of others, Care of pets, Communication management, Community mobility, Driving, Grocery/shopping, Meal preparation and cleanup  Leisure Limitations: Leisure exploration, Leisure participation  Social Participation Limitations: Community, Peer/friend  Functional Limitations: Activity tolerance, Bed mobility, Carrying objects, Communication, Fine motor coordination, Functional cognition, Functional mobility, Gross motor coordination, Increased risk of fall, Maintaining balance, Manipulating objects, Pain when reaching, Pain with ADLs/IADLs, Sitting tolerance, Standing tolerance, Transfers, Range of motion, Reaching         Personal Factors Affecting Prognosis: Cognitive impairment       Anticipated Need for Modification: Pt would best benefit from inpatient rehab  Evaluation/Treatment Response: Patient limited by pain  Patient Goal for Therapy (OT): increased functional indep  Prognosis: Fair  Assessment Details: Ms Avendaño is s/p Nontraumatic subcortical hemorrhage of left cerebral hemisphere. She presents today with aphasia, right hemiparesis with spasticity already present with significant resting tone in flexion and internal rotation. At this time, pt is requiring wheelchair for her functional mobility/transportation. She is unable to complete ADLs/IADLs without spouse assistance. Prior to CVA, pt was active and indep and required no assist/DME. It is unfortunate that pt did not receive inpatient rehab following acute stay for her deficits. At this time, I continue to believe that inpatient rehab would  best benefit pt's progress in care. OT will follow up with MD on this topic; OT will follow up in outpatient 3x/w to address stated goals listed below.     Plan  From an occupational therapy perspective, the patient would benefit from: Skilled Rehab Services    Planned therapy interventions include: Therapeutic exercise, Therapeutic activities, Neuromuscular re-education, Manual therapy, ADLs/IADLs, Cognitive functional training, and Wheelchair management.    Planned modalities to include: Biofeedback, Electrical stimulation - attended, Fluidotherapy, and Paraffin bath.        Visit Frequency: 3 times Per Week for 10 Weeks.       This plan was discussed with Patient and Caregiver.   Discussion participants: Agreed Upon Plan of Care             Patient's spiritual, cultural, and educational needs considered and patient agreeable to plan of care and goals.           Goals:   Short Term: 5 weeks  - Pt will participate in upper body dressing with moderate assistance using adaptive techniques or devices to increase independence in ADLs.  - Pt will tolerate right upper extremity range of motion program x10 min without need for rest break.   - Pt will complete stand pivot transfer with minimal assistance.   - Pt will complete lateral scooting as needed for ADLs with min(A).   - Pt will tolerate right upper extremity splint/orthosis x1 hour as needed for tone/spasticity management.   - Pt will be independent with Home Exercise Program (HEP)/Home Activity Program (HAP) and report at least 50% compliance.       Long Term: 10 weeks   - Pt  will participate in upper body dressing with minimal assistance using adaptive techniques or devices to increase independence in ADLs.  - Pt will complete lower body dressing (pants, brief) with moderate assistance.   - Pt will demonstrate improved upper extremity positioning in wheelchair and bed to reduce spasticity and risk of contracture in RUE, as evidenced by maintaining neutral  shoulder and elbow alignment with appropriate supports for at least 80% of observed sessions.  - Pt will tolerate right wrist in neutral x8 minutes as needed for weightbearing and functional tasks.   - Caregiver will demo understanding for range of motion program and positioning for Right upper extremity in seated and supine.   - Pt will be independent with Home Exercise Program (HEP)/Home Activity Program (HAP) to promote long term maintenance of progress made in therapy.     Goals to be added and edited within plan of care as needed/appropriate.     NAMITA Thomas

## 2025-05-09 ENCOUNTER — CLINICAL SUPPORT (OUTPATIENT)
Dept: REHABILITATION | Facility: HOSPITAL | Age: 65
End: 2025-05-09
Payer: COMMERCIAL

## 2025-05-09 DIAGNOSIS — Z78.9 IMPAIRED MOBILITY AND ADLS: ICD-10-CM

## 2025-05-09 DIAGNOSIS — Z74.09 IMPAIRED MOBILITY AND ADLS: ICD-10-CM

## 2025-05-09 DIAGNOSIS — Z74.09 IMPAIRED FUNCTIONAL MOBILITY, BALANCE, GAIT, AND ENDURANCE: Primary | ICD-10-CM

## 2025-05-09 DIAGNOSIS — R25.2 SPASTICITY: ICD-10-CM

## 2025-05-09 DIAGNOSIS — I69.320 APHASIA AS LATE EFFECT OF CEREBROVASCULAR ACCIDENT: Primary | ICD-10-CM

## 2025-05-09 DIAGNOSIS — R47.01 GLOBAL APHASIA: ICD-10-CM

## 2025-05-09 DIAGNOSIS — G81.94 LEFT HEMIPARESIS: Primary | ICD-10-CM

## 2025-05-09 PROCEDURE — 92523 SPEECH SOUND LANG COMPREHEN: CPT | Mod: PO | Performed by: SPEECH-LANGUAGE PATHOLOGIST

## 2025-05-09 PROCEDURE — 97162 PT EVAL MOD COMPLEX 30 MIN: CPT | Mod: PO

## 2025-05-09 PROCEDURE — 97112 NEUROMUSCULAR REEDUCATION: CPT | Mod: PO

## 2025-05-09 PROCEDURE — 97166 OT EVAL MOD COMPLEX 45 MIN: CPT | Mod: PO

## 2025-05-09 NOTE — PROGRESS NOTES
Outpatient Rehab    Occupational Therapy Visit    Patient Name: Kateryna Weber  MRN: [id number]  YOB: 1960  Encounter Date: 5/12/2025    Therapy Diagnosis:   Encounter Diagnosis   Name Primary?    Spasticity Yes     Physician: Latisha Garg MD    Physician Orders: Eval and Treat  Medical Diagnosis: Hemiparesis of right dominant side as late effect of nontraumatic subarachnoid hemorrhage    Visit # / Visits Authorized: 1 / 10  Insurance Authorization Period: 5/12/2025 to 12/31/2025  Date of Evaluation: 5/9/2025   Plan of Care Certification: 5/9/2025 to 7/18/2025 (3x/wk x 10 wks)      Time In: 1516   Time Out: 1622  Total Time (in minutes): 66   Total Billable Time (in minutes): 66    Precautions: Standard, fall, aphasia        Subjective   Pt globally aphasic but able to follow commands and answer questions appropriately at times. Pt able to express pain. Pt's  reported that she is getting a dynasplint on Thursday. She is currently tolerating resting hand splint 2-4 hours during the day and is wearing a soft elbow extension splint at night while holding a ball to help keep her hand open..  Family / care giver present for this visit:   Pain reported as 0/10. Location: N/A    Objective            Treatment:     Neuromuscular re-education activities to improve Balance, Kinesthetic, Sense, Proprioception, and Posture for 66 minutes. The following activities were included:  Pt presented with resting hand splint donned to RUE. Stand pivot transfer from w/c>mat (towards left) with Mod A.     Seated EOM with mirror feedback:   - With therapist seated on therapy ball behind patient, stretch was applied to bilateral shoulders to promote upright posture, anterior pelvic tilt, thoracic extension, and scap retraction, 3x. Side/side weight shifts then added with attempted hip hike, x multiple reps.   - PROM RUE scap mobs elevation, depression, retraction, and protraction   - Doffed resting hand splint    - Attempted PROM for RUE elbow extension with STM to biceps in inhibitory pattern and wrist stretches but repositioning required secondary to c/o pain     Sit>supine with Mod A   The following stretches were completed to RUE within tolerable range:   - Sustained elbow extension stretch with STM to biceps in inhibitory pattern using foam roller then hand   - Focal muscle vibration (FMV) to right triceps to facilitate relaxation of biceps to address hypertonicity and spasticity of RUE  - Wrist stretches for RD/UD, flexion/towards extension, towards pronation, and hand extension  - RUE shoulder stretches for shoulder abduction, flexion with combined scap mobs, and ER post STM to pec/lat insertion while maintaining elbow at 90* and neutral forearm     Sit>supine with Mod A  Seated EOM:   - Mat push ups using LUE with Min A, x4     Squat pivot transfer post final mat squat (towards left). Min A for sit>stand at end of session to readjust clothing.     Wear schedule of splints discussed throughout session with pt and pt's .     Time Entry(in minutes):  Neuromuscular Re-Education Time Entry: 66    Assessment & Plan   Assessment: Pt tolerated today's session good-fair. Overall pt with good participation, but she was somewhat limited by pain with end range stretching. Better response to all stretching in supine. Unknown if this was due to change in positioning or good response to STM and FMV. Unable to achieve greater than 90* elbow extension throughout session; however, post extensive/prolonged stretching, pt's resting tone was somewhat improved. Verbalized importance of stretching/PROM to help prevent further joint contractures and help maintain joint integrity. Additionally, pt's resting posture was much improved after postural education at beginning of session. Pt would continue to benefit from skilled occupational therapy services for pt education, HEP/HAP guidance, and to maximize pt participation with ADLs  and IADLs.  Evaluation/Treatment Tolerance: Patient tolerated treatment well, Patient limited by pain    Patient will continue to benefit from skilled outpatient occupational therapy to address the deficits listed in the problem list box on initial evaluation, provide pt/family education and to maximize pt's level of independence in the home and community environment.     Patient's spiritual, cultural, and educational needs considered and patient agreeable to plan of care and goals.           Plan: Cont OT POX 3x/wk; focus on RUE stretching to prevent further joint contractures, mat/bed mobility; compensatory strategies/aly techniques for ADLs    Goals:   Short Term: 5 weeks  - Pt will participate in upper body dressing with moderate assistance using adaptive techniques or devices to increase independence in ADLs. ongoing  - Pt will tolerate right upper extremity range of motion program x10 min without need for rest break. ongoing  - Pt will complete stand pivot transfer with minimal assistance. ongoing  - Pt will complete lateral scooting as needed for ADLs with min(A). ongoing  - Pt will tolerate right upper extremity splint/orthosis x1 hour as needed for tone/spasticity management. ongoing  - Pt will be independent with Home Exercise Program (HEP)/Home Activity Program (HAP) and report at least 50% compliance. ongoing     Long Term: 10 weeks   - Pt  will participate in upper body dressing with minimal assistance using adaptive techniques or devices to increase independence in ADLs. ongoing  - Pt will complete lower body dressing (pants, brief) with moderate assistance. ongoing  - Pt will demonstrate improved upper extremity positioning in wheelchair and bed to reduce spasticity and risk of contracture in RUE, as evidenced by maintaining neutral shoulder and elbow alignment with appropriate supports for at least 80% of observed sessions. ongoing  - Pt will tolerate right wrist in neutral x8 minutes as needed for  weightbearing and functional tasks. Ongoing   - Caregiver will demo understanding for range of motion program and positioning for Right upper extremity in seated and supine. ongoing  - Pt will be independent with Home Exercise Program (HEP)/Home Activity Program (HAP) to promote long term maintenance of progress made in therapy. ongoing     Goals to be added and edited within plan of care as needed/appropriate.    Kelsey Milan, OT

## 2025-05-09 NOTE — PROGRESS NOTES
"[facility]  Physical Therapy Neurological Rehabilitation Initial Evaluation     Name: Kateryna Weber  Clinic Number: [id number]    Therapy Diagnosis:   Encounter Diagnosis   Name Primary?    Impaired functional mobility, balance, gait, and endurance Yes     Physician: Latisha Garg MD    Physician Orders: PT Eval and Treat   Medical Diagnosis from Referral: Hemiparesis of right dominant side as late effect of nontraumatic subarachnoid hemorrhage   Evaluation Date: 5/9/2025  Authorization Period Expiration: 12/31/25  Plan of Care Expiration: 07/04/25  Progress Note Due: 06/04/25  Date of Surgery: 01/31/25 craniotomy; 02/05/25 cranioplasty  Visit # / Visits authorized: 01/ 01  FOTO: 1/ 3    Precautions: Standard, Fall, and aphasia/cognitive deficits, h/o cancer, PEG    Time In: 09:30  Time Out: 10:15  Total Billable Time: 45 minutes    Subjective      Date of onset: Per medical chart review: "The patient presents for an initial physical medicine and rehabilitation evaluation. She is a 64 y.o. female with history of Previous large parieto-occipital ICH  Status post evacuation/decompressive craniotomy and hematoma evacuation on 01/22/2025. Status post craniotomy with washout on 01/31/2025. She had cranioplasty in February. She went from the acute hospital to [facility]. She then discharged to home and is doing home health. "    Currently working with PMR MD on spasticity management and qualification for Inpatient Rehab (IPR).     History of current condition - [facility] reports: Patient's  present to provide history: Patient was completely (I) prior to cerebral vascular accident. Was in hospital for 2 months and denied Inpatient Rehab (IPR) so was discharged home with home health. Was completely (I) with functional mobility/ADL prior to cerebral vascular accident; ambulated (I). Finally took 5 steps with HH therapy yesterday with assist x 2 people. Currently uses " w/c as primary mode of mobility. Currently requires Mod A to Max A for functional mobility/ADL performance.      Imagin25 CT Head:   1.    Postoperative findings.  There is evolution of the parenchymal hemorrhage in the left parietal lobe.  No new hemorrhage is seen.   2.   There is encephalomalacia in the left parietal and temporal lobes.     Prior Therapy: hospital for 2 months, denied Inpatient Rehab (IPR)/SNF, completed HH therapy  Social History: lives with her   Falls: 0   DME: manual w/c, suly (was returned), hospital bed (recently returned to normal bed), waiting on bed side commode but able to use toilet, shower bench for walk in shower  Home Environment: [facility],  but ramp present   Exercise Routine / History:  currently stretching ankles and legs, lower extremity strengthening exercises   Family Present at time of Eval:  present for evaluation   Occupation: not currently working, was retired prior to CVA  Prior Level of Function: (I) with functional mobility/ADL, driving  Current Level of Function: Mod A to Max A for functional transfers, only able to take a few steps with assist x 2 people, primarily using w/c Min A for mobility    Pain:  Indicates 4/10 pain on VAS scale, but unable to communicate where pain is present    Patient's goals: to regain as much mobility as possible    Medical History:   No past medical history on file.    Surgical History:   Kateryna Weber  has a past surgical history that includes Appendectomy; Hysterectomy; Abdominal surgery (); Gastric bypass (); Colonoscopy (N/A, 2019); Interposition arthroplasty of carpometacarpal joints (Left, 8/15/2023); Interposition arthroplasty of carpometacarpal joints (Right, 2023); and Colonoscopy (N/A, 10/24/2024).    Medications:   Kateryna has a current medication list which includes the following prescription(s): amlodipine, estradiol, multivitamin, and multivitamin, and the following  Facility-Administered Medications: lactated ringers and lidocaine (pf) 10 mg/ml (1%).    Allergies:   Review of patient's allergies indicates:   Allergen Reactions    No known drug allergies         Objective      Mental status: affect appropriate to mood, drowsy  Appearance: Casually dressed  Behavior:  calm and cooperative  Attention Span and Concentration:  Grossly intact    Dominant hand:  right     Posture Alignment in sitting:   Head: forward head   Scapulae: see Occupational Therapy assessment for more detail   Trunk: trunk deviated right at times but able to correct to midline with cueing  Pelvis: WFL   Legs: WFL     Posture Alignment in standing:   Head: forward head   Scapulae: see Occupational Therapy evaluation for more detail   Trunk: forward flexed-- cues to improve upright posture   Pelvis: NT   Legs: predominant WB through LLE; increased right ankle plantarflexion, increased right knee hyperextension in stance     Sensation: Light Touch: inaccurate responses to testing         Tone: increased tone and tightness in right hip adductor; right ankle resulting in plantarflexion contracture-- currently working with medical team on spasticity/tone control    Visual/Auditory:   Tracking:NT  Saccades: NT  Acuity:Intact  R/L discrimination: Intact  Visual field: potential limitations-  reports that he has to point out if she drops food but then she is able to see it    Coordination:   - fine motor: see Occupational Therapy eval  - UE coordination: see Occupational Therapy eval    - LE coordination:  NT        RANGE OF MOTION--LOWER EXTREMITIES  (R) LE Hip: limited hip abduction due to hip adductor tightness   Knee: WFL for PROM   Ankle: limited ankle dorsiflexion due to plantarflexion contracture    (L) LE: Hip: normal   Knee: normal   Ankle: normal    Strength: manual muscle test grades below   Lower Extremity Strength- performed with patient in sitting  Right LE  Left LE    Hip Flexion: 2-/5 Hip  Flexion: At least 3/5- unable to accurately assess resistance due to difficulty following commands   Hip Extension:  2-/5 Hip Extension: At least 3/5- unable to accurately assess resistance due to difficulty following commands   Hip Abduction: 2-/5 Hip Abduction: At least 3/5- unable to accurately assess resistance due to difficulty following commands   Hip Adduction: 2-/5 Hip Adduction At least 3/5- unable to accurately assess resistance due to difficulty following commands   Knee Extension: 2-/5 Knee Extension: At least 3/5- unable to accurately assess resistance due to difficulty following commands   Knee Flexion: 2-/5 Knee Flexion: At least 3/5- unable to accurately assess resistance due to difficulty following commands   Ankle Dorsiflexion: 0/5 Ankle Dorsiflexion: At least 3/5- unable to accurately assess resistance due to difficulty following commands   Ankle Plantarflexion: 2-/5 Ankle Plantarflexion: At least 3/5- unable to accurately assess resistance due to difficulty following commands     Abdominal Strength: at least 3+/5    Flexibility: tightness throughout right hip adductors and right ankle PFs    Postural control: Grading for Balance: Graded Posture Movement Ability of Individual  Static Sitting: Normal- able to maintain sitting balance against max resistance  Dynamic Sitting: Normal- able to sit unsupported, able to weight shift and cross midline maximally  Static Standing: Poor + -- requires Mod A and upper extremity support to maintain standing without loss of balance  Dynamic Standing: Unable to perform at this time      Gait Assessment:   - AD used: //bars, Neuro ERIC glider on right foot, w/c follow  - Assistance: Mod A x 2 assists; 1 assist to block right knee instance, to advance RLE in swing, and to weight shift; another assist to promote upright posture and to reduce right knee hyperextension in stance  - Distance: x 12 feet in // bars x 2 trials    GAIT DEVIATIONS:  Kateryna displays the  following deviations with ambulation: decreased step length bilaterally, decreased weight acceptance through RLE, forward flexed trunk, increased right knee hyperextension in stance, decreased RLE clearance in swing    Impairments contributing to deviations: right sided hemiparesis, decreased activity tolerance, right ankle contracture, cognitive/communication deficits    Endurance Deficit: significant     Functional Mobility (Bed mobility, transfers)  Bed mobility: Max A  Supine to sit: Mod A  Sit to supine: Max A  Transfers to bed: Mod A to Max A stand pivot  Transfers to toilet: Mod A to Max A stand pivot  Sit to stand:  Mod A x 2 trials in // bars  Stand pivot:  Mod A  Car transfers: Max A- stand pivot  Wheelchair mobility: Min A for steering    ADL's: see Occupational Therapy evaluation for more details       Intake Outcome Measure for FOTO for lower extremity CVA Survey- performed by patient's  as proxy    Therapist reviewed FOTO scores for Kateryna Weber on 5/9/2025.   FOTO report - see Media section or FOTO account episode details.    Intake Score: 80% limited       Treatment     No treatment provided this date. Entire time spent on evaluation.     Patient Education and Home Exercises     Education provided:   - plan of care (POC), scheduling    Written Home Exercises Provided: to be updated at first follow up session    Assessment     Kateryna is a 64 y.o. female referred to outpatient Physical Therapy with a medical diagnosis of Hemiparesis of right dominant side as late effect of nontraumatic subarachnoid hemorrhage . Patient presents to therapy evaluation status post large parieto-occipital ICH; status post evacuation/decompressive craniotomy and hematoma evacuation on 01/22/2025; status post craniotomy with washout on 01/31/2025. Prior to cerebral vascular accident, patient was (I) with all functional mobility/ADL performance and was (I) with ambulation. Currently, patient requires Mod to Max A for  functional transfers/ADL performance, requires Total A (Mod A x 2 assists) for short distance ambulation in // bars, and uses a manual w/c as primary mode of mobility Min A. At this time, functional mobility appears most limited by dense right sided hemiparesis, increased tone/contractures throughout right ankle and right hip adductor and cognitive/communication deficits. Good effort noted throughout evaluation, despite appearing drowsy from starting new medication. Patient is appropriate for high intensity therapy to address mobility deficits listed above in order to improve independence with functional mobility/ADL performance and to reduce caregiver burden. Recommend inpatient rehab for high intensity therapy to maximize functional gains and progress. Therapy team to work with  and medical team on this recommendation. In the interim, patient to benefit from outpatient physical therapy to address deficits and goals listed above.     Patient prognosis is Good.   Patient will benefit from skilled outpatient Physical Therapy to address the deficits stated above and in the chart below, provide patient /family education, and to maximize patient's level of independence.     Plan of care discussed with patient: Yes  Patient's spiritual, cultural and educational needs considered and patient is agreeable to the plan of care and goals as stated below:     Anticipated Barriers for therapy: co-morbidities    Medical Necessity is demonstrated by the following  History  Co-morbidities and personal factors that may impact the plan of care [] LOW: no personal factors / co-morbidities  [] MODERATE: 1-2 personal factors / co-morbidities  [x] HIGH: 3+ personal factors / co-morbidities    Moderate / High Support Documentation:   Co-morbidities affecting plan of care: hypertension, h/o bariatric surgery, h/o colon cancer    Personal Factors:   no deficits     Examination  Body Structures and Functions, activity limitations  and participation restrictions that may impact the plan of care [] LOW: addressing 1-2 elements  [] MODERATE: 3+ elements  [x] HIGH: 4+ elements (please support below)    Moderate / High Support Documentation: lower extremity strength, functional mobility, activity tolerance, postural control, ambulation     Clinical Presentation [] LOW: stable  [x] MODERATE: Evolving- undergoing medical treatment to address tone/spasticity  [] HIGH: Unstable     Decision Making/ Complexity Score: moderate       Goals:  Short Term Goals: 4 weeks   Patient/caregiver to be (I) with established home exercise program.   Patient to improve supine <> sit transfer to Mod A consistently for improved independence with bed mobility.   Patient to improve sit <> stand transfer with no more than Min A consistently for improved (I) with functional transfers.   Patient to improve stand pivot transfers to no more than Mod A consistently for improved (I) with functional transfers.   Patient to improve ambulation x 12 feet in // bars with no more than Mod A x 1 assist consistently to progress gait training to appropriate assistive device.   Patient to propel w/c x 100 feet, including 90 deg turns with no more than SBA for improved independence with w/c mobility.     Long Term Goals: 8 weeks   Patient to improve supine <> sit transfer to Min A consistently for improved independence with bed mobility.   Patient to improve sit <> stand transfer with no more than CGA consistently for improved (I) with functional transfers.   Patient to improve stand pivot transfers to no more than Min A consistently for improved (I) with functional transfers.   Patient to improve ambulation x 25 feet with no more than Mod A x 1 assist with appropriate assistive device for improved independence with ambulation.    Patient to propel w/c x 150 feet, including 90 deg turns with no more than SBA for improved independence with w/c mobility.     Plan     Plan of care  Certification: 5/9/2025 to 07/04/25.    Outpatient Physical Therapy 3 times weekly for 8 weeks to include the following interventions: Gait Training, Manual Therapy, Moist Heat/ Ice, Neuromuscular Re-ed, Orthotic Management and Training, Patient Education, Self Care, Therapeutic Activities, and Therapeutic Exercise.     Madhuri Burris PT        Physician's Signature: _________________________________________ Date: ________________

## 2025-05-09 NOTE — PATIENT INSTRUCTIONS
"Aphasia    What is aphasia?    Aphasia is a communication disorder that results from damage to the parts of the brain that contain language (typically in the left half of the brain). Individuals who experience damage to the right side of the brain may have additional difficulties beyond speech and language issues. Aphasia may causes difficulties in speaking, listening, reading, and writing, but does not affect intelligence. Individuals with aphasia may also have other problems, such as dysarthria, apraxia, or swallowing problems.    What causes aphasia?    Aphasia is most often caused by stroke. However, any disease or damage to the parts of the brain that control language can cause aphasia. These include brain tumors, traumatic brain injury, and progressive neurological disorders.    What are some signs or symptoms of aphasia?    The specific symptoms and severity of aphasia vary depending on the location and extent of brain damage. Individuals with damage to the front part of the brain may have "choppy" or non-fluent speech. However, they can typically understand what people say fairly well. Those with damage to the posterior regions of the brain often have fluent speech--that is, the rate and rhythm of speech may sound normal. However, their speech may contain the wrong words or made-up words. They also typically have difficulty understanding what is spoken.    Additionally, all individuals with aphasia may also have one or more of the following problems:    Difficulty producing language:  Experience difficulty coming up with the words they want to say  Substitute the intended word with another word that may be related in meaning to the target (e.g., "chicken" for "fish") or unrelated (e.g., "radio" for "ball")  Switch sounds within words (e.g., "wish dasher" for "")  Use made-up words (e.g., "frigilin" for "hamburger")  Have difficulty putting words together to form sentences  String together made-up " "words and real words fluently but without making sense  Difficulty understanding language:  Misunderstand what others say, especially when they speak fast (e.g., radio or television news) or in long sentences  Find it hard to understand speech in background noise or in group situations  Misinterpret jokes and take the literal meaning of figurative speech (e.g., "it's raining cats and dogs")  Difficulty reading and writing:  Difficulty reading forms, pamphlets, books, and other written material  Problems spelling and putting words together to write sentences  Difficulty understanding number concepts (e.g., telling time, counting money, adding/subtracting)    How common is aphasia?    The National [place] on Neurological Disorders and Stroke estimates that approximately one million individuals suffer from aphasia in the [place].    How is aphasia diagnosed?    The speech-language pathologist (SLP) evaluates the individual with a variety tools to determine the type and severity of aphasia. It includes assessment of:    Auditory Comprehension: understanding words, questions, directions, and stories that are spoken  Verbal Expression: producing automatic sequences (e.g., days of the week), naming objects, describing pictures, responding to questions, and having conversations  Reading and Writing: understanding or producing letters, words, sentences, and paragraphs  Functional Communication: using gestures, drawing, pointing, or other supportive means of communication when he/she has trouble getting a point across verbally    What treatments are available for people with aphasia?    There are many types of treatment available for individuals with aphasia. The type of treatment depends on the needs and goals of the person with aphasia. Treatment may be provided in individual or group sessions. The speech-language pathologist (SLP) works on activities to improve specific language skills affected by damage to the " "brain. The SLP also helps the person with aphasia develop and use strategies to improve overall communication in a variety of situations (e.g., life participation approach to the treatment of aphasia). Later on in recovery, the SLP may work with a vocational specialist to help the person return to work or school, if appropriate. The SLP may also work with employers and/or educational specialists to implement the use of compensatory strategies in these settings and may work with them to modify the environment to meet language needs.    What can I do to communicate better with the person with aphasia?    Get the person's attention before you start speaking.  Maintain eye contact and watch the persons body language and use of gesture.  Minimize or eliminate background noise (TV, radio, other people).  Keep your voice at a normal level. Do not speak loudly unless the person asks you to do so.  Keep communication simple, but adult. Don't "talk down" to the person with aphasia.  Simplify your sentence structure and emphasize key words.   Reduce your rate of speech.  Give the individual time to speak. Resist the urge to finish sentences or offer words.  Communicate with drawings, gestures, writing, and facial expressions in addition to speech.  Encourage the person to use drawings, gestures, and writing.  Use "yes" and "no" questions rather than open-ended questions.  Praise all attempts to speak and downplay any errors. Avoid insisting that that each word be produced perfectly.  Engage in normal activities whenever possible.   Encourage independence and avoid being overprotective.    What other organizations have information about aphasia?    This list is not exhaustive and inclusion does not imply endorsement of the organization or the content of the website by AMY.    Resources:   American Speech-Language-Hearing Association (AMY)  Academy of Neurologic Communication Sciences and Disorders  Aphasia Hope  National " Aphasia Association  Stroke Association      See Also  [facility] Practice Portal: Aphasia  Family Adjustment to Aphasia  A Letter on Aphasia from a Concerned Son  Life Participation Approach to Aphasia      Reference: Aphasia. American Speech-Language-Hearing Association, [facility], www.arpit.org/public/speech/disorders/Aphasia/.     Augmentative and Alternative Communication    Augmentative and alternative communication (AAC) is an area of clinical practice that addresses the needs of individuals with significant and complex communication disorders characterized by impairments in speech-language production and/or comprehension, including spoken and written modes of communication.   AAC uses a variety of techniques and tools, including picture communication boards, line drawings, speech-generating devices (SGDs), tangible objects, manual signs, gestures, and finger spelling, to help the individual express thoughts, wants and needs, feelings, and ideas.   AAC is augmentative when used to supplement existing speech, and alternative when used in place of speech that is absent or not functional.   AAC may be temporary, as when used by patients postoperatively in intensive care, or permanent, as when used by an individual who will require the use of some form of AAC throughout his or her lifetime.     http://www.arpit.org/Practice-Portal/Professional-Issues/Augmentative-and-Alternative-Communication/

## 2025-05-09 NOTE — PROGRESS NOTES
Outpatient Rehab  Speech-Language Pathology Evaluation (only)    Patient Name: Kateryna Weber  MRN: [id number]  YOB: 1960  Encounter Date: 5/9/2025    Therapy Diagnosis:   Encounter Diagnoses   Name Primary?    Aphasia as late effect of cerebrovascular accident Yes    Global aphasia      Physician: Latisha Garg MD    Physician Orders: Eval and Treat  Medical Diagnosis: Aphasia as late effect of cerebrovascular accident    Visit # / Visits Authorized: 1 / 1   Insurance Authorization Period: 5/5/2025 to 5/5/2026  Date of Evaluation: 5/9/2025      Time In: 1033   Time Out: 1115  Total Time (in minutes): 42   Total Billable Time (in minutes): 42 minutes     Intake Outcome Measure for FOTO Survey  Intake Score: Did not complete FOTO due to patient's auditory comprehension/verbal expression deficits and patient's frustration.     Precautions: Standard, fall, aphasia     Subjective   History of Present Illness  Kateryna is a 64 y.o. female who reports to Speech-Language Pathology with a chief concern of Aphasia/communication.     Prior Level of Function: No verbal expression/auditory comprehension concerns prior to cerebrovascular accident  Current Level of Function: Severe global aphasia post cerebrovascular accident    Date of Onset: intracerebral hemorrhage status post evacuation decompression in January 2025 History of Current Condition:  Kateryna Weber is a 64 y.o. female who presents to [facility] for evaluation secondary to CVA. Patient was referred to therapy by Latisha Garg MD.  Patient's , Jordy, reports main goals are improving language. Jordy endorses that patient talks a lot at home but needs a lot of support. PEG tube removed early April and patient has been maintaining nutritional status with no swallowing concerns per Jordy. Patient was accompanied to the evaluation by Jordy, her .  Reports that patient was started on  Baclofen and is becoming drowsy.     Pain  Patient reports a current pain level of 4/10.     Location: Shoulder (communicated via AAC)    Treatment History  Patient participated in speech therapy at both acute level and home health - discharged from home health occupational therapy/speech therapy/physical therapy yesterday.  Not Currently Receiving Treatments     Living Arrangements  Home with max assist  Living Arrangements: Spouse/significant other     Employment  Employment Status: Retired; Patient is retired .      Past Medical History/Physical Systems Review:   Kateryna Weber  has no past medical history on file.    Kateryna Weber  has a past surgical history that includes Appendectomy; Hysterectomy; Abdominal surgery (2000); Gastric bypass (2007); Colonoscopy (N/A, 4/8/2019); Interposition arthroplasty of carpometacarpal joints (Left, 8/15/2023); Interposition arthroplasty of carpometacarpal joints (Right, 11/24/2023); and Colonoscopy (N/A, 10/24/2024).    Kateryna has a current medication list which includes the following prescription(s): amlodipine, estradiol, multivitamin, and multivitamin, and the following Facility-Administered Medications: lactated ringers and lidocaine (pf) 10 mg/ml (1%).    Review of patient's allergies indicates:   Allergen Reactions    No known drug allergies       Objective          Formal Assessment:  Western Aphasia Battery - Revised (WAB-R) was administered to evaluate the patient's receptive and expressive language function.The purpose stated in the manual for the WAB-R is to determine the presence, severity, and type of aphasia; measure the patient's level of performance to provide a baseline for detecting change over time; provide a comprehensive assessment of the patients language strengths and deficits in order to guide treatment and management; infer the location and etiology of the lesion causing the aphasia.  The following results were revealed:     Spontaneous  "Speech Score: 2  20  Auditory Comprehension Score: 2.7 / 10  Repetition Score:   0.2 /10  Naming and Word Finding Score: 0/ 10  Aphasia Quotient (AQ):   9.8 / 100  Aphasia Classification: Severe Global Aphasia    Subtest Results:   Information Content: 1 / 10  Fluency, Grammatical Competence, and Paraphasias: 1 10  Yes / No Questions: 39 / 60  Auditory Word Recognition:  60  Sequential Commands: 6 / 80  Repetition: 2 100  Object Namin 60  Word Fluency: 0  20  Sentence Completion: 0 /10  Responsive Speech: 0 / 10    Description:   Auditory Comprehension: Patient answered /60 (65% accuracy) yes/no questions correctly indicating consistent, unreliable yes/no responses. Auditory word recognition for objects, letters, numbers, colors, shapes, and body parts was completed with 1.5% accuracy. Pt followed simple commands with 40% accuracy; followed moderate to complex commands with 0% accuracy. Overall, auditory comprehension is severely impaired.     Verbal Expression: Patient named objects with 0% accuracy despite max cues - patient unable to repeat objects named by SLP. Patient able to list 0 animals within one minute. Sentence completion was completed with 0% accuracy; responsive naming was completed with 0% accuracy. Pt able to repeat single, simple words with 1.4% accuracy; able to repeat phrases/sentences with 0% accuracy. Patient noted to perseverate on "grandma" and "grandpa" throughout assessment. Overall verbal expression is severely impaired.    Picture description: "We got this. (Hesitation) And grandma. That's about all."     Time Entry(in minutes):  Speech Sound Prod Eval w/ Lang Comp and Exp Time Entry: 42    Assessment & Plan   Assessment   Diagnosis and Impressions: Kateryna presents to [facility] status post medical diagnosis of Aphasia as late effect of cerebrovascular accident. She presents with severe global aphasia characterized by profoundly impaired auditory comprehension " "and verbal expression. Auditory comprehension deficits are evidenced by inconsistent yes/no responses (65% accuracy), severely limited auditory word recognition (1.5% accuracy), and poor ability to follow simple (40%) and complex (0%) commands. Verbal expression is marked by severely impaired naming, repetition, and generative language, with 0% accuracy across multiple expressive tasks and perseveration on limited familiar words ("grandma" and "grandpa"). Overall, the patient demonstrates minimal functional use of language for communication at this time. Patient would benefit from intensive therapy services - ideally, inpatient rehabilitation frequency.   Functional Limitations: Patient with severe deficits with verbal expression and functional communication and at risk for social isolation, decreased QOL, and increased frustration.    Personal Factors Affecting Prognosis: Good Family support  Evaluation/Treatment Response: Patient limited by fatigue     Patient Goal for Therapy (SLP): Improve communication  Prognosis: Fair  Referral Recommendations: Inpatient Rehab     Education  Education was done with Patient and Other recipient present. The patient's learning style includes Listening. The patient Requires continuing/additional education. Jordy participated in education. They identified as Spouse/significant other. The reported learning style is Listening. The recipient Verbalizes understanding.      Plan  From a speech language pathology perspective, the patient would benefit from: Skilled Rehab Services  Planned therapy interventions and modalities include: Speech/language therapy.    Visit Frequency: 3 times Per Week for 10 Weeks.     This plan was discussed with Patient and Caregiver.   Discussion participants: Agreed Upon Plan of Care     Patient's spiritual, cultural, and educational needs considered and patient agreeable to plan of care and goals.     Goals:   Active       Long Term Goals        1.  She " will develop functional cognitive-linguistic based skills and utilize compensatory strategies to communicate wants/needs effectively to different conversational partners, maintain safety, participate socially in functional living environment.          Start:  05/09/25    Expected End:  07/18/25            2. Patient will trial compensatory strategies and augmentative-alternative communication (AAC) to communicate wants and needs effectively to different conversational partners, maintain safety, and participate socially in functional living environment.        Start:  05/09/25    Expected End:  07/18/25               Short Term Goals        1. Patient will identify objects in a field of 3 given the name/function of the object with 75% accuracy following nonverbal cues and requesting repetitions as needed across 2-3 sessions to improve auditory comprehension skills.       Start:  05/09/25    Expected End:  06/20/25            2. Patient will answer 1 unit/personal yes/no questions with 75% accuracy following nonverbal cues and requesting repetitions as needed across 2-3 sessions to improve auditory comprehension skills.       Start:  05/09/25    Expected End:  06/20/25            3. Patient will name objects with 75% acc given carrier phrase as needed across 3 sessions           Start:  05/09/25    Expected End:  06/20/25            4. Patient will participate in the speech production treatment hierarchy with mono-syllabic /m/ words with 75% acc across 3 probe trials.          Start:  05/09/25    Expected End:  06/20/25            5. Patient will use gestures and/or low-tech AAC (e.g., communication board, yes/no cards, picture symbols) to express basic wants and needs in response to visual/verbal cues with 75% accuracy across 3 consecutive sessions.       Start:  05/09/25    Expected End:  06/20/25              Short Term Goals      1. Patient will identify objects in a field of 3 given the name/function of the  "object with 75% accuracy following nonverbal cues and requesting repetitions as needed across 2-3 sessions to improve auditory comprehension skills.    2. Patient will answer 1 unit/personal yes/no questions with 75% accuracy following nonverbal cues and requesting repetitions as needed across 2-3 sessions to improve auditory comprehension skills.    3. Patient will name objects with 75% acc given carrier phrase as needed across 3 sessions        4. Patient will participate in the speech production treatment hierarchy with mono-syllabic /m/ words with 75% acc across 3 probe trials.       5. The patient will use gestures and/or low-tech AAC (e.g., communication board, yes/no cards, picture symbols) to express basic wants and needs (e.g., "eat," "drink," "bathroom," "help") in response to visual/verbal cues with 80% accuracy across 3 consecutive sessions.               HALLIE Cabral, L-SLP, CCC-SLP  Speech Language Pathologist   5/9/2025       "

## 2025-05-12 ENCOUNTER — CLINICAL SUPPORT (OUTPATIENT)
Dept: REHABILITATION | Facility: HOSPITAL | Age: 65
End: 2025-05-12
Payer: COMMERCIAL

## 2025-05-12 DIAGNOSIS — Z74.09 IMPAIRED MOBILITY AND ADLS: Primary | ICD-10-CM

## 2025-05-12 DIAGNOSIS — R25.2 SPASTICITY: Primary | ICD-10-CM

## 2025-05-12 DIAGNOSIS — Z78.9 IMPAIRED MOBILITY AND ADLS: Primary | ICD-10-CM

## 2025-05-12 PROCEDURE — 97112 NEUROMUSCULAR REEDUCATION: CPT | Mod: PO

## 2025-05-12 PROCEDURE — 97110 THERAPEUTIC EXERCISES: CPT | Mod: PO

## 2025-05-12 PROCEDURE — 97530 THERAPEUTIC ACTIVITIES: CPT | Mod: PO

## 2025-05-12 NOTE — PROGRESS NOTES
Outpatient Rehab    Physical Therapy Visit    Patient Name: Kateryna Weber  MRN: [id number]  YOB: 1960  Encounter Date: 5/12/2025    Therapy Diagnosis:   Encounter Diagnosis   Name Primary?    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 Patient's  present for session.   Pain reported as 0/10.      Objective    Last performed on evaluation.         Treatment:   Kateryna received therapeutic exercises to develop strength, endurance, ROM, flexibility, posture, and core stabilization for 20 minutes including:   Supine on wedge: RLE  Right hip flexion <> extension AAROM x 10 reps  Right hip adductor stretch PROM  Right hip AB<> assistive device AAROM on bolster x 10 reps  Right knee SAQs AAROM x 10 reps  Right ankle dorsiflexion mobilization Grade 2-3 + manual stretch      Patient participated in neuromuscular re-education activities to improve: Balance, Coordination, and Sense for 00 minutes. The following activities were included:   NP    Patient participated in dynamic functional therapeutic activities to improve functional performance for 10 minutes. Including:   W/c > mat stand pivot transfer moving to left side, Mod A  Sit > supine Mod A for trunk and RLE management  Supine > sit Max A for trunk and  BLE management  Mat > W/c stand pivot transfer, Mod A  Sit <> stand x multiple trials in // bars Min A    Patient participated in gait training activities to normalize gait pattern for 15 minutes. The following activities were included:    Gait belt used for safety. Assistive device: // bars  X 4 laps x 12 feet, ambulation in // bars, Neuro-ERIC glider on right foot- Mod A x 2 assists to weight shift bilaterally, to promote right hip extension in stance, to block right knee in stance, to advance RLE in swing, for upright trunk posture; LUE support c/ w/c follow      Time Entry(in minutes):   See above    Assessment & Plan   Assessment:  Kateryna tolerated initial follow up session well but does remain drowsy with mat exercises due to adjusting to recent start on baclofen. Focused on neuro priming of RLE prior to standing and ambulation activities. Good effort throughout ambulation trials, but considerable assist still required to complete gait trials due to significant RLE weakness. Patient to benefit from high intensity PT to address ongoing mobility deficits and to maximize progress with therapy.        Patient will continue to benefit from skilled outpatient physical therapy to address the deficits listed in the problem list box on initial evaluation, provide pt/family education and to maximize pt's level of independence in the home and community environment.     Patient's spiritual, cultural, and educational needs considered and patient agreeable to plan of care and goals.           Plan:  Progress RLE strength, standing, and functional mobility as tolerated.     Goals:  Short Term Goals: 4 weeks   Patient/caregiver to be (I) with established home exercise program. Ongoing  Patient to improve supine <> sit transfer to Mod A consistently for improved independence with bed mobility. Ongoing  Patient to improve sit <> stand transfer with no more than Min A consistently for improved (I) with functional transfers.  Ongoing  Patient to improve stand pivot transfers to no more than Mod A consistently for improved (I) with functional transfers. Ongoing  Patient to improve ambulation x 12 feet in // bars with no more than Mod A x 1 assist consistently to progress gait training to appropriate assistive device. Ongoing  Patient to propel w/c x 100 feet, including 90 deg turns with no more than SBA for improved independence with w/c mobility. Ongoing     Long Term Goals: 8 weeks   Patient to improve supine <> sit transfer to Min A consistently for improved independence with bed mobility. Ongoing  Patient to improve sit <> stand transfer with no more than CGA consistently for improved (I) with functional transfers. Ongoing  Patient to improve stand pivot transfers to no more than Min A consistently for improved (I) with functional transfers. Ongoing  Patient to improve ambulation x 25 feet with no more than Mod A x 1 assist with appropriate assistive device for improved independence with ambulation.  Ongoing  Patient to propel w/c x 150 feet, including 90 deg turns with no more than SBA for improved independence with w/c mobility. Ongoing    Madhuri Burris, PT

## 2025-05-13 ENCOUNTER — PATIENT MESSAGE (OUTPATIENT)
Dept: INTERNAL MEDICINE | Facility: CLINIC | Age: 65
End: 2025-05-13
Payer: COMMERCIAL

## 2025-05-13 NOTE — PROGRESS NOTES
Outpatient Rehab    Occupational Therapy Visit    Patient Name: Kateryna Weber  MRN: [id number]  YOB: 1960  Encounter Date: 5/14/2025    Therapy Diagnosis:   Encounter Diagnosis   Name Primary?    Spasticity Yes       Physician: Latisha Garg MD    Physician Orders: Eval and Treat  Medical Diagnosis: Hemiparesis of right dominant side as late effect of nontraumatic subarachnoid hemorrhage    Visit # / Visits Authorized: 2 / 10  Insurance Authorization Period: 5/12/2025 to 12/31/2025  Date of Evaluation: 5/9/2025   Plan of Care Certification: 5/9/2025 to 7/18/2025 (3x/wk x 10 wks)      Time In: 0851   Time Out: 0938  Total Time (in minutes): 47   Total Billable Time (in minutes): 47    Precautions: Standard, fall, aphasia        Subjective   Pt globally aphasic but able to follow commands and answer questions appropriately at times. Pt able to express pain. Pt did express that her RUE felt better/looser after previous OT session. Hubert is delivering splint today..  Family / care giver present for this visit:     Pain reported as 0/10. Location: N/A    Objective            Treatment:     Neuromuscular re-education activities to improve Balance, Kinesthetic, Sense, Posture, and Tone Normalization for 47 minutes. The following activities were included:  Stand pivot transfer from w/c>mat (towards left) with Mod A.     Seated EOM with mirror feedback:   - Stretch applied to bilateral shoulders to promote upright posture, anterior pelvic tilt, thoracic extension, and scap retraction, 3x  - PROM RUE scap mobs elevation, depression, retraction, and protraction   - Gentle PROM for RUE elbow extension with STM to biceps in inhibitory pattern and wrist stretches for RD/UD, flexion/towards extension, and forearm towards neutral within available range   - Mat push ups using LUE with Min A, x3  - Mat push ups with R/L mat scoots with Min A, x3-4 each direction  - Lateral weight shifts for hip hike  completed R and L with tactile cueing for facilitation, x multiple reps each; pt used LUE to place cutting board under buttocks during L hip hike and therapist placed board under buttocks during R hip hike to help promote increased movement and simulate pericare    - Drank water using LUE   - Dynamic reaching outside of MADHU with LUE to handoff/retrieve cup of water, x multiple reps     Squat pivot transfer (towards left) with Mod A.     Time Entry(in minutes):  Neuromuscular Re-Education Time Entry: 47    Assessment & Plan   Assessment: Pt tolerated today's session good-fair. Pt remains sensitive to RUE stretching, but is agreeable. All stretching completed within tolerable range and rest breaks provided as needed. Pt able to make some postural corrections given verbal and visual feedback, but rests in PPT. Good performance with mat scoots and lateral weight shifts for hip hike this date. Encouraged pt's  to have her try to complete pericare after urinating on toilet utilizing grab bars as needed to help steady self; pt's  agreeable. Pt would continue to benefit from skilled occupational therapy services for pt education, HEP/HAP guidance, and to maximize pt participation with ADLs and IADLs.  Evaluation/Treatment Tolerance: Patient tolerated treatment well, Patient limited by pain    Patient will continue to benefit from skilled outpatient occupational therapy to address the deficits listed in the problem list box on initial evaluation, provide pt/family education and to maximize pt's level of independence in the home and community environment.     Patient's spiritual, cultural, and educational needs considered and patient agreeable to plan of care and goals.           Plan: Cont OT POX 3x/wk; focus on RUE stretching to prevent further joint contractures, mat/bed mobility; compensatory strategies/aly techniques for ADLs    Goals:   Short Term: 5 weeks  - Pt will participate in upper body dressing  with moderate assistance using adaptive techniques or devices to increase independence in ADLs. ongoing  - Pt will tolerate right upper extremity range of motion program x10 min without need for rest break. ongoing  - Pt will complete stand pivot transfer with minimal assistance. ongoing  - Pt will complete lateral scooting as needed for ADLs with min(A). ongoing  - Pt will tolerate right upper extremity splint/orthosis x1 hour as needed for tone/spasticity management. ongoing  - Pt will be independent with Home Exercise Program (HEP)/Home Activity Program (HAP) and report at least 50% compliance. ongoing     Long Term: 10 weeks   - Pt  will participate in upper body dressing with minimal assistance using adaptive techniques or devices to increase independence in ADLs. ongoing  - Pt will complete lower body dressing (pants, brief) with moderate assistance. ongoing  - Pt will demonstrate improved upper extremity positioning in wheelchair and bed to reduce spasticity and risk of contracture in RUE, as evidenced by maintaining neutral shoulder and elbow alignment with appropriate supports for at least 80% of observed sessions. ongoing  - Pt will tolerate right wrist in neutral x8 minutes as needed for weightbearing and functional tasks. Ongoing   - Caregiver will demo understanding for range of motion program and positioning for Right upper extremity in seated and supine. ongoing  - Pt will be independent with Home Exercise Program (HEP)/Home Activity Program (HAP) to promote long term maintenance of progress made in therapy. ongoing     Goals to be added and edited within plan of care as needed/appropriate.    Kelsey Milan, OT

## 2025-05-14 ENCOUNTER — CLINICAL SUPPORT (OUTPATIENT)
Dept: REHABILITATION | Facility: HOSPITAL | Age: 65
End: 2025-05-14
Payer: COMMERCIAL

## 2025-05-14 DIAGNOSIS — Z78.9 IMPAIRED MOBILITY AND ADLS: ICD-10-CM

## 2025-05-14 DIAGNOSIS — R25.2 SPASTICITY: Primary | ICD-10-CM

## 2025-05-14 DIAGNOSIS — Z74.09 IMPAIRED MOBILITY AND ADLS: ICD-10-CM

## 2025-05-14 PROCEDURE — 97112 NEUROMUSCULAR REEDUCATION: CPT | Mod: PO,CQ

## 2025-05-14 PROCEDURE — 97530 THERAPEUTIC ACTIVITIES: CPT | Mod: PO,CQ

## 2025-05-14 PROCEDURE — 97112 NEUROMUSCULAR REEDUCATION: CPT | Mod: PO

## 2025-05-14 PROCEDURE — 97110 THERAPEUTIC EXERCISES: CPT | Mod: PO,CQ

## 2025-05-14 NOTE — PROGRESS NOTES
Outpatient Rehab    Physical Therapy Visit    Patient Name: Kateryna Weber  MRN: [id number]  YOB: 1960  Encounter Date: 5/14/2025    Therapy Diagnosis:   Encounter Diagnoses   Name Primary?    Spasticity Yes    Impaired mobility and ADLs      Physician: Latisha Garg MD    Physician Orders: Eval and Treat  Medical Diagnosis: Hemiparesis of right dominant side as late effect of nontraumatic subarachnoid hemorrhage    Visit # / Visits Authorized:  2 / 10  Insurance Authorization Period: 5/9/2025 to 12/31/2025  Date of Evaluation: 5/9/202505/09/25  Plan of Care Certification:  07/04/25       PT/PTA:     Number of PTA visits since last PT visit:   Time In:  1115   Time Out:  1200  Total Time (in minutes):   45  Total Billable Time (in minutes): 45     FOTO:  Intake Score:  %  Survey Score 2:  %  Survey Score 3:  %    Precautions:       Subjective     Patient's  present for session.          Objective    Last performed on evaluation.         Treatment:   Kateryna received therapeutic exercises to develop strength, endurance, ROM, flexibility, posture, and core stabilization for 15 minutes including:   Supine on wedge: RLE  Right hip flexion <> extension AAROM x 10 reps  Right hip adductor stretch PROM  Right hip AB<> assistive device AAROM on bolster x 10 reps  Right knee SAQs AAROM x 10 reps  Right ankle dorsiflexion mobilization Grade 2-3 + manual stretch      Patient participated in neuromuscular re-education activities to improve: Balance, Coordination, and Sense for 10 minutes. The following activities were included:   5 feet of non-functional gait with long dowel left upper extremity support right Neuro ERIC slider Mod A trunk and lower extremity management    Patient participated in dynamic functional therapeutic activities to improve functional performance for 10 minutes. Including:   W/c > mat stand pivot transfer moving to left side, Mod A  Sit > supine Mod A for trunk and RLE  management  Supine > sit Max A for trunk and BLE management  Mat > W/c stand pivot transfer, Mod A  Sit <> stand x multiple trials in // bars Min A    Patient participated in gait training activities to normalize gait pattern for 10 minutes. The following activities were included:    Gait belt used for safety. Assistive device: // bars  X 10 laps x 10 feet, ambulation in // bars, Neuro-ERIC glider on right foot- Mod A x 2 assists to weight shift bilaterally, to promote right hip extension in stance, to block right knee in stance, to advance RLE in swing, for upright trunk posture; LUE support c/ w/c follow        Assessment & Plan   Assessment:  Kateryna tolerated initial follow up session well but does remain drowsy with mat exercises due to adjusting to recent start on baclofen. Focused on neuro priming of RLE prior to standing and ambulation activities. Good effort throughout ambulation trials, but considerable assist still required to complete gait trials due to significant RLE weakness. Patient to benefit from high intensity PT to address ongoing mobility deficits and to maximize progress with therapy.        Patient will continue to benefit from skilled outpatient physical therapy to address the deficits listed in the problem list box on initial evaluation, provide pt/family education and to maximize pt's level of independence in the home and community environment.     Patient's spiritual, cultural, and educational needs considered and patient agreeable to plan of care and goals.           Plan:  Progress RLE strength, standing, and functional mobility as tolerated.     Goals:  Short Term Goals: 4 weeks   Patient/caregiver to be (I) with established home exercise program. Ongoing  Patient to improve supine <> sit transfer to Mod A consistently for improved independence with bed mobility. Ongoing  Patient to improve sit <> stand transfer with no more than Min A consistently for improved (I) with functional  transfers. Ongoing  Patient to improve stand pivot transfers to no more than Mod A consistently for improved (I) with functional transfers. Ongoing  Patient to improve ambulation x 12 feet in // bars with no more than Mod A x 1 assist consistently to progress gait training to appropriate assistive device. Ongoing  Patient to propel w/c x 100 feet, including 90 deg turns with no more than SBA for improved independence with w/c mobility. Ongoing     Long Term Goals: 8 weeks   Patient to improve supine <> sit transfer to Min A consistently for improved independence with bed mobility. Ongoing  Patient to improve sit <> stand transfer with no more than CGA consistently for improved (I) with functional transfers. Ongoing  Patient to improve stand pivot transfers to no more than Min A consistently for improved (I) with functional transfers. Ongoing  Patient to improve ambulation x 25 feet with no more than Mod A x 1 assist with appropriate assistive device for improved independence with ambulation.  Ongoing  Patient to propel w/c x 150 feet, including 90 deg turns with no more than SBA for improved independence with w/c mobility. Ongoing    Klever Wright, PTA

## 2025-05-16 ENCOUNTER — CLINICAL SUPPORT (OUTPATIENT)
Dept: REHABILITATION | Facility: HOSPITAL | Age: 65
End: 2025-05-16
Payer: COMMERCIAL

## 2025-05-16 DIAGNOSIS — R25.2 SPASTICITY: Primary | ICD-10-CM

## 2025-05-16 DIAGNOSIS — Z78.9 IMPAIRED MOBILITY AND ADLS: Primary | ICD-10-CM

## 2025-05-16 DIAGNOSIS — Z74.09 IMPAIRED MOBILITY AND ADLS: Primary | ICD-10-CM

## 2025-05-16 PROCEDURE — 97110 THERAPEUTIC EXERCISES: CPT | Mod: PO

## 2025-05-16 PROCEDURE — 97112 NEUROMUSCULAR REEDUCATION: CPT | Mod: PO

## 2025-05-16 PROCEDURE — 97530 THERAPEUTIC ACTIVITIES: CPT | Mod: PO

## 2025-05-16 NOTE — PROGRESS NOTES
Outpatient Rehab    Occupational Therapy Visit    Patient Name: Kateryna Weber  MRN: [id number]  YOB: 1960  Encounter Date: 5/16/2025    Therapy Diagnosis:   Encounter Diagnosis   Name Primary?    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 Pt able to express pain.  reported that they have practiced wiping on the toilet. When she leans to the right she tends to lose her balance..  Family / care giver present for this visit:       Pain reported as 0/10. Location: N/A; pt winced at end ranges with stretches    Objective            Treatment:     Neuromuscular re-education activities to improve Balance, Kinesthetic, Sense, Posture, and Tone Normalization for 48 minutes. The following activities were included:  Stand pivot transfer from w/c>mat (towards left) with Mod A.     Seated EOM with mirror feedback:   - PROM RUE scap mobs elevation, depression, retraction, and protraction   - Gentle PROM for RUE elbow extension and wrist stretches for RD/UD, flexion/towards extension, and forearm towards neutral within available range   - Donned soft dynamic elbow extension splint to RUE   - Dynamic reaching outside of MADHU with LUE, laterally, forward, and across midline, to transfer/match associated color rings set on right mat to cone held in various functional directions, x20 reps   - Anterior pelvic tilt  stretch with sheet     Stand pivot transfer (towards left) with Mod A.     Time Entry(in minutes):  Neuromuscular Re-Education Time Entry: 48    Assessment & Plan   Assessment: Pt tolerated today's session good-fair. Pt remains sensitive to RUE stretching, but is agreeable. All stretching completed within tolerable range and rest breaks provided as needed. Good postural corrections given verbal, tactile, and visual feedback. Reaching across midline with LUE challenging dynamic sitting balance needed to complete activities such as wiping when seated on toilet. Pt without LOB but task was challenging. Pt would continue to benefit from skilled occupational therapy services for pt education, HEP/HAP guidance, and to maximize pt participation with ADLs and IADLs.  Evaluation/Treatment Tolerance: Patient tolerated treatment well, Patient limited by pain    Patient will continue to benefit from skilled outpatient occupational therapy to address the deficits listed in the problem list box on initial evaluation, provide pt/family education and to maximize pt's level of independence in the home and community environment.     Patient's spiritual, cultural, and educational needs considered and patient agreeable to plan of care and goals.           Plan: Cont OT POX 3x/wk; focus on RUE stretching to prevent further joint contractures, mat/bed mobility; compensatory strategies/lay techniques for ADLs    Goals:   Short Term: 5 weeks  - Pt will participate in upper body dressing with moderate assistance using adaptive techniques or devices to increase independence in ADLs. ongoing  - Pt will tolerate right upper extremity range of motion program x10 min without need for rest break. ongoing  - Pt will complete stand pivot transfer with minimal assistance. ongoing  - Pt will complete lateral scooting as needed for ADLs with min(A). ongoing  - Pt will tolerate right upper extremity splint/orthosis x1 hour as needed for  tone/spasticity management. ongoing  - Pt will be independent with Home Exercise Program (HEP)/Home Activity Program (HAP) and report at least 50% compliance. ongoing     Long Term: 10 weeks   - Pt  will participate in upper body dressing with minimal assistance using adaptive techniques or devices to increase independence in ADLs. ongoing  - Pt will complete lower body dressing (pants, brief) with moderate assistance. ongoing  - Pt will demonstrate improved upper extremity positioning in wheelchair and bed to reduce spasticity and risk of contracture in RUE, as evidenced by maintaining neutral shoulder and elbow alignment with appropriate supports for at least 80% of observed sessions. ongoing  - Pt will tolerate right wrist in neutral x8 minutes as needed for weightbearing and functional tasks. Ongoing   - Caregiver will demo understanding for range of motion program and positioning for Right upper extremity in seated and supine. ongoing  - Pt will be independent with Home Exercise Program (HEP)/Home Activity Program (HAP) to promote long term maintenance of progress made in therapy. ongoing     Goals to be added and edited within plan of care as needed/appropriate.    Kelsey Milan, OT

## 2025-05-16 NOTE — PROGRESS NOTES
Outpatient Rehab    Physical Therapy Visit    Patient Name: Kateryna Weber  MRN: [id number]  YOB: 1960  Encounter Date: 5/16/2025    Therapy Diagnosis:   Encounter Diagnosis   Name Primary?    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 Patient indicates readiness for therapy.   No pain indicated throughout session.          Objective    Last performed on evaluation.         Treatment:   Kateryna received therapeutic exercises to develop strength, endurance, ROM, flexibility, posture, and core stabilization for 12 minutes including:   Supine on wedge: RLE, performed to reduce tone in preparation for standing activities  Right hip flexion <> extension PROM  Right hip adductor stretch PROM  Hook lying PROM LTRs  Right ankle dorsiflexion mobilization Grade 2-3 + manual stretch      Patient participated in neuromuscular re-education activities to improve: Balance, Coordination, and Sense for 00 minutes. The following activities were included:   NP    Patient participated in dynamic functional therapeutic activities to improve functional performance for 15 minutes. Including:   W/c > mat stand pivot transfer moving to left side, Mod A  Sit > supine Mod A for trunk and RLE management  Supine > sit Max A for trunk and BLE management  Mat > W/c stand pivot transfer, Mod A  with aly walker  Sit <> stand x multiple trials in // bars Min A    Seated edge of mat with mat slightly elevated; aly walker placed on left side for LUE support, PT on right side to properly position right knee in stance  2 x 10 reps, sit <> stand with LUE on aly walker- TCs for proper technique, Min A to Mod A to block right knee, promote right hip extension in stance and to maintain upright posture    Patient participated in gait training activities to normalize gait pattern for 15 minutes. The following activities were included:    Gait belt used for safety. Assistive device: // bars  X 4 laps x 12 feet, ambulation in // bars, Neuro-ERIC glider on right foot- 2 assists required: Min A from tech to help promote upright trunk posture, Mod A from PT to weight shift bilaterally, to promote right hip extension in stance, to block right knee in stance, to advance RLE in swing; LUE support on bar; c/ w/c follow      Time Entry(in minutes):   See above    Assessment & Plan   Assessment:  Kateryna tolerated therapy session well this afternoon with good effort throughout. Initiated sit <> stand transfers with aly-walker with goal to progress to quad cane; aly-walker only utilized to initiate proper sit <> stand technique and for upper extremity support. Able to maintain improved upright posture during ambulation trials in // bars, with improved speed of ambulation today compared to prior sessions. Dense hemiparesis throughout RLE remains greatest limiting factor to functional mobility, but gradual progress noted with therapy. Patient to benefit from high intensity PT to address ongoing mobility deficits and to maximize progress with therapy.        Patient will continue to benefit from skilled outpatient physical therapy to address the deficits listed in the problem list box on initial evaluation, provide pt/family education and to maximize pt's level of independence in the home and community environment.     Patient's  spiritual, cultural, and educational needs considered and patient agreeable to plan of care and goals.           Plan:  Progress RLE strength, standing, and functional mobility as tolerated.     Goals:  Short Term Goals: 4 weeks   Patient/caregiver to be (I) with established home exercise program. Ongoing  Patient to improve supine <> sit transfer to Mod A consistently for improved independence with bed mobility. Ongoing  Patient to improve sit <> stand transfer with no more than Min A consistently for improved (I) with functional transfers. Ongoing  Patient to improve stand pivot transfers to no more than Mod A consistently for improved (I) with functional transfers. Ongoing  Patient to improve ambulation x 12 feet in // bars with no more than Mod A x 1 assist consistently to progress gait training to appropriate assistive device. Ongoing  Patient to propel w/c x 100 feet, including 90 deg turns with no more than SBA for improved independence with w/c mobility. Ongoing     Long Term Goals: 8 weeks   Patient to improve supine <> sit transfer to Min A consistently for improved independence with bed mobility. Ongoing  Patient to improve sit <> stand transfer with no more than CGA consistently for improved (I) with functional transfers. Ongoing  Patient to improve stand pivot transfers to no more than Min A consistently for improved (I) with functional transfers. Ongoing  Patient to improve ambulation x 25 feet with no more than Mod A x 1 assist with appropriate assistive device for improved independence with ambulation.  Ongoing  Patient to propel w/c x 150 feet, including 90 deg turns with no more than SBA for improved independence with w/c mobility. Ongoing    Madhuri Burris, PT

## 2025-05-19 ENCOUNTER — CLINICAL SUPPORT (OUTPATIENT)
Dept: REHABILITATION | Facility: HOSPITAL | Age: 65
End: 2025-05-19
Payer: COMMERCIAL

## 2025-05-19 DIAGNOSIS — R47.01 GLOBAL APHASIA: Primary | ICD-10-CM

## 2025-05-19 PROCEDURE — 92507 TX SP LANG VOICE COMM INDIV: CPT | Mod: PO

## 2025-05-19 NOTE — PROGRESS NOTES
Outpatient Rehab    Speech-Language Pathology Visit    Patient Name: Kateryna Weber  MRN: [id number]  YOB: 1960  Encounter Date: 5/19/2025    Therapy Diagnosis:   Encounter Diagnosis   Name Primary?    Global aphasia Yes     Physician: Latisha Garg MD    Physician Orders: Eval and Treat  Medical Diagnosis: Aphasia as late effect of cerebrovascular accident    Visit # / Visits Authorized: 1 / 20   Insurance Authorization Period: 5/9/2025 to 12/31/2025  Date of Evaluation: 5/9/2025   Plan of Care Certification: 5/9/2025 to 7/18/2025      Time In: 0920   Time Out: 1020  Total Time (in minutes): 60   Total Billable Time (in minutes): 60    FOTO:  Intake Score:  %  Survey Score 2:  %  Survey Score 3:  %    Precautions:        Standard, Fall, and aphasia/cognitive deficits, h/o cancer    Subjective   Present with . Noted she communicates more at home than here currently. They use low tech AAC boards at home..  Pain reported as 2/10. (ID via AAC with support) in elbow  Family/caregiver present for this visit:  ()      Objective               Treatment     Short Term Goals:   1. Patient will identify objects in a field of 3 given the name/function of the object with 75% accuracy following nonverbal cues and requesting repetitions as needed across 2-3 sessions to improve auditory comprehension skills.   90% accuracy with ID of object independently   80% accuracy with ID given function   2. Patient will answer 1 unit/personal yes/no questions with 75% accuracy following nonverbal cues and requesting repetitions as needed across 2-3 sessions to improve auditory comprehension skills.   Not addressed in today's session.   3. Patient will name objects with 75% acc given carrier phrase as needed across 3 sessions      Close approximations with `20% accuracy, severe due to Aquired Apraxia of Speech more than aphasia   4. Patient will participate in the speech production treatment hierarchy with  mono-syllabic /m/ words with 75% acc across 3 probe trials.      See Sound Production Treatment chart below  Introduced this date  Close approximation notes, but need for nonspeech oral prompts (lip popping, animal sounds, etc) for improving approximation   5. Patient will use gestures and/or low-tech AAC (e.g., communication board, yes/no cards, picture symbols) to express basic wants and needs in response to visual/verbal cues with 75% accuracy across 3 consecutive sessions.   Introduced high level AAC this date with explanation of purpose, supplementation, and brief discussion on AAC as durable medical equipment through insurance.   [facility], significant training would be needed, but appears she would an excellent user    /m/ one syllable - Bilabial Nasal    Step 1 - Repetition / Minimal Contrast Word Step 2 - Printed Letter or Target Sound with Repetition Step 3- Integral Stimulation Step 4- Articulatory Placement Cues 5 Repetitions of Correct Production   Map    +      5/5   Man  +        5/5    Mean          2/5   Meat  - - - +  2/5   Mow  not applicable            Mop   not applicable           Milk   not applicable           Mud   not applicable           Mood   not applicable           Mad   not applicable           Men    not applicable           mole   not applicable               Time Entry(in minutes):  Speech Treatment (Individual) Time Entry: 60    Assessment & Plan   Assessment  Pt with good participation throughout the session. AAC introduced and trialed. Pt would need support, but has the ability to be a functional AAC user and this would likely reduce her frustration.  reports she communicates more at home and he is getting better with guessing. Pt was fatigued by the end of the session and needed brain breaks throughout the session. SLP and  carried all of the burden of communication.  Evaluation/Treatment Tolerance: Patient tolerated treatment well    Patient will continue  to benefit from skilled outpatient speech therapy to address the deficits listed in the problem list box on initial evaluation, provide pt/family education and to maximize pt's level of independence in the home and community environment.     Patient's spiritual, cultural, and educational needs considered and patient agreeable to plan of care and goals.     Education  Education was done with Other recipient present and Patient. The patient's learning style includes Listening and Demonstration. The patient Requires continuing/additional education. [place] participated in education. They identified as Spouse/significant other. The reported learning style is Listening. The recipient Verbalizes understanding.     Discussed ways to support communication at home. Instructed to work on first sounds using white board and mirror (Sound Production Treatment) and use of low tech communication board.         Plan  Continue POC. Focus on trialing devices and use of AAC functionally to describe pain.          Goals:   Active       Long Term Goals        1.  She will develop functional cognitive-linguistic based skills and utilize compensatory strategies to communicate wants/needs effectively to different conversational partners, maintain safety, participate socially in functional living environment.    (Progressing)       Start:  05/09/25    Expected End:  07/18/25            2. Patient will trial compensatory strategies and augmentative-alternative communication (AAC) to communicate wants and needs effectively to different conversational partners, maintain safety, and participate socially in functional living environment.  (Progressing)       Start:  05/09/25    Expected End:  07/18/25               Short Term Goals        1. Patient will identify objects in a field of 3 given the name/function of the object with 75% accuracy following nonverbal cues and requesting repetitions as needed across 2-3 sessions to improve auditory  comprehension skills. (Progressing)       Start:  05/09/25    Expected End:  06/20/25            2. Patient will answer 1 unit/personal yes/no questions with 75% accuracy following nonverbal cues and requesting repetitions as needed across 2-3 sessions to improve auditory comprehension skills. (Progressing)       Start:  05/09/25    Expected End:  06/20/25            3. Patient will name objects with 75% acc given carrier phrase as needed across 3 sessions     (Progressing)       Start:  05/09/25    Expected End:  06/20/25            4. Patient will participate in the speech production treatment hierarchy with mono-syllabic /m/ words with 75% acc across 3 probe trials.    (Progressing)       Start:  05/09/25    Expected End:  06/20/25            5. Patient will use gestures and/or low-tech AAC (e.g., communication board, yes/no cards, picture symbols) to express basic wants and needs in response to visual/verbal cues with 75% accuracy across 3 consecutive sessions. (Progressing)       Start:  05/09/25    Expected End:  06/20/25                JANA Gabriel-COLT, CCC-SLP

## 2025-05-20 ENCOUNTER — CLINICAL SUPPORT (OUTPATIENT)
Dept: REHABILITATION | Facility: HOSPITAL | Age: 65
End: 2025-05-20
Payer: COMMERCIAL

## 2025-05-20 DIAGNOSIS — Z74.09 IMPAIRED MOBILITY AND ADLS: Primary | ICD-10-CM

## 2025-05-20 DIAGNOSIS — Z78.9 IMPAIRED MOBILITY AND ADLS: Primary | ICD-10-CM

## 2025-05-20 DIAGNOSIS — R25.2 SPASTICITY: Primary | ICD-10-CM

## 2025-05-20 PROCEDURE — 97112 NEUROMUSCULAR REEDUCATION: CPT | Mod: PO

## 2025-05-20 PROCEDURE — 97110 THERAPEUTIC EXERCISES: CPT

## 2025-05-20 PROCEDURE — 97140 MANUAL THERAPY 1/> REGIONS: CPT | Mod: PO

## 2025-05-20 PROCEDURE — 97530 THERAPEUTIC ACTIVITIES: CPT

## 2025-05-20 PROCEDURE — 97116 GAIT TRAINING THERAPY: CPT

## 2025-05-20 NOTE — PROGRESS NOTES
Outpatient Rehab    Occupational Therapy Visit    Patient Name: Kateryna Weber  MRN: [id number]  YOB: 1960  Encounter Date: 5/20/2025    Therapy Diagnosis:   Encounter Diagnosis   Name Primary?    Spasticity Yes     Physician: Latisha Garg MD    Physician Orders: Eval and Treat  Medical Diagnosis: Hemiparesis of right dominant side as late effect of nontraumatic subarachnoid hemorrhage    Visit # / Visits Authorized: 4 / 10  Insurance Authorization Period: 5/12/2025 to 12/31/2025  Date of Evaluation: 5/9/2025  Plan of Care Certification: 5/9/2025 to 7/18/2025      Time In: 0845   Time Out: 0930  Total Time (in minutes): 45   Total Billable Time (in minutes): 45           Subjective   Pt. presenting with significant expressive aphasia,  able to assist with deciphering communication at times; pt. able to communicate appropriately when experiencing pain throughout OT session.  Family / care giver present for this visit:              Treatment:  Manual Therapy  MT 1: Once moist heat removed from shoulder, OT performed manual TrP and myofascial release to R pectoralis, coracobrachialis, biceps insertion, and deltoid mm for reduced tissue adhesions compromising joint positioning, eliciting pain and limiting ROM  MT 2: Pt. t/f to side lying Mod A; in side lying, manual R scapular mobilizations with accompanying scapulohumeral facilitation and additional PROM in shoulder flexion & ER; education provided to pt. and  re: purpose of scapular mobilizations and recommending  refrain from attempting to perform at home at this time, with plan to complete caregiver training on scap mobs/setting in the future  Balance/Neuromuscular Re-Education  NMR 1: Stand pivot t/f w/c>EOM Mod A,  sit>supine Mod A. Pt. supine while OT performed progressive, gentle PROM Stretching to R shoulder, elbow & forearm with sustained hold at tolerable end ranges, focusing on shoulder flexion, ER,  retraction, elbow extension, and pronation.  NMR 2: Moist heat applied to R shoulder to improve circulation, reduce pain and promote tissue lengthening while OT performed PROM Stretching and joint mobilizations & distractions to R wrist, hand, and digits    Time Entry(in minutes):  Manual Therapy Time Entry: 15  Neuromuscular Re-Education Time Entry: 30    Assessment & Plan   Assessment: Pt. significantly limited in available passive ranges of motion at the shoulder, elbow, forearm, wrist, and hand d/t prolonged tissue length imbalances 2/2 spasticity/tone and mm activation/force production deficits. In order for any potential to functionally utilize dominant RUE, increased tissue lengthening and pain mgmt. must be achieved in order to allow for optimal participation in intensive, skilled, neuro specific OT Tx interventions. Pt. with Fair tolerance of PROM stretching, with less tolerance demonstrated with distal passive range of motion. OT educated pt's  (present throughout tx session) on utilizing rolled towel or pillow placed between flank and proximal/distal RUE while pt. supine to promote proximal tissue lengthening and reduce current adduction, protraction, & IR RUE resting position. Pt. was able to tolerate aggressive scapular mobilizations, with OT discussing plan to complete caregiver training on caregiver assisted scap mobs/scap setting within upcoming tx session.       Patient will continue to benefit from skilled outpatient occupational therapy to address the deficits listed in the problem list box on initial evaluation, provide pt/family education and to maximize pt's level of independence in the home and community environment.     Patient's spiritual, cultural, and educational needs considered and patient agreeable to plan of care and goals.           Plan: Cont OT POC 3x/wk; focus on RUE stretching to prevent further joint contractures, mat/bed mobility; compensatory strategies/aly techniques  for ADLs    Goals:   Short Term: 5 weeks  - Pt will participate in upper body dressing with moderate assistance using adaptive techniques or devices to increase independence in ADLs. ongoing  - Pt will tolerate right upper extremity range of motion program x10 min without need for rest break. ongoing  - Pt will complete stand pivot transfer with minimal assistance. ongoing  - Pt will complete lateral scooting as needed for ADLs with min(A). ongoing  - Pt will tolerate right upper extremity splint/orthosis x1 hour as needed for tone/spasticity management. ongoing  - Pt will be independent with Home Exercise Program (HEP)/Home Activity Program (HAP) and report at least 50% compliance. ongoing     Long Term: 10 weeks   - Pt  will participate in upper body dressing with minimal assistance using adaptive techniques or devices to increase independence in ADLs. ongoing  - Pt will complete lower body dressing (pants, brief) with moderate assistance. ongoing  - Pt will demonstrate improved upper extremity positioning in wheelchair and bed to reduce spasticity and risk of contracture in RUE, as evidenced by maintaining neutral shoulder and elbow alignment with appropriate supports for at least 80% of observed sessions. ongoing  - Pt will tolerate right wrist in neutral x8 minutes as needed for weightbearing and functional tasks. Ongoing   - Caregiver will demo understanding for range of motion program and positioning for Right upper extremity in seated and supine. ongoing  - Pt will be independent with Home Exercise Program (HEP)/Home Activity Program (HAP) to promote long term maintenance of progress made in therapy. ongoing     Goals to be added and edited within plan of care as needed/appropriate.        Mac Rosa, OT

## 2025-05-20 NOTE — PROGRESS NOTES
Outpatient Rehab    Physical Therapy Visit    Patient Name: Kateryna Weber  MRN: [id number]  YOB: 1960  Encounter Date: 5/20/2025    Therapy Diagnosis:   Encounter Diagnosis   Name Primary?    Impaired mobility and ADLs Yes         Physician: Latisha Garg MD    Physician Orders: Eval and Treat  Medical Diagnosis: Hemiparesis of right dominant side as late effect of nontraumatic subarachnoid hemorrhage    Visit # / Visits Authorized:  4 / 10  Insurance Authorization Period: 5/9/2025 to 12/31/2025  Date of Evaluation: 5/9/202505/09/25  Plan of Care Certification:  07/04/25       PT/PTA: PT   Number of PTA visits since last PT visit:0  Time In: 0932   Time Out: 1012  Total Time (in minutes): 40   Total Billable Time (in minutes): 40      Precautions:     Standard, Fall, and aphasia/cognitive deficits, h/o cancer      Subjective     Patient's  present for session. Patient indicates readiness for therapy.   No pain indicated throughout session.          Objective    Last performed on evaluation.         Treatment:   Kateryna received therapeutic exercises to develop strength, endurance, ROM, flexibility, posture, and core stabilization for 10 minutes including:   Supine on wedge: RLE, performed to reduce tone in preparation for standing activities  Right hip flexion <> extension PROM  Right hip adductor stretch PROM  Hook lying PROM LTRs  Right ankle dorsiflexion mobilization Grade 2-3 + manual stretch      Patient participated in neuromuscular re-education activities to improve: Balance, Coordination, and Sense for 00 minutes. The following activities were included:   NP    Patient participated in dynamic functional therapeutic activities to improve functional performance for 15 minutes. Including:   Supine > sit Max A for trunk and BLE management  Mat > W/c stand pivot transfer, Mod A with aly walker  Sit <> stand x multiple trials in // bars Min A    Seated edge of mat with mat slightly  elevated; aly walker placed on left side for LUE support, PT on right side to properly position right knee in stance  2 x 8 reps, sit <> stand with LUE on aly walker- TCs for proper technique, Min A to Mod A to block right knee, promote right hip extension in stance and to maintain upright posture    Patient participated in gait training activities to normalize gait pattern for 15 minutes. The following activities were included:    Gait belt used for safety. Assistive device: // bars  X 4 laps x 12 feet, ambulation in // bars, Neuro-ERIC glider on right foot- 2 assists required: Min A from tech to help promote upright trunk posture, Mod A from PT to weight shift bilaterally, to promote right hip extension in stance, to block right knee in stance, to advance RLE in swing; LUE support on bar; c/ w/c follow      Time Entry(in minutes):  Gait Training Time Entry: 15  Therapeutic Activity Time Entry: 15  Therapeutic Exercise Time Entry: 10    Assessment & Plan   Assessment:  Kateryna tolerated therapy session well this afternoon with good effort throughout. Able to maintain improved upright posture during sit to stand activities and ambulation trials in // bars, with improved step length with left during gait allowing improved swing initiation on RLE. Dense hemiparesis throughout RLE remains greatest limiting factor to functional mobility, but gradual progress noted with therapy. Would likely benefit from standing frame for increased weight bearing through RLE to regulate tone in RLE before gait. Patient to benefit from high intensity PT to address ongoing mobility deficits and to maximize progress with therapy.        Patient will continue to benefit from skilled outpatient physical therapy to address the deficits listed in the problem list box on initial evaluation, provide pt/family education and to maximize pt's level of independence in the home and community environment.     Patient's spiritual, cultural, and  educational needs considered and patient agreeable to plan of care and goals.           Plan:  Progress RLE strength, standing, and functional mobility as tolerated. Standing Frame    Goals:  Short Term Goals: 4 weeks   Patient/caregiver to be (I) with established home exercise program. Ongoing  Patient to improve supine <> sit transfer to Mod A consistently for improved independence with bed mobility. Ongoing  Patient to improve sit <> stand transfer with no more than Min A consistently for improved (I) with functional transfers. Ongoing  Patient to improve stand pivot transfers to no more than Mod A consistently for improved (I) with functional transfers. Ongoing  Patient to improve ambulation x 12 feet in // bars with no more than Mod A x 1 assist consistently to progress gait training to appropriate assistive device. Ongoing  Patient to propel w/c x 100 feet, including 90 deg turns with no more than SBA for improved independence with w/c mobility. Ongoing     Long Term Goals: 8 weeks   Patient to improve supine <> sit transfer to Min A consistently for improved independence with bed mobility. Ongoing  Patient to improve sit <> stand transfer with no more than CGA consistently for improved (I) with functional transfers. Ongoing  Patient to improve stand pivot transfers to no more than Min A consistently for improved (I) with functional transfers. Ongoing  Patient to improve ambulation x 25 feet with no more than Mod A x 1 assist with appropriate assistive device for improved independence with ambulation.  Ongoing  Patient to propel w/c x 150 feet, including 90 deg turns with no more than SBA for improved independence with w/c mobility. Ongoing    Jacquelyn Preciado, PT

## 2025-05-21 NOTE — PROGRESS NOTES
Outpatient Rehab    Occupational Therapy Visit    Patient Name: Kateryna Weber  MRN: [id number]  YOB: 1960  Encounter Date: 5/22/2025    Therapy Diagnosis:   Encounter Diagnoses   Name Primary?    Spasticity Yes    Left hemiparesis     Impaired mobility and ADLs      Physician: Latisha Garg MD    Physician Orders: Eval and Treat  Medical Diagnosis: Hemiparesis of right dominant side as late effect of nontraumatic subarachnoid hemorrhage    Visit # / Visits Authorized: 5 / 10  Insurance Authorization Period: 5/12/2025 to 12/31/2025  Date of Evaluation: 5/9/2025  Plan of Care Certification: 5/9/2025 to 7/18/2025      Time In: 1246   Time Out: 1339  Total Time (in minutes): 53   Total Billable Time (in minutes): 53      Precautions:     Standard, Fall, aphasia, h/o cancer      Subjective   Pt with aphasia;  reported they have been getting out of the house and going on walks in the neighborhood. is stretching daily.  Family / care giver present for this visit:  (spouse present throughout)    unable to indicate on numerical scale; indications throughout RUE with PROM    Objective            Treatment:   Kateryna participated in neuromuscular re-education activities to improve: Proprioception, Posture, and tone/spasticity for 53 minutes. The following activities were included:  Sit<>stand from wheelchair: min(A); stand pivot t/f to edge of mat: mod(A) with left knee blocked   Lateral scooting to left at edge of mat x2 with min(A)  Sit<>supine: mod(A)    Supine: x2 pillows under head and bolster under knees:  -PROM for right scapular depression and retraction   -PROM and tone inhibition techniques with cross friction at pec insertion and bicep tendon as tolerance for right upper extremity ; prolonged stretch and postioning for right upper extremity to encourage neutral forearm, wrist and for shoulder abduction   -added time spent for tissue elongation; vibration on low setting provided at pec  insertion, tricep and wrist followed by additional PROM and end range stretch     Left side laying: encouraged neutral cervical spine and slight left rotation of cervical spine  -additional scapular mobilizations (passive) for scapular depression and retraction with humerus positioned in neutral/at side     Stand pivot from edge of mat to wheelchair: mod(A)  Repositioning for right upper extremity in chair for more neutral and return for opening assist     Time Entry(in minutes):  Neuromuscular Re-Education Time Entry: 53    Assessment & Plan   Assessment: Ms Avendaño tolerated session fairly on this date with rest breaks as needed. She remains with contracture/limitation for elbow extension. Session focus to continue to be on spasticity and tone mgmt and tissue and joint elasticity. Pt with great caregiver support and home carry over. She would best benefit from inpatient rehab and daily intensive therapy to maximize functional potential.  Evaluation/Treatment Tolerance: Patient tolerated treatment well, Patient limited by pain    Patient will continue to benefit from skilled outpatient occupational therapy to address the deficits listed in the problem list box on initial evaluation, provide pt/family education and to maximize pt's level of independence in the home and community environment.     Patient's spiritual, cultural, and educational needs considered and patient agreeable to plan of care and goals.           Plan: Cont OT POC 3x/wk; focus on RUE stretching to prevent further joint contractures, mat/bed mobility; compensatory strategies/aly techniques for ADLs. Continue to recommend inpatient rehab for patient at this time.    Goals:   Goals:   Short Term: 5 weeks  - Pt will participate in upper body dressing with moderate assistance using adaptive techniques or devices to increase independence in ADLs. ongoing  - Pt will tolerate right upper extremity range of motion program x10 min without need for rest  break. ongoing  - Pt will complete stand pivot transfer with minimal assistance. ongoing  - Pt will complete lateral scooting as needed for ADLs with min(A). ongoing  - Pt will tolerate right upper extremity splint/orthosis x1 hour as needed for tone/spasticity management. ongoing  - Pt will be independent with Home Exercise Program (HEP)/Home Activity Program (HAP) and report at least 50% compliance. ongoing     Long Term: 10 weeks   - Pt  will participate in upper body dressing with minimal assistance using adaptive techniques or devices to increase independence in ADLs. ongoing  - Pt will complete lower body dressing (pants, brief) with moderate assistance. ongoing  - Pt will demonstrate improved upper extremity positioning in wheelchair and bed to reduce spasticity and risk of contracture in RUE, as evidenced by maintaining neutral shoulder and elbow alignment with appropriate supports for at least 80% of observed sessions. ongoing  - Pt will tolerate right wrist in neutral x8 minutes as needed for weightbearing and functional tasks. Ongoing   - Caregiver will demo understanding for range of motion program and positioning for Right upper extremity in seated and supine. ongoing  - Pt will be independent with Home Exercise Program (HEP)/Home Activity Program (HAP) to promote long term maintenance of progress made in therapy. ongoing     Goals to be added and edited within plan of care as needed/appropriate.       NAMITA Thomas

## 2025-05-21 NOTE — PROGRESS NOTES
Outpatient Rehab    Physical Therapy Visit    Patient Name: Kateryna Weber  MRN: [id number]  YOB: 1960  Encounter Date: 5/22/2025    Therapy Diagnosis:   Encounter Diagnosis   Name Primary?    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  Family / care giver present for this visit:   Pain reported as 0/10.      Objective          Treatment:   Kateryna received therapeutic exercises to develop strength, endurance, ROM, flexibility, posture, and core stabilization for 0  minutes including:        Patient participated in neuromuscular re-education activities to improve: Balance, Coordination, and Sense for 00 minutes. The following activities were included:   NP     Patient participated in dynamic functional therapeutic activities to improve functional performance for 30 minutes. Including:     Mat > W/c stand pivot transfer, Mod A with aly walker  Sit <> stand x multiple trials in // bars Min A     Seated edge of mat with mat slightly elevated; aly walker placed on left side for LUE support, PT on right side to properly position right knee in stance  2 x 8 reps, sit <> stand with LUE on aly walker- TCs for proper technique, Min A to Mod A to block right knee, promote right hip  extension in stance and to maintain upright posture     Patient participated in gait training activities to normalize gait pattern for 15 minutes. The following activities were included:    Gait belt used for safety. Assistive device: // bars  X 4 laps x 12 feet, ambulation in // bars, Neuro-ERIC glider on right foot- 2 assists required: Min A from tech to help promote upright trunk posture, Mod A from PT to weight shift bilaterally, to promote right hip extension in stance, to block right knee in stance, to advance RLE in swing; LUE support on bar; c/ w/c follow        Time Entry(in minutes):  Gait Training Time Entry: 15  Therapeutic Activity Time Entry: 30  Therapeutic Exercise Time Entry: 0      Assessment & Plan  Assessment:  Kateryna tolerated therapy session well this afternoon with good effort throughout. Standing frame attempted today but patient reports significant discomfort due to R UE positioning and activity is discontinued. Although notable improvement in DF range of motion while in standing frame. Gait remains limited due to significant right sided weakness in addition to limited R ankle DF range of motion.   Patient to benefit from high intensity PT to address ongoing mobility deficits and to maximize progress with therapy.      Patient will continue to benefit from skilled outpatient physical therapy to address the deficits listed in the problem list box on initial evaluation, provide pt/family education and to maximize pt's level of independence in the home and community environment.      Patient's spiritual, cultural, and educational needs considered and patient agreeable to plan of care and goals.             Plan:  Progress RLE strength, standing, and functional mobility as tolerated. Standing Frame     Goals:  Short Term Goals: 4 weeks   Patient/caregiver to be (I) with established home exercise program. Ongoing  Patient to improve supine <> sit transfer to Mod A consistently for improved  independence with bed mobility. Ongoing  Patient to improve sit <> stand transfer with no more than Min A consistently for improved (I) with functional transfers. Ongoing  Patient to improve stand pivot transfers to no more than Mod A consistently for improved (I) with functional transfers. Ongoing  Patient to improve ambulation x 12 feet in // bars with no more than Mod A x 1 assist consistently to progress gait training to appropriate assistive device. Ongoing  Patient to propel w/c x 100 feet, including 90 deg turns with no more than SBA for improved independence with w/c mobility. Ongoing     Long Term Goals: 8 weeks   Patient to improve supine <> sit transfer to Min A consistently for improved independence with bed mobility. Ongoing  Patient to improve sit <> stand transfer with no more than CGA consistently for improved (I) with functional transfers. Ongoing  Patient to improve stand pivot transfers to no more than Min A consistently for improved (I) with functional transfers. Ongoing  Patient to improve ambulation x 25 feet with no more than Mod A x 1 assist with appropriate assistive device for improved independence with ambulation.  Ongoing  Patient to propel w/c x 150 feet, including 90 deg turns with no more than SBA for improved independence with w/c mobility. Ongoing       Jennifer Garcia, PT

## 2025-05-22 ENCOUNTER — CLINICAL SUPPORT (OUTPATIENT)
Dept: REHABILITATION | Facility: HOSPITAL | Age: 65
End: 2025-05-22
Payer: COMMERCIAL

## 2025-05-22 ENCOUNTER — OUTPATIENT CASE MANAGEMENT (OUTPATIENT)
Dept: ADMINISTRATIVE | Facility: OTHER | Age: 65
End: 2025-05-22
Payer: COMMERCIAL

## 2025-05-22 DIAGNOSIS — Z78.9 IMPAIRED MOBILITY AND ADLS: ICD-10-CM

## 2025-05-22 DIAGNOSIS — Z74.09 IMPAIRED MOBILITY AND ADLS: ICD-10-CM

## 2025-05-22 DIAGNOSIS — G81.94 LEFT HEMIPARESIS: ICD-10-CM

## 2025-05-22 DIAGNOSIS — Z74.09 IMPAIRED FUNCTIONAL MOBILITY AND ACTIVITY TOLERANCE: Primary | ICD-10-CM

## 2025-05-22 DIAGNOSIS — R25.2 SPASTICITY: Primary | ICD-10-CM

## 2025-05-22 PROCEDURE — 97530 THERAPEUTIC ACTIVITIES: CPT | Mod: PO

## 2025-05-22 PROCEDURE — 97116 GAIT TRAINING THERAPY: CPT | Mod: PO

## 2025-05-22 PROCEDURE — 97112 NEUROMUSCULAR REEDUCATION: CPT | Mod: PO

## 2025-05-23 ENCOUNTER — CLINICAL SUPPORT (OUTPATIENT)
Dept: REHABILITATION | Facility: HOSPITAL | Age: 65
End: 2025-05-23
Payer: COMMERCIAL

## 2025-05-23 DIAGNOSIS — I69.351 FLACCID HEMIPLEGIA OF RIGHT DOMINANT SIDE AS LATE EFFECT OF CEREBRAL INFARCTION: ICD-10-CM

## 2025-05-23 DIAGNOSIS — Z74.09 IMPAIRED MOBILITY AND ADLS: Primary | ICD-10-CM

## 2025-05-23 DIAGNOSIS — Z78.9 IMPAIRED MOBILITY AND ADLS: Primary | ICD-10-CM

## 2025-05-23 DIAGNOSIS — Z78.9 IMPAIRED MOBILITY AND ADLS: ICD-10-CM

## 2025-05-23 DIAGNOSIS — Z74.09 IMPAIRED MOBILITY AND ADLS: ICD-10-CM

## 2025-05-23 DIAGNOSIS — R25.2 SPASTICITY: ICD-10-CM

## 2025-05-23 DIAGNOSIS — G81.94 LEFT HEMIPARESIS: Primary | ICD-10-CM

## 2025-05-23 DIAGNOSIS — G81.94 LEFT HEMIPARESIS: ICD-10-CM

## 2025-05-23 PROCEDURE — 97112 NEUROMUSCULAR REEDUCATION: CPT | Mod: PO

## 2025-05-23 PROCEDURE — 97140 MANUAL THERAPY 1/> REGIONS: CPT | Mod: PO

## 2025-05-23 PROCEDURE — 97530 THERAPEUTIC ACTIVITIES: CPT | Mod: PO,CQ

## 2025-05-23 PROCEDURE — 97110 THERAPEUTIC EXERCISES: CPT | Mod: PO,CQ

## 2025-05-23 NOTE — PROGRESS NOTES
"  Outpatient Rehab    Physical Therapy Visit    Patient Name: Kateryna Weber  MRN: [id number]  YOB: 1960  Encounter Date: 5/23/2025    Therapy Diagnosis:   Encounter Diagnoses   Name Primary?    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  Family / care giver present for this visit:  ( [place])         Objective            Treatment:  Therapeutic Exercise  TE 1: Supine on mat:  2 x 10 reps of R AAROM including bridges, SLR, hip abd/add, hip ext/flex, ankle pumps.   2 x 30 sec of R LE HC stretches and HS stretches  Therapeutic Activity  TA 1: SPT from W/C ---> EOM with Mod A.    Sit <--> supine with Mod A.  Rolling R with MOd A.   SPT from EOM ---> W/C with Mod A  TA 2: Attempted to propell W/C ~ 25ft  with L UE and L LE and Max A.  Max A for direction and to use the L foot  TA 3: Sit to stand x 4 trials from W/C ---> // bars with Min A.   Attempted to get patient to scoot forward each time in her chair, and she did not want to.  TA 4: Pt ambulated the length of the // bars 4 trials with Mod A.  Slider placed on R foot, W/C follow up for safety.    Time Entry(in minutes):  Therapeutic Activity Time Entry: 28  Therapeutic Exercise Time Entry: 25    Assessment & Plan   Assessment: Kateryna tolerated her tx session fairly well and very cooperative.  Kateryna had a difficult time " scooting forward and propelling her W/C with her uninvolved side and limited at times due to pain on the Right side.  Kateryna required less assistance for sit to stands today in the // bars.  Evaluation/Treatment Tolerance: Patient limited by pain    The patient will continue to benefit from skilled outpatient physical therapy in order to address the deficits listed in the problem list on the initial evaluation, provide patient and family education, and maximize the patients level of independence in the home and community environments.     The patient's spiritual, cultural, and educational needs were considered, and the patient is agreeable to the plan of care and goals.           Plan: Cont with functional mobility, sit to stands, standing, and gait    Goals:     Sandee Freguson, PTA

## 2025-05-26 ENCOUNTER — CLINICAL SUPPORT (OUTPATIENT)
Dept: REHABILITATION | Facility: HOSPITAL | Age: 65
End: 2025-05-26
Payer: COMMERCIAL

## 2025-05-26 ENCOUNTER — DOCUMENTATION ONLY (OUTPATIENT)
Dept: REHABILITATION | Facility: HOSPITAL | Age: 65
End: 2025-05-26
Payer: COMMERCIAL

## 2025-05-26 DIAGNOSIS — Z78.9 IMPAIRED MOBILITY AND ADLS: Primary | ICD-10-CM

## 2025-05-26 DIAGNOSIS — G81.94 LEFT HEMIPARESIS: ICD-10-CM

## 2025-05-26 DIAGNOSIS — Z74.09 IMPAIRED MOBILITY AND ADLS: Primary | ICD-10-CM

## 2025-05-26 DIAGNOSIS — R25.2 SPASTICITY: ICD-10-CM

## 2025-05-26 PROCEDURE — 97140 MANUAL THERAPY 1/> REGIONS: CPT | Mod: PO

## 2025-05-26 PROCEDURE — 97112 NEUROMUSCULAR REEDUCATION: CPT | Mod: PO

## 2025-05-26 NOTE — PROGRESS NOTES
Outpatient Care Management   - Patient Assessment    Patient: Kateryna Weber  MRN:  [id number]  Date of Service:  5/26/2025  Completed by:  Suzy Varner LCSW  Referral Date: 05/21/2025    Reason for Visit   Patient presents with    Social Work Assessment     5/26/25    OPCM Enrollment Call     5/26/25       Brief Summary:  received a referral from outpatient provider, Lizbet (OT), for the following Low/Mod SW psychosocial needs PT needs inPt rehab placement assistance.  Pt and Pt  elected to participate in the OPCM program. Social work assessment and SDOH questionnaire completed. Pt and Pt  reported Pt lives with him in a single story home. Pt utilizes a wheelchair for DME and is dependent on assistance with ADLs. The patient also requests assistance with N/A. Care plan was created in collaboration with patient/caregiver input.   INTERVENTIONS:  Sent referral to [facility] for review.    Future Appointments   Date Time Provider Department Center   5/27/2025 10:30 AM Lizbet Kim, ANILAR [facility]   5/29/2025 11:15 AM Nina Andino L-SLP, CCC-SLP [facility]   5/30/2025  8:45 AM Sandee Ferguson, PTA [facility]   5/30/2025  9:30 AM Lizbet Kim, LOTR [facility]   5/30/2025 10:30 AM Suki Adan L-SLP, CCC-SLP [facility]   6/2/2025  9:30 AM Klever Wright PTA [facility]   6/4/2025  9:30 AM Kelsey Milan, OT [facility]   6/4/2025 10:30 AM Sandee Ferguson, PTA [facility]   6/4/2025 11:15 AM Suki Adan L-SLP, CCC-SLP [facility]   6/6/2025  9:30 AM Sandee Ferguson, PTA [facility]   6/6/2025 10:30 AM Kelsey Milan, OT [facility]   6/6/2025 11:15 AM Suki Adan L-SLP, [facility] [facility]   6/9/2025  8:45 AM Virginia Beaulieu L-SLP, [facility] [facility]   6/9/2025  9:30 AM Leti,  Lalitha, PT [facility]   6/9/2025 10:30 AM Mac Rosa, OT [facility]   6/10/2025 10:15 AM Virginia Beaulieu, L-SLP, CCC-SLP [facility]   6/11/2025  9:30 AM Lalitha Landeros, PT [facility]   6/11/2025 10:30 AM Mac Rosa, OT [facility]   6/13/2025  9:30 AM Madhuri Burris, PT [facility]   6/13/2025 10:30 AM Suki Adan, L-SLP, CCC-SLP [facility]   6/13/2025 11:15 AM Lizbet Kim., LOTR [facility]   6/16/2025  8:45 AM Madhuri Burris, PT [facility]   6/16/2025  9:30 AM Lizbet Kim., [facility]   6/16/2025 10:30 AM Radha Cooper, L-SLP, CCC-SLP [facility]   6/17/2025  8:45 AM Madhuri Burris, PT [facility]   6/17/2025  9:30 AM Lizbet Kim., [facility]   6/17/2025 10:15 AM Virginia Beaulieu L-SLP, CCC-SLP [facility]   6/20/2025  8:45 AM Suki Adan, L-SLP, CCC-SLP [facility]   6/20/2025  9:30 AM Madhuri Burris, PT [facility]   6/20/2025 10:30 AM Mac Rosa, OT [facility]   6/23/2025  8:45 AM Madhuri Burris, PT [facility]   6/23/2025  9:30 AM Lizbet Kim., LOTR [facility]   6/23/2025 10:15 AM Virginia Beaulieu, L-SLP, CCC-SLP [facility]   6/25/2025  9:30 AM Perilljoanx, Kelsey, OT [facility]   6/25/2025 10:30 AM Sandee Ferguson, PTA [facility]   6/25/2025 11:15 AM Suki Adan, L-SLP, CCC-SLP [facility]   6/27/2025  8:45 AM Suki Adan, L-SLP, [facility]-SL[facility]   6/27/2025  9:30 AM Madhuri Burris, PT [facility]   6/27/2025 11:15 AM Lizbet Kim, NAMITA [facility]   7/1/2025  9:30 AM Perimalinda, Kelsey, OT [facility]   7/1/2025 10:30 AM Madhuri Burris, PT [facility]   7/1/2025 11:15 AM Suki Adan, L-SLP, CCC-SLP [facility]   7/2/2025  1:00 PM Suki Adan, L-SLP, CCC-SLP [facility]   7/2/2025  1:45 PM Sandee Ferguson, PTA [facility]   7/2/2025  2:30 PM JulioLizbet harrington., LOTR [facility]   7/3/2025 10:30 AM Madhuri Burris, PT [facility]   7/3/2025 11:15 AM Lizbet Kim., LOTR [facility]   7/7/2025  9:30 AM Madhuri Burris, PT [facility]   7/7/2025 10:30 AM Lizbet Kim., [facility]   7/7/2025 11:15 AM Suki Adan, L-SLP, CCC-SLP [facility]   7/8/2025  9:45 AM Virginia Beaulieu L-SLP, CCC-SLP [facility]   7/8/2025 10:30 AM Madhuri Burris, PT [facility]   7/8/2025 11:15 AM Mac Rosa, OT [facility]   7/11/2025  9:30 AM Lizbet Kim., LOTR [facility]   7/11/2025 10:30 AM Madhuri Burris, PT [facility]   7/11/2025 11:15 AM Suki Adan, L-SLP, CCC-SLP [facility]   7/14/2025  9:30 AM Lizbet Kim., LOTR [facility]   7/14/2025 10:30 AM Madhuri Burris, PT [facility]   7/14/2025 11:15 AM Radha Cooper, L-SLP, CCC-SLP [facility]   7/16/2025  9:30 AM Klever Wright, PTA [facility]   7/16/2025 10:30 AM Kelsey Milan, OT [facility]   7/16/2025 11:15 AM Radha Cooper, L-SLP, CCC-SLP [facility]   7/18/2025  9:30 AM Lizbet Kim, LOTR [facility]   7/18/2025 10:30 AM Madhuri Burris, PT [facility]   7/18/2025 11:15 AM Radha Cooper L-SLP, CCC-SLP [facility]   7/21/2025  9:30 AM Lizbet Kim, LOTR [facility]   7/21/2025 10:30 AM Radha Cooper, L-SLP, CCC-SLP [facility]   7/21/2025 11:15 AM Madhuri Burris, PT [facility]   7/23/2025  9:30 AM Kelsey Milan, OT [facility]   7/23/2025 10:15 AM Virginia Beaulieu L-SLP, CCC-SLP [facility]    7/23/2025 11:15 AM Lalitha Landeros, PT [facility]   7/25/2025  9:30 AM [facility]bet Kim [facility] [place] [address] [facility]   7/25/2025 10:30 AM Madhuri Burris, PT [place] [address] [facility]   7/25/2025 11:15 AM Radha Cooper L-SLP, CCC-SLP [place] [address] [facility]     [facility] Varner [facility]  Neuro Therapy   [facility]  [phone]

## 2025-05-26 NOTE — PROGRESS NOTES
Patient was a waitlist add on today but she was adamant of not staying following occupational therapy. She indicated that she was in pain 5/5 but could not tell where she was hurting. Her  and the  was present to discuss her current need for inpatient status versus outpatient. Patient would greatly benefit from inpatient rehab or Skilled nursing at this time.     JACQUES Mancini., L-SLP,CCC-SLP, CBIS  Speech-Language Pathologist  Certified Brain Injury Specialist

## 2025-05-26 NOTE — PROGRESS NOTES
Outpatient Rehab    Occupational Therapy Visit    Patient Name: Kateryna Weber  MRN: [id number]  YOB: 1960  Encounter Date: 5/27/2025    Therapy Diagnosis:   Encounter Diagnoses   Name Primary?    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  Family / care giver present for this visit:  (spouse present throughout)      unable to indicate on numerical scale; indications throughout RUE with PROM    Objective            Treatment:   Kateryna participated in neuromuscular re-education activities to improve: Proprioception, Posture, and tone/spasticity for 45 minutes. The following activities were included:  Scooting up in w/c with min(A); sit<>stand with mod(A); stand pivot t/f to edge of mat: mod(A) with left knee blocked   Lateral scooting to left at edge of mat x2 with min(A)  Sit<>supine: mod(A)    Side laying on left side:   -PROM for right scapular mobilizations for depression and retraction with improvement in tolerance noted     Supine: x2 pillows under head and bolster under knees and feet flat on the ground   -PROM and tone inhibition techniques with cross friction at pec insertion and bicep tendon as tolerance for right upper extremity ; prolonged stretch and postioning for right upper extremity to encourage neutral forearm, wrist and for shoulder abduction    -added time spent for tissue elongation and tone inhibition at bicep tendon   -suction cupping completed to pec insertion and pec region via glass #3    Stand pivot from edge of mat to wheelchair: mod(A)  Repositioning for right upper extremity in chair for more neural postioning     Time Entry(in minutes):  Neuromuscular Re-Education Time Entry: 45    Assessment & Plan   Assessment:  Mrs Avendaño tolerated session well today with improvement in proximal tone and tolerance to activity noted. I continue to strongly recommend inpatient rehab for Mrs Avendaño as she would best benefit from intensive daily therapy to maximize her functional gains and indep. She tolerated manual tone inhibition via cupping fairly well today.        Patient will continue to benefit from skilled outpatient occupational therapy to address the deficits listed in the problem list box on initial evaluation, provide pt/family education and to maximize pt's level of independence in the home and community environment.     Patient's spiritual, cultural, and educational needs considered and patient agreeable to plan of care and goals.           Plan: Cont OT POC 3x/wk; focus on RUE stretching to prevent further joint contractures, mat/bed mobility; compensatory strategies/aly techniques for ADLs. Continue to recommend inpatient rehab for patient at this time.    Goals:   Goals:   Short Term: 5 weeks  - Pt will participate in upper body dressing with moderate assistance using adaptive techniques or devices to increase independence in ADLs. ongoing  - Pt will tolerate right upper extremity range of motion program x10 min without need for rest break. ongoing  - Pt will complete stand pivot transfer with minimal assistance. ongoing  - Pt will complete lateral scooting as needed for ADLs with min(A). ongoing  - Pt will tolerate right upper extremity splint/orthosis x1 hour as needed for tone/spasticity management. ongoing  - Pt will be independent with Home  Exercise Program (HEP)/Home Activity Program (HAP) and report at least 50% compliance. ongoing     Long Term: 10 weeks   - Pt  will participate in upper body dressing with minimal assistance using adaptive techniques or devices to increase independence in ADLs. ongoing  - Pt will complete lower body dressing (pants, brief) with moderate assistance. ongoing  - Pt will demonstrate improved upper extremity positioning in wheelchair and bed to reduce spasticity and risk of contracture in RUE, as evidenced by maintaining neutral shoulder and elbow alignment with appropriate supports for at least 80% of observed sessions. ongoing  - Pt will tolerate right wrist in neutral x8 minutes as needed for weightbearing and functional tasks. Ongoing   - Caregiver will demo understanding for range of motion program and positioning for Right upper extremity in seated and supine. ongoing  - Pt will be independent with Home Exercise Program (HEP)/Home Activity Program (HAP) to promote long term maintenance of progress made in therapy. ongoing     Goals to be added and edited within plan of care as needed/appropriate.       NAMITA Thomas

## 2025-05-27 ENCOUNTER — CLINICAL SUPPORT (OUTPATIENT)
Dept: REHABILITATION | Facility: HOSPITAL | Age: 65
End: 2025-05-27
Payer: COMMERCIAL

## 2025-05-27 DIAGNOSIS — Z78.9 IMPAIRED MOBILITY AND ADLS: Primary | ICD-10-CM

## 2025-05-27 DIAGNOSIS — R25.2 SPASTICITY: ICD-10-CM

## 2025-05-27 DIAGNOSIS — R47.01 GLOBAL APHASIA: Primary | ICD-10-CM

## 2025-05-27 DIAGNOSIS — G81.94 LEFT HEMIPARESIS: ICD-10-CM

## 2025-05-27 DIAGNOSIS — Z74.09 IMPAIRED MOBILITY AND ADLS: Primary | ICD-10-CM

## 2025-05-27 PROCEDURE — 92507 TX SP LANG VOICE COMM INDIV: CPT | Mod: PO

## 2025-05-27 PROCEDURE — 97112 NEUROMUSCULAR REEDUCATION: CPT | Mod: PO

## 2025-05-27 NOTE — PROGRESS NOTES
Outpatient Rehab    Speech-Language Pathology Visit    Patient Name: Kateryna Weber  MRN: [id number]  YOB: 1960  Encounter Date: 5/29/2025    Therapy Diagnosis:   Encounter Diagnosis   Name Primary?    Global aphasia Yes         Physician: Latisha Garg MD    Physician Orders: Eval and Treat  Medical Diagnosis: Aphasia as late effect of cerebrovascular accident    Visit # / Visits Authorized: 3 / 20   Insurance Authorization Period: 5/9/2025 to 12/31/2025  Date of Evaluation: 5/9/2025   Plan of Care Certification: 5/9/2025 to 7/18/2025      Time In: 1017   Time Out: 1102  Total Time (in minutes): 45   Total Billable Time (in minutes): 45    FOTO:  Intake Score:  %  Survey Score 2:  %  Survey Score 3:  %    Precautions:        Standard, Fall, aphasia, h/o cancer    Subjective   Present with . Noted she is communicating more than previous week. Was tired after PT due to walking today..  Pain reported as 5/10. in elbow, communictaed with AAC.  Family/caregiver present for this visit:  ()        Objective             Treatment     Short Term Goals:   1. Patient will identify objects in a field of 3 given the name/function of the object with 75% accuracy following nonverbal cues and requesting repetitions as needed across 2-3 sessions to improve auditory comprehension skills.   85% accuracy with ID of object independently, 100% given cues. Difficulty when objects were in the same category   100% accuracy with ID given function independently   GOAL MET  2. Patient will answer 1 unit/personal yes/no questions with 75% accuracy following nonverbal cues and requesting repetitions as needed across 2-3 sessions to improve auditory comprehension skills.   Answered 2 unit yes/no personal with 30% accuracy independently   3. Patient will name objects with 75% acc given carrier phrase as needed across 3 sessions      Close approximations with 20% accuracy, severe due to Aquired Apraxia of Speech  more than aphasia   Attempted to match with nonspeech oral motor tasks- minimal to slight improvement   4. Patient will participate in the speech production treatment hierarchy with mono-syllabic /m/ words with 75% acc across 3 probe trials.      Attempted but unable to get close approximations. Goal not appropriate for this point in therapy. Focus on AAC, auditory comprehension, and functional naming goals given islands of connected speech are improving in length and complexity.   DISCONTINUE   5. Patient will use gestures and/or low-tech AAC (e.g., communication board, yes/no cards, picture symbols) to express basic wants and needs in response to visual/verbal cues with 75% accuracy across 3 consecutive sessions.   Used to communicate pain    Trialed commands:  Tactus therapy 1 step with objects- 100% accuracy   Tactus therapy 1 step with adjectives- 33% accuracy   Improved with passive sentence structure as compared to active.       Time Entry(in minutes):  Speech Treatment (Individual) Time Entry: 45    Assessment & Plan   Assessment  Goal met today and one goal discontinued. See objective section above. Pt with more islands of connected, correct speech and langauge, typically in response to frustration. COntinue with POC as patient is progressing. See education section for onesimo download at home for HEP discussed with .  Evaluation/Treatment Tolerance: Patient tolerated treatment well (fatigue noted at end of session.)    Patient will continue to benefit from skilled outpatient speech therapy to address the deficits listed in the problem list box on initial evaluation, provide pt/family education and to maximize pt's level of independence in the home and community environment.     Patient's spiritual, cultural, and educational needs considered and patient agreeable to plan of care and goals.     Education  Education was done with Other recipient present and Patient. The patient's learning style includes  Listening and Demonstration. The patient Requires continuing/additional education. Wellington participated in education. They identified as Spouse/significant other. The reported learning style is Listening. The recipient Verbalizes understanding.     Discussed downloading Tactus Therapy advanced Comprehension application for practice at home. Educated on role of intact intelligence and frustration with recovery.       Plan  Continue POC. Focus on auditory comprehension, verbal expression, trialing devices and use of AAC functionally to describe pain.          Goals:   Active       Long Term Goals        1.  She will develop functional cognitive-linguistic based skills and utilize compensatory strategies to communicate wants/needs effectively to different conversational partners, maintain safety, participate socially in functional living environment.    (Progressing)       Start:  05/09/25    Expected End:  07/18/25            2. Patient will trial compensatory strategies and augmentative-alternative communication (AAC) to communicate wants and needs effectively to different conversational partners, maintain safety, and participate socially in functional living environment.  (Progressing)       Start:  05/09/25    Expected End:  07/18/25               Short Term Goals        1. Patient will identify objects in a field of 3 given the name/function of the object with 75% accuracy following nonverbal cues and requesting repetitions as needed across 2-3 sessions to improve auditory comprehension skills. (Met)       Start:  05/09/25    Expected End:  06/20/25    Resolved:  05/29/25         2. Patient will answer 1 unit/personal yes/no questions with 75% accuracy following nonverbal cues and requesting repetitions as needed across 2-3 sessions to improve auditory comprehension skills. (Progressing)       Start:  05/09/25    Expected End:  06/20/25            3. Patient will name objects with 75% acc given carrier phrase as  needed across 3 sessions     (Progressing)       Start:  05/09/25    Expected End:  06/20/25            4. Patient will participate in the speech production treatment hierarchy with mono-syllabic /m/ words with 75% acc across 3 probe trials.    (Unable to Meet)       Start:  05/09/25    Expected End:  06/20/25            5. Patient will use gestures and/or low-tech AAC (e.g., communication board, yes/no cards, picture symbols) to express basic wants and needs in response to visual/verbal cues with 75% accuracy across 3 consecutive sessions. (Progressing)       Start:  05/09/25    Expected End:  06/20/25                    JANA Gabriel-SLP, HOMA-SLP

## 2025-05-27 NOTE — PROGRESS NOTES
Outpatient Rehab    Speech-Language Pathology Visit    Patient Name: Kateryna Weber  MRN: [id number]  YOB: 1960  Encounter Date: 5/27/2025    Therapy Diagnosis:   Encounter Diagnosis   Name Primary?    Global aphasia Yes       Physician: Latisha Garg MD    Physician Orders: Eval and Treat  Medical Diagnosis: Aphasia as late effect of cerebrovascular accident    Visit # / Visits Authorized: 2 / 20   Insurance Authorization Period: 5/9/2025 to 12/31/2025  Date of Evaluation: 5/9/2025   Plan of Care Certification: 5/9/2025 to 7/18/2025      Time In: 1127   Time Out: 1210  Total Time (in minutes): 43   Total Billable Time (in minutes): 43    FOTO:  Intake Score:  %  Survey Score 2:  %  Survey Score 3:  %    Precautions:        Standard, Fall, aphasia, h/o cancer    Subjective   Present with . Noted she communicates more at home than here currently. Added last minute but stated she was willing to attend. They use low tech AAC boards at home..  Pain reported as 5/10. in elbow, communictaed with AAC.  Family/caregiver present for this visit:  ()      Objective               Treatment     Short Term Goals:   1. Patient will identify objects in a field of 3 given the name/function of the object with 75% accuracy following nonverbal cues and requesting repetitions as needed across 2-3 sessions to improve auditory comprehension skills.   100% accuracy with ID of object independently   90% accuracy with ID given function   2. Patient will answer 1 unit/personal yes/no questions with 75% accuracy following nonverbal cues and requesting repetitions as needed across 2-3 sessions to improve auditory comprehension skills.   Answered 2 unit yes/no personal with 90% accuracy independently   Answered 2 unit yes/no not personal  with 70% accuracy independently   3. Patient will name objects with 75% acc given carrier phrase as needed across 3 sessions      Close approximations with 20% accuracy, severe  due to Aquired Apraxia of Speech more than aphasia   Attempted to match with nonspeech oral motor tasks- minimal to slight improvement   4. Patient will participate in the speech production treatment hierarchy with mono-syllabic /m/ words with 75% acc across 3 probe trials.      Not addressed in today's session.   5. Patient will use gestures and/or low-tech AAC (e.g., communication board, yes/no cards, picture symbols) to express basic wants and needs in response to visual/verbal cues with 75% accuracy across 3 consecutive sessions.   Used to communicate pain  SnapCore demo'ed, significant training would be needed, but appears she would an excellent user  Trialed communication with weather, food and drinks  Prompts required for selection due to use of a blanket in the cold room. Selection imporved when prompted.           Time Entry(in minutes):  Speech Treatment (Individual) Time Entry: 43    Assessment & Plan   Assessment  Pt with good participation throughout the session. AAC introduction continued and trialed. Pt would need support, but has the ability to be a functional AAC user andwas able to say that AAC was overwhelming but did reduce her frustration. She was able to communicate more with her frustrations today and made a couple of verbal requests.  Pt was fatigued by the end of the session and needed brain breaks throughout the session. SLP and  carried all of the burden of communication. yes/no accuracy with improvement this date and some accurate spontaneous communication noted.  Evaluation/Treatment Tolerance: Patient tolerated treatment well    Patient will continue to benefit from skilled outpatient speech therapy to address the deficits listed in the problem list box on initial evaluation, provide pt/family education and to maximize pt's level of independence in the home and community environment.     Patient's spiritual, cultural, and educational needs considered and patient agreeable to plan  of care and goals.     Education  Education was done with Other recipient present and Patient. The patient's learning style includes Listening and Demonstration. The patient Requires continuing/additional education. Wellington participated in education. They identified as Spouse/significant other. The reported learning style is Listening. The recipient Verbalizes understanding.     AAC discussed and demonstrated.           Plan  Continue POC. Focus on trialing devices and use of AAC functionally to describe pain.          Goals:   Active       Long Term Goals        1.  She will develop functional cognitive-linguistic based skills and utilize compensatory strategies to communicate wants/needs effectively to different conversational partners, maintain safety, participate socially in functional living environment.    (Progressing)       Start:  05/09/25    Expected End:  07/18/25            2. Patient will trial compensatory strategies and augmentative-alternative communication (AAC) to communicate wants and needs effectively to different conversational partners, maintain safety, and participate socially in functional living environment.  (Progressing)       Start:  05/09/25    Expected End:  07/18/25               Short Term Goals        1. Patient will identify objects in a field of 3 given the name/function of the object with 75% accuracy following nonverbal cues and requesting repetitions as needed across 2-3 sessions to improve auditory comprehension skills. (Progressing)       Start:  05/09/25    Expected End:  06/20/25            2. Patient will answer 1 unit/personal yes/no questions with 75% accuracy following nonverbal cues and requesting repetitions as needed across 2-3 sessions to improve auditory comprehension skills. (Progressing)       Start:  05/09/25    Expected End:  06/20/25            3. Patient will name objects with 75% acc given carrier phrase as needed across 3 sessions     (Progressing)        Start:  05/09/25    Expected End:  06/20/25            4. Patient will participate in the speech production treatment hierarchy with mono-syllabic /m/ words with 75% acc across 3 probe trials.    (Progressing)       Start:  05/09/25    Expected End:  06/20/25            5. Patient will use gestures and/or low-tech AAC (e.g., communication board, yes/no cards, picture symbols) to express basic wants and needs in response to visual/verbal cues with 75% accuracy across 3 consecutive sessions. (Progressing)       Start:  05/09/25    Expected End:  06/20/25                  JANA Gabriel-SLP, CCC-SLP

## 2025-05-27 NOTE — PROGRESS NOTES
Outpatient Care Management   - Care Plan Follow Up    Patient: Kateryna Weber  MRN:  [id number]  Date of Service:  5/27/2025  Completed by:  Suzy Varner LCSW  Referral Date: 05/21/2025    Reason for Visit   Patient presents with    Social Work Assessment     5/26/25    OPCM Enrollment Call     5/26/25       Brief Summary: 5/27- Met with patient's  during OT session today.  Discussed inpatient rehab and issues with medical necessity with regard to insurance authorization.  Discussed upcoming appointments with Dr. Varela especially appointments that are related to a possible baclofen pump.  As it stands currently, patient would not meet medical necessity for inpatient rehab despite notes and recommendations from both therapists, and MD doctors.  Patient receiving a baclofen pump would likely meet medical necessity due to the absolute need for that to be checked frequently for infections and to be monitored closely during initial placement period by a doctor.  Advised that rehab liasion on will likely review the chart and while they might be willing to submit for authorization at this point, if the pump is indeed being considered, it would probably be more appropriate to wait until after that is completed and continue the course of outpatient therapy in the meantime.  Patient's  agreeable.  Discussed referral with Chris, [facility] rehab liaison, and advised that he will follow this case until it is time to submit for authorization.  Updated patient's  at end of SLP session.    Future Appointments   Date Time Provider Department Center   5/29/2025  8:45 AM Madhuri Burris, PT [facility]   5/29/2025 11:15 AM Nina Andino L-SLP, [facility]-SLP [address] PT   5/30/2025  8:45 AM Sandee Ferguson, PTA [facility]   5/30/2025  9:30 AM Lizbet Kim LOTR [facility]   5/30/2025 10:30 AM Suki Adan L-SLP, CCC-SLP [address] PT    6/2/2025  9:30 AM Klever Wright, PTA [facility]   6/4/2025  9:30 AM Perimalinda, Kelsey, OT [facility]   6/4/2025 10:30 AM Marty, Sandee Choi, PTA [facility]   6/4/2025 11:15 AM Suki Adan, L-SLP, CCC-SLP [facility]   6/6/2025  9:30 AM Sandee Ferguson, PTA [facility]   6/6/2025 10:30 AM Perimalinda, Kelsey, OT [facility]   6/6/2025 11:15 AM Suki Adan, L-SLP, CCC-SLP [facility]   6/9/2025  8:45 AM Virginia Beaulieu, L-SLP, CCC-SLP [facility]   6/9/2025  9:30 AM Lalitha Landeros, PT [facility]   6/9/2025 10:30 AM Mac Rosa, OT [facility]   6/10/2025 10:15 AM Virginia Beaulieu L-SLP, CCC-SLP [facility]   6/11/2025  9:30 AM Lalitha Landeros, PT [facility]   6/11/2025 10:30 AM Mac Rosa, OT [facility]   6/13/2025  9:30 AM Madhuri Burris, PT [facility]   6/13/2025 10:30 AM Suki Adan, L-SLP, CCC-SLP [facility]   6/13/2025 11:15 AM Lizbet Kim, LOTR [facility]   6/16/2025  8:45 AM Madhuri Burris, PT [facility]   6/16/2025  9:30 AM Lizbet Kim, LOTR [facility]   6/16/2025 10:30 AM Allison, Radha, L-SLP, CCC-SLP [facility]   6/17/2025  8:45 AM Madhuri Burris, PT [facility]   6/17/2025  9:30 AM Lizbet Kim, LOTR [facility]   6/17/2025 10:15 AM Virginia Beaulieu, L-SLP, CCC-SLP [facility]   6/20/2025  8:45 AM Suki Adan, L-SLP, CCC-SLP [facility]   6/20/2025  9:30 AM Madhuri Burris, PT [facility]   6/20/2025 10:30 AM Mac Rosa, OT [facility]   6/23/2025  8:45 AM Madhuri Burris, PT [facility]   6/23/2025  9:30 AM Lizbet Kim, LOTR [facility]   6/23/2025 10:15 AM Virginia Beaulieu, L-SLP, CCC-SLP [facility]   6/25/2025   9:30 AM Perilloux, Kelsey, OT [facility]   6/25/2025 10:30 AM Sandee Ferguson, PTA [facility]   6/25/2025 11:15 AM Suki Adan, L-SLP, CCC-SLP [facility]   6/27/2025  8:45 AM Suki Adan, L-SLP, CCC-SLP [facility]   6/27/2025  9:30 AM Madhuri Burris, PT [facility]   6/27/2025 11:15 AM JulioShubham harringtonly W., LOTR [facility]   7/1/2025  9:30 AM Perilloux, Kelsey, OT [facility]   7/1/2025 10:30 AM Madhuri Burris, PT [facility]   7/1/2025 11:15 AM Suki Adan, L-SLP, CCC-SLP [facility]   7/2/2025  1:00 PM Suki Adan, L-SLP, CCC-SLP [facility]   7/2/2025  1:45 PM Sandee Ferguson, PTA [facility]   7/2/2025  2:30 PM JulioShubham harringtonly W., LOTR [facility]   7/3/2025 10:30 AM Madhuri Burris, PT [facility]   7/3/2025 11:15 AM JulioLizbet harrington W., LOTR [facility]   7/7/2025  9:30 AM Madhuri Burris, PT [facility]   7/7/2025 10:30 AM JulioLizbet harrington W., LOTR [facility]   7/7/2025 11:15 AM Suki Adan, L-SLP, CCC-SLP [facility]   7/8/2025  9:45 AM Virginia Beaulieu, L-SLP, CCC-SLP [facility]   7/8/2025 10:30 AM Madhuri Burris, PT [facility]   7/8/2025 11:15 AM Mac Rosa, OT [facility]   7/11/2025  9:30 AM Lizbet Kim, LOTR [facility]   7/11/2025 10:30 AM Madhuri Burris, PT [facility]   7/11/2025 11:15 AM Suki Adan L-SLP, CCC-SLP [facility]   7/14/2025  9:30 AM Lizbet Kim, LOTR [facility]   7/14/2025 10:30 AM Madhuri Burris, PT [facility]   7/14/2025 11:15 AM Radha Cooper L-SLP, CCC-SLP [facility]   7/16/2025  9:30 AM Klever Wright, PTA [facility]   7/16/2025 10:30 AM Kelsey Milan, OT [facility]   7/16/2025 11:15 AM Radha Cooper L-SLP,  CCC-SLP [facility]   7/18/2025  9:30 AM Lizbet Kim, LOTR [facility]   7/18/2025 10:30 AM Madhuri Burris, PT [facility]   7/18/2025 11:15 AM Radha Cooper L-SLP, CCC-SLP [facility]   7/21/2025  9:30 AM Lizbet Kim, LOTR [facility]   7/21/2025 10:30 AM Radha Cooper, L-SLP, CCC-SLP [facility]   7/21/2025 11:15 AM Madhuri Burris, PT [facility]   7/23/2025  9:30 AM Kelsey Milan, OT [facility]   7/23/2025 10:15 AM Virginia Beaulieu L-SLP, CCC-SLP [facility]   7/23/2025 11:15 AM Lalitha Landeros, PT [facility]   7/25/2025  9:30 AM Lizbet Kim, LOTR [facility]   7/25/2025 10:30 AM Madhuri Burris, PT [facility]   7/25/2025 11:15 AM Radha Cooper, L-SLP, CCC-SLP [facility]     Suzy Varner LCSW  Neuro Therapy   [facility]  [phone]

## 2025-05-27 NOTE — PROGRESS NOTES
Outpatient Rehab    Occupational Therapy Visit    Patient Name: Kateryna Weber  MRN: [id number]  YOB: 1960  Encounter Date: 5/26/2025    Therapy Diagnosis:   Encounter Diagnoses   Name Primary?    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 Pt's  also reporting noted increase in passive R shoulder IR and abduction with daily caregiver assisted progressive PROM stretching.  Family / care giver present for this visit:            Treatment:  Manual Therapy  MT 1: Manual soft tissue and myofascial release performed to R pectoralis, coracobrachialis, deltoid, bicep, and brachioradialis; therapeutic cupping also performed to R anterior deltoid, pectoralis, coracobrachialis, and origin of biceps tendon; education provided to pt. and  re: purpose/rationale for manual therapeutic interventions, potential for soreness, bruising, etc., & importance of adequate hydration for proper elimination of metabolic waste byproducts following manual therapy, with pt's  expressing verbal understanding to all education and recommendations.  Balance/Neuromuscular Re-Education  NMR 1: Stand pivot t/f w/c<>EOM Mod A, sit<>supine Mod A. Pt. supine while OT performed progressive, gentle RUE PROM Stretching to all joints/planes with sustained hold at tolerable end ranges and deep manual inhibition  provided to pectoralis, bicep, and brachioradialis as needed; moist heat applied to R shoulder/chest/upper arm for improved circulation and further tissue lengthening while OT performed PROM Stretching to R elbow, wrist and hand; wrist, hand and digit joint mobs and distractions performed for reduced joint stiffness and improved joint integrity    Time Entry(in minutes):  Manual Therapy Time Entry: 25  Neuromuscular Re-Education Time Entry: 20    Assessment & Plan   Assessment: Pt. exhibited good tolerance of manual soft tissue and myofascial release performed this date, with noted improvements in tissue lengthening also observed thru R pectoralis, coracobrachialis, and biceps mm following skilled manual therapeutic interventions. Education provided to pt. and  re: purpose of interventions/techniques provided, considerations following manual soft tissue/myofascial release and cupping, ways to manage potential soreness/tenderness following, and importance of adequate hydration for metabolic waste elimination, with pt's  expressing verbal understanding. Pt. will continue to require and benefit from skilled manual soft tissue release, joint manipulation, and tone inhibition/spasticity mgmt. interventions to maximize tissue lengthening in order to reduce further contracture development and maximize potential for functional use of RUE.       The patient will continue to benefit from skilled outpatient occupational therapy in order to address the deficits listed in the problem list on the initial evaluation, provide patient and family education, and maximize the patients level of independence in the home and community environments.     The patient's spiritual, cultural, and educational needs were considered, and the patient is agreeable to the plan of care and goals.           Plan: Cont OT POC 3x/wk; focus on RUE stretching to prevent further joint contractures, mat/bed mobility; compensatory  strategies/aly techniques for ADLs. Continue to recommend inpatient rehab for patient at this time.    Goals:   Short Term: 5 weeks  - Pt will participate in upper body dressing with moderate assistance using adaptive techniques or devices to increase independence in ADLs. ongoing  - Pt will tolerate right upper extremity range of motion program x10 min without need for rest break. ongoing  - Pt will complete stand pivot transfer with minimal assistance. ongoing  - Pt will complete lateral scooting as needed for ADLs with min(A). ongoing  - Pt will tolerate right upper extremity splint/orthosis x1 hour as needed for tone/spasticity management. ongoing  - Pt will be independent with Home Exercise Program (HEP)/Home Activity Program (HAP) and report at least 50% compliance. ongoing     Long Term: 10 weeks   - Pt  will participate in upper body dressing with minimal assistance using adaptive techniques or devices to increase independence in ADLs. ongoing  - Pt will complete lower body dressing (pants, brief) with moderate assistance. ongoing  - Pt will demonstrate improved upper extremity positioning in wheelchair and bed to reduce spasticity and risk of contracture in RUE, as evidenced by maintaining neutral shoulder and elbow alignment with appropriate supports for at least 80% of observed sessions. ongoing  - Pt will tolerate right wrist in neutral x8 minutes as needed for weightbearing and functional tasks. Ongoing   - Caregiver will demo understanding for range of motion program and positioning for Right upper extremity in seated and supine. ongoing  - Pt will be independent with Home Exercise Program (HEP)/Home Activity Program (HAP) to promote long term maintenance of progress made in therapy. ongoing     Goals to be added and edited within plan of care as needed/appropriate.      Mac Rosa, OT

## 2025-05-28 ENCOUNTER — DOCUMENT SCAN (OUTPATIENT)
Dept: HOME HEALTH SERVICES | Facility: HOSPITAL | Age: 65
End: 2025-05-28
Payer: COMMERCIAL

## 2025-05-29 ENCOUNTER — CLINICAL SUPPORT (OUTPATIENT)
Dept: REHABILITATION | Facility: HOSPITAL | Age: 65
End: 2025-05-29
Payer: COMMERCIAL

## 2025-05-29 DIAGNOSIS — R47.01 GLOBAL APHASIA: Primary | ICD-10-CM

## 2025-05-29 DIAGNOSIS — Z74.09 IMPAIRED FUNCTIONAL MOBILITY, BALANCE, GAIT, AND ENDURANCE: Primary | ICD-10-CM

## 2025-05-29 PROCEDURE — 97530 THERAPEUTIC ACTIVITIES: CPT | Mod: PO

## 2025-05-29 PROCEDURE — 92507 TX SP LANG VOICE COMM INDIV: CPT | Mod: PO

## 2025-05-29 PROCEDURE — 97112 NEUROMUSCULAR REEDUCATION: CPT | Mod: PO

## 2025-05-29 PROCEDURE — 97116 GAIT TRAINING THERAPY: CPT | Mod: PO

## 2025-05-29 NOTE — PROGRESS NOTES
Outpatient Rehab    Physical Therapy Visit    Patient Name: Kateryna Weber  MRN: [id number]  YOB: 1960  Encounter Date: 5/29/2025    Therapy Diagnosis:   Encounter Diagnosis   Name Primary?    Impaired functional mobility, balance, gait, and endurance Yes         Physician: Latisha Garg MD    Physician Orders: Eval and Treat  Medical Diagnosis: Hemiparesis of right dominant side as late effect of nontraumatic subarachnoid hemorrhage    Visit # / Visits Authorized:  7 / 22  Insurance Authorization Period: 5/9/2025 to 12/31/2025  Date of Evaluation: 5/9/202505/09/25  Plan of Care Certification:  07/04/25       PT/PTA:     Number of PTA visits since last PT visit:   Time In:     Time Out:    Total Time (in minutes):     Total Billable Time (in minutes):      FOTO:  Intake Score:  %  Survey Score 2:  %  Survey Score 3:  %    Precautions:       Subjective     Patient's  present for session. Patient indicates readiness for therapy.   No pain indicated throughout session.          Objective    Last performed on evaluation.         Treatment:   Kateryna received therapeutic exercises to develop strength, endurance, ROM, flexibility, posture, and core stabilization for 05 minutes including:     Seated right ankle dorsiflexion mobilizations Grade 3 to reduce plantarflexion contracture and tightness  Seated right ankle dorsiflexion PROM with overpressure stretch      Patient participated in neuromuscular re-education activities to improve: Balance, Coordination, and Sense for 10 minutes. The following activities were included:     2 trials x 4 min each, standing with LUE support on LBQC for upright tolerance and reduced tone throughout right ankle, CGA  PT places PT's knee behind patient's right knee to reduce hyperextension, mirror in front for visual feedback    Time above includes rest break between trials.       Patient participated in dynamic functional therapeutic activities to improve functional  performance for 08 minutes. Including:   W/c > mat stand pivot transfer with LBQC moving to left side, Min A to Mod A  Sit <> stand from mat x 3 trials with LBQC, Min A- verbal cues for proper hand placement  Sit <> stand from w/c x 3 trials wth LBQC Min A- verbal cues for proper hand placement      Patient participated in gait training activities to normalize gait pattern for 22 minutes. The following activities were included:    Gait belt used for safety. Assistive device: LBQC at [facility] glider on right foot    Trial 1: 16 feet  Trial 2: 36 feet  Trial 3: 36 feet    Tech present on right side of patient to cue for upright posture as needed, w/c follow  Mod A from PT in front to weight shift, promote right hip extension and upright trunk positioning, and to advance RLE in swing  Deficits: right knee hyperextension and right hip flexion in stance due to ankle plantarflexion contracture; variety of step to and step through pattern      Time Entry(in minutes):   See above    Assessment & Plan   Assessment:  Kateryna tolerated therapy session well this morning with good effort throughout. Utilized LBQC for stand pivot transfer with good response. Standing trials utilized to promote endurance in standing, upright posture, and to reduce tone throughout right ankle. Some manual stretches also provided following WB activities to help reduce some excess plantarflexion position of ankle due to elevated tone. Initiated ambulation trials with LBQC with good tolerance and effort. Most assistance required to advance RLE in swing due to ongoing weakness and plantarflexion contracture. Spoke with patient and her  about requesting order for dynasplint for right ankle to reduce ongoing plantarflexion contracture since this appears to be one of the greatest limiting factors in completing standing transfers and ambulation more independently. Additionally, patient and her  remain compliant with daily stretching  program, but more aggressive stretch likely needed for improved positioning of ankle. Patient to benefit from high intensity PT to address ongoing mobility deficits and to maximize progress with therapy.        Patient will continue to benefit from skilled outpatient physical therapy to address the deficits listed in the problem list box on initial evaluation, provide pt/family education and to maximize pt's level of independence in the home and community environment.     Patient's spiritual, cultural, and educational needs considered and patient agreeable to plan of care and goals.           Plan:  Utilize standing activities to promote WB through RLE and reduce ankle tone. Continue transfers and ambulation with LBQC.     Goals:  Short Term Goals: 4 weeks   Patient/caregiver to be (I) with established home exercise program. Ongoing  Patient to improve supine <> sit transfer to Mod A consistently for improved independence with bed mobility. Ongoing  Patient to improve sit <> stand transfer with no more than Min A consistently for improved (I) with functional transfers. MET 05/29/25  Patient to improve stand pivot transfers to no more than Mod A consistently for improved (I) with functional transfers. MET 05/29/25  Patient to improve ambulation x 12 feet in // bars with no more than Mod A x 1 assist consistently to progress gait training to appropriate assistive device. MET 05/29/25  Patient to propel w/c x 100 feet, including 90 deg turns with no more than SBA for improved independence with w/c mobility. Ongoing     Long Term Goals: 8 weeks   Patient to improve supine <> sit transfer to Min A consistently for improved independence with bed mobility. Ongoing  Patient to improve sit <> stand transfer with no more than CGA consistently for improved (I) with functional transfers. Ongoing  Patient to improve stand pivot transfers to no more than Min A consistently for improved (I) with functional transfers.  Progressing  Patient to improve ambulation x 25 feet with no more than Mod A x 1 assist with appropriate assistive device for improved independence with ambulation. Progressing  Patient to propel w/c x 150 feet, including 90 deg turns with no more than SBA for improved independence with w/c mobility. Ongoing    Madhuri Burris, PT

## 2025-05-30 ENCOUNTER — TELEPHONE (OUTPATIENT)
Dept: HOME HEALTH SERVICES | Facility: CLINIC | Age: 65
End: 2025-05-30
Payer: COMMERCIAL

## 2025-05-30 ENCOUNTER — CLINICAL SUPPORT (OUTPATIENT)
Dept: REHABILITATION | Facility: HOSPITAL | Age: 65
End: 2025-05-30
Payer: COMMERCIAL

## 2025-05-30 ENCOUNTER — PATIENT MESSAGE (OUTPATIENT)
Dept: HOME HEALTH SERVICES | Facility: CLINIC | Age: 65
End: 2025-05-30
Payer: COMMERCIAL

## 2025-05-30 ENCOUNTER — DOCUMENTATION ONLY (OUTPATIENT)
Dept: REHABILITATION | Facility: HOSPITAL | Age: 65
End: 2025-05-30
Payer: COMMERCIAL

## 2025-05-30 DIAGNOSIS — Z74.09 IMPAIRED FUNCTIONAL MOBILITY, BALANCE, GAIT, AND ENDURANCE: Primary | ICD-10-CM

## 2025-05-30 DIAGNOSIS — Z74.09 IMPAIRED MOBILITY AND ADLS: Primary | ICD-10-CM

## 2025-05-30 DIAGNOSIS — Z78.9 IMPAIRED MOBILITY AND ADLS: Primary | ICD-10-CM

## 2025-05-30 DIAGNOSIS — G81.94 LEFT HEMIPARESIS: ICD-10-CM

## 2025-05-30 DIAGNOSIS — R47.01 GLOBAL APHASIA: Primary | ICD-10-CM

## 2025-05-30 DIAGNOSIS — R25.2 SPASTICITY: ICD-10-CM

## 2025-05-30 PROCEDURE — 97112 NEUROMUSCULAR REEDUCATION: CPT | Mod: PO

## 2025-05-30 PROCEDURE — 92507 TX SP LANG VOICE COMM INDIV: CPT | Mod: PO

## 2025-05-30 PROCEDURE — 97116 GAIT TRAINING THERAPY: CPT | Mod: PO

## 2025-05-30 PROCEDURE — 97110 THERAPEUTIC EXERCISES: CPT | Mod: PO

## 2025-05-30 PROCEDURE — 97530 THERAPEUTIC ACTIVITIES: CPT | Mod: PO

## 2025-05-30 NOTE — TELEPHONE ENCOUNTER
Spouse Wellington returned call to schedule a follow-up care at home visit with the nurse practitioner.    Patient has been scheduled

## 2025-05-30 NOTE — PROGRESS NOTES
PT/PTA met face to face to discuss pt's treatment plan and progress towards established goals.  Continue with current PT POC with focus on standing to stretch ankle, ambulation with LBQC.  Patient will be seen by physical therapist at least every sixth treatment or 30 days, whichever occurs first.    Sandee Ferguson, PTA  05/30/2025

## 2025-05-30 NOTE — TELEPHONE ENCOUNTER
Attempted to contact pt spouse Wellington to schedule a follow-up care at home visit with the nurse practitioner.

## 2025-05-30 NOTE — PROGRESS NOTES
"    Outpatient Rehab    Speech-Language Pathology Visit    Patient Name: Kateryna Weber  MRN: [id number]  YOB: 1960  Encounter Date: 5/30/2025    Therapy Diagnosis:   Encounter Diagnosis   Name Primary?    Global aphasia Yes     Physician: Latisha Garg MD  Physician Orders: Eval and Treat  Medical Diagnosis: Aphasia as late effect of cerebrovascular accident    Visit # / Visits Authorized: 4 / 20   Insurance Authorization Period: 5/9/2025 to 12/31/2025  Date of Evaluation: 5/9/2025   Plan of Care Certification: 5/9/2025 to 7/18/2025      Time In: 1035   Time Out: 1107  Total Time (in minutes): 32   Total Billable Time (in minutes): 32    Precautions:        Standard, Fall, aphasia, h/o cancer  Subjective   Patient was accompanied to therapy by her . said she was doing ok. Became fatigued as the session progressed and asked to end session early by saying "we need to go".  Pain reported as 3/10.    Family/caregiver present for this visit:       Objective           Treatment  Speech  Activity 1: ID objects by function in a field of 3 - 80% acc indly, 100% acc given a model. one error was when two items in the same category were challenged in a field. Challenged 3 items in the same category with 100% accuracy after reattempt  Activity 2: 1 unit, personal yes/no question with visual "yes" "no" cues with 80% acc indly, 100% acc given a prompt and a repetition  Activity 3: name object with a carrier phrase on 1/8 attempts, 5/8 attempts given a repetition, model, visual cue, and phonemic cue  Activity 4: repeated single syllable words with word initial bilabials /m/ with 0/5 indly, 3/5 given MAX cues. nonspeech sounds most effective in eliciting speech sounds (i.e. kissing sound for /m/)  Activity 5: repeated single syllable words with word initial linguavelar sounds /d/ with 0/5 indly, 3/5 given max cues. nonspeech sounds most effective in eliciting speech sounds (i.e. clicking tounge tip for /t/ " which elicited /d/)    Short Term Goals:   2. Patient will answer 1 unit/personal yes/no questions with 75% accuracy following nonverbal cues and requesting repetitions as needed across 2-3 sessions to improve auditory comprehension skills.   METx1 with visual cue  3. Patient will name objects with 75% acc given carrier phrase as needed across 3 sessions      Activity 3  4. Patient will participate in the speech production treatment hierarchy with mono-syllabic /m/ words with 75% acc across 3 probe trials.      Activity 4&5  5. Patient will use gestures and/or low-tech AAC (e.g., communication board, yes/no cards, picture symbols) to express basic wants and needs in response to visual/verbal cues with 75% accuracy across 3 consecutive sessions.   Not formally addressed      Time Entry(in minutes):  Speech Treatment (Individual) Time Entry: 32    Assessment & Plan   Assessment  Kateryna was demonstrated significant improvement in comprehension today on both identification and yes/no tasks. continued difficulty with all speech tasks was alleviated slightly with breaks and redirection. Increased frustration resulted in lower output and engagement in therapy session. Nonspeech sounds were most effective bridge to elicit speech sounds in a repetition task.  Evaluation/Treatment Tolerance: Patient limited by fatigue    Patient will continue to benefit from skilled outpatient speech therapy to address the deficits listed in the problem list box on initial evaluation, provide pt/family education and to maximize pt's level of independence in the home and community environment.     Patient's spiritual, cultural, and educational needs considered and patient agreeable to plan of care and goals.     Education  Education was done with Patient and Other recipient present. The patient's learning style includes Demonstration. The patient Demonstrates understanding and Requires continuing/additional education.  They identified as Family.        Discussed apraxia and how it is a disconnect between what she is thinking and what she is able to say. Validated frustration. Positive reinforcement provided throughout session.       Plan  Continue outpatient speech therapy to improve communication and reduce frustration        Goals:   Active       Long Term Goals        1.  She will develop functional cognitive-linguistic based skills and utilize compensatory strategies to communicate wants/needs effectively to different conversational partners, maintain safety, participate socially in functional living environment.    (Progressing)       Start:  05/09/25    Expected End:  07/18/25            2. Patient will trial compensatory strategies and augmentative-alternative communication (AAC) to communicate wants and needs effectively to different conversational partners, maintain safety, and participate socially in functional living environment.  (Progressing)       Start:  05/09/25    Expected End:  07/18/25               Short Term Goals        1. Patient will identify objects in a field of 3 given the name/function of the object with 75% accuracy following nonverbal cues and requesting repetitions as needed across 2-3 sessions to improve auditory comprehension skills. (Met)       Start:  05/09/25    Expected End:  06/20/25    Resolved:  05/29/25         2. Patient will answer 1 unit/personal yes/no questions with 75% accuracy following nonverbal cues and requesting repetitions as needed across 2-3 sessions to improve auditory comprehension skills. (Progressing)       Start:  05/09/25    Expected End:  06/20/25            3. Patient will name objects with 75% acc given carrier phrase as needed across 3 sessions     (Progressing)       Start:  05/09/25    Expected End:  06/20/25            4. Patient will participate in the speech production treatment hierarchy with mono-syllabic /m/ words with 75% acc across 3 probe trials.    (Progressing)       Start:   05/09/25    Expected End:  06/20/25            5. Patient will use gestures and/or low-tech AAC (e.g., communication board, yes/no cards, picture symbols) to express basic wants and needs in response to visual/verbal cues with 75% accuracy across 3 consecutive sessions. (Progressing)       Start:  05/09/25    Expected End:  06/20/25              JANA Gaitan-SLP, CCC-SLP  5/30/2025

## 2025-05-30 NOTE — PROGRESS NOTES
Outpatient Rehab    Physical Therapy Visit    Patient Name: Kateryna Weber  MRN: [id number]  YOB: 1960  Encounter Date: 5/30/2025    Therapy Diagnosis:   Encounter Diagnosis   Name Primary?    Impaired functional mobility, balance, gait, and endurance Yes           Physician: Latisha Garg MD    Physician Orders: Eval and Treat  Medical Diagnosis: Hemiparesis of right dominant side as late effect of nontraumatic subarachnoid hemorrhage    Visit # / Visits Authorized:  8 / 22  Insurance Authorization Period: 5/9/2025 to 12/31/2025  Date of Evaluation: 5/9/2025  Plan of Care Certification:  07/04/25         Time In: 0845   Time Out: 0930  Total Time (in minutes): 45   Total Billable Time (in minutes): 45      Precautions:     Standard, Fall, and aphasia/cognitive deficits, h/o cancer, PEG      Subjective   Pt reports she is doing OK this morning.. Patient's  present for session. Patient indicates readiness for therapy.   No pain indicated throughout session.   Family / care giver present for this visit:  ()  Pain reported as 6/10. R UE in general~ pt communicates with Erazo-Finney faces scale    Objective    Last performed on evaluation.         Treatment:   Kateryna received therapeutic exercises to develop strength, endurance, ROM, flexibility, posture, and core stabilization for 10 minutes including:     Seated right ankle dorsiflexion mobilizations Grade 3 to reduce plantarflexion contracture and tightness  Seated right ankle dorsiflexion PROM with overpressure stretch  Supine B AA bridges, 1 x 10~ limited hip clearance on mat  Supine R LE AA hip and knee flexion/extension, 1 x 10      Patient participated in neuromuscular re-education activities to improve: Balance, Coordination, and Sense for 0 minutes. The following activities were included:         Patient participated in dynamic functional therapeutic activities to improve functional performance for 19 minutes. Including:     W/c >  mat stand pivot transfer with LBQC moving to left side, Mod A  Sit <> stand from mat x 3 trials with LBQC, Mod A- verbal cues for proper hand placement  Sit <> stand from w/c x 2 trials wth LBQC Mod A- verbal cues for proper hand placement    2 trials x 3 min each, standing with LUE support on LBQC or student PT's hand for upright tolerance and reduced tone throughout right ankle, min A.  PT places knee behind patient's right knee to reduce hyperextension, mirror in front for visual feedback    Sit> supine on mat with mod A, pt lowers to her L side  Supine> sit with mod A ~ pt rises from her L side    Time above includes rest break between trials.       Patient participated in gait training activities to normalize gait pattern for 16 minutes. The following activities were included:    Gait belt used for safety. Assistive device: LBQC at LUE, Neuro-ERIC slider on right foot    Trial 1: 16 feet  Trial 2: 25 feet  Trial 3: 29 feet    PT student provides w/c follow for all the above trials  Mod A from PT positioned to pt's R side to assist with weight shift, promote right knee extension and upright trunk positioning, and to advance RLE in swing  Deficits: right knee flexion and right hip flexion in stance due to ankle plantarflexion contracture; mostly step to  pattern      Time Entry(in minutes):  Gait Training Time Entry: 16  Therapeutic Activity Time Entry: 19  Therapeutic Exercise Time Entry: 10    Assessment & Plan   Assessment:  Kateryna tolerated therapy session well this morning with good effort throughout. PT continued use of LBQC for stand pivot transfer with good response. Unfortunately, R foot tends to stick to floor due to plantarflexion contracture. Standing trials utilized to promote endurance in standing, upright posture, and to reduce tone throughout right ankle. Some manual stretches also provided following WB activities to help reduce some excess plantarflexion position of ankle due to elevated tone.  Continued ambulation trials with LBQC with good tolerance and effort. Most assistance required to advance RLE in swing due to ongoing weakness and plantarflexion contracture. Pt also struggles to maintain upright trunk posture throughout each trial. Added some supine exercise and bed mobility practice today. Patient will benefit from continued high intensity PT to address ongoing mobility deficits and to maximize progress with therapy.        Patient will continue to benefit from skilled outpatient physical therapy to address the deficits listed in the problem list box on initial evaluation, provide pt/family education and to maximize pt's level of independence in the home and community environment.     Patient's spiritual, cultural, and educational needs considered and patient agreeable to plan of care and goals.           Plan:  Utilize standing activities to promote WB through RLE and reduce ankle tone. Continue transfers and ambulation with LBQC.     Goals:  Short Term Goals: 4 weeks   Patient/caregiver to be (I) with established home exercise program. Ongoing  Patient to improve supine <> sit transfer to Mod A consistently for improved independence with bed mobility. Ongoing  Patient to improve sit <> stand transfer with no more than Min A consistently for improved (I) with functional transfers. MET 05/29/25  Patient to improve stand pivot transfers to no more than Mod A consistently for improved (I) with functional transfers. MET 05/29/25  Patient to improve ambulation x 12 feet in // bars with no more than Mod A x 1 assist consistently to progress gait training to appropriate assistive device. MET 05/29/25  Patient to propel w/c x 100 feet, including 90 deg turns with no more than SBA for improved independence with w/c mobility. Ongoing     Long Term Goals: 8 weeks   Patient to improve supine <> sit transfer to Min A consistently for improved independence with bed mobility. Ongoing  Patient to improve sit <>  stand transfer with no more than CGA consistently for improved (I) with functional transfers. Ongoing  Patient to improve stand pivot transfers to no more than Min A consistently for improved (I) with functional transfers. Progressing  Patient to improve ambulation x 25 feet with no more than Mod A x 1 assist with appropriate assistive device for improved independence with ambulation. Progressing  Patient to propel w/c x 150 feet, including 90 deg turns with no more than SBA for improved independence with w/c mobility. Ongoing    Stanley Barrera, PT

## 2025-06-02 ENCOUNTER — CLINICAL SUPPORT (OUTPATIENT)
Dept: REHABILITATION | Facility: HOSPITAL | Age: 65
End: 2025-06-02
Payer: COMMERCIAL

## 2025-06-02 ENCOUNTER — OUTPATIENT CASE MANAGEMENT (OUTPATIENT)
Dept: ADMINISTRATIVE | Facility: OTHER | Age: 65
End: 2025-06-02
Payer: COMMERCIAL

## 2025-06-02 DIAGNOSIS — G81.94 LEFT HEMIPARESIS: ICD-10-CM

## 2025-06-02 DIAGNOSIS — I69.351 FLACCID HEMIPLEGIA OF RIGHT DOMINANT SIDE AS LATE EFFECT OF CEREBRAL INFARCTION: ICD-10-CM

## 2025-06-02 DIAGNOSIS — Z74.09 IMPAIRED FUNCTIONAL MOBILITY, BALANCE, GAIT, AND ENDURANCE: ICD-10-CM

## 2025-06-02 DIAGNOSIS — Z78.9 IMPAIRED MOBILITY AND ADLS: Primary | ICD-10-CM

## 2025-06-02 DIAGNOSIS — R47.01 GLOBAL APHASIA: ICD-10-CM

## 2025-06-02 DIAGNOSIS — Z74.09 IMPAIRED MOBILITY AND ADLS: Primary | ICD-10-CM

## 2025-06-02 DIAGNOSIS — R25.2 SPASTICITY: ICD-10-CM

## 2025-06-02 PROCEDURE — 97530 THERAPEUTIC ACTIVITIES: CPT | Mod: PO,CQ

## 2025-06-02 PROCEDURE — 97110 THERAPEUTIC EXERCISES: CPT | Mod: PO,CQ

## 2025-06-02 PROCEDURE — 97116 GAIT TRAINING THERAPY: CPT | Mod: PO,CQ

## 2025-06-04 ENCOUNTER — CLINICAL SUPPORT (OUTPATIENT)
Dept: REHABILITATION | Facility: HOSPITAL | Age: 65
End: 2025-06-04
Payer: COMMERCIAL

## 2025-06-04 ENCOUNTER — DOCUMENT SCAN (OUTPATIENT)
Dept: HOME HEALTH SERVICES | Facility: HOSPITAL | Age: 65
End: 2025-06-04
Payer: COMMERCIAL

## 2025-06-04 DIAGNOSIS — Z78.9 IMPAIRED MOBILITY AND ADLS: Primary | ICD-10-CM

## 2025-06-04 DIAGNOSIS — Z74.09 IMPAIRED MOBILITY AND ADLS: Primary | ICD-10-CM

## 2025-06-04 DIAGNOSIS — Z74.09 IMPAIRED FUNCTIONAL MOBILITY, BALANCE, GAIT, AND ENDURANCE: Primary | ICD-10-CM

## 2025-06-04 DIAGNOSIS — G81.94 LEFT HEMIPARESIS: ICD-10-CM

## 2025-06-04 DIAGNOSIS — R25.2 SPASTICITY: ICD-10-CM

## 2025-06-04 DIAGNOSIS — R47.01 GLOBAL APHASIA: Primary | ICD-10-CM

## 2025-06-04 PROCEDURE — 97530 THERAPEUTIC ACTIVITIES: CPT | Mod: PO,CQ

## 2025-06-04 PROCEDURE — 97112 NEUROMUSCULAR REEDUCATION: CPT | Mod: PO

## 2025-06-04 PROCEDURE — 92507 TX SP LANG VOICE COMM INDIV: CPT | Mod: PO

## 2025-06-04 PROCEDURE — 97116 GAIT TRAINING THERAPY: CPT | Mod: PO,CQ

## 2025-06-05 ENCOUNTER — CARE AT HOME (OUTPATIENT)
Dept: HOME HEALTH SERVICES | Facility: CLINIC | Age: 65
End: 2025-06-05
Payer: COMMERCIAL

## 2025-06-05 VITALS
DIASTOLIC BLOOD PRESSURE: 62 MMHG | OXYGEN SATURATION: 98 % | HEART RATE: 72 BPM | TEMPERATURE: 97 F | RESPIRATION RATE: 16 BRPM | SYSTOLIC BLOOD PRESSURE: 124 MMHG

## 2025-06-05 DIAGNOSIS — I10 PRIMARY HYPERTENSION: ICD-10-CM

## 2025-06-05 DIAGNOSIS — I69.320 APHASIA AS LATE EFFECT OF CEREBROVASCULAR ACCIDENT: ICD-10-CM

## 2025-06-05 DIAGNOSIS — Z74.09 IMPAIRED FUNCTIONAL MOBILITY AND ACTIVITY TOLERANCE: ICD-10-CM

## 2025-06-05 DIAGNOSIS — I69.351 FLACCID HEMIPLEGIA OF RIGHT DOMINANT SIDE AS LATE EFFECT OF CEREBRAL INFARCTION: Primary | ICD-10-CM

## 2025-06-05 DIAGNOSIS — Z93.1 PEG (PERCUTANEOUS ENDOSCOPIC GASTROSTOMY) STATUS: ICD-10-CM

## 2025-06-05 PROCEDURE — 1159F MED LIST DOCD IN RCRD: CPT | Mod: CPTII,S$GLB,, | Performed by: NURSE PRACTITIONER

## 2025-06-05 PROCEDURE — 3078F DIAST BP <80 MM HG: CPT | Mod: CPTII,S$GLB,, | Performed by: NURSE PRACTITIONER

## 2025-06-05 PROCEDURE — 99348 HOME/RES VST EST LOW MDM 30: CPT | Mod: S$GLB,,, | Performed by: NURSE PRACTITIONER

## 2025-06-05 PROCEDURE — 3074F SYST BP LT 130 MM HG: CPT | Mod: CPTII,S$GLB,, | Performed by: NURSE PRACTITIONER

## 2025-06-05 PROCEDURE — 1160F RVW MEDS BY RX/DR IN RCRD: CPT | Mod: CPTII,S$GLB,, | Performed by: NURSE PRACTITIONER

## 2025-06-06 ENCOUNTER — CLINICAL SUPPORT (OUTPATIENT)
Dept: REHABILITATION | Facility: HOSPITAL | Age: 65
End: 2025-06-06
Payer: COMMERCIAL

## 2025-06-06 ENCOUNTER — OUTPATIENT CASE MANAGEMENT (OUTPATIENT)
Dept: ADMINISTRATIVE | Facility: OTHER | Age: 65
End: 2025-06-06
Payer: COMMERCIAL

## 2025-06-06 DIAGNOSIS — Z74.09 IMPAIRED FUNCTIONAL MOBILITY, BALANCE, GAIT, AND ENDURANCE: Primary | ICD-10-CM

## 2025-06-06 DIAGNOSIS — R25.2 SPASTICITY: ICD-10-CM

## 2025-06-06 DIAGNOSIS — Z78.9 IMPAIRED MOBILITY AND ADLS: Primary | ICD-10-CM

## 2025-06-06 DIAGNOSIS — G81.94 LEFT HEMIPARESIS: ICD-10-CM

## 2025-06-06 DIAGNOSIS — Z74.09 IMPAIRED MOBILITY AND ADLS: Primary | ICD-10-CM

## 2025-06-06 DIAGNOSIS — R47.01 GLOBAL APHASIA: Primary | ICD-10-CM

## 2025-06-06 PROCEDURE — 97110 THERAPEUTIC EXERCISES: CPT | Mod: PO,CQ

## 2025-06-06 PROCEDURE — 97530 THERAPEUTIC ACTIVITIES: CPT | Mod: PO,CQ

## 2025-06-06 PROCEDURE — 92507 TX SP LANG VOICE COMM INDIV: CPT | Mod: PO

## 2025-06-06 PROCEDURE — 97116 GAIT TRAINING THERAPY: CPT | Mod: PO,CQ

## 2025-06-06 PROCEDURE — 97112 NEUROMUSCULAR REEDUCATION: CPT | Mod: PO

## 2025-06-10 ENCOUNTER — CLINICAL SUPPORT (OUTPATIENT)
Dept: REHABILITATION | Facility: HOSPITAL | Age: 65
End: 2025-06-10
Payer: COMMERCIAL

## 2025-06-10 DIAGNOSIS — Z74.09 IMPAIRED FUNCTIONAL MOBILITY, BALANCE, GAIT, AND ENDURANCE: Primary | ICD-10-CM

## 2025-06-10 DIAGNOSIS — R47.01 GLOBAL APHASIA: Primary | ICD-10-CM

## 2025-06-10 PROCEDURE — 92507 TX SP LANG VOICE COMM INDIV: CPT | Mod: PO

## 2025-06-10 PROCEDURE — 97116 GAIT TRAINING THERAPY: CPT | Mod: PO

## 2025-06-10 PROCEDURE — 97110 THERAPEUTIC EXERCISES: CPT | Mod: PO

## 2025-06-10 PROCEDURE — 97530 THERAPEUTIC ACTIVITIES: CPT | Mod: PO

## 2025-06-10 NOTE — PROGRESS NOTES
Outpatient Rehab    Physical Therapy Visit- Progress Note    Patient Name: Kateryna Weber  MRN: [id number]  YOB: 1960  Encounter Date: 6/10/2025    Therapy Diagnosis:   Encounter Diagnosis   Name Primary?    Impaired functional mobility, balance, gait, and endurance Yes           Physician: Latisha Garg MD    Physician Orders: Eval and Treat  Medical Diagnosis: Hemiparesis of right dominant side as late effect of nontraumatic subarachnoid hemorrhage    Visit # / Visits Authorized:  12 / 22  Insurance Authorization Period: 5/9/2025 to 12/31/2025  Date of Evaluation: 5/9/202505/09/25  Plan of Care Certification:  07/04/25       PT/PTA: PT   Number of PTA visits since last PT visit:0  Time In: 0930   Time Out: 1015  Total Time (in minutes): 45   Total Billable Time (in minutes): 45    FOTO:  Intake Score:  %  Survey Score 2:  %  Survey Score 3:  %    Precautions:     Standard, Fall, aphasia, h/o cancer      Subjective     Patient's  present for session. Patient indicates readiness for therapy.   Patient's  reports that patient completed trial for baclofen pump yesterday with good response noted. They are going to [facility] on Thursday for Botox to her right arm.   No pain indicated throughout session.          Objective     See below           Treatment:   Kateryna received therapeutic exercises to develop strength, endurance, ROM, flexibility, posture, and core stabilization for 10 minutes including:     Seated right ankle dorsiflexion mobilizations Grade 3 to reduce plantarflexion contracture and tightness  Seated right ankle dorsiflexion PROM with overpressure stretch    Right ankle PROM with overpressure: lacking 32 degrees from neutral position (plantarflexor contracture)    Strength: manual muscle test grades below   Lower Extremity Strength- performed with patient in sitting  Right LE   Left LE     Hip Flexion: 2-/5 Hip Flexion: At least 3/5- unable to accurately assess resistance due to  difficulty following commands   Hip Extension:  2-/5 Hip Extension: At least 3/5- unable to accurately assess resistance due to difficulty following commands   Hip Abduction: 2-/5 Hip Abduction: At least 3/5- unable to accurately assess resistance due to difficulty following commands   Hip Adduction: 2-/5 Hip Adduction At least 3/5- unable to accurately assess resistance due to difficulty following commands   Knee Extension: 2+//5 Knee Extension: At least 3/5- unable to accurately assess resistance due to difficulty following commands   Knee Flexion: 2/5 Knee Flexion: At least 3/5- unable to accurately assess resistance due to difficulty following commands   Ankle Dorsiflexion: 1/5 Ankle Dorsiflexion: At least 3/5- unable to accurately assess resistance due to difficulty following commands   Ankle Plantarflexion: 2-/5 Ankle Plantarflexion: At least 3/5- unable to accurately assess resistance due to difficulty following commands         Patient participated in neuromuscular re-education activities to improve: Balance, Coordination, and Sense for 00 minutes. The following activities were included:     NP      Patient participated in dynamic functional therapeutic activities to improve functional performance for 15 minutes. Including:   W/c > mat stand pivot transfer with LBQC moving to left side, Min A  Sit > supine Min A for RLE management  Supine > sit: rolled to right side SBA, Min A to have LLE move RLE of mat, able to sit up pushing with left arm, SBA-- Min A for transfer  Mat > w/x stand pivot with LBQC moving to right side, Min A for RLE management  Sit <> stand x multiple trials, CGA to Min A with LBQC    W/c propulsion: x 30 feet with LUE to propel and LLE to steer; Min A to steer      Patient participated in gait training activities to normalize gait pattern for 20 minutes. The following activities were included:    Gait belt used for safety. Assistive device: LBQC at E, Neuro-ERICCARLEY harperder on right  foot    Trial 1: 36 feet  Trial 2: 36 feet  Trial 3: 45 feet    Tech present on right side of patient to cue for upright posture as needed, w/c follow  Mod A from PT in front to weight shift, promote right hip extension and upright trunk positioning, and to advance RLE in swing  Deficits: right knee hyperextension and right hip flexion in stance due to ankle plantarflexion contracture; step through pattern; able to initiate hip flexion in swing phase this date      Time Entry(in minutes):   See above    Assessment & Plan   Assessment:  Patient reassessed to determine progress with therapy. Reassessment period: 05/09/25 to 06/10/25. Patient demonstrates good progress with therapy with improvements noted with functional transfers and ambulation with LBQC. Functional transfers for w/c <> mat stand pivot and supine <> sit have progressed to Min A from Mod A with most help required to maneuver RLE. Sit <> stand transfers have progressed from Mod A to CGA/Min A with improved form. Able to progress ambulation with LBQC and Neuro-ERIC foot glider up to 45 feet with Mod A, but patient able to initiate hip flexion in swing and perform step through pattern this date. RLE strength remains significantly limited due to ongoing hemiparesis, but improved strength noted throughout right knee and ankle muscle groups. Right ankle plantarflexion contracture remains significant limiting factor to functional mobility performance. Patient currently undergoing baclofen trial to help reduce extensor tone throughout RLE and ankle. PT has reached out to  to assist in acquiring order for Dynasplint to help reduce current right ankle plantarflexion contracture. Patient to benefit from high intensity PT to address ongoing mobility deficits and to maximize progress with therapy.        Patient will continue to benefit from skilled outpatient physical therapy to address the deficits listed in the problem list box on initial  evaluation, provide pt/family education and to maximize pt's level of independence in the home and community environment.     Patient's spiritual, cultural, and educational needs considered and patient agreeable to plan of care and goals.           Plan:  Utilize standing activities to promote WB through RLE and reduce ankle tone. Continue transfers and ambulation with LBQC.     Goals:  Short Term Goals: 4 weeks   Patient/caregiver to be (I) with established home exercise program. MET 06/10/25  Patient to improve supine <> sit transfer to Mod A consistently for improved independence with bed mobility. MET 06/10/25  Patient to improve sit <> stand transfer with no more than Min A consistently for improved (I) with functional transfers. MET 05/29/25  Patient to improve stand pivot transfers to no more than Mod A consistently for improved (I) with functional transfers. MET 05/29/25  Patient to improve ambulation x 12 feet in // bars with no more than Mod A x 1 assist consistently to progress gait training to appropriate assistive device. MET 05/29/25  Patient to propel w/c x 100 feet, including 90 deg turns with no more than SBA for improved independence with w/c mobility. Ongoing     Long Term Goals: 8 weeks   Patient to improve supine <> sit transfer to Min A consistently for improved independence with bed mobility. Progressing  Patient to improve sit <> stand transfer with no more than CGA consistently for improved (I) with functional transfers. Progressing  Patient to improve stand pivot transfers to no more than Min A consistently for improved (I) with functional transfers. Progressing  Patient to improve ambulation x 25 feet with no more than Mod A x 1 assist with appropriate assistive device for improved independence with ambulation. MET 06/10/25   Updated 06/10/25: Patient to ambulate x 50 feet with no more than Mod A with LBQC for improved independence with ambulation. Ongoing  Patient to propel w/c x 150  feet, including 90 deg turns with no more than SBA for improved independence with w/c mobility. Ongoing    Madhuri Burris, PT

## 2025-06-10 NOTE — PROGRESS NOTES
Outpatient Rehab  Speech-Language Pathology Visit    Patient Name: Kateryna Weber  MRN: [id number]  YOB: 1960  Encounter Date: 6/10/2025    Therapy Diagnosis:   Encounter Diagnosis   Name Primary?    Global aphasia Yes     Physician: Latisha Garg MD    Physician Orders: Eval and Treat  Medical Diagnosis: Aphasia as late effect of cerebrovascular accident    Visit # / Visits Authorized: 7 / 20   Insurance Authorization Period: 5/9/2025 to 12/31/2025  Date of Evaluation: 5/9/2025   Plan of Care Certification: 5/9/2025 to 7/18/2025      Time In: 1015   Time Out: 1100  Total Time (in minutes): 45   Total Billable Time (in minutes): 45    Precautions:  Standard, Fall, aphasia, h/o cancer    Subjective   Patient's  accompanied her to today's session.  Did not appear frustrated today during session..    none indicated  Family/caregiver present for this visit:       Objective /Treatment         Speech  Activity 1: Completing phrases presented verbally with 20% acc mod A (1/5 attempts)  Activity 2: answered simple yes/no questions with visual yes/no cards with 65% acc independently (13/20 attempts) 2 self corrections.  Activity 3: [employer] ID objects fiold of 2 x 10 with 100% acc .ind; field of 3 x 5 with 100% acc. Field of 6 with max A.  Activity 4: Identifying real objects field of 3 with 100% acc (3/3). Field of 6 with 75% acc .ind (9/12)  Activity 5: Reading: Read a word and matched object with 100% acc independently (6/6 attempts).  Match the phrase with binary choice with 100% acc independently (6/6 attempts).  Attempted ROXANNE (Oral Reading for Language in Aphasia) with limited to no success - reading short 3 word phrases.  Verbally reading out loud unsuccessful.  Reading comprehension of short phrases and words appears in tact.  Activity 6: Automatic speech acts: Counting 1-5 with max A. Able to count to 3 in unison with visual support.  Days of weeks with 100% acc  simultaneously.  Activity 7: TD Snap with difficulty.  ST navigated to a particular page and asked questions with limited success.  Attempted weather, season, drink, feeling.  Able to correclty identify what she had for breakfast.  When asked about lunch, patient touched every button.    /m/ one syllable - Bilabial Nasal    Step 1 - Repetition / Minimal Contrast Word Step 2 - Printed Letter or Target Sound with Repetition Step 3- Integral Stimulation Step 4- Articulatory Placement Cues 5 Repetitions of Correct Production   Map  - + +   3/5 acc   Man  - - - - - 0%   Mean  - - - - - 0%   Meat  DNT           Mow  DNT             Time Entry(in minutes):  Speech Treatment (Individual) Time Entry: 45    Assessment & Plan   Assessment  Decreased success with verbal expression today.  Difficulty with [employer] Snap onesimo.  Difficulty with sound production treatment.  Patient did well with comprehension tasks today. Patient did well with [employer] language onesimo today. Imprvoement verbally expressing days of the week.  Evaluation/Treatment Tolerance: Patient tolerated treatment well    The patient will continue to benefit from skilled outpatient speech therapy in order to address the deficits listed in the problem list on the initial evaluation, provide patient and family education, and maximize the patients level of independence in the home and community environments.     The patient's spiritual, cultural, and educational needs were considered, and the patient is agreeable to the plan of care and goals.        Plan  Continue outpatient speech therapy. Continue to trial communication applications next session.          Goals:   Active       Long Term Goals        1.  She will develop functional cognitive-linguistic based skills and utilize compensatory strategies to communicate wants/needs effectively to different conversational partners, maintain safety, participate socially in functional living environment.    (Progressing)        Start:  05/09/25    Expected End:  07/18/25            2. Patient will trial compensatory strategies and augmentative-alternative communication (AAC) to communicate wants and needs effectively to different conversational partners, maintain safety, and participate socially in functional living environment.  (Progressing)       Start:  05/09/25    Expected End:  07/18/25               Short Term Goals        1. Patient will identify objects in a field of 3 given the name/function of the object with 75% accuracy following nonverbal cues and requesting repetitions as needed across 2-3 sessions to improve auditory comprehension skills. (Met)       Start:  05/09/25    Expected End:  06/20/25    Resolved:  05/29/25         2. Patient will answer 1 unit/personal yes/no questions with 75% accuracy following nonverbal cues and requesting repetitions as needed across 2-3 sessions to improve auditory comprehension skills. (Progressing)       Start:  05/09/25    Expected End:  06/20/25            3. Patient will name objects with 75% acc given carrier phrase as needed across 3 sessions     (Progressing)       Start:  05/09/25    Expected End:  06/20/25            4. Patient will participate in the speech production treatment hierarchy with mono-syllabic /m/ words with 75% acc across 3 probe trials.    (Progressing)       Start:  05/09/25    Expected End:  06/20/25            5. Patient will use gestures and/or low-tech AAC (e.g., communication board, yes/no cards, picture symbols) to express basic wants and needs in response to visual/verbal cues with 75% accuracy across 3 consecutive sessions. (Progressing)       Start:  05/09/25    Expected End:  06/20/25                JANA Mi-SLP, CCC-SLP

## 2025-06-11 ENCOUNTER — CLINICAL SUPPORT (OUTPATIENT)
Dept: REHABILITATION | Facility: HOSPITAL | Age: 65
End: 2025-06-11
Payer: COMMERCIAL

## 2025-06-11 DIAGNOSIS — Z78.9 IMPAIRED MOBILITY AND ADLS: Primary | ICD-10-CM

## 2025-06-11 DIAGNOSIS — G81.94 LEFT HEMIPARESIS: ICD-10-CM

## 2025-06-11 DIAGNOSIS — Z74.09 IMPAIRED MOBILITY AND ADLS: Primary | ICD-10-CM

## 2025-06-11 DIAGNOSIS — Z74.09 IMPAIRED FUNCTIONAL MOBILITY, BALANCE, GAIT, AND ENDURANCE: Primary | ICD-10-CM

## 2025-06-11 DIAGNOSIS — R25.2 SPASTICITY: ICD-10-CM

## 2025-06-11 PROCEDURE — 97140 MANUAL THERAPY 1/> REGIONS: CPT | Mod: PO

## 2025-06-11 PROCEDURE — 97530 THERAPEUTIC ACTIVITIES: CPT | Mod: PO

## 2025-06-11 PROCEDURE — 97112 NEUROMUSCULAR REEDUCATION: CPT | Mod: PO

## 2025-06-11 NOTE — PROGRESS NOTES
Outpatient Rehab    Physical Therapy Visit    Patient Name: Kateryna Weber  MRN: [id number]  YOB: 1960  Encounter Date: 6/11/2025    Therapy Diagnosis:   Encounter Diagnosis   Name Primary?    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  Surgical Diagnosis: Not applicable for this Episode   Surgical Date: Not applicable for this Episode    Visit # / Visits Authorized:  13 / 22  Insurance Authorization Period: 5/9/2025 to 12/31/2025  Date of Evaluation: 5/9/2025  Plan of Care Certification:       PT/PTA:   Physical Therapy   Number of PTA visits since last PT visit:   Time In:   9:30  Time Out:  10:15  Total Time (in minutes):   45 min  Total Billable Time (in minutes):        Precautions:     Standard, Fall, aphasia, h/o cancer      Subjective   Patient accompained by her ..  Family / care giver present for this visit:     none reported    Objective            Treatment:  Manual Therapy  MT 1: Right ankle manual dorsiflexion stretch with manual to achilles tendon and gastroc  Therapeutic Activity  TA 1: Stand pivot from wheelchair to mat mod assist  TA 2: Sit to stand x 5 trials from W/C to litegait with Min A.  Min A to elevate  Bhips.  VCs for correct hand placement and safety  TA 3: Static standing x 3 min with litegait and CGA for set up  TA 4: Ambulation with litegait 35 ft and 70 ft.  Manual progression of right LE, cues for right knee ext in stance.  Occasional assist for left LE progression secondary to fearful ambulation.  Verbal cues for sequencing of gait.  TA 5: Bed mobility requires assist for right UE and LE    Time Entry(in minutes):  Manual Therapy Time Entry: 15  Therapeutic Activity Time Entry: 30    Assessment & Plan   Assessment: Kateryna participated fairly well in [facility]  session.  She did have a couple episodes of fearfulness and tearfulness during ambulation trials, but encouraged to keep walking.  She demo's improved gait sequencing with verbal cues, occasionally require manual progression of left LE.  She was able to ambulate longer distance today with litegait support.  She continues to make progress toward her goals.  Evaluation/Treatment Tolerance: Patient tolerated treatment well    The patient will continue to benefit from skilled outpatient physical therapy in order to address the deficits listed in the problem list on the initial evaluation, provide patient and family education, and maximize the patients level of independence in the home and community environments.     The patient's spiritual, cultural, and educational needs were considered, and the patient is agreeable to the plan of care and goals.     Education  Education was done with Patient and Other recipient present.      The recipient Verbalizes understanding.             Plan: Cont with strengthening, stretching, gait and transfers    Goals:     Lalitha Landeros, PT

## 2025-06-12 NOTE — PROGRESS NOTES
Outpatient Rehab    Occupational Therapy Visit    Patient Name: Kateryna Weber  MRN: [id number]  YOB: 1960  Encounter Date: 6/13/2025    Therapy Diagnosis:   Encounter Diagnoses   Name Primary?    Impaired mobility and ADLs Yes    Left hemiparesis     Spasticity      Physician: Latisha Garg MD    Physician Orders: Eval and Treat  Medical Diagnosis: Hemiparesis of right dominant side as late effect of nontraumatic subarachnoid hemorrhage    Visit # / Visits Authorized: 13 / 22  Insurance Authorization Period: 5/12/2025 to 12/31/2025  Date of Evaluation: 5/9/2025  Plan of Care Certification: 5/9/2025 to 7/18/2025      Time In: 1118   Time Out: 1158  Total Time (in minutes): 40   Total Billable Time (in minutes): 40    FOTO:  Intake Score: 28%  Survey Score 2: 27%    Precautions:     Standard, Fall, aphasia, h/o cancer      Subjective   spouse reported good PMR appt and hope of rehab at this time.  Family / care giver present for this visit:  ( present and engaged)  Pain reported as 2/10. (use of visual scale) R hand/wrist    Objective            Treatment:   Kateryna participated in neuromuscular re-education activities to improve: Balance, Coordination, Proprioception, and Posture for 40 minutes. The following activities were included:  Scooting up in w/c with min(A); sit<>stand with mod(A) with large base quad cane; stand pivot t/f to edge of mat: mod(A) with left knee blocked with use of quad cane  Lateral scooting to left at edge of mat x2 with contact guard assist   Sit<>supine: mod(A)     Supine:  x1 pillows under head and bolster under knees and feet flat on the ground   -PROM for right scapular mobilizations for depression and retraction with improvement in tolerance noted   -PROM and tone inhibition techniques with cross friction at pec insertion and bicep tendon as tolerance for right upper extremity ; prolonged stretch and postioning for right upper extremity to encourage neutral  forearm, wrist and for shoulder abduction   -added time spent for tissue elongation and tone inhibition at bicep tendon     Supine<>sit: mod(A)  Stand pivot to w/c from high edge of mat with min(A) and use of quad cane     In session: discussed with spouse scheduling and possible rehab (inpatient admit)    Time Entry(in minutes):  Neuromuscular Re-Education Time Entry: 40    Assessment & Plan   Assessment:  Ms vAendaño tolerated session fairly on this date. She did demo improvement in tolerance for tone inhibition and overall stretch for distal right upper extremity. Pt received botox this week, edu pt/spouse on importance of prolonged stretching alongside injections for max benefit.        The patient will continue to benefit from skilled outpatient occupational therapy in order to address the deficits listed in the problem list on the initial evaluation, provide patient and family education, and maximize the patients level of independence in the home and community environments.     The patient's spiritual, cultural, and educational needs were considered, and the patient is agreeable to the plan of care and goals.           Plan: Cont OT POC 3x/wk; focus on RUE stretching to prevent further joint contractures, mat/bed mobility; compensatory strategies/aly techniques for ADLs. Continue to recommend inpatient rehab for patient at this time.    Goals:   Short Term: 5 weeks  - Pt will participate in upper body dressing with moderate assistance using adaptive techniques or devices to increase independence in ADLs. MET per pt's  report; ongoing for demo  - Pt will tolerate right upper extremity range of motion program x10 min without need for rest break. ongoing  - Pt will complete stand pivot transfer with minimal assistance. ongoing  - Pt will complete lateral scooting as needed for ADLs with min(A). MET 6/6/25  - Pt will tolerate right upper extremity splint/orthosis x1 hour as needed for tone/spasticity  management. MET 6/6/25; pt's  contacting rep for different size elbow ext splint   - Pt will be independent with Home Exercise Program (HEP)/Home Activity Program (HAP) and report at least 50% compliance. ongoing     Long Term: 10 weeks   - Pt will participate in upper body dressing with minimal assistance using adaptive techniques or devices to increase independence in ADLs. ongoing  - Pt will complete lower body dressing (pants, brief) with moderate assistance. ongoing  - Pt will demonstrate improved upper extremity positioning in wheelchair and bed to reduce spasticity and risk of contracture in RUE, as evidenced by maintaining neutral shoulder and elbow alignment with appropriate supports for at least 80% of observed sessions. ongoing  - Pt will tolerate right wrist in neutral x8 minutes as needed for weightbearing and functional tasks. Ongoing   - Caregiver will demo understanding for range of motion program and positioning for Right upper extremity in seated and supine. ongoing  - Pt will be independent with Home Exercise Program (HEP)/Home Activity Program (HAP) to promote long term maintenance of progress made in therapy. ongoing     Goals to be added and edited within plan of care as needed/appropriate.    NAMITA Thomas

## 2025-06-13 ENCOUNTER — CLINICAL SUPPORT (OUTPATIENT)
Dept: REHABILITATION | Facility: HOSPITAL | Age: 65
End: 2025-06-13
Payer: COMMERCIAL

## 2025-06-13 DIAGNOSIS — R47.01 GLOBAL APHASIA: Primary | ICD-10-CM

## 2025-06-13 DIAGNOSIS — Z74.09 IMPAIRED MOBILITY AND ADLS: Primary | ICD-10-CM

## 2025-06-13 DIAGNOSIS — G81.94 LEFT HEMIPARESIS: ICD-10-CM

## 2025-06-13 DIAGNOSIS — R25.2 SPASTICITY: ICD-10-CM

## 2025-06-13 DIAGNOSIS — Z74.09 IMPAIRED FUNCTIONAL MOBILITY, BALANCE, GAIT, AND ENDURANCE: Primary | ICD-10-CM

## 2025-06-13 DIAGNOSIS — Z78.9 IMPAIRED MOBILITY AND ADLS: Primary | ICD-10-CM

## 2025-06-13 PROCEDURE — 97116 GAIT TRAINING THERAPY: CPT | Mod: PO

## 2025-06-13 PROCEDURE — 92507 TX SP LANG VOICE COMM INDIV: CPT | Mod: PO

## 2025-06-13 PROCEDURE — 97110 THERAPEUTIC EXERCISES: CPT | Mod: PO

## 2025-06-13 PROCEDURE — 97530 THERAPEUTIC ACTIVITIES: CPT | Mod: PO

## 2025-06-13 PROCEDURE — 97112 NEUROMUSCULAR REEDUCATION: CPT | Mod: PO

## 2025-06-13 NOTE — PROGRESS NOTES
"Outpatient Rehab  Speech-Language Pathology Visit    Patient Name: Kateryna Weber  MRN: [id number]  YOB: 1960  Encounter Date: 6/13/2025    Therapy Diagnosis:   Encounter Diagnosis   Name Primary?    Global aphasia Yes   Physician: Latisha Garg MD  Physician Orders: Eval and Treat  Medical Diagnosis: Aphasia as late effect of cerebrovascular accident    Visit # / Visits Authorized: 8 / 20   Insurance Authorization Period: 5/9/2025 to 12/31/2025  Date of Evaluation: 5/9/2025   Plan of Care Certification: 5/9/2025 to 7/18/2025      Time In: 1030   Time Out: 1115  Total Time (in minutes): 45   Total Billable Time (in minutes): 45    Precautions:  Standard, Fall, aphasia, h/o cancer    Subjective   "I dont know; I guess it's ok" answered "yeah" to do you think things are getting easier.  Pain reported as 3/10. in right arm    Objective /Treatment         Speech  Activity 1: completed sentences with 40% acc indly, 70% acc given moderate verbal and visual cues  Activity 2: completed confrontational naming task with a carrier phrase with 30% acc indly, 100% acc given max verbal cues  Activity 3: ID a phrase to match a picture - 1/2 - very difficulty and frustrating. Scaffold down complexity of item to match a word to a picture in a field fo 2 with 72% acc given consistent cues to slow down  Activity 4: answered 2 unit yes /no questions with visual cue on 2/5 attempts, 4/5 given a repetition and phonemic cues    /m/ one syllable - Bilabial Nasal    Step 1 - Repetition / Minimal Contrast Word Step 2 - Printed Letter or Target Sound with Repetition Step 3- Integral Stimulation Step 4- Articulatory Placement Cues 5 Repetitions of Correct Production   Map +     3/5 acc   Man 0 0 + - 5/5 independently    Mean + - - - 5/5   Meat 0 0 +   0/5   Mow 0 0 0 0 -     Time Entry(in minutes):  Speech Treatment (Individual) Time Entry: 45    Assessment & Plan   Assessment  Phonemic cues were most successful in improving " verbal expression today. Comprehension continues to improve allowing for less supportive cuing on verbal tasks. Decreased frustration as evidenced by eager participation in entire session .   Evaluation/Treatment Tolerance: Patient tolerated treatment well    The patient will continue to benefit from skilled outpatient speech therapy in order to address the deficits listed in the problem list on the initial evaluation, provide patient and family education, and maximize the patients level of independence in the home and community environments.     The patient's spiritual, cultural, and educational needs were considered, and the patient is agreeable to the plan of care and goals.     Education  Education was done with Patient and Other recipient present.  The patient Demonstrates understanding and Verbalizes understanding.         Progress observed and purpose of each exercise.     Plan  Continue OP ST 3x per week        Goals:   Active       Long Term Goals        1.  She will develop functional cognitive-linguistic based skills and utilize compensatory strategies to communicate wants/needs effectively to different conversational partners, maintain safety, participate socially in functional living environment.    (Progressing)       Start:  05/09/25    Expected End:  07/18/25            2. Patient will trial compensatory strategies and augmentative-alternative communication (AAC) to communicate wants and needs effectively to different conversational partners, maintain safety, and participate socially in functional living environment.  (Progressing)       Start:  05/09/25    Expected End:  07/18/25               Short Term Goals        1. Patient will identify objects in a field of 3 given the name/function of the object with 75% accuracy following nonverbal cues and requesting repetitions as needed across 2-3 sessions to improve auditory comprehension skills. (Met)       Start:  05/09/25    Expected End:  06/20/25     Resolved:  05/29/25         2. Patient will answer 1 unit/personal yes/no questions with 75% accuracy following nonverbal cues and requesting repetitions as needed across 2-3 sessions to improve auditory comprehension skills. (Progressing)       Start:  05/09/25    Expected End:  06/20/25            3. Patient will name objects with 75% acc given carrier phrase as needed across 3 sessions     (Progressing)       Start:  05/09/25    Expected End:  06/20/25            4. Patient will participate in the speech production treatment hierarchy with mono-syllabic /m/ words with 75% acc across 3 probe trials.    (Progressing)       Start:  05/09/25    Expected End:  06/20/25            5. Patient will use gestures and/or low-tech AAC (e.g., communication board, yes/no cards, picture symbols) to express basic wants and needs in response to visual/verbal cues with 75% accuracy across 3 consecutive sessions. (Progressing)       Start:  05/09/25    Expected End:  06/20/25              JANA Gaitan-SLP, CCC-SLP

## 2025-06-13 NOTE — PROGRESS NOTES
Outpatient Rehab    Physical Therapy Visit    Patient Name: Kateryna Weber  MRN: [id number]  YOB: 1960  Encounter Date: 6/13/2025    Therapy Diagnosis:   Encounter Diagnosis   Name Primary?    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  Surgical Diagnosis: Not applicable for this Episode   Surgical Date: Not applicable for this Episode    Visit # / Visits Authorized:  14 / 22  Insurance Authorization Period: 5/9/2025 to 12/31/2025  Date of Evaluation: 5/9/2025  Plan of Care Certification: 5/9/2025 to 7/4/2025      PT/PTA:   Physical Therapy   Number of PTA visits since last PT visit:   Time In: 0930   Time Out: 1015  Total Time (in minutes): 45   Total Billable Time (in minutes): 45      Precautions:     Standard, Fall, aphasia, h/o cancer      Subjective   Patient is accompanied by her . That she received dysport injections in her right arm and right calf yesterday. They were told that it would take a few days to work so they are hopeful to see some improvement over the weekend..      none reported    Objective            Treatment:     Kateryna received therapeutic exercises to develop strength, endurance, ROM, flexibility, posture, and core stabilization for 08 minutes including:   Seated right ankle dorsiflexion mobilizations Grade 3 to reduce plantarflexion contracture and tightness  Seated right ankle dorsiflexion PROM with overpressure stretch    Patient participated in neuromuscular re-education activities to improve: Balance, Coordination, and Sense for 00 minutes. The following activities were included:       Patient participated in dynamic functional therapeutic activities to improve functional performance for 15 minutes. Including:   W/c > mat stand pivot transfer with LBQC moving to  left side, Min A  Sit > supine Min A for RLE management  Supine > sit: rolled to right side SBA, Min A to have LLE move RLE of mat, able to sit up pushing with left arm, SBA-- Min A for transfer  Mat > w/c stand pivot with LBQC moving to right side, Min A for RLE management  Sit <> stand x multiple trials, CGA to Min A with LBQC     W/c propulsion: x 90 feet with LUE to propel and LLE to steer; Min A to Mod A to steer        Patient participated in gait training activities to normalize gait pattern for 20 minutes. The following activities were included:    Gait belt used for safety. Assistive device: LBQC at LUE, Neuro-ERIC glider on right foot     Trial 1: 42 feet  Trial 2: 54 feet  Trial 3: 89 feet     Tech present on right side of patient to cue for upright posture as needed, w/c follow  Mod A from PT in front to weight shift, promote right hip extension and upright trunk positioning, and to advance RLE in swing  Deficits: right knee hyperextension and right hip flexion in stance due to ankle plantarflexion contracture; step through pattern; able to initiate hip flexion in swing phase this date    Time Entry(in minutes):   See above    Assessment & Plan   Assessment: Kateryna tolerated therapy session well this morning with good effort throughout. Able to increase ambulation distance across all 3 trials with continued ability to initate right hip flexion in swing. Right ankle PF contracture remains greatest limiting factor to more independent transfers and ambulation. Continued to educate patient on w/c mobility wiht assistance still required for steering. Patient remains appropriate for high intensity therapy to further progress ongoing mobility defiicts.       The patient will continue to benefit from skilled outpatient physical therapy in order to address the deficits listed in the problem list on the initial evaluation, provide patient and family education, and maximize the patients level of independence in the  home and community environments.     The patient's spiritual, cultural, and educational needs were considered, and the patient is agreeable to the plan of care and goals.             Plan: Progress functional transfers and ambulation with LBQC as able.    Goals:  Short Term Goals: 4 weeks   Patient/caregiver to be (I) with established home exercise program. MET 06/10/25  Patient to improve supine <> sit transfer to Mod A consistently for improved independence with bed mobility. MET 06/10/25  Patient to improve sit <> stand transfer with no more than Min A consistently for improved (I) with functional transfers. MET 05/29/25  Patient to improve stand pivot transfers to no more than Mod A consistently for improved (I) with functional transfers. MET 05/29/25  Patient to improve ambulation x 12 feet in // bars with no more than Mod A x 1 assist consistently to progress gait training to appropriate assistive device. MET 05/29/25  Patient to propel w/c x 100 feet, including 90 deg turns with no more than SBA for improved independence with w/c mobility. Ongoing     Long Term Goals: 8 weeks   Patient to improve supine <> sit transfer to Min A consistently for improved independence with bed mobility. Progressing  Patient to improve sit <> stand transfer with no more than CGA consistently for improved (I) with functional transfers. Progressing  Patient to improve stand pivot transfers to no more than Min A consistently for improved (I) with functional transfers. Progressing  Patient to improve ambulation x 25 feet with no more than Mod A x 1 assist with appropriate assistive device for improved independence with ambulation. MET 06/10/25              Updated 06/10/25: Patient to ambulate x 50 feet with no more than Mod A with LBQC for improved independence with ambulation. Ongoing  Patient to propel w/c x 150 feet, including 90 deg turns with no more than SBA for improved independence with w/c mobility. Ongoing    Madhuri  Dayton, PT

## 2025-06-13 NOTE — PROGRESS NOTES
Outpatient Rehab    Occupational Therapy Visit    Patient Name: Kateryna Weber  MRN: [id number]  YOB: 1960  Encounter Date: 6/11/2025    Therapy Diagnosis:   Encounter Diagnoses   Name Primary?    Impaired mobility and ADLs Yes    Left hemiparesis     Spasticity      Physician: Latisha Garg MD    Physician Orders: Eval and Treat  Medical Diagnosis: Hemiparesis of right dominant side as late effect of nontraumatic subarachnoid hemorrhage  Surgical Diagnosis: Not applicable for this Episode   Surgical Date: Not applicable for this Episode    Visit # / Visits Authorized: 12 / 22  Insurance Authorization Period: 5/12/2025 to 12/31/2025  Date of Evaluation: 5/9/2025  Plan of Care Certification: 5/9/2025 to 7/18/2025      Time In: 1030   Time Out: 1115  Total Time (in minutes): 45   Total Billable Time (in minutes): 45            Subjective   Pt's  reports pt. had good response and tolerance of Botox trial the previous day; pt. has appt. scheduled with neurologist tomorrow for Botox injections and plans to discuss potential for further intervention/options for tone/spasticity mgmt. Pt's  also reports performing scapular mobilizations and RUE PROM stretching earlier this morning..  Family / care giver present for this visit:              Treatment:  Balance/Neuromuscular Re-Education  NMR 1: Stand pivot t/f using NBQC w/c<>EOM Mod/Max A, sit<>supine Mod A. Pt. supine while OT performed progressive RUE PROM Stretching to all joints/planes with sustained hold at tolerable end ranges and deep manual inhibition provided to pectoralis, bicep, and brachioradialis as needed for tone inhibition and promotion of tissue lengthening; wrist, hand and digit joint mobs and distractions performed for reduced joint stiffness and improved joint integrity  NMR 2: Pt. transitioned supine<>side lying on L Min/Mod A. In side lying, OT performed R scapulothoracic and scapulohumeral joint mobilizations  along with subscapularis stretch as tolerated; attempted to perform AAROM shoulder flexion/extension with scapular protraction/retraction and IR/ER in side lying, several reps each with and without vibratory stim to rhomboid, lower trap, infraspinatus, teres, and posterior deltoid mm for attempt to increase mm activation necessary for scap/shoulder ROM, Max A and no active mm contraction observed despite ongoing verbal and tactile cueing and manual facilitation  NMR 3: Pt. supine and seated, vibratory stim applied to R triceps mm during attempted elbow extension AAROM, poor motor response and sustained activation of co-contractors observed despite attempt for inhibition cueing and manual facilitation; vibratory stim applied to wrist/digit flexors and extensors, slight mm contraction observed with input to flexors and initially slight response observed over digit extensors however inconsistent and unable to repeat with additional reps following initial 2-3 trials    Time Entry(in minutes):  Neuromuscular Re-Education Time Entry: 45    Assessment & Plan   Assessment: Pt's  continues to report good carry over and compliance with scapular mobilizations and caregiver assisted PROM stretching routine to promote tissue lengthening thru RUE, chest, and periscapular regions. Despite this, pt. continues to require extensive, skilled manual therapeutic interventions with clinical assessment for management of presenting tone to promote optimal tissue lengthening and improve joint integrity/positioning within pt's tolerance. Mrs. Avendaño tolerated manual stretching and joint mobilizations fairly this date. Overall, minimal active mm contraction observed thru periscapular, shoulder, elbow, forearm, wrist, and hand in various positioning set ups with and without vibratory stimulation this date, with increased distal flexor mm activation 2/2 clinical hypertonicity and potential guarding remaining prominent. Per pt's ,  pt. has appt. scheduled to receive Botox injections tomorrow and also plan to discuss additional intervention options for tone/spasticity mgmt. given spasticity severity and contribution to existing joint contractures.       The patient will continue to benefit from skilled outpatient occupational therapy in order to address the deficits listed in the problem list on the initial evaluation, provide patient and family education, and maximize the patients level of independence in the home and community environments.     The patient's spiritual, cultural, and educational needs were considered, and the patient is agreeable to the plan of care and goals.           Plan: Cont OT POC 3x/wk; focus on RUE stretching to prevent further joint contractures, mat/bed mobility; compensatory strategies/aly techniques for ADLs. Continue to recommend inpatient rehab for patient at this time.    Goals:   Short Term: 5 weeks  - Pt will participate in upper body dressing with moderate assistance using adaptive techniques or devices to increase independence in ADLs. MET per pt's  report; ongoing for demo  - Pt will tolerate right upper extremity range of motion program x10 min without need for rest break. ongoing  - Pt will complete stand pivot transfer with minimal assistance. ongoing  - Pt will complete lateral scooting as needed for ADLs with min(A). MET 6/6/25  - Pt will tolerate right upper extremity splint/orthosis x1 hour as needed for tone/spasticity management. MET 6/6/25; pt's  contacting rep for different size elbow ext splint   - Pt will be independent with Home Exercise Program (HEP)/Home Activity Program (HAP) and report at least 50% compliance. ongoing     Long Term: 10 weeks   - Pt will participate in upper body dressing with minimal assistance using adaptive techniques or devices to increase independence in ADLs. ongoing  - Pt will complete lower body dressing (pants, brief) with moderate assistance.  ongoing  - Pt will demonstrate improved upper extremity positioning in wheelchair and bed to reduce spasticity and risk of contracture in RUE, as evidenced by maintaining neutral shoulder and elbow alignment with appropriate supports for at least 80% of observed sessions. ongoing  - Pt will tolerate right wrist in neutral x8 minutes as needed for weightbearing and functional tasks. Ongoing   - Caregiver will demo understanding for range of motion program and positioning for Right upper extremity in seated and supine. ongoing  - Pt will be independent with Home Exercise Program (HEP)/Home Activity Program (HAP) to promote long term maintenance of progress made in therapy. ongoing     Goals to be added and edited within plan of care as needed/appropriate.    Mac Rosa, OT

## 2025-06-16 ENCOUNTER — CLINICAL SUPPORT (OUTPATIENT)
Dept: REHABILITATION | Facility: HOSPITAL | Age: 65
End: 2025-06-16
Payer: COMMERCIAL

## 2025-06-16 ENCOUNTER — DOCUMENTATION ONLY (OUTPATIENT)
Dept: REHABILITATION | Facility: HOSPITAL | Age: 65
End: 2025-06-16

## 2025-06-16 ENCOUNTER — OUTPATIENT CASE MANAGEMENT (OUTPATIENT)
Dept: ADMINISTRATIVE | Facility: OTHER | Age: 65
End: 2025-06-16
Payer: COMMERCIAL

## 2025-06-16 DIAGNOSIS — R47.01 GLOBAL APHASIA: Primary | ICD-10-CM

## 2025-06-16 DIAGNOSIS — Z74.09 IMPAIRED MOBILITY AND ADLS: Primary | ICD-10-CM

## 2025-06-16 DIAGNOSIS — Z78.9 IMPAIRED MOBILITY AND ADLS: Primary | ICD-10-CM

## 2025-06-16 DIAGNOSIS — G81.94 LEFT HEMIPARESIS: ICD-10-CM

## 2025-06-16 DIAGNOSIS — Z74.09 IMPAIRED FUNCTIONAL MOBILITY, BALANCE, GAIT, AND ENDURANCE: Primary | ICD-10-CM

## 2025-06-16 DIAGNOSIS — R25.2 SPASTICITY: ICD-10-CM

## 2025-06-16 PROCEDURE — 97116 GAIT TRAINING THERAPY: CPT | Mod: PO

## 2025-06-16 PROCEDURE — 97110 THERAPEUTIC EXERCISES: CPT | Mod: PO

## 2025-06-16 PROCEDURE — 97530 THERAPEUTIC ACTIVITIES: CPT | Mod: PO

## 2025-06-16 PROCEDURE — 92507 TX SP LANG VOICE COMM INDIV: CPT | Mod: PO

## 2025-06-16 PROCEDURE — 92609 USE OF SPEECH DEVICE SERVICE: CPT | Mod: PO

## 2025-06-16 PROCEDURE — 97112 NEUROMUSCULAR REEDUCATION: CPT | Mod: PO

## 2025-06-16 NOTE — PROGRESS NOTES
Outpatient Rehab    Physical Therapy Visit    Patient Name: Kateryna Weber  MRN: [id number]  YOB: 1960  Encounter Date: 6/16/2025    Therapy Diagnosis:   Encounter Diagnosis   Name Primary?    Impaired functional mobility, balance, gait, and endurance Yes         Physician: Latisha Garg MD    Physician Orders: Eval and Treat  Medical Diagnosis: Hemiparesis of right dominant side as late effect of nontraumatic subarachnoid hemorrhage  Surgical Diagnosis: Not applicable for this Episode   Surgical Date: Not applicable for this Episode    Visit # / Visits Authorized:  15 / 22  Insurance Authorization Period: 5/9/2025 to 12/31/2025  Date of Evaluation: 5/9/2025  Plan of Care Certification: 5/9/2025 to 7/4/2025      PT/PTA: PT Physical Therapy   Number of PTA visits since last PT visit:0  Time In: 0845   Time Out: 0930  Total Time (in minutes): 45   Total Billable Time (in minutes): 45      Precautions:     Standard, Fall, aphasia, h/o cancer      Subjective    That she is doing fine this morning. Accompanied by her  who reports that her right ankle may be a little looser since receiving BOTOX.            Objective            Treatment:     Kateryna received therapeutic exercises to develop strength, endurance, ROM, flexibility, posture, and core stabilization for 08 minutes including:   Seated right ankle dorsiflexion mobilizations Grade 3 to reduce plantarflexion contracture and tightness  Seated right ankle dorsiflexion PROM with overpressure stretch    Patient participated in neuromuscular re-education activities to improve: Balance, Coordination, and Sense for 00 minutes. The following activities were included:       Patient participated in dynamic functional therapeutic activities to improve functional performance for 15 minutes. Including:   W/c > mat stand pivot transfer with LBQC moving to left side, Min A for RLE management  Sit > supine Min A for RLE management  Supine > sit: rolled to  right side SBA, Min A for trunk  Mat > w/c stand pivot with LBQC moving to right side, Min A for RLE management  Sit <> stand x multiple trials, CGA to Min A with LBQC     W/c propulsion: x 50 feet with LUE to propel and LLE to steer; Min A to Mod A to steer, added turns today        Patient participated in gait training activities to normalize gait pattern for 22 minutes. The following activities were included:    Gait belt used for safety. Assistive device: LBQC at LUE, Neuro-ERIC glider on right foot     Trial 1: 64 feet  Trial 2: 104 feet  Trial 3: 70 feet     Tech present on right side of patient to cue for upright posture as needed, w/c follow  Mod A from PT in front to weight shift, promote right hip extension and upright trunk positioning, and to advance RLE in swing  Deficits: right knee hyperextension and right hip flexion in stance due to ankle plantarflexion contracture; step through pattern; able to initiate hip flexion in swing phase     Time Entry(in minutes):   See above    Assessment & Plan   Assessment:  Kateryna tolerated therapy session well this morning and continues to provide good effort throughout each activity. Slight improvement in right ankle mobility with passive stretches this morning, but significant contracture still present. Able to increase ambulation distance again throughout ambulation trials; distance remains limited by right sided hemiparesis, quick muscular fatigue, and right ankle plantarflexion contracture. Plan to focus on RLE weight acceptance activities next session to promote improved gait mechanics. Patient remains appropriate for continued high intensity PT intervention to further progress ongoing mobility deficits.        The patient will continue to benefit from skilled outpatient physical therapy in order to address the deficits listed in the problem list on the initial evaluation, provide patient and family education, and maximize the patients level of independence in  the home and community environments.     The patient's spiritual, cultural, and educational needs were considered, and the patient is agreeable to the plan of care and goals.             Plan:  Focus on RLE weight acceptance activities next session. Continue to progress ambulation endurance and independence with functional transfers.     Goals:  Short Term Goals: 4 weeks   Patient/caregiver to be (I) with established home exercise program. MET 06/10/25  Patient to improve supine <> sit transfer to Mod A consistently for improved independence with bed mobility. MET 06/10/25  Patient to improve sit <> stand transfer with no more than Min A consistently for improved (I) with functional transfers. MET 05/29/25  Patient to improve stand pivot transfers to no more than Mod A consistently for improved (I) with functional transfers. MET 05/29/25  Patient to improve ambulation x 12 feet in // bars with no more than Mod A x 1 assist consistently to progress gait training to appropriate assistive device. MET 05/29/25  Patient to propel w/c x 100 feet, including 90 deg turns with no more than SBA for improved independence with w/c mobility. Ongoing     Long Term Goals: 8 weeks   Patient to improve supine <> sit transfer to Min A consistently for improved independence with bed mobility. Progressing  Patient to improve sit <> stand transfer with no more than CGA consistently for improved (I) with functional transfers. Progressing  Patient to improve stand pivot transfers to no more than Min A consistently for improved (I) with functional transfers. MET 06/16/25  Patient to improve ambulation x 25 feet with no more than Mod A x 1 assist with appropriate assistive device for improved independence with ambulation. MET 06/10/25              Updated 06/10/25: Patient to ambulate x 50 feet with no more than Mod A with LBQC for improved independence with ambulation. Ongoing  Patient to propel w/c x 150 feet, including 90 deg turns with  no more than SBA for improved independence with w/c mobility. Ongoing    Madhuri Burris, PT

## 2025-06-16 NOTE — PROGRESS NOTES
Face to face meeting completed with Madhuri Burris PT regarding current status and progress of   Kateryna Weber .  Klever Wright, PTA

## 2025-06-16 NOTE — PROGRESS NOTES
Outpatient Rehab  Speech-Language Pathology Visit    Patient Name: Kateryna Weber  MRN: [id number]  YOB: 1960  Encounter Date: 6/16/2025    Therapy Diagnosis:   Encounter Diagnosis   Name Primary?    Global aphasia Yes   Physician: Latisha Garg MD  Physician Orders: Eval and Treat  Medical Diagnosis: Aphasia as late effect of cerebrovascular accident    Visit # / Visits Authorized: 9 / 20   Insurance Authorization Period: 5/9/2025 to 12/31/2025  Date of Evaluation: 5/9/2025   Plan of Care Certification: 5/9/2025 to 7/18/2025      Time In: 1030   Time Out: 1115  Total Time (in minutes): 45   Total Billable Time (in minutes): 45    Precautions:  Standard, Fall, aphasia, h/o cancer    Subjective   no changes.  Pain reported as 2/10. could not indicate body part  Family/caregiver present for this visit:     Objective /Treatment             1. Patient will identify objects in a field of 3 given the name/function of the object with 75% accuracy following nonverbal cues and requesting repetitions as needed across 2-3 sessions to improve auditory comprehension skills. (Met)    Met    Start:  05/09/25    Expected End:  06/20/25    Resolved:  05/29/25         2. Patient will answer 1 unit/personal yes/no questions with 75% accuracy following nonverbal cues and requesting repetitions as needed across 2-3 sessions to improve auditory comprehension skills. (Progressing)    1 unit - 70% accuracy independently, 90% accuracy given minimum cues.; 2 unit 80% accuracy independently, 90% accuracy given minimum cues.    Start:  05/09/25    Expected End:  06/20/25            3. Patient will name objects with 75% acc given carrier phrase as needed across 3 sessions     (Progressing)    50 % accuracy given carrier phrase   Start:  05/09/25    Expected End:  06/20/25            4. Patient will participate in the speech production treatment hierarchy with mono-syllabic /m/ words with 75% acc across 3 probe trials.     (Progressing)    See below   Start:  05/09/25    Expected End:  06/20/25            5. Patient will use gestures and/or low-tech AAC (e.g., communication board, yes/no cards, picture symbols) to express basic wants and needs in response to visual/verbal cues with 75% accuracy across 3 consecutive sessions. (Progressing)    Used yes/no onesimo to answer questions., demonstrated TouchChat onesimo. Patient identified food items and clothing with minimum cues.    Start:  05/09/25    Expected End:  06/20/25               PROBE: Tactus Therapy field of 4 (easy) - 90% accuracy independently; medium level field of 6 - 60% accuracy; field of 4 - 90% accuracy independently     Tactus Therapy - Matched pictures (fo2) to words- 80% accuracy independently     /m/ one syllable - Bilabial Nasal    Step 1 - Repetition / Minimal Contrast Word Step 2 - Printed Letter or Target Sound with Repetition Step 3- Integral Stimulation Step 4- Articulatory Placement Cues 5 Repetitions of Correct Production   Map - - +   5/5 acc   Man 0 0 0 0 -    Mean - - - - -   Meat 0 0 -   0/5   Mow 0 0 0 0 -     Time Entry(in minutes):  [employer] for Speech-Gen Device Time Entry: 15  Speech Treatment (Individual) Time Entry: 30    Assessment & Plan   Assessment  Patient increased accuracy for identifying items in field of 4 to 6 for easy and medium levels. Increased accuracy for answering 1 unit and 2 unit yes/no questions. Naming items and repeating words for SPT still a struggle which caused some frustration and whining. Patient introduced to [employer] onesimo on iPad. She was able to identify foods she ate and clothing she was wearing with minimum cues.   Evaluation/Treatment Tolerance: Patient tolerated treatment well    The patient will continue to benefit from skilled outpatient speech therapy in order to address the deficits listed in the problem list on the initial evaluation, provide patient and family education, and maximize the patients level of  independence in the home and community environments.     The patient's spiritual, cultural, and educational needs were considered, and the patient is agreeable to the plan of care and goals.        Plan           Goals:   Active       Long Term Goals        1.  She will develop functional cognitive-linguistic based skills and utilize compensatory strategies to communicate wants/needs effectively to different conversational partners, maintain safety, participate socially in functional living environment.    (Progressing)       Start:  05/09/25    Expected End:  07/18/25            2. Patient will trial compensatory strategies and augmentative-alternative communication (AAC) to communicate wants and needs effectively to different conversational partners, maintain safety, and participate socially in functional living environment.  (Progressing)       Start:  05/09/25    Expected End:  07/18/25               Short Term Goals        1. Patient will identify objects in a field of 3 given the name/function of the object with 75% accuracy following nonverbal cues and requesting repetitions as needed across 2-3 sessions to improve auditory comprehension skills. (Met)       Start:  05/09/25    Expected End:  06/20/25    Resolved:  05/29/25         2. Patient will answer 1 unit/personal yes/no questions with 75% accuracy following nonverbal cues and requesting repetitions as needed across 2-3 sessions to improve auditory comprehension skills. (Progressing)       Start:  05/09/25    Expected End:  06/20/25            3. Patient will name objects with 75% acc given carrier phrase as needed across 3 sessions     (Progressing)       Start:  05/09/25    Expected End:  06/20/25            4. Patient will participate in the speech production treatment hierarchy with mono-syllabic /m/ words with 75% acc across 3 probe trials.    (Progressing)       Start:  05/09/25    Expected End:  06/20/25            5. Patient will use gestures  and/or low-tech AAC (e.g., communication board, yes/no cards, picture symbols) to express basic wants and needs in response to visual/verbal cues with 75% accuracy across 3 consecutive sessions. (Progressing)       Start:  05/09/25    Expected End:  06/20/25              JACQUES Mancini., L-SLP,CCC-SLP, CBIS  Speech-Language Pathologist  Certified Brain Injury Specialist

## 2025-06-16 NOTE — PROGRESS NOTES
Outpatient Rehab    Occupational Therapy Visit    Patient Name: Kateryna Weber  MRN: [id number]  YOB: 1960  Encounter Date: 6/16/2025    Therapy Diagnosis:   Encounter Diagnoses   Name Primary?    Impaired mobility and ADLs Yes    Left hemiparesis     Spasticity        Physician: Latisha Garg MD    Physician Orders: Eval and Treat  Medical Diagnosis: Hemiparesis of right dominant side as late effect of nontraumatic subarachnoid hemorrhage    Visit # / Visits Authorized: 14 / 22  Insurance Authorization Period: 5/12/2025 to 12/31/2025  Date of Evaluation: 5/9/2025  Plan of Care Certification: 5/9/2025 to 7/18/2025      Time In: 0930   Time Out: 1016  Total Time (in minutes): 46   Total Billable Time (in minutes): 46      Precautions:     Standard, Fall, aphasia, h/o cancer      Subjective   feels like the arm is getting looser.  Family / care giver present for this visit:  (spouse present and engaged throughout)    Pain reported as 0/10. none reported at rest; wrist discomfort with stretching    Objective            Treatment:  Neuromuscular re-education activities to improve Kinesthetic, Sense, Proprioception, and Posture for 46 minutes. The following activities were included:     Stand pivot transfer from w/c>mat with Mod A using LBQC; assist for weight shifts      Seated edge of mat   - manual facilitation for thoracic extension ; tactile cue also applied to lumbar region to promote anterior pelvic tilt; mirror feedback provided for postural awareness/visual cueing   - PROM for right elbow extension as able (able to achieve 100 degrees)     Sit<>supine with Mod A using log rolling technique;  Supine<>L sidelying with Min A.      Left sidelying with pillow placed between distal LEs to maximize pt comfort:   - PROM for scapular mobs all planes including elevation, retraction, protraction, and depression     Left sidelying>supine Mod I     Supine with B feet flat on mat:    - PROM for RUE elbow  extension and tone inhibition techniques with cross friction massage to bicep tendon   - Prolonged stretch and RUE forearm postioning into neutral   - Brief/gentle RUE wrist/forearm stretches for RD/UD, flexion/towards extension, and towards pronation within tolerable range      Supine<>sit with Mod A      Seated edge of mat with tech assistance:   - right upper extremity positioned in neutral and slightly abducted for pt completion of reaching across midline with left upper extremity for card  from table top x12 reps -- moderate cues for right side attention and find     Stand pivot transfer from mat>w/c with Mod A using LBQC; assist for weight shift for step/clearance of foot    Supination strap donned to hand  for encouragement of hand in neutral while in seated for speech language pathology  session     Time Entry(in minutes):  Neuromuscular Re-Education Time Entry: 46    Assessment & Plan   Assessment:  Kateryna tolerated session fairly on this date. She demo gains in passive range with functional task with right elbow for 100 degrees of extension today. She remains with most discomfort at right wrist for neutral position and remains unable to achieve wrist extension at this time. She remains with good participation and tolerance for OT at this time      briefly present in session today to discuss inpatient rehab with spouse.        The patient will continue to benefit from skilled outpatient occupational therapy in order to address the deficits listed in the problem list on the initial evaluation, provide patient and family education, and maximize the patients level of independence in the home and community environments.     The patient's spiritual, cultural, and educational needs were considered, and the patient is agreeable to the plan of care and goals.           Plan: Cont OT POC 3x/wk; focus on RUE stretching to prevent further joint contractures, mat/bed mobility; compensatory  strategies/aly techniques for ADLs. Continue to recommend inpatient rehab for patient at this time.    Goals:   Short Term: 5 weeks  - Pt will participate in upper body dressing with moderate assistance using adaptive techniques or devices to increase independence in ADLs. MET per pt's  report; ongoing for demo  - Pt will tolerate right upper extremity range of motion program x10 min without need for rest break. ongoing  - Pt will complete stand pivot transfer with minimal assistance. ongoing  - Pt will complete lateral scooting as needed for ADLs with min(A). MET 6/6/25  - Pt will tolerate right upper extremity splint/orthosis x1 hour as needed for tone/spasticity management. MET 6/6/25; pt's  contacting rep for different size elbow ext splint   - Pt will be independent with Home Exercise Program (HEP)/Home Activity Program (HAP) and report at least 50% compliance. ongoing     Long Term: 10 weeks   - Pt will participate in upper body dressing with minimal assistance using adaptive techniques or devices to increase independence in ADLs. ongoing  - Pt will complete lower body dressing (pants, brief) with moderate assistance. ongoing  - Pt will demonstrate improved upper extremity positioning in wheelchair and bed to reduce spasticity and risk of contracture in RUE, as evidenced by maintaining neutral shoulder and elbow alignment with appropriate supports for at least 80% of observed sessions. ongoing  - Pt will tolerate right wrist in neutral x8 minutes as needed for weightbearing and functional tasks. Ongoing   - Caregiver will demo understanding for range of motion program and positioning for Right upper extremity in seated and supine. ongoing  - Pt will be independent with Home Exercise Program (HEP)/Home Activity Program (HAP) to promote long term maintenance of progress made in therapy. ongoing     Goals to be added and edited within plan of care as needed/appropriate.    NAMITA Thomas

## 2025-06-17 ENCOUNTER — CLINICAL SUPPORT (OUTPATIENT)
Dept: REHABILITATION | Facility: HOSPITAL | Age: 65
End: 2025-06-17
Payer: COMMERCIAL

## 2025-06-17 DIAGNOSIS — R47.01 GLOBAL APHASIA: Primary | ICD-10-CM

## 2025-06-17 DIAGNOSIS — Z74.09 IMPAIRED FUNCTIONAL MOBILITY, BALANCE, GAIT, AND ENDURANCE: Primary | ICD-10-CM

## 2025-06-17 DIAGNOSIS — R25.2 SPASTICITY: ICD-10-CM

## 2025-06-17 DIAGNOSIS — Z78.9 IMPAIRED MOBILITY AND ADLS: Primary | ICD-10-CM

## 2025-06-17 DIAGNOSIS — G81.94 LEFT HEMIPARESIS: ICD-10-CM

## 2025-06-17 DIAGNOSIS — Z74.09 IMPAIRED MOBILITY AND ADLS: Primary | ICD-10-CM

## 2025-06-17 PROCEDURE — 92507 TX SP LANG VOICE COMM INDIV: CPT | Mod: PO

## 2025-06-17 PROCEDURE — 97530 THERAPEUTIC ACTIVITIES: CPT | Mod: PO

## 2025-06-17 PROCEDURE — 97116 GAIT TRAINING THERAPY: CPT | Mod: PO

## 2025-06-17 PROCEDURE — 97112 NEUROMUSCULAR REEDUCATION: CPT | Mod: PO

## 2025-06-17 PROCEDURE — 97110 THERAPEUTIC EXERCISES: CPT | Mod: PO

## 2025-06-17 NOTE — PROGRESS NOTES
"  Outpatient Rehab    Physical Therapy Visit    Patient Name: Kateryna Weber  MRN: [id number]  YOB: 1960  Encounter Date: 6/17/2025    Therapy Diagnosis:   Encounter Diagnosis   Name Primary?    Impaired functional mobility, balance, gait, and endurance Yes           Physician: Latisha Garg MD    Physician Orders: Eval and Treat  Medical Diagnosis: Hemiparesis of right dominant side as late effect of nontraumatic subarachnoid hemorrhage  Surgical Diagnosis: Not applicable for this Episode   Surgical Date: Not applicable for this Episode    Visit # / Visits Authorized:  16 / 22  Insurance Authorization Period: 5/9/2025 to 12/31/2025  Date of Evaluation: 5/9/2025  Plan of Care Certification: 5/9/2025 to 7/4/2025      PT/PTA: PT Physical Therapy   Number of PTA visits since last PT visit:0  Time In: 0845   Time Out: 0930  Total Time (in minutes): 45   Total Billable Time (in minutes): 45      Precautions:     Standard, Fall, aphasia, h/o cancer      Subjective    That she is doing fine this morning. Accompanied by her  who reports that the rep from [employer] is coming to the house tomorrow to fit patient for ankle brace.             Objective    Last performed on 06/10/25.         Treatment:     Kateryna received therapeutic exercises to develop strength, endurance, ROM, flexibility, posture, and core stabilization for 08 minutes including:   Seated right ankle dorsiflexion mobilizations Grade 3 to reduce plantarflexion contracture and tightness  Seated right ankle dorsiflexion PROM with overpressure stretch    Patient participated in neuromuscular re-education activities to improve: Balance, Coordination, and Sense for 12 minutes. The following activities were included:   Edge of mat + LBQC for LUE support, mirror in front for VCs:  2 x 10 reps, weight acceptance on RLE placing LLE onto 4" step, CGA  2 x 10 reps, weight acceptance on LLE, gliding RLE forward to colored target and pulling it " "back to start position; (A) provided mainly for extension phase due to elevated tone; attempted to progress to 2" step but limited by extensor tone in leg; CGA to varying assist to progress RLE      Patient participated in dynamic functional therapeutic activities to improve functional performance for 15 minutes. Including:   W/c > mat stand pivot transfer with LBQC moving to left side, Min A for RLE management  Sit > supine Min A for RLE management  Supine > sit: rolled to right side SBA, Min A for trunk  Mat > w/c stand pivot with LBQC moving to right side, Min A for RLE management  Sit <> stand x multiple trials, CGA to Min A with LBQC        Patient participated in gait training activities to normalize gait pattern for 10 minutes. The following activities were included:    Gait belt used for safety. Assistive device: LBQC at [facility], [facility] glider on right foot-- emphasis on initiating RLE swing throughout entire trials     Trial 1: 75 feet  Trial 2: 48 feet     Tech present on right side of patient to cue for upright posture as needed, w/c follow  Mod A from PT in front to weight shift, promote right hip extension and upright trunk positioning, and to advance RLE in swing  Deficits: right knee hyperextension and right hip flexion in stance due to ankle plantarflexion contracture; step through/to pattern; able to initiate hip flexion in swing phase     Time Entry(in minutes):   See above    Assessment & Plan   Assessment:  Kateryna tolerated therapy session well this morning and continues to provide good effort throughout each activity. Increased focus on RLE weight acceptance and motor control today to improve gait mechanics and functional transfers. Patient able to initiate RLE hip flexion to target in front, but limited hip extension activation due to ongoing extensor tone. Focused on RLE hip flexion initiation throughout gait trials today.  Patient remains appropriate for continued high intensity PT " intervention to further progress ongoing mobility deficits.        The patient will continue to benefit from skilled outpatient physical therapy in order to address the deficits listed in the problem list on the initial evaluation, provide patient and family education, and maximize the patients level of independence in the home and community environments.     The patient's spiritual, cultural, and educational needs were considered, and the patient is agreeable to the plan of care and goals.             Plan:   Continue to progress ambulation endurance and independence with functional transfers. Promote RLE hip flexion initiate in swing phase of gait trials.     Goals:  Short Term Goals: 4 weeks   Patient/caregiver to be (I) with established home exercise program. MET 06/10/25  Patient to improve supine <> sit transfer to Mod A consistently for improved independence with bed mobility. MET 06/10/25  Patient to improve sit <> stand transfer with no more than Min A consistently for improved (I) with functional transfers. MET 05/29/25  Patient to improve stand pivot transfers to no more than Mod A consistently for improved (I) with functional transfers. MET 05/29/25  Patient to improve ambulation x 12 feet in // bars with no more than Mod A x 1 assist consistently to progress gait training to appropriate assistive device. MET 05/29/25  Patient to propel w/c x 100 feet, including 90 deg turns with no more than SBA for improved independence with w/c mobility. Ongoing     Long Term Goals: 8 weeks   Patient to improve supine <> sit transfer to Min A consistently for improved independence with bed mobility. Progressing  Patient to improve sit <> stand transfer with no more than CGA consistently for improved (I) with functional transfers. Progressing  Patient to improve stand pivot transfers to no more than Min A consistently for improved (I) with functional transfers. MET 06/16/25  Patient to improve ambulation x 25 feet  with no more than Mod A x 1 assist with appropriate assistive device for improved independence with ambulation. MET 06/10/25              Updated 06/10/25: Patient to ambulate x 50 feet with no more than Mod A with LBQC for improved independence with ambulation. Ongoing  Patient to propel w/c x 150 feet, including 90 deg turns with no more than SBA for improved independence with w/c mobility. Ongoing    Madhuri Burris, PT

## 2025-06-17 NOTE — PROGRESS NOTES
Outpatient Care Management   - Care Plan Follow Up    Patient: Kateryna Weber  MRN:  [id number]  Date of Service:  6/16/2025  Completed by:  Suzy Varner LCSW  Referral Date: 05/21/2025    Reason for Visit   Patient presents with    OPCM SW Follow Up Call       Brief Summary:  spoke with Pt  between therapy sessions today. They have decided to go with [facility] so that Dr Varela can monitor her progress while at the rehab. For now, they are not going with the pump but will see how the botox helps her spacity. Spoke with Shira with [employer] and advised they have submitted for authorization. She will advise if they need further assistance or if Pt is approved for rehab.     Future Appointments   Date Time Provider Department Center   6/17/2025  8:45 AM Madhuri Burris, PT [facility]   6/17/2025  9:30 AM Lizbet Kim, LOTR [facility]   6/17/2025 10:15 AM Virginia Beaulieu L-SLP, CCC-SLP [facility]   6/18/2025  1:00 PM Suki Adan L-SLP, CCC-SLP [facility]   6/20/2025  9:30 AM Madhuri Burris, PT [facility]   6/20/2025 10:30 AM Mac Rosa, OT [facility]   6/23/2025  8:45 AM Madhuri Burris, PT [facility]   6/23/2025  9:30 AM Lizbet Kim, LOTR [facility]   6/23/2025 10:15 AM Virginia Beaulieu L-SLP, CCC-SLP [facility]   6/25/2025  9:30 AM Kelsey Milan, OT [facility]   6/25/2025 10:30 AM Sandee Ferguson, PTA [facility]   6/25/2025 11:15 AM Suki Adan, L-SLP, CCC-SLP [facility]   6/27/2025  8:45 AM Suki Adan, L-SLP, CCC-SLP [facility]   6/27/2025  9:30 AM Madhuri Burris, PT [facility]   6/27/2025 11:15 AM Lizbet Kim LOTR [facility]   7/1/2025  9:30 AM Kelsey Milan, FERNANDA [facility]   7/1/2025 10:30 AM Madhuri Burris, PT [facility]    7/1/2025 11:15 AM Suki Adan, L-SLP, CCC-SLP [facility]   7/2/2025  1:00 PM Suki Adan, L-SLP, CCC-SLP [facility]   7/2/2025  1:45 PM Sandee Ferguson, PTA [facility]   7/2/2025  2:30 PM Lizbet Kim, LOTR [facility]   7/3/2025 10:30 AM Madhuri Burris, PT [facility]   7/3/2025 11:15 AM Lizbet Kim., LOTR [facility]   7/7/2025  9:30 AM Madhuri Burris, PT [facility]   7/7/2025 10:30 AM Lizbet Kim, LOTR [facility]   7/7/2025 11:15 AM Suki Adan, L-SLP, CCC-SLP [facility]   7/8/2025  9:45 AM Virginia Beaulieu L-SLP, CCC-SLP [facility]   7/8/2025 10:30 AM Madhuri Burris, PT [facility]   7/8/2025 11:15 AM Mac Rosa, OT [facility]   7/11/2025  9:30 AM Lizbet Kim, LOTR [facility]   7/11/2025 10:30 AM Madhuri Burris, PT [facility]   7/11/2025 11:15 AM Suki Adan, L-SLP, CCC-SLP [facility]   7/14/2025  9:30 AM Lizbet Kim, LOTR [facility]   7/14/2025 10:30 AM Madhuri Burris, PT [facility]   7/14/2025 11:15 AM Radha Cooper, L-SLP, CCC-SLP [facility]   7/16/2025  9:30 AM Klever Wright, PTA [facility]   7/16/2025 10:30 AM Kayli, Kelsey, OT [facility]   7/16/2025 11:15 AM Radha Cooper, L-SLP, CCC-SLP [facility]   7/18/2025  9:30 AM Lizbet Kim, LOTR [facility]   7/18/2025 10:30 AM Madhuri Burris, PT [facility]   7/18/2025 11:15 AM Radha Cooper, L-SLP, CCC-SLP [facility]   7/21/2025  9:30 AM Lizbet Kim, LOTR [facility]   7/21/2025 10:30 AM Radha Cooper, L-SLP, CCC-SLP [facility]   7/21/2025 11:15 AM Madhuri Burris, PT [facility]   7/23/2025  9:30 AM Kelsey Milan, OT [facility]   7/23/2025 10:15 AM  Virginia Beaulieu, L-SLP, [facility]SL[facility] [address] [facility]   7/23/2025 11:15 AM Lalitha Landeros, PT [address] PT   7/25/2025  9:30 AM Lizbet Kim LOTR [address] PT   7/25/2025 10:30 AM Madhuri Burris, PT [address] PT   7/25/2025 11:15 AM Radha Cooper, L-SLP, [facility] [address] PT     Suzy Varner, [facility]  Neuro Therapy   [facility]  [phone]

## 2025-06-17 NOTE — PROGRESS NOTES
Outpatient Rehab    Occupational Therapy Visit    Patient Name: Kateryna Weber  MRN: [id number]  YOB: 1960  Encounter Date: 6/17/2025    Therapy Diagnosis:   Encounter Diagnoses   Name Primary?    Impaired mobility and ADLs Yes    Left hemiparesis     Spasticity      Physician: Latisha Garg MD    Physician Orders: Eval and Treat  Medical Diagnosis: Hemiparesis of right dominant side as late effect of nontraumatic subarachnoid hemorrhage  Surgical Diagnosis: Not applicable for this Episode   Surgical Date: Not applicable for this Episode  Days Since Last Surgery: Not applicable for this Episode    Visit # / Visits Authorized: 15 / 22  Insurance Authorization Period: 5/12/2025 to 12/31/2025  Date of Evaluation: 5/9/2025  Plan of Care Certification: 5/9/2025 to 7/18/2025      Time In: 0932   Time Out: 1015  Total Time (in minutes): 43   Total Billable Time (in minutes): 43        Precautions:     Standard, Fall, aphasia, h/o cancer      Subjective   wife wanted him to stretch her arm a lot yesterday; she indicated it felt good.  Family / care giver present for this visit:  ( engaged throughout)  Pain reported as 4/10. R wrist with stretching/ROM    Objective            Treatment:   Sit<>Stand from w/c with min(A); Stand pivot transfer from w/c>mat with Mod A using LBQC; assist for weight shifts      Seated edge of mat   - manual facilitation for thoracic extension ; tactile cue also applied to lumbar region to promote anterior pelvic tilt; mirror feedback provided for postural awareness/visual cueing   - PROM for right elbow extension as able (able to achieve 100 degrees); rehab tech to assist with sustained end range hold -- OT to complete manual scrapping/manipulation of bicep insertion (pt tolerated well)     Sit<>supine with Mod A using log rolling technique;  Supine<>L sidelying with Min A.      Left sidelying with pillow placed between distal LEs to maximize pt comfort:   - PROM for  right scapular mobs all planes including elevation, retraction, protraction, and depression  -PROM for right shoulder extension with forearm in neutral      Left sidelying>supine Mod I ; Supine<>sit with Mod A      Seated edge of mat:   - right upper extremity positioned in neutral and slightly abducted for pt completion of reaching across midline with left upper extremity matching game x8/8 reps with 100% accuracy      Stand pivot transfer from mat>w/c with Mod A using LBQC; assist for weight shift for step/clearance of foot    Edu and encouraged patient to assist Right upper extremity to sustain neutral position with left hand assist     Time Entry(in minutes):  Neuromuscular Re-Education Time Entry: 43    Assessment & Plan   Assessment:  Mrs Avendaño tolerated session well today with improvement in tolerance for tone inhibition and stretching for hemiparetic upper extremity. She tolerated manual scrapping method well with fair release noted. Goals remain appropriate at this time. Continue to recommend inpatient rehab at this time. Moreover, pt would benefit from new dynamic splinting for right upper extremity to encourage neutral positioning and prolonged stretch.        The patient will continue to benefit from skilled outpatient occupational therapy in order to address the deficits listed in the problem list on the initial evaluation, provide patient and family education, and maximize the patients level of independence in the home and community environments.     The patient's spiritual, cultural, and educational needs were considered, and the patient is agreeable to the plan of care and goals.           Plan: Cont OT POC 3x/wk; focus on RUE stretching to prevent further joint contractures, mat/bed mobility; compensatory strategies/aly techniques for ADLs. Continue to recommend inpatient rehab for patient at this time.    Goals:   Short Term: 5 weeks  - Pt will participate in upper body dressing with moderate  assistance using adaptive techniques or devices to increase independence in ADLs. MET per pt's  report; ongoing for demo  - Pt will tolerate right upper extremity range of motion program x10 min without need for rest break. ongoing  - Pt will complete stand pivot transfer with minimal assistance. ongoing  - Pt will complete lateral scooting as needed for ADLs with min(A). MET 6/6/25  - Pt will tolerate right upper extremity splint/orthosis x1 hour as needed for tone/spasticity management. MET 6/6/25; pt's  contacting rep for different size elbow ext splint   - Pt will be independent with Home Exercise Program (HEP)/Home Activity Program (HAP) and report at least 50% compliance. ongoing     Long Term: 10 weeks   - Pt will participate in upper body dressing with minimal assistance using adaptive techniques or devices to increase independence in ADLs. ongoing  - Pt will complete lower body dressing (pants, brief) with moderate assistance. ongoing  - Pt will demonstrate improved upper extremity positioning in wheelchair and bed to reduce spasticity and risk of contracture in RUE, as evidenced by maintaining neutral shoulder and elbow alignment with appropriate supports for at least 80% of observed sessions. ongoing  - Pt will tolerate right wrist in neutral x8 minutes as needed for weightbearing and functional tasks. Ongoing   - Caregiver will demo understanding for range of motion program and positioning for Right upper extremity in seated and supine. ongoing  - Pt will be independent with Home Exercise Program (HEP)/Home Activity Program (HAP) to promote long term maintenance of progress made in therapy. ongoing     Goals to be added and edited within plan of care as needed/appropriate.    NAMITA Thomas

## 2025-06-17 NOTE — PROGRESS NOTES
Outpatient Rehab  Speech-Language Pathology Visit    Patient Name: Kateryna Weber  MRN: [id number]  YOB: 1960  Encounter Date: 6/17/2025    Therapy Diagnosis:   Encounter Diagnosis   Name Primary?    Global aphasia Yes     Physician: Latisha Garg MD  Physician Orders: Eval and Treat  Medical Diagnosis: Aphasia as late effect of cerebrovascular accident    Visit # / Visits Authorized: 10 / 20   Insurance Authorization Period: 5/9/2025 to 12/31/2025  Date of Evaluation: 5/9/2025   Plan of Care Certification: 5/9/2025 to 7/18/2025      Time In: 1015   Time Out: 1100  Total Time (in minutes): 45   Total Billable Time (in minutes): 45    Precautions:  Standard, Fall, aphasia, h/o cancer    Subjective   Pleasnt; no complaints. Less confused.  sat in on  today's session.  reported that she did much better today..    none reported.  Family/caregiver present for this visit:       Objective /Treatment             1. Patient will identify objects in a field of 3 given the name/function of the object with 75% accuracy following nonverbal cues and requesting repetitions as needed across 2-3 sessions to improve auditory comprehension skills. (Met)    Met    Start:  05/09/25    Expected End:  06/20/25    Resolved:  05/29/25         2. Patient will answer 1 unit/personal yes/no questions with 75% accuracy following nonverbal cues and requesting repetitions as needed across 2-3 sessions to improve auditory comprehension skills. (Progressing)    1 unit - 90% acc Independently. Verbally answered yes/no questions.    Start:  05/09/25    Expected End:  06/20/25            3. Patient will name objects with 75% acc given carrier phrase as needed across 3 sessions     (Progressing)    80% acc given a carrier phrase and phonemic cue   Start:  05/09/25    Expected End:  06/20/25            4. Patient will participate in the speech production treatment hierarchy with mono-syllabic /m/ words with 75% acc across  3 probe trials.    (Progressing)    -Not addressed this session.    Start:  05/09/25    Expected End:  06/20/25            5. Patient will use gestures and/or low-tech AAC (e.g., communication board, yes/no cards, picture symbols) to express basic wants and needs in response to visual/verbal cues with 75% accuracy across 3 consecutive sessions. (Progressing)    Demonstrated Touch Chat Tammi and TD Snap Tammi: Patient did not like Touch Chat Tammi. Demonstrated keyboard, white board, categories.  Patient was able to indicate pain level on the TD Snap. Patient was able to answer questions using the TD Snap. Patient indicated that she liked this tammi. Discussed tammi as a bridge to language.  is interested in exploring apps and purchasing an tammi to assist with language.  Patient and 's main goal is verbal output.    Start:  05/09/25    Expected End:  06/20/25           Additional:  Patient requested a drink. Patient repeated some words/phrases. I.e. more please, bye.     /m/ one syllable - Bilabial Nasal    Step 1 - Repetition / Minimal Contrast Word Step 2 - Printed Letter or Target Sound with Repetition Step 3- Integral Stimulation Step 4- Articulatory Placement Cues 5 Repetitions of Correct Production   Map -Not addressed this session.        Man -Not addressed this session.        Mean -Not addressed this session.       Meat -Not addressed this session.        Mow -Not addressed this session.          Time Entry(in minutes):  Speech Treatment (Individual) Time Entry: 45    Assessment & Plan   Assessment  Improvement noted today across tasks. Increased verbal output. Improved Accuracy with yes/no questions. Improved mood. Decreased confusion. Patient and  liked the TD Snap tammi and would like to continue exploring AAC tammi options as a bridge to language.   Evaluation/Treatment Tolerance: Patient tolerated treatment well    The patient will continue to benefit from skilled outpatient speech therapy in  order to address the deficits listed in the problem list on the initial evaluation, provide patient and family education, and maximize the patients level of independence in the home and community environments.     The patient's spiritual, cultural, and educational needs were considered, and the patient is agreeable to the plan of care and goals.        Plan  Continue OP ST 3x per week        Goals:   Active       Long Term Goals        1.  She will develop functional cognitive-linguistic based skills and utilize compensatory strategies to communicate wants/needs effectively to different conversational partners, maintain safety, participate socially in functional living environment.    (Progressing)       Start:  05/09/25    Expected End:  07/18/25            2. Patient will trial compensatory strategies and augmentative-alternative communication (AAC) to communicate wants and needs effectively to different conversational partners, maintain safety, and participate socially in functional living environment.  (Progressing)       Start:  05/09/25    Expected End:  07/18/25               Short Term Goals        1. Patient will identify objects in a field of 3 given the name/function of the object with 75% accuracy following nonverbal cues and requesting repetitions as needed across 2-3 sessions to improve auditory comprehension skills. (Met)       Start:  05/09/25    Expected End:  06/20/25    Resolved:  05/29/25         2. Patient will answer 1 unit/personal yes/no questions with 75% accuracy following nonverbal cues and requesting repetitions as needed across 2-3 sessions to improve auditory comprehension skills. (Progressing)       Start:  05/09/25    Expected End:  06/20/25            3. Patient will name objects with 75% acc given carrier phrase as needed across 3 sessions     (Progressing)       Start:  05/09/25    Expected End:  06/20/25            4. Patient will participate in the speech production treatment  hierarchy with mono-syllabic /m/ words with 75% acc across 3 probe trials.    (Progressing)       Start:  05/09/25    Expected End:  06/20/25            5. Patient will use gestures and/or low-tech AAC (e.g., communication board, yes/no cards, picture symbols) to express basic wants and needs in response to visual/verbal cues with 75% accuracy across 3 consecutive sessions. (Progressing)       Start:  05/09/25    Expected End:  06/20/25                Virginia Beaulieu M.S., L-SLP,CCC-SLP   Speech Language Pathologist

## 2025-06-18 ENCOUNTER — CLINICAL SUPPORT (OUTPATIENT)
Dept: REHABILITATION | Facility: HOSPITAL | Age: 65
End: 2025-06-18
Payer: COMMERCIAL

## 2025-06-18 DIAGNOSIS — R47.01 GLOBAL APHASIA: Primary | ICD-10-CM

## 2025-06-18 PROCEDURE — 92507 TX SP LANG VOICE COMM INDIV: CPT | Mod: PO

## 2025-06-18 NOTE — PROGRESS NOTES
"Outpatient Rehab  Speech-Language Pathology Visit    Patient Name: Kateryna Weber  MRN: [id number]  YOB: 1960  Encounter Date: 6/18/2025    Therapy Diagnosis:   Encounter Diagnosis   Name Primary?    Global aphasia Yes     Physician: Latisha Garg MD  Physician Orders: Eval and Treat  Medical Diagnosis: Aphasia as late effect of cerebrovascular accident    Visit # / Visits Authorized: 11 / 20   Insurance Authorization Period: 5/9/2025 to 12/31/2025  Date of Evaluation: 5/9/2025   Plan of Care Certification: 5/9/2025 to 7/18/2025      Time In: 1300   Time Out: 1345  Total Time (in minutes): 45   Total Billable Time (in minutes): 45    Precautions:  Standard, Fall, aphasia, h/o cancer    Subjective   doing good - no pain today.         "I say it's about the same" when asked how she is doing    Objective /Treatment             1. Patient will identify objects in a field of 3 given the name/function of the object with 75% accuracy following nonverbal cues and requesting repetitions as needed across 2-3 sessions to improve auditory comprehension skills. (Met)    Met         2. Patient will answer 1 unit/personal yes/no questions with 75% accuracy following nonverbal cues and requesting repetitions as needed across 2-3 sessions to improve auditory comprehension skills. (Progressing)    2 unit - 80% acc Independently; 90% accuracy given a repetition        3. Patient will name objects with 75% acc given carrier phrase as needed across 3 sessions     (Progressing)    10% acc given a carrier phrase and phonemic cue, 33% accuracy given a phonemic cue and a model        4. Patient will participate in the speech production treatment hierarchy with mono-syllabic /m/ words with 75% acc across 3 probe trials.    (Progressing)    See below        5. Patient will use gestures and/or low-tech AAC (e.g., communication board, yes/no cards, picture symbols) to express basic wants and needs in response to visual/verbal " cues with 75% accuracy across 3 consecutive sessions. (Progressing)    Used TD SNAP in session to answer questions, spelling was difficult but seeing options aided in self cueing    Start:  05/09/25    Expected End:  06/20/25           Additional:  Grandchildren's names Fernando, Rod, Henrry  Cat: Biddy    /m/ one syllable - Bilabial Nasal    Step 1 - Repetition / Minimal Contrast Word Step 2 - Printed Letter or Target Sound with Repetition Step 3- Integral Stimulation Step 4- Articulatory Placement Cues 5 Repetitions of Correct Production   Map - - - -    Man +    5/5 indly   Mean +    5/5 indly   Meat +    5/5 indly   Mow 0 0 +  5/5 independently      Time Entry(in minutes):  Speech Treatment (Individual) Time Entry: 45    Assessment & Plan   Assessment  Increased frustration today that was redirected by alternating structured and unstructured tasks. Phonemic cues remain most effective in improving verbal output. Increased spontaneous phrases and sentences in conversation.  Evaluation/Treatment Tolerance: Patient tolerated treatment well    The patient will continue to benefit from skilled outpatient speech therapy in order to address the deficits listed in the problem list on the initial evaluation, provide patient and family education, and maximize the patients level of independence in the home and community environments.     The patient's spiritual, cultural, and educational needs were considered, and the patient is agreeable to the plan of care and goals.     Education  Education was done with Patient and Other recipient present.           How progress might feel difficult for the person with aphasia as their awareness of deficits increases. Mrs. Avendaño feels she hasn't made progress, but objective progress made as seen by clinical team and her . Explained this is typical as she continues to improve. She and [place], her , verbalized understanding.        Plan  Continue OP ST 3x per week         Goals:   Active       Long Term Goals        1.  She will develop functional cognitive-linguistic based skills and utilize compensatory strategies to communicate wants/needs effectively to different conversational partners, maintain safety, participate socially in functional living environment.    (Progressing)       Start:  05/09/25    Expected End:  07/18/25            2. Patient will trial compensatory strategies and augmentative-alternative communication (AAC) to communicate wants and needs effectively to different conversational partners, maintain safety, and participate socially in functional living environment.  (Progressing)       Start:  05/09/25    Expected End:  07/18/25               Short Term Goals        1. Patient will identify objects in a field of 3 given the name/function of the object with 75% accuracy following nonverbal cues and requesting repetitions as needed across 2-3 sessions to improve auditory comprehension skills. (Met)       Start:  05/09/25    Expected End:  06/20/25    Resolved:  05/29/25         2. Patient will answer 1 unit/personal yes/no questions with 75% accuracy following nonverbal cues and requesting repetitions as needed across 2-3 sessions to improve auditory comprehension skills. (Progressing)       Start:  05/09/25    Expected End:  06/20/25            3. Patient will name objects with 75% acc given carrier phrase as needed across 3 sessions     (Progressing)       Start:  05/09/25    Expected End:  06/20/25            4. Patient will participate in the speech production treatment hierarchy with mono-syllabic /m/ words with 75% acc across 3 probe trials.    (Progressing)       Start:  05/09/25    Expected End:  06/20/25            5. Patient will use gestures and/or low-tech AAC (e.g., communication board, yes/no cards, picture symbols) to express basic wants and needs in response to visual/verbal cues with 75% accuracy across 3 consecutive sessions. (Progressing)        Start:  05/09/25    Expected End:  06/20/25                HALLIE Mahajan, L-SLP, CCC-SLP  Speech Language Pathologist

## 2025-06-19 NOTE — PROGRESS NOTES
"  Outpatient Rehab    Physical Therapy Visit    Patient Name: Kateryna Weber  MRN: [id number]  YOB: 1960  Encounter Date: 6/20/2025    Therapy Diagnosis:   Encounter Diagnosis   Name Primary?    Impaired functional mobility, balance, gait, and endurance Yes             Physician: Latisha Garg MD    Physician Orders: Eval and Treat  Medical Diagnosis: Hemiparesis of right dominant side as late effect of nontraumatic subarachnoid hemorrhage  Surgical Diagnosis: Not applicable for this Episode   Surgical Date: Not applicable for this Episode    Visit # / Visits Authorized:  17 / 22  Insurance Authorization Period: 5/9/2025 to 12/31/2025  Date of Evaluation: 5/9/2025  Plan of Care Certification: 5/9/2025 to 7/4/2025      PT/PTA: PT  Number of PTA visits since last PT visit:0  Time In: 0930   Time Out: 1016  Total Time (in minutes): 46   Total Billable Time (in minutes): 46      Precautions:     Standard, Fall, aphasia, h/o cancer      Subjective    "Good."  Patient's  reports that patient received her dynasplints on Wednesday and have been wearing them 2 hours daily.             Objective    Last performed on 06/10/25.         Treatment:     Kateryna received therapeutic exercises to develop strength, endurance, ROM, flexibility, posture, and core stabilization for 08 minutes including:   Seated right ankle dorsiflexion mobilizations Grade 3 to reduce plantarflexion contracture and tightness  Seated right ankle dorsiflexion PROM with overpressure stretch    Patient participated in neuromuscular re-education activities to improve: Balance, Coordination, and Sense for 08 minutes. The following activities were included:   X 10 reps sit <> stand from mat, LUE pushing from mat--- emphasis on motor control and proper form, TCs for proper form      Patient participated in dynamic functional therapeutic activities to improve functional performance for 10 minutes. Including:   W/c > mat stand pivot transfer " with LBQC moving to left side, Min A for RLE management  Sit > supine Min A for RLE management  Supine > sit: rolled to right side SBA, Min A for trunk  Mat > w/c stand pivot with LBQC moving to right side, Min A for RLE management  Sit <> stand x multiple trials, CGA to Min A with LBQC        Patient participated in gait training activities to normalize gait pattern for 20 minutes. The following activities were included:    Gait belt used for safety. Assistive device: LBQC at [facility] [place], [employer]IC glider on right foot-- emphasis on initiating RLE swing throughout entire trials     Trial 1: 127 feet, 2 90 deg turns  Trial 2: 121 feet  Trial 3: 133, 2 90 deg turns     Tech present on right side of patient to cue for upright posture as needed, w/c follow  Mod A from PT in front to weight shift, promote right hip extension and upright trunk positioning, and to advance RLE in swing  Deficits: right knee hyperextension and right hip flexion in stance due to ankle plantarflexion contracture; step through/to pattern; able to initiate hip flexion in swing phase     Time Entry(in minutes):   See above    Assessment & Plan   Assessment:  Kateryna tolerated therapy session well this morning and continues to provide good effort throughout each activity. Focused on proper sit <> stand form for improved independence; good carryover throughout activity but recommend continued practice. Also focused on endurance with ambulation with patient able to increase distance of ambulation trials today and complete 90 deg turns.  Patient remains appropriate for continued high intensity PT intervention to further progress ongoing mobility deficits.        The patient will continue to benefit from skilled outpatient physical therapy in order to address the deficits listed in the problem list on the initial evaluation, provide patient and family education, and maximize the patients level of independence in the home and community environments.     The  patient's spiritual, cultural, and educational needs were considered, and the patient is agreeable to the plan of care and goals.             Plan:   Continue to progress ambulation endurance and independence with functional transfers. Promote RLE hip flexion initiate in swing phase of gait trials.     Goals:  Short Term Goals: 4 weeks   Patient/caregiver to be (I) with established home exercise program. MET 06/10/25  Patient to improve supine <> sit transfer to Mod A consistently for improved independence with bed mobility. MET 06/10/25  Patient to improve sit <> stand transfer with no more than Min A consistently for improved (I) with functional transfers. MET 05/29/25  Patient to improve stand pivot transfers to no more than Mod A consistently for improved (I) with functional transfers. MET 05/29/25  Patient to improve ambulation x 12 feet in // bars with no more than Mod A x 1 assist consistently to progress gait training to appropriate assistive device. MET 05/29/25  Patient to propel w/c x 100 feet, including 90 deg turns with no more than SBA for improved independence with w/c mobility. Ongoing     Long Term Goals: 8 weeks   Patient to improve supine <> sit transfer to Min A consistently for improved independence with bed mobility. Progressing  Patient to improve sit <> stand transfer with no more than CGA consistently for improved (I) with functional transfers. Progressing  Patient to improve stand pivot transfers to no more than Min A consistently for improved (I) with functional transfers. MET 06/16/25  Patient to improve ambulation x 25 feet with no more than Mod A x 1 assist with appropriate assistive device for improved independence with ambulation. MET 06/10/25              Updated 06/10/25: Patient to ambulate x 50 feet with no more than Mod A with LBQC for improved independence with ambulation. Ongoing  Patient to propel w/c x 150 feet, including 90 deg turns with no more than SBA for improved  independence with w/c mobility. Ongoing    Madhuri Burris, PT

## 2025-06-20 ENCOUNTER — CLINICAL SUPPORT (OUTPATIENT)
Dept: REHABILITATION | Facility: HOSPITAL | Age: 65
End: 2025-06-20
Payer: COMMERCIAL

## 2025-06-20 DIAGNOSIS — Z74.09 IMPAIRED FUNCTIONAL MOBILITY, BALANCE, GAIT, AND ENDURANCE: Primary | ICD-10-CM

## 2025-06-20 PROCEDURE — 97110 THERAPEUTIC EXERCISES: CPT | Mod: PO

## 2025-06-20 PROCEDURE — 97112 NEUROMUSCULAR REEDUCATION: CPT | Mod: PO

## 2025-06-20 PROCEDURE — 97530 THERAPEUTIC ACTIVITIES: CPT | Mod: PO

## 2025-06-20 PROCEDURE — 97116 GAIT TRAINING THERAPY: CPT | Mod: PO

## 2025-06-23 ENCOUNTER — CLINICAL SUPPORT (OUTPATIENT)
Dept: REHABILITATION | Facility: HOSPITAL | Age: 65
End: 2025-06-23
Payer: COMMERCIAL

## 2025-06-23 DIAGNOSIS — R25.2 SPASTICITY: ICD-10-CM

## 2025-06-23 DIAGNOSIS — Z74.09 IMPAIRED MOBILITY AND ADLS: Primary | ICD-10-CM

## 2025-06-23 DIAGNOSIS — Z74.09 IMPAIRED FUNCTIONAL MOBILITY, BALANCE, GAIT, AND ENDURANCE: Primary | ICD-10-CM

## 2025-06-23 DIAGNOSIS — Z78.9 IMPAIRED MOBILITY AND ADLS: Primary | ICD-10-CM

## 2025-06-23 DIAGNOSIS — G81.94 LEFT HEMIPARESIS: ICD-10-CM

## 2025-06-23 DIAGNOSIS — R47.01 GLOBAL APHASIA: Primary | ICD-10-CM

## 2025-06-23 PROCEDURE — 97112 NEUROMUSCULAR REEDUCATION: CPT | Mod: PO

## 2025-06-23 PROCEDURE — 92507 TX SP LANG VOICE COMM INDIV: CPT | Mod: PO

## 2025-06-23 PROCEDURE — 97116 GAIT TRAINING THERAPY: CPT | Mod: PO

## 2025-06-23 PROCEDURE — 97110 THERAPEUTIC EXERCISES: CPT | Mod: PO

## 2025-06-23 PROCEDURE — 97530 THERAPEUTIC ACTIVITIES: CPT | Mod: PO

## 2025-06-23 NOTE — PROGRESS NOTES
Outpatient Rehab    Occupational Therapy Visit    Patient Name: Kateryna Weber  MRN: [id number]  YOB: 1960  Encounter Date: 6/23/2025    Therapy Diagnosis:   Encounter Diagnoses   Name Primary?    Impaired mobility and ADLs Yes    Left hemiparesis     Spasticity      Physician: Latisha Garg MD    Physician Orders: Eval and Treat  Medical Diagnosis: Hemiparesis of right dominant side as late effect of nontraumatic subarachnoid hemorrhage  Surgical Diagnosis: Not applicable for this Episode   Surgical Date: Not applicable for this Episode  Days Since Last Surgery: Not applicable for this Episode    Visit # / Visits Authorized: 16 / 22  Insurance Authorization Period: 5/12/2025 to 12/31/2025  Date of Evaluation: 5/9/2025  Plan of Care Certification: 5/9/2025 to 7/18/2025      Time In: 0937   Time Out: 1021  Total Time (in minutes): 44   Total Billable Time (in minutes): 44    Precautions: Standard, Fall, aphasia, h/o cancer        Subjective   Pt's  reported that chaset brought 4 new splints. She has been wearing them for short periods of time; within her tolerance. She reported her right arm is sore..  Family / care giver present for this visit:   Pain reported as 4/10. Location: RUE using Erazo Finney Scale    Objective          Objective measures can be seen in most recent progress note on 6/6/25    Treatment:     Neuromuscular re-education activities to improve Coordination, Kinesthetic, Sense, Proprioception, and Posture for 36 minutes. The following activities were included:  Stand pivot transfer from w/c>mat with Min A using LBQC; assist for weight shifts     Seated EOM:   - Brief PROM RUE elbow extension    Sit>supine with Min A using log rolling technique; pt educated to reach across body with LUE to assist. Supine>L sidelying with Min A.     L sidelying with pillow placed between LEs to maximize pt comfort:   - Scap mobs all planes including elevation, retraction, protraction,  and depression  - Focal muscle vibration (FMV) to right triceps to facilitate relaxation of biceps to address hypertonicity and spasticity of RUE, x 3-4 minutes  - PROM RUE elbow extension   - PROM RUE wrist stretches including RD/UD, flexion/extension, forearm supination/pronation, and digits towards extension    L sidelying>supine Mod I. Supine>R sidelying with Min A. R sidelying>supine with CGA.     Seated EOM:   - With RUE held in slight ER with neutral forearm positioning, downward reach with LUE for completion of the following:    mini colored bowling pins as indicated by therapist, 1 x 10   mini numbered bowling pins as indicated by therapist, 2 x 5    Note: Verbal cues required for color/number corrections     Self care to improve ADL and IADL tasks for 8 minutes, including:  - UB dressing to nikki/doff pullover shirt with Min A   [place]: Verbal cues for aly technique and to guide right arm through sleeve  [place]: Verbal cues for aly technique and to bring shirt over head     Time Entry(in minutes):  Neuromuscular Re-Education Time Entry: 36  Therapeutic Activity Time Entry: 8    Assessment & Plan   Assessment: Pt tolerated today's session well. Better mat mobility requiring less assistance using log rolling technique. Pt with good response to focal muscle vibration exhibiting decreased flexor tone when applied to triceps. Pt had trouble differentiating between colored bowling pins and was unable to correctly identify any, other than one, without assistance. Improved identification with numbers, but some mistakes still made. Overall, pt did well with UB dressing. Encouragement needed along with verbal cues for aly techniques, but pt was able to nikki and doff with only minimal assistance. Encouraged carryover in the home. Pt would continue to benefit from skilled occupational therapy services for neuro muscular re-education and to maximize overall participation with ADLs.        The patient  will continue to benefit from skilled outpatient occupational therapy in order to address the deficits listed in the problem list on the initial evaluation, provide patient and family education, and maximize the patients level of independence in the home and community environments.     The patient's spiritual, cultural, and educational needs were considered, and the patient is agreeable to the plan of care and goals.           Plan: Cont OT POC 3x/wk; focus on RUE stretching to prevent further joint contractures, mat/bed mobility; compensatory strategies/aly techniques for ADLs. Continue to recommend inpatient rehab for patient at this time.    Goals:   Short Term: 5 weeks  - Pt will participate in upper body dressing with moderate assistance using adaptive techniques or devices to increase independence in ADLs. MET per pt's  report; ongoing for demo  - Pt will tolerate right upper extremity range of motion program x10 min without need for rest break. ongoing  - Pt will complete stand pivot transfer with minimal assistance. ongoing  - Pt will complete lateral scooting as needed for ADLs with min(A). MET 6/6/25  - Pt will tolerate right upper extremity splint/orthosis x1 hour as needed for tone/spasticity management. MET 6/6/25; pt's  contacting rep for different size elbow ext splint   - Pt will be independent with Home Exercise Program (HEP)/Home Activity Program (HAP) and report at least 50% compliance. ongoing     Long Term: 10 weeks   - Pt will participate in upper body dressing with minimal assistance using adaptive techniques or devices to increase independence in ADLs. ongoing  - Pt will complete lower body dressing (pants, brief) with moderate assistance. ongoing  - Pt will demonstrate improved upper extremity positioning in wheelchair and bed to reduce spasticity and risk of contracture in RUE, as evidenced by maintaining neutral shoulder and elbow alignment with appropriate supports for at  least 80% of observed sessions. ongoing  - Pt will tolerate right wrist in neutral x8 minutes as needed for weightbearing and functional tasks. Ongoing   - Caregiver will demo understanding for range of motion program and positioning for Right upper extremity in seated and supine. ongoing  - Pt will be independent with Home Exercise Program (HEP)/Home Activity Program (HAP) to promote long term maintenance of progress made in therapy. ongoing     Goals to be added and edited within plan of care as needed/appropriate.    Kelsey Milan, OT

## 2025-06-23 NOTE — PROGRESS NOTES
"  Outpatient Rehab    Physical Therapy Visit    Patient Name: Kateryna Weber  MRN: [id number]  YOB: 1960  Encounter Date: 6/23/2025    Therapy Diagnosis:   Encounter Diagnosis   Name Primary?    Impaired functional mobility, balance, gait, and endurance Yes               Physician: Latisha Garg MD    Physician Orders: Eval and Treat  Medical Diagnosis: Hemiparesis of right dominant side as late effect of nontraumatic subarachnoid hemorrhage  Surgical Diagnosis: Not applicable for this Episode   Surgical Date: Not applicable for this Episode    Visit # / Visits Authorized:  18 / 22  Insurance Authorization Period: 5/9/2025 to 12/31/2025  Date of Evaluation: 5/9/2025  Plan of Care Certification: 5/9/2025 to 7/4/2025      PT/PTA: PT  Number of PTA visits since last PT visit:0  Time In: 0845   Time Out: 0930  Total Time (in minutes): 45   Total Billable Time (in minutes): 45      Precautions:     Standard, Fall, aphasia, h/o cancer      Subjective    "Good."  Patient's  reports that she has been tolerating the ankle dynasplint.            Objective    Last performed on 06/10/25.         Treatment:     Kateryna received therapeutic exercises to develop strength, endurance, ROM, flexibility, posture, and core stabilization for 08 minutes including:   Seated right ankle dorsiflexion mobilizations Grade 3 to reduce plantarflexion contracture and tightness  Seated right ankle dorsiflexion PROM with overpressure stretch    Patient participated in neuromuscular re-education activities to improve: Balance, Coordination, and Sense for 08 minutes. The following activities were included:   X 10 reps sit <> stand from mat, LUE pushing from mat--- emphasis on motor control and proper form, TCs for proper form      Patient participated in dynamic functional therapeutic activities to improve functional performance for 10 minutes. Including:   W/c > mat stand pivot transfer with LBQC moving to left side, CGA   Sit " > supine Min A for RLE management  Supine > sit: rolled to right side SBA, Min A for trunk  Sit <> stand x multiple trials, CGA to Min A with LBQC    Attempted 20 feet w/c propulsion with LUE to propel and LLE to steer, Mod A to perform        Patient participated in gait training activities to normalize gait pattern for 19 minutes. The following activities were included:    Gait belt used for safety. Assistive device: LBQC at E, Neuro-ERIC glider on right foot-- emphasis on initiating RLE swing throughout entire trials     Trial 1: 127 feet, 2 90 deg turns  Trial 2: 121 feet  Trial 3: 130 feet     Tech present on right side of patient to cue for upright posture as needed, w/c follow  Mod A from PT in front to weight shift, promote right hip extension and upright trunk positioning, and to advance RLE in swing  Deficits: right knee hyperextension and right hip flexion in stance due to ankle plantarflexion contracture; step through/to pattern; able to initiate hip flexion in swing phase     Time Entry(in minutes):   See above    Assessment & Plan   Assessment:  Kateryna tolerated therapy session well this morning and continues to provide good effort throughout each activity. Continued to focus on proper form for improved safety with sit <> stand transfer; improved form throughout trials, but reinforcement recommended. Improved independence noted with stand pivot transfer this date. Able to maintain distance throughout ambulation trials today, but cues needed to maintain upright posture.  Patient remains appropriate for continued high intensity PT intervention to further progress ongoing mobility deficits.        The patient will continue to benefit from skilled outpatient physical therapy in order to address the deficits listed in the problem list on the initial evaluation, provide patient and family education, and maximize the patients level of independence in the home and community environments.     The patient's  spiritual, cultural, and educational needs were considered, and the patient is agreeable to the plan of care and goals.             Plan:   Continue to progress ambulation endurance and independence with functional transfers. Promote RLE hip flexion initiate in swing phase of gait trials.     Goals:  Short Term Goals: 4 weeks   Patient/caregiver to be (I) with established home exercise program. MET 06/10/25  Patient to improve supine <> sit transfer to Mod A consistently for improved independence with bed mobility. MET 06/10/25  Patient to improve sit <> stand transfer with no more than Min A consistently for improved (I) with functional transfers. MET 05/29/25  Patient to improve stand pivot transfers to no more than Mod A consistently for improved (I) with functional transfers. MET 05/29/25  Patient to improve ambulation x 12 feet in // bars with no more than Mod A x 1 assist consistently to progress gait training to appropriate assistive device. MET 05/29/25  Patient to propel w/c x 100 feet, including 90 deg turns with no more than SBA for improved independence with w/c mobility. Ongoing     Long Term Goals: 8 weeks   Patient to improve supine <> sit transfer to Min A consistently for improved independence with bed mobility. Progressing  Patient to improve sit <> stand transfer with no more than CGA consistently for improved (I) with functional transfers. Progressing  Patient to improve stand pivot transfers to no more than Min A consistently for improved (I) with functional transfers. MET 06/16/25  Patient to improve ambulation x 25 feet with no more than Mod A x 1 assist with appropriate assistive device for improved independence with ambulation. MET 06/10/25              Updated 06/10/25: Patient to ambulate x 50 feet with no more than Mod A with LBQC for improved independence with ambulation. Ongoing  Patient to propel w/c x 150 feet, including 90 deg turns with no more than SBA for improved independence  with w/c mobility. Ongoing    Madhuri Burris, PT

## 2025-06-24 NOTE — PROGRESS NOTES
Outpatient Rehab  Speech-Language Pathology Visit    Patient Name: Kateryna Weber  MRN: [id number]  YOB: 1960  Encounter Date: 6/23/2025    Therapy Diagnosis:   Encounter Diagnosis   Name Primary?    Global aphasia Yes     Physician: Latisha Garg MD  Physician Orders: Eval and Treat  Medical Diagnosis: Aphasia as late effect of cerebrovascular accident    Visit # / Visits Authorized: 12 / 20   Insurance Authorization Period: 5/9/2025 to 12/31/2025  Date of Evaluation: 5/9/2025   Plan of Care Certification: 5/9/2025 to 7/18/2025      Time In: 1015   Time Out: 1100  Total Time (in minutes): 45   Total Billable Time (in minutes): 45    Precautions:  Standard, Fall, aphasia, h/o cancer    Subjective   Pleasant; no complaints.   present during today's session..       Family/caregiver present for this visit:       Objective /Treatment             1. Patient will identify objects in a field of 3 given the name/function of the object with 75% accuracy following nonverbal cues and requesting repetitions as needed across 2-3 sessions to improve auditory comprehension skills. (Met)    Met         2. Patient will answer 1 unit/personal yes/no questions with 75% accuracy following nonverbal cues and requesting repetitions as needed across 2-3 sessions to improve auditory comprehension skills. (Progressing)    1 unit yes/no questions with 100% acc Independently; GOAL MET        3. Patient will name objects with 75% acc given carrier phrase as needed across 3 sessions     (Progressing)    10% acc given a carrier phrase and phonemic cue, 30% accuracy given max cues.  Difficulty naming despite max cues.     I.D. objects in a field of 3 with 100% acc Independently        4. Patient will participate in the speech production treatment hierarchy with mono-syllabic /m/ words with 75% acc across 3 probe trials.    (Progressing)    See below        5. Patient will use gestures and/or low-tech AAC (e.g.,  communication board, yes/no cards, picture symbols) to express basic wants and needs in response to visual/verbal cues with 75% accuracy across 3 consecutive sessions. (Progressing)    Used TD SNAP in session to answer questions, spelling was difficult but seeing options aided in self cueing     Patient wrote her name, Jayne. Patient had difficulty writing to dictation. Difficulty copying words; however, patient as able to trace large words including water and tea.  Patient uses a thick pen/marker to write due to decreased fine motor/strength of hands.    Start:  05/09/25    Expected End:  06/20/25           Verbally: patient requested 'more' verbally x 5 to request more pudding. Patient also repeated 'tea' x 5. Tea is her most requested drink at home.     Patient has been calling family members and having a conversation on the phone. She is able to say some automatic phrases such as 'hey, how are you' and 'what are ya'll doing.'  'We were just calling to check on you.'  Difficult for her to elaborate in the conversation but the family is very happy about this functional language development.     Additional:  Grandchildren's names Rod King Hartley  Cat: Biddy    /m/ one syllable - Bilabial Nasal    Step 1 - Repetition / Minimal Contrast Word Step 2 - Printed Letter or Target Sound with Repetition Step 3- Integral Stimulation Step 4- Articulatory Placement Cues 5 Repetitions of Correct Production   May - +   5/5 min A   More - +   5/5 min A   Map - - - -    Man +    5/5 indly   Mean +    5/5 indly   Meat +    5/5 indly   Mow - - +  5/5 Brendan     Time Entry(in minutes):  Speech Treatment (Individual) Time Entry: 45    Assessment & Plan   Assessment  Increased frustration today that was redirected by alternating structured and unstructured tasks. Phonemic cues remain most effective in improving verbal output. Increased spontaneous phrases and sentences in conversation at home and during session. Patient enjoys  pudding. Patient requested more verbally x 6 to eat more pudding. Patient and  reportedly have low tech communication board at home but they forget to use it.   Evaluation/Treatment Tolerance: Patient tolerated treatment well    The patient will continue to benefit from skilled outpatient speech therapy in order to address the deficits listed in the problem list on the initial evaluation, provide patient and family education, and maximize the patients level of independence in the home and community environments.     The patient's spiritual, cultural, and educational needs were considered, and the patient is agreeable to the plan of care and goals.          Plan  Continue OP ST 3x per week        Goals:   Active       Long Term Goals        1.  She will develop functional cognitive-linguistic based skills and utilize compensatory strategies to communicate wants/needs effectively to different conversational partners, maintain safety, participate socially in functional living environment.    (Progressing)       Start:  05/09/25    Expected End:  07/18/25            2. Patient will trial compensatory strategies and augmentative-alternative communication (AAC) to communicate wants and needs effectively to different conversational partners, maintain safety, and participate socially in functional living environment.  (Progressing)       Start:  05/09/25    Expected End:  07/18/25               Short Term Goals        1. Patient will identify objects in a field of 3 given the name/function of the object with 75% accuracy following nonverbal cues and requesting repetitions as needed across 2-3 sessions to improve auditory comprehension skills. (Met)       Start:  05/09/25    Expected End:  06/20/25    Resolved:  05/29/25         2. Patient will answer 1 unit/personal yes/no questions with 75% accuracy following nonverbal cues and requesting repetitions as needed across 2-3 sessions to improve auditory comprehension  skills. (Progressing)       Start:  05/09/25    Expected End:  06/20/25            3. Patient will name objects with 75% acc given carrier phrase as needed across 3 sessions     (Progressing)       Start:  05/09/25    Expected End:  06/20/25            4. Patient will participate in the speech production treatment hierarchy with mono-syllabic /m/ words with 75% acc across 3 probe trials.    (Progressing)       Start:  05/09/25    Expected End:  06/20/25            5. Patient will use gestures and/or low-tech AAC (e.g., communication board, yes/no cards, picture symbols) to express basic wants and needs in response to visual/verbal cues with 75% accuracy across 3 consecutive sessions. (Progressing)       Start:  05/09/25    Expected End:  06/20/25                Virginia Beaulieu M.S., L-SLP,CCC-SLP   Speech Language Pathologist

## 2025-06-24 NOTE — PROGRESS NOTES
Outpatient Rehab    Occupational Therapy Visit    Patient Name: Kateryna Weber  MRN: [id number]  YOB: 1960  Encounter Date: 6/25/2025    Therapy Diagnosis:   Encounter Diagnoses   Name Primary?    Impaired mobility and ADLs Yes    Left hemiparesis     Spasticity        Physician: Latisha Garg MD    Physician Orders: Eval and Treat  Medical Diagnosis: Hemiparesis of right dominant side as late effect of nontraumatic subarachnoid hemorrhage  Surgical Diagnosis: Not applicable for this Episode   Surgical Date: Not applicable for this Episode  Days Since Last Surgery: Not applicable for this Episode    Visit # / Visits Authorized: 17 / 22  Insurance Authorization Period: 5/12/2025 to 12/31/2025  Date of Evaluation: 5/9/2025  Plan of Care Certification: 5/9/2025 to 7/18/2025      Time In: 0931   Time Out: 1015  Total Time (in minutes): 44   Total Billable Time (in minutes): 44    Precautions: Standard, Fall, aphasia, h/o cancer        Subjective   Pt's  reported she is tolerating dynamic elbow splint more. More pain when stretching R shoulder this morning. She was able to put her own deodarant on under B armpits..  Family / care giver present for this visit:     Pain reported as 2/10. Location: RUE using Erazo Finney Scale    Objective          Objective measures can be seen in most recent progress note on 6/6/25 (visit 11)     Treatment:     Neuromuscular re-education activities to improve Coordination, Kinesthetic, Sense, Proprioception, and Posture for 44 minutes. The following activities were included:  Stand pivot transfer with OTS from w/c>mat (towards left) with Max A using LBQC; assist for weight shifts     Seated EOM:   - Brief PROM RUE elbow extension    Sit>supine with Min A using log rolling technique; assisted pt with hooking LLE under RLE and reminded pt to reach across body with LUE to assist. Supine>L sidelying with supervision and verbal cueing to look L to promote completion of  roll.     L sidelying with pillow placed between LEs to maximize pt comfort:   - Scap mobs all planes including elevation, retraction, protraction, and depression followed by CW/CCW directions  - Pt's  educated on how to complete scap mobs with pt in sidelying and how to maximize proper body mechanics     L sidelying>supine Mod I.     Supine:  - PROM RUE elbow extension   - PROM/attempted AAROM RUE in the following planes with sustained end range stretch:   Shoulder flexion, x3  Shoulder abduction with STM to pec, x3   ER with STM to pec/lat insertion, x2  - PROM/attempted AAROM RUE PNF D1 and D2 patterns, x5 each; max verbal cueing to visually attend to RUE   - PROM RUE wrist stretches including RD/UD, flexion/extension, forearm supination/pronation, and digits towards extension    Supine>R sidelying>sit with CGA and verbal cues for encouragement to complete without physical assist; initial guidance to hook LLE under RLE    Stand pivot transfer from mat>w/c (towards left) with Min A using LBQC     Time Entry(in minutes):  Neuromuscular Re-Education Time Entry: 44    Assessment & Plan   Assessment: Pt tolerated today's session well. Pt continues to demonstrate more independence with mat mobility, which is being carried over into the home. Pt demonstrated inconsistent, trace muscle activation with eccentric shoulder flexion and abduction and PNF D1F. Pt's  provided return demonstration on sidelying scap mobs applying proper body mechanics. He also videoed how to complete sidelying scap mobs in CW/CCW directions for at home carryover. Pt would continue to benefit from skilled occupational therapy services for neuro muscular re-education and to maximize overall participation with ADLs.        The patient will continue to benefit from skilled outpatient occupational therapy in order to address the deficits listed in the problem list on the initial evaluation, provide patient and family education, and  maximize the patients level of independence in the home and community environments.     The patient's spiritual, cultural, and educational needs were considered, and the patient is agreeable to the plan of care and goals.           Plan: Cont OT POC 3x/wk; focus on RUE stretching to prevent further joint contractures, mat/bed mobility; compensatory strategies/aly techniques for ADLs. Continue to recommend inpatient rehab for patient at this time.    Goals:   Short Term: 5 weeks  - Pt will participate in upper body dressing with moderate assistance using adaptive techniques or devices to increase independence in ADLs. MET per pt's  report; ongoing for demo  - Pt will tolerate right upper extremity range of motion program x10 min without need for rest break. ongoing  - Pt will complete stand pivot transfer with minimal assistance. ongoing  - Pt will complete lateral scooting as needed for ADLs with min(A). MET 6/6/25  - Pt will tolerate right upper extremity splint/orthosis x1 hour as needed for tone/spasticity management. MET 6/6/25; pt's  contacting rep for different size elbow ext splint   - Pt will be independent with Home Exercise Program (HEP)/Home Activity Program (HAP) and report at least 50% compliance. ongoing     Long Term: 10 weeks   - Pt will participate in upper body dressing with minimal assistance using adaptive techniques or devices to increase independence in ADLs. ongoing  - Pt will complete lower body dressing (pants, brief) with moderate assistance. ongoing  - Pt will demonstrate improved upper extremity positioning in wheelchair and bed to reduce spasticity and risk of contracture in RUE, as evidenced by maintaining neutral shoulder and elbow alignment with appropriate supports for at least 80% of observed sessions. ongoing  - Pt will tolerate right wrist in neutral x8 minutes as needed for weightbearing and functional tasks. Ongoing   - Caregiver will demo understanding for range  of motion program and positioning for Right upper extremity in seated and supine. ongoing  - Pt will be independent with Home Exercise Program (HEP)/Home Activity Program (HAP) to promote long term maintenance of progress made in therapy. ongoing     Goals to be added and edited within plan of care as needed/appropriate.    Kelsey Milan, OT

## 2025-06-25 ENCOUNTER — CLINICAL SUPPORT (OUTPATIENT)
Dept: REHABILITATION | Facility: HOSPITAL | Age: 65
End: 2025-06-25
Payer: COMMERCIAL

## 2025-06-25 DIAGNOSIS — Z74.09 IMPAIRED MOBILITY AND ADLS: Primary | ICD-10-CM

## 2025-06-25 DIAGNOSIS — Z74.09 IMPAIRED FUNCTIONAL MOBILITY, BALANCE, GAIT, AND ENDURANCE: Primary | ICD-10-CM

## 2025-06-25 DIAGNOSIS — Z78.9 IMPAIRED MOBILITY AND ADLS: Primary | ICD-10-CM

## 2025-06-25 DIAGNOSIS — R47.01 GLOBAL APHASIA: Primary | ICD-10-CM

## 2025-06-25 DIAGNOSIS — R25.2 SPASTICITY: ICD-10-CM

## 2025-06-25 DIAGNOSIS — G81.94 LEFT HEMIPARESIS: ICD-10-CM

## 2025-06-25 PROCEDURE — 92507 TX SP LANG VOICE COMM INDIV: CPT | Mod: PO

## 2025-06-25 PROCEDURE — 97112 NEUROMUSCULAR REEDUCATION: CPT | Mod: PO

## 2025-06-25 PROCEDURE — 97116 GAIT TRAINING THERAPY: CPT | Mod: PO,CQ

## 2025-06-25 PROCEDURE — 97110 THERAPEUTIC EXERCISES: CPT | Mod: PO,CQ

## 2025-06-25 PROCEDURE — 97530 THERAPEUTIC ACTIVITIES: CPT | Mod: PO,CQ

## 2025-06-25 NOTE — PROGRESS NOTES
"Outpatient Rehab  Speech-Language Pathology Visit    Patient Name: Kateryna Weber  MRN: [id number]  YOB: 1960  Encounter Date: 6/25/2025    Therapy Diagnosis:   Encounter Diagnosis   Name Primary?    Global aphasia Yes   Physician: Latisha Garg MD  Physician Orders: Eval and Treat  Medical Diagnosis: Aphasia as late effect of cerebrovascular accident    Visit # / Visits Authorized: 13 / 20   Insurance Authorization Period: 5/9/2025 to 12/31/2025  Date of Evaluation: 5/9/2025   Plan of Care Certification: 5/9/2025 to 7/18/2025      Time In: 1115   Time Out: 1200  Total Time (in minutes): 45   Total Billable Time (in minutes): 45    Precautions:  Standard, Fall, aphasia, h/o cancer    Subjective   Motivated after PT and OT sessions.  present. Reports that Kateryna is saying "bathroom" and "Tea" at home. SLP observed Kateryna effectively communicating with her PT when walking toward the SLP office..             Objective /Treatment             1. Patient will identify objects in a field of 3 given the name/function of the object with 75% accuracy following nonverbal cues and requesting repetitions as needed across 2-3 sessions to improve auditory comprehension skills. (Met)    Met         2. Patient will answer 1 unit/personal yes/no questions with 75% accuracy following nonverbal cues and requesting repetitions as needed across 2-3 sessions to improve auditory comprehension skills. (Progressing)    GOAL MET  3 unit yes no: 90% accuracy independently ,100% accuracy given a repetition  Followed 2 unit body part commands on 1/6 independently, 6/6 given a model   ID body parts on 1/8 attempts, 8/8 attempts given a model         3. Patient will name objects with 75% acc given carrier phrase as needed across 3 sessions     (Progressing)    40% acc given a carrier phrase and phonemic cue, 70% accuracy given max cues.  Difficulty naming despite max cues.             4. Patient will participate in the " speech production treatment hierarchy with mono-syllabic /m/ words with 75% acc across 3 probe trials.    (Progressing)    See below        5. Patient will use gestures and/or low-tech AAC (e.g., communication board, yes/no cards, picture symbols) to express basic wants and needs in response to visual/verbal cues with 75% accuracy across 3 consecutive sessions. (Progressing)    Used yes/no onesimo for yes no questions.   Wrote her name independently  Wrote her birthday month independently, copied date and year    Start:  05/09/25    Expected End:  06/20/25             Matched phrase to a picture in a field of 2 phrases with 90% accuracy independently       Additional:  Grandchildren's names Fernando, Rod, Henrry  Cat: Biddy    /m/ one syllable - Bilabial Nasal    Step 1 - Repetition / Minimal Contrast Word Step 2 - Printed Letter or Target Sound with Repetition Step 3- Integral Stimulation Step 4- Articulatory Placement Cues 5 Repetitions of Correct Production   May        More        Map        Man        Mean        Meat        Mow          Time Entry(in minutes):  Speech Treatment (Individual) Time Entry: 45    Assessment & Plan   Assessment  Kateryna is making significant progress in therapy. With increased language comprehension, episodes of frustration are appropriately becoming more frequent; however, increased frustration has allowed for rapid de-escalation of frustration. Increased spontaneous appropriate utterances  in conversation today.   Evaluation/Treatment Tolerance: Patient tolerated treatment well    The patient will continue to benefit from skilled outpatient speech therapy in order to address the deficits listed in the problem list on the initial evaluation, provide patient and family education, and maximize the patients level of independence in the home and community environments.     The patient's spiritual, cultural, and educational needs were considered, and the patient is agreeable to the plan of  care and goals.      Plan  Continue OP ST 3x per week; patient would benefit from inpatient rehab stay.        Goals:   Active       Long Term Goals        1.  She will develop functional cognitive-linguistic based skills and utilize compensatory strategies to communicate wants/needs effectively to different conversational partners, maintain safety, participate socially in functional living environment.    (Progressing)       Start:  05/09/25    Expected End:  07/18/25            2. Patient will trial compensatory strategies and augmentative-alternative communication (AAC) to communicate wants and needs effectively to different conversational partners, maintain safety, and participate socially in functional living environment.  (Progressing)       Start:  05/09/25    Expected End:  07/18/25               Short Term Goals        1. Patient will identify objects in a field of 3 given the name/function of the object with 75% accuracy following nonverbal cues and requesting repetitions as needed across 2-3 sessions to improve auditory comprehension skills. (Met)       Start:  05/09/25    Expected End:  06/20/25    Resolved:  05/29/25         2. Patient will answer 1 unit/personal yes/no questions with 75% accuracy following nonverbal cues and requesting repetitions as needed across 2-3 sessions to improve auditory comprehension skills. (Progressing)       Start:  05/09/25    Expected End:  06/20/25            3. Patient will name objects with 75% acc given carrier phrase as needed across 3 sessions     (Progressing)       Start:  05/09/25    Expected End:  06/20/25            4. Patient will participate in the speech production treatment hierarchy with mono-syllabic /m/ words with 75% acc across 3 probe trials.    (Progressing)       Start:  05/09/25    Expected End:  06/20/25            5. Patient will use gestures and/or low-tech AAC (e.g., communication board, yes/no cards, picture symbols) to express basic wants and  needs in response to visual/verbal cues with 75% accuracy across 3 consecutive sessions. (Progressing)       Start:  05/09/25    Expected End:  06/20/25                JANA Gaitan-SLP, CCC-SLP

## 2025-06-25 NOTE — PROGRESS NOTES
"  Outpatient Rehab    Physical Therapy Visit    Patient Name: Kateryna Weber  MRN: [id number]  YOB: 1960  Encounter Date: 6/25/2025    Therapy Diagnosis: No diagnosis found.  Physician: Latisha Garg MD    Physician Orders: Eval and Treat  Medical Diagnosis: Hemiparesis of right dominant side as late effect of nontraumatic subarachnoid hemorrhage  Surgical Diagnosis: Not applicable for this Episode   Surgical Date: Not applicable for this Episode  Days Since Last Surgery: Not applicable for this Episode    Visit # / Visits Authorized:  19 / 22  Insurance Authorization Period: 5/9/2025 to 12/31/2025  Date of Evaluation:   Plan of Care Certification:       PT/PTA: PTA   Number of PTA visits since last PT visit:1  Time In: 1030   Time Out: 1115  Total Time (in minutes): 45   Total Billable Time (in minutes): 45      Precautions:       Subjective   "Hi".  Family / care giver present for this visit:  ( present)         Objective            Treatment:   Kateryna received therapeutic exercises to develop strength, endurance, ROM, flexibility, posture, and core stabilization for 10 minutes including:   Seated right ankle dorsiflexion mobilizations Grade 3 to reduce plantarflexion contracture and tightness  Seated right ankle dorsiflexion PROM with overpressure stretch       Patient participated in dynamic functional therapeutic activities to improve functional performance for 13 minutes. Including:   W/c > mat stand pivot transfer with LBQC moving to left side, CGA   Sit > supine Min A for RLE management  Supine > sit: rolled to right side SBA, Min A for trunk  Sit <> stand x multiple trials, CGA to Min A with LBQC     Attempted 20 feet w/c propulsion with LUE to propel and LLE to steer, Mod A to perform        Patient participated in gait training activities to normalize gait pattern for 22 minutes. The following activities were included:    Gait belt used for safety. Assistive device: LBQC at LUE, " Neuro-ERIC glider on right foot-- emphasis on initiating RLE swing throughout entire trials     Trial 1: 65 feet,   Trial 2: 75 feet       Tech present on right side of patient to cue for upright posture as needed, w/c follow  Mod A from PT in front to weight shift, promote right hip extension and upright trunk positioning, and to advance RLE in swing  Deficits: right knee hyperextension and right hip flexion in stance due to ankle plantarflexion contracture; step through/to pattern; able to initiate hip flexion in swing phase     Time Entry(in minutes):  Gait Training Time Entry: 22  Therapeutic Activity Time Entry: 13  Therapeutic Exercise Time Entry: 10    Assessment & Plan   Assessment: Kateryna tolerated her tx session fairly well today.  Kateryna was not able to ambulate as far today and was more agitated during her gait trials today.  Kateryna required 1 sitting rest break during her tx session.    Evaluation/Treatment Tolerance: Patient limited by fatigue    The patient will continue to benefit from skilled outpatient physical therapy in order to address the deficits listed in the problem list on the initial evaluation, provide patient and family education, and maximize the patients level of independence in the home and community environments.     The patient's spiritual, cultural, and educational needs were considered, and the patient is agreeable to the plan of care and goals.           Plan: Cont with stretching, functional mobility and gait    Goals:     Sandee Ferguson, PTA

## 2025-06-27 ENCOUNTER — CLINICAL SUPPORT (OUTPATIENT)
Dept: REHABILITATION | Facility: HOSPITAL | Age: 65
End: 2025-06-27
Payer: COMMERCIAL

## 2025-06-27 ENCOUNTER — OUTPATIENT CASE MANAGEMENT (OUTPATIENT)
Dept: ADMINISTRATIVE | Facility: OTHER | Age: 65
End: 2025-06-27
Payer: COMMERCIAL

## 2025-06-27 DIAGNOSIS — G81.94 LEFT HEMIPARESIS: ICD-10-CM

## 2025-06-27 DIAGNOSIS — R47.01 GLOBAL APHASIA: Primary | ICD-10-CM

## 2025-06-27 DIAGNOSIS — R25.2 SPASTICITY: ICD-10-CM

## 2025-06-27 DIAGNOSIS — Z74.09 IMPAIRED FUNCTIONAL MOBILITY, BALANCE, GAIT, AND ENDURANCE: Primary | ICD-10-CM

## 2025-06-27 DIAGNOSIS — Z74.09 IMPAIRED MOBILITY AND ADLS: Primary | ICD-10-CM

## 2025-06-27 DIAGNOSIS — Z78.9 IMPAIRED MOBILITY AND ADLS: Primary | ICD-10-CM

## 2025-06-27 PROCEDURE — 97530 THERAPEUTIC ACTIVITIES: CPT | Mod: PO

## 2025-06-27 PROCEDURE — 97112 NEUROMUSCULAR REEDUCATION: CPT | Mod: PO

## 2025-06-27 PROCEDURE — 92507 TX SP LANG VOICE COMM INDIV: CPT | Mod: PO

## 2025-06-27 PROCEDURE — 97110 THERAPEUTIC EXERCISES: CPT | Mod: PO

## 2025-06-27 PROCEDURE — 97116 GAIT TRAINING THERAPY: CPT | Mod: PO

## 2025-06-27 NOTE — PROGRESS NOTES
Outpatient Rehab    Occupational Therapy Visit    Patient Name: Kateryna Weber  MRN: [id number]  YOB: 1960  Encounter Date: 6/27/2025    Therapy Diagnosis:   Encounter Diagnoses   Name Primary?    Impaired mobility and ADLs Yes    Left hemiparesis     Spasticity        Physician: Latisha Garg MD    Physician Orders: Eval and Treat  Medical Diagnosis: Hemiparesis of right dominant side as late effect of nontraumatic subarachnoid hemorrhage  Surgical Diagnosis: Not applicable for this Episode   Surgical Date: Not applicable for this Episode  Days Since Last Surgery: Not applicable for this Episode    Visit # / Visits Authorized: 18 / 34  Insurance Authorization Period: 5/12/2025 to 12/31/2025  Date of Evaluation: 5/9/2025  Plan of Care Certification: 5/9/2025 to 7/18/2025      Time In: 1117   Time Out: 1205  Total Time (in minutes): 48   Total Billable Time (in minutes): 48    Precautions: Standard, Fall, aphasia, h/o cancer        Subjective   Spouse reported she is wearing dynamic splints for ~1.5 hours at a time ; tolerates much better and is requesting wear.  Family / care giver present for this visit:  (spouse present and engaged throughout)      Pain reported as 0/10. none reported or indicated throughout session today    Objective          Objective measures can be seen in most recent progress note on 6/6/25 (visit 11)     Treatment:     Neuromuscular re-education activities to improve Coordination, Kinesthetic, Sense, Proprioception, and Posture. The following activities were included:  Stand pivot transfer with OTS from w/c>mat (towards left) with Min A using LBQC; assist for weight shifts     Seated edge of mat  - PROM for right elbow extension and supination   - PROM for right scapular mobilizations for retraction and depression as tolerated     Sit>supine with Mod A using log rolling technique; Supine>Left sidelying with min(A)    Left sidelying with pillow placed between LEs to  maximize pt comfort:   - PROM for right scapular mobilizations-  all planes including elevation, retraction, protraction, and depression followed by CW/CCW directions  - PROM for right shoulder extension, horizontal abd/add     L sidelying>supine Mod I.     Supine:  - PROM fpr right upper extremity wrist stretches including RD/UD, flexion/extension, forearm supination/pronation, and digits towards extension    Kateryna participated in dynamic functional therapeutic activities to improve functional performance:    Supine<>sit: mod(A)  -donned right yodit-splint for right elbow extension for adjustment for increase in extension   -attempt at adjustment to prevent further supination     Stand pivot transfer from mat>w/c (towards left) with max(A) without device    Time Entry(in minutes):  Neuromuscular Re-Education Time Entry: 27  Therapeutic Activity Time Entry: 21    Assessment & Plan   Assessment: Ms Avendaño demo improvement in overall tolerance for prolonged stretching and range of motion. Spouse continues to verbalize carry over for all education and exercises and is completing at home. Adjustment briefly made for right dynamic splint for increased elbow extension; however, due to nature of the splint, it placed Kateryna is more overt supination. Attempted multiple re-adjustments with no avail -- will continued to monitor for best positioning on future sessions.   Evaluation/Treatment Tolerance: Patient tolerated treatment well    The patient will continue to benefit from skilled outpatient occupational therapy in order to address the deficits listed in the problem list on the initial evaluation, provide patient and family education, and maximize the patients level of independence in the home and community environments.     The patient's spiritual, cultural, and educational needs were considered, and the patient is agreeable to the plan of care and goals.           Plan: Cont OT POC 3x/wk; focus on RUE stretching to prevent  further joint contractures, mat/bed mobility; compensatory strategies/aly techniques for ADLs. Continue to recommend inpatient rehab for patient at this time.    Goals:   Short Term: 5 weeks  - Pt will participate in upper body dressing with moderate assistance using adaptive techniques or devices to increase independence in ADLs. MET per pt's  report; ongoing for demo  - Pt will tolerate right upper extremity range of motion program x10 min without need for rest break. ongoing  - Pt will complete stand pivot transfer with minimal assistance. ongoing  - Pt will complete lateral scooting as needed for ADLs with min(A). MET 6/6/25  - Pt will tolerate right upper extremity splint/orthosis x1 hour as needed for tone/spasticity management. MET 6/6/25; pt's  contacting rep for different size elbow ext splint   - Pt will be independent with Home Exercise Program (HEP)/Home Activity Program (HAP) and report at least 50% compliance. ongoing     Long Term: 10 weeks   - Pt will participate in upper body dressing with minimal assistance using adaptive techniques or devices to increase independence in ADLs. ongoing  - Pt will complete lower body dressing (pants, brief) with moderate assistance. ongoing  - Pt will demonstrate improved upper extremity positioning in wheelchair and bed to reduce spasticity and risk of contracture in RUE, as evidenced by maintaining neutral shoulder and elbow alignment with appropriate supports for at least 80% of observed sessions. ongoing  - Pt will tolerate right wrist in neutral x8 minutes as needed for weightbearing and functional tasks. Ongoing   - Caregiver will demo understanding for range of motion program and positioning for Right upper extremity in seated and supine. ongoing  - Pt will be independent with Home Exercise Program (HEP)/Home Activity Program (HAP) to promote long term maintenance of progress made in therapy. ongoing     Goals to be added and edited within plan  of care as needed/appropriate.    NAMITA Thomas

## 2025-06-27 NOTE — PROGRESS NOTES
Outpatient Care Management   - Care Plan Follow Up    Patient: Kateryna Weber  MRN:  [id number]  Date of Service:  6/27/2025  Completed by:  Suzy Varner LCSW  Referral Date: 05/21/2025    Reason for Visit   Patient presents with    Case Closure    Goals Complete     6/27/25       Brief Summary:  advised by therapist that patient's  called and received notification that the appeal for inpatient rehab was successful.  They will admit on Monday, and contact the clinic when she is discharged to schedule appointments as part of her post discharge from rehab plan.     Future Appointments   Date Time Provider Department Center   7/1/2025  9:30 AM Kelsey Milan, OT [facility]   7/1/2025 10:30 AM Madhuri Burris, PT [facility]   7/2/2025  1:00 PM Suki Adan L-SLP, CCC-SLP [facility]   7/2/2025  1:45 PM Sandee Ferguson, PTA [facility]   7/2/2025  2:30 PM Lizbet Kim., LOTR [facility]   7/3/2025 10:30 AM Madhuri Burris, PT [facility]   7/3/2025 11:15 AM Lizbet Kim., [facility]   7/7/2025  9:30 AM Madhuri Burris, PT [facility]   7/7/2025 10:30 AM Lizbet Kim, LOTR [facility]   7/7/2025 11:15 AM Suki Adan L-SLP, CCC-SLP [facility]   7/8/2025  9:45 AM Virginia Beaulieu L-SLP, CCC-SLP [facility]   7/8/2025 10:30 AM Madhuri Burris, PT [facility]   7/8/2025 11:15 AM Lizbet Kim., [facility]   7/11/2025  9:30 AM Lizbet Kim., [facility]   7/11/2025 10:30 AM Madhuri Burris, PT [facility]   7/11/2025 11:15 AM Suki Adan L-SLP, [facility]-SLP [facility]   7/14/2025  9:30 AM Lizbet Kim LOTR [facility]   7/14/2025 10:30 AM Madhuri Burris, PT [facility]   7/14/2025 11:15 AM Radha Cooper L-SLP, CCC-SLP [place] [id number]  [facility]   7/16/2025  9:30 AM Klever Wright, PTA [facility]   7/16/2025 10:30 AM Perilloux, Kelsey, OT [facility]   7/16/2025 11:15 AM Allison, Radha, L-SLP, CCC-SLP [facility]   7/18/2025  9:30 AM JulioLizbet harrington., LOTR [facility]   7/18/2025 10:30 AM Madhuri Burris, PT [facility]   7/18/2025 11:15 AM Allison, Radha, L-SLP, CCC-SLP [facility]   7/21/2025  9:30 AM JulioLizbet harrington., LOTR [facility]   7/21/2025 10:30 AM Allison, Radha, L-SLP, CCC-SLP [facility]   7/21/2025 11:15 AM Madhuri Burris, PT [facility]   7/23/2025  9:30 AM Perilljerzy, Kelsey, OT [facility]   7/23/2025 10:15 AM Virginia Beaulieu, L-SLP, CCC-SLP [facility]   7/23/2025 11:15 AM Lalitha Landeros, PT [facility]   7/25/2025  9:30 AM Lizbet Kim, LOT[facility]   7/25/2025 10:30 AM Madhuri Burris, PT [facility]   7/25/2025 11:15 AM Allison, Radha, L-SLP, CCC-SLP [facility]     CASIMIRO Irizarry  Neuro Therapy   [facility]  [phone]

## 2025-06-27 NOTE — PROGRESS NOTES
"  Outpatient Rehab    Physical Therapy Visit    Patient Name: Kateryna Weber  MRN: [id number]  YOB: 1960  Encounter Date: 6/27/2025    Therapy Diagnosis:   Encounter Diagnosis   Name Primary?    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  Surgical Diagnosis: Not applicable for this Episode   Surgical Date: Not applicable for this Episode    Visit # / Visits Authorized:  20 / 22  Insurance Authorization Period: 5/9/2025 to 12/31/2025  Date of Evaluation: 5/9/2025  Plan of Care Certification: 5/9/2025 to 7/4/2025      PT/PTA: PT  Number of PTA visits since last PT visit:0  Time In: 0930   Time Out: 1015  Total Time (in minutes): 45   Total Billable Time (in minutes): 45      Precautions:     Standard, Fall, aphasia, h/o cancer      Subjective    "Good."  Patient's  reports that she has been tolerating the ankle dynasplint. He is still considering the baclofen pump.            Objective    Last performed on 06/10/25.         Treatment:     Kateryna received therapeutic exercises to develop strength, endurance, ROM, flexibility, posture, and core stabilization for 10 minutes including:   Supine right ankle dorsiflexion mobilizations Grade 3 to reduce plantarflexion contracture and tightness  Supine right ankle dorsiflexion PROM with overpressure stretch  Supine right knee flexion to reduce tone prior to ambulation trials    Patient participated in neuromuscular re-education activities to improve: Balance, Coordination, and Sense for 08 minutes. The following activities were included:   X 10 reps sit <> stand from mat, LUE pushing from mat--- emphasis on motor control and proper form, TCs for proper form      Patient participated in dynamic functional therapeutic activities to improve functional " performance for 08 minutes. Including:   W/c > mat stand pivot transfer with LBQC moving to left side, CGA   Sit > supine Min A for RLE management  Supine > sit: rolled to right side SBA, Min A for trunk  Sit <> stand x multiple trials, CGA to Min A with LBQC       Patient participated in gait training activities to normalize gait pattern for 19 minutes. The following activities were included:    Gait belt used for safety. [place]istive device[place] [facility] [place] [facility] [place] [facility] [place] on right foot-- emphasis on initiating RLE swing throughout entire trials     Trial 1: 127 feet, 2 90 deg turns  Trial 2: 158 feet       Tech present on right side of patient to cue for upright posture as needed, w/c follow  Mod A from PT in front to weight shift, promote right hip extension and upright trunk positioning, and to advance RLE in swing  Deficits: right knee hyperextension and right hip flexion in stance due to ankle plantarflexion contracture; step through/to pattern; able to initiate hip flexion in swing phase     Time Entry(in minutes):   See above    Assessment & Plan   Assessment:  Kateryna tolerated therapy session well this morning and continues to provide good effort throughout each activity. Continued to focus on proper form for improved safety with sit <> stand transfer; improved form throughout trials, but reinforcement recommended. Able to improved ambulation distance with improved ability to initiate right hip flexion in swing phase today. Cues needed at end of second trial to maintain upright posture likely due to fatigue. Assessed right knee positioning with Swedish knee cage assessed to improved right knee hyperextension and will plan to trial gait trials next session.  Patient remains appropriate for continued high intensity PT intervention to further progress ongoing mobility deficits.        The patient will continue to benefit from skilled outpatient physical therapy in order to address the deficits listed in the  problem list on the initial evaluation, provide patient and family education, and maximize the patients level of independence in the home and community environments.     The patient's spiritual, cultural, and educational needs were considered, and the patient is agreeable to the plan of care and goals.             Plan:   Continue to progress ambulation endurance and independence with functional transfers. Promote RLE hip flexion initiate in swing phase of gait trials.     Goals:  Short Term Goals: 4 weeks   Patient/caregiver to be (I) with established home exercise program. MET 06/10/25  Patient to improve supine <> sit transfer to Mod A consistently for improved independence with bed mobility. MET 06/10/25  Patient to improve sit <> stand transfer with no more than Min A consistently for improved (I) with functional transfers. MET 05/29/25  Patient to improve stand pivot transfers to no more than Mod A consistently for improved (I) with functional transfers. MET 05/29/25  Patient to improve ambulation x 12 feet in // bars with no more than Mod A x 1 assist consistently to progress gait training to appropriate assistive device. MET 05/29/25  Patient to propel w/c x 100 feet, including 90 deg turns with no more than SBA for improved independence with w/c mobility. Ongoing     Long Term Goals: 8 weeks   Patient to improve supine <> sit transfer to Min A consistently for improved independence with bed mobility. Progressing  Patient to improve sit <> stand transfer with no more than CGA consistently for improved (I) with functional transfers. Progressing  Patient to improve stand pivot transfers to no more than Min A consistently for improved (I) with functional transfers. MET 06/16/25  Patient to improve ambulation x 25 feet with no more than Mod A x 1 assist with appropriate assistive device for improved independence with ambulation. MET 06/10/25              Updated 06/10/25: Patient to ambulate x 50 feet with no  more than Mod A with LBQC for improved independence with ambulation. Ongoing  Patient to propel w/c x 150 feet, including 90 deg turns with no more than SBA for improved independence with w/c mobility. Ongoing    Madhuri Burris, PT

## 2025-06-27 NOTE — PROGRESS NOTES
Outpatient Rehab  Speech-Language Pathology Visit    Patient Name: Kateryna Weber  MRN: [id number]  YOB: 1960  Encounter Date: 6/27/2025    Therapy Diagnosis:   Encounter Diagnosis   Name Primary?    Global aphasia Yes   Physician: Latisha Garg MD  Physician Orders: Eval and Treat  Medical Diagnosis: Aphasia as late effect of cerebrovascular accident    Visit # / Visits Authorized: 14 / 20   Insurance Authorization Period: 5/9/2025 to 12/31/2025  Date of Evaluation: 5/9/2025   Plan of Care Certification: 5/9/2025 to 7/18/2025      Time In: 0845   Time Out: 0930  Total Time (in minutes): 45       Precautions:  Standard, Fall, aphasia, h/o cancer    Subjective   Accompanied by her . Continued motivation for session..       Family/caregiver present for this visit:         Objective /Treatment             1. Patient will identify objects in a field of 3 given the name/function of the object with 75% accuracy following nonverbal cues and requesting repetitions as needed across 2-3 sessions to improve auditory comprehension skills. (Met)    Met         2. Patient will answer 1 unit/personal yes/no questions with 75% accuracy following nonverbal cues and requesting repetitions as needed across 2-3 sessions to improve auditory comprehension skills. (Progressing)    GOAL MET  2 unit yes no: 90% accuracy independently, 100% accuracy given a repetition  3 unit yes no: 60% accuracy independently ,100% accuracy given a repetition  Followed 2 unit body part commands: Not formally addressed    ID body parts: Not formally addressed            3. Patient will name objects with 75% acc given carrier phrase as needed across 3 sessions     (Progressing)    Not formally addressed              4. Patient will participate in the speech production treatment hierarchy with mono-syllabic /m/ words with 75% acc across 3 probe trials.    (Progressing)    See below        5. Patient will use gestures and/or  low-tech AAC (e.g., communication board, yes/no cards, picture symbols) to express basic wants and needs in response to visual/verbal cues with 75% accuracy across 3 consecutive sessions. (Progressing)    Used yes/no onesimo for yes no questions.   No writing trials today    Start:  05/09/25    Expected End:  06/20/25             Matched phrase to a picture in a field of 2 phrases with 60% accuracy independently   Selected word to complete a phrase with 50% accuracy independently given a field of 2  Completed phrases verbally with visual stimuli and a field of three with 20% accuracy independently   Answered questions about a simple sentence read aloud with 0% accuracy independently, 50% accuracy given maximum verbal and visual cues       Additional:  Grandchildren's names Fernando, Rod, Henrry  Cat: Biddy    /m/ one syllable - Bilabial Nasal    Step 1 - Repetition / Minimal Contrast Word Step 2 - Printed Letter or Target Sound with Repetition Step 3- Integral Stimulation Step 4- Articulatory Placement Cues 5 Repetitions of Correct Production   May +    5/5   More +    5/5   Map 0 +   5/5   Man 0 +   5/5   Mean 0 +   5/5   Meat 0 0 0 0 0   Mow 0 0 0 0 0     Time Entry(in minutes):  Speech Treatment (Individual) Time Entry: 45    Assessment & Plan   Assessment  Kateryna is making significant progress in her speech and language skills. Increased episodes of appropriate phrases interspaced with jargon in therapy and at home. Automatic speech is improving significantly. Improved comprehension of commands in context continues; progression from single unit command and utterance comprehension has improved to mastery of 2 unit information and emerging comprehension of 3 unit commands and utterances. Continued speech therapy is medically necessary as Kateryna continues to have less efficiency and accuracy in medical, social, and emergency situations to communicate needs.   Evaluation/Treatment Tolerance: Patient tolerated treatment  well    The patient will continue to benefit from skilled outpatient speech therapy in order to address the deficits listed in the problem list on the initial evaluation, provide patient and family education, and maximize the patients level of independence in the home and community environments.     The patient's spiritual, cultural, and educational needs were considered, and the patient is agreeable to the plan of care and goals.      Plan  Continue OP ST 3x per week; patient would benefit from inpatient rehab stay. Begin to target bilabial  /b/ and linguadental /t/ as next targets in apraxia of speech targets.        Goals:   Active       Long Term Goals        1.  She will develop functional cognitive-linguistic based skills and utilize compensatory strategies to communicate wants/needs effectively to different conversational partners, maintain safety, participate socially in functional living environment.    (Progressing)       Start:  05/09/25    Expected End:  07/18/25            2. Patient will trial compensatory strategies and augmentative-alternative communication (AAC) to communicate wants and needs effectively to different conversational partners, maintain safety, and participate socially in functional living environment.  (Progressing)       Start:  05/09/25    Expected End:  07/18/25               Short Term Goals        1. Patient will identify objects in a field of 3 given the name/function of the object with 75% accuracy following nonverbal cues and requesting repetitions as needed across 2-3 sessions to improve auditory comprehension skills. (Met)       Start:  05/09/25    Expected End:  06/20/25    Resolved:  05/29/25         2. Patient will answer 1 unit/personal yes/no questions with 75% accuracy following nonverbal cues and requesting repetitions as needed across 2-3 sessions to improve auditory comprehension skills. (Progressing)       Start:  05/09/25    Expected End:  06/20/25            3.  Patient will name objects with 75% acc given carrier phrase as needed across 3 sessions     (Progressing)       Start:  05/09/25    Expected End:  06/20/25            4. Patient will participate in the speech production treatment hierarchy with mono-syllabic /m/ words with 75% acc across 3 probe trials.    (Progressing)       Start:  05/09/25    Expected End:  06/20/25            5. Patient will use gestures and/or low-tech AAC (e.g., communication board, yes/no cards, picture symbols) to express basic wants and needs in response to visual/verbal cues with 75% accuracy across 3 consecutive sessions. (Progressing)       Start:  05/09/25    Expected End:  06/20/25                JANA Gaitan-SLP, CCC-SLP

## 2025-07-15 ENCOUNTER — DOCUMENT SCAN (OUTPATIENT)
Dept: HOME HEALTH SERVICES | Facility: HOSPITAL | Age: 65
End: 2025-07-15
Payer: COMMERCIAL

## 2025-07-16 ENCOUNTER — PATIENT MESSAGE (OUTPATIENT)
Dept: INTERNAL MEDICINE | Facility: CLINIC | Age: 65
End: 2025-07-16
Payer: COMMERCIAL

## 2025-07-16 ENCOUNTER — TELEPHONE (OUTPATIENT)
Dept: INTERNAL MEDICINE | Facility: CLINIC | Age: 65
End: 2025-07-16
Payer: COMMERCIAL

## 2025-07-16 DIAGNOSIS — I69.351 FLACCID HEMIPLEGIA OF RIGHT DOMINANT SIDE AS LATE EFFECT OF CEREBRAL INFARCTION: ICD-10-CM

## 2025-07-16 DIAGNOSIS — G81.94 LEFT HEMIPARESIS: Primary | ICD-10-CM

## 2025-07-16 NOTE — TELEPHONE ENCOUNTER
Copied from CRM #[id number]. Topic: General Inquiry - Patient Advice  >> Jul 16, 2025  2:59 PM Jessie wrote:  .[email or web address]     Patient is calling for Medical Advice regarding: Pt  is calling to get orders for out pt therapy     How long has patient had these symptoms:    Pharmacy name and phone#:    Patient wants a call back or thru [facility], provide patient's call back phone number:[phone]    Comments:    Please advise patient replies from provider may take up to 48 hours.

## 2025-07-17 DIAGNOSIS — G81.94 LEFT HEMIPARESIS: Primary | ICD-10-CM

## 2025-07-17 DIAGNOSIS — I63.9 CEREBRAL INFARCTION: ICD-10-CM

## 2025-07-17 NOTE — PROGRESS NOTES
Outpatient Rehab    Occupational Therapy Progress Note : Updated Plan of Care    Patient Name: Kateryna Weber  MRN: [id number]  YOB: 1960  Encounter Date: 7/18/2025    Therapy Diagnosis:   Encounter Diagnoses   Name Primary?    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  Family / care giver present for this visit:   Pain reported as 0/10. none reported    Objective      Shoulder Range of Motion  Right Shoulder   Active (deg) Passive (deg) Pain   Flexion 0 81     Extension 0 60     Scaption         ABduction 0 72 Yes   ADduction         Horizontal ABduction         Horizontal ADduction         External Rotation (Shoulder ABducted 0 degrees)         External Rotation (Shoulder ABducted 45 degrees)         External Rotation (Shoulder ABducted 90 degrees)  (negative; in internal rotation posture) 0     Internal Rotation (Shoulder ABducted 0 degrees)         Internal Rotation (Shoulder ABducted 45 degrees)         Internal Rotation (Shoulder ABducted 90 degrees) 65 90              Elbow/Forearm Range of Motion   Right Elbow/Forearm   Active (deg) Passive (deg) Pain   Flexion  ()       Extension  ()       Forearm Pronation 0  (lacking ~45 degrees from neutral)      Forearm Supination 80 (resting tone position) 90              Wrist Range of Motion  Right Wrist   Active (deg) Passive (deg) Pain Comment   Flexion 0 80       Extension 0 15       Radial Deviation           Ulnar Deviation                           Shoulder Strength - Planes of Motion   Right Strength Right Pain Left Strength Left  Pain   Flexion 1         Extension 0         ABduction 0         ADduction 0         Horizontal ABduction 0         Horizontal ADduction 0         Internal Rotation 0° 0         Internal Rotation 90° 0         External Rotation 0° 0         External Rotation 90° 0             Shoulder Strength - Rotator Cuff Muscles   Right Strength Right Pain Left Strength Left  Pain   Supraspinatus 1         Infraspinatus 1         Teres Minor 1         Subscapularis 1           Shoulder Strength - Scapular Stabilizing Muscles   Right Strength Right Pain Left Strength Left  Pain   Lower Trapezius 1         Middle Trapezius 1         Rhomboids 1           Elbow Strength   Right Strength Right Pain Left Strength Left  Pain   Flexion (C6) 1         Extension (C7) 1              Forearm Strength   Right Strength Right Pain Left Strength Left  Pain   Pronation 0         Supination 1             Wrist Strength - Planes of Motion   Right Strength Right Pain Left Strength Left  Pain   Flexion 0         Extension 0         Radial Deviation 0         Ulnar Deviation (C8) 0                     Assessment for tone in hand:   MCPS: from 3rd digit on right hand  Restin *  PROM: 45*    Proximal interphalangeal joints:   Restin  PROM: neutral     Distal interphalangeal joints:  Restin  PROM: 30    Treatment:  Therapeutic Activity  TA 1: -updated objective measurements as seen above    Discussed scheduling with spouse    Neuro Re-Edu:   -PROM for right scapular mobilizations for retraction and depression  - PROM for right general wrist stretches including metacarpal spreads, carpal rolls, increasing  mobility towards radial/ulnar deviation, increasing mobility of wrist towards extension/flexion, Increasing mobility of wrist towards supination/pronation. PROM of fingers to encourage extension.  -prolonged stretch for shoulder abduction with elbow extension with neutral positioning of forearm as tolerated; Kateryna to assist with self stretch at right hand for extension of digits       Time Entry(in minutes):  Neuromuscular Re-Education Time Entry: 20  Therapeutic Activity Time Entry: 30    Assessment & Plan   Assessment  Mrs Avendaño returns to outpatient today s/p rehab placement x3 week duration. She demo great improvement in her right upper extremity tone managment with relaxation at elbow and hand most evident. Moreover, she demo improvement in tolerance for activity and manual stretching compared to pre rehab. She also demo trace movement both proximally and distally for right upper extremity at this time. Her transfers remain to be min(A) via stand pivot with quad cane and she continues to require added assistance from spouse for all ADLs at this time due to right upper extremity spasticity and for best safety. She will greatly benefit from OT services at this time 2-3x/w to continue to work towards her safety and functional indep to return to prior roles and routines as able. Kateryna has great family/spousal support and assistance at this time. Plan is to transfer them to outpatient clinic closer to home for less travel time.   Evaluation/Treatment Tolerance: Patient tolerated treatment well    The patient will continue to benefit from skilled outpatient occupational therapy in order to address the deficits listed in the problem list on the initial evaluation, provide patient and family education, and maximize the patients level of independence in the home and community environments.     The patient's spiritual, cultural, and educational needs were considered, and the patient is agreeable to the plan of care and  goals.          Goals: updated with plan of care update 7/18/2025     Short Term: 5 weeks  - Pt will participate in upper body dressing with minimal assistance using adaptive techniques or devices to increase independence in ADLs.   - Pt will tolerate right upper extremity range of motion program x10 min without need for rest break. ongoing  - Pt will complete stand pivot transfer with minimal assistance. MET  Update: with contact guard assist and quad cane.   - Pt will complete lateral scooting as needed for ADLs with min(A). MET 6/6/25  - Pt will tolerate right upper extremity splint/orthosis x1 hour as needed for tone/spasticity management. MET 6/6/25 and consistent with completion   - Pt will be independent with Home Exercise Program (HEP)/Home Activity Program (HAP) and report at least 50% compliance. ongoing     Long Term: 10 weeks   - Pt will complete lower body dressing (pants, brief) with minimal assistance.   - Pt will demonstrate improved upper extremity positioning in wheelchair and bed to reduce spasticity and risk of contracture in RUE, as evidenced by maintaining neutral shoulder and elbow alignment with appropriate supports for at least 80% of observed sessions. ongoing  - Pt will tolerate right wrist in neutral x8 minutes as needed for weightbearing and functional tasks. Ongoing   - Caregiver will demo understanding for range of motion program and positioning for Right upper extremity in seated and supine. MET and ongoing with progression   - Pt will be independent with Home Exercise Program (HEP)/Home Activity Program (HAP) to promote long term maintenance of progress made in therapy. ongoing      NAMITA Thomas

## 2025-07-17 NOTE — TELEPHONE ENCOUNTER
Pt's paperwork was faxed over I did call to notify the pt however didn't get an answer. So a vm to call back was left.

## 2025-07-18 ENCOUNTER — CLINICAL SUPPORT (OUTPATIENT)
Dept: REHABILITATION | Facility: HOSPITAL | Age: 65
End: 2025-07-18
Payer: COMMERCIAL

## 2025-07-18 DIAGNOSIS — Z78.9 IMPAIRED MOBILITY AND ADLS: Primary | ICD-10-CM

## 2025-07-18 DIAGNOSIS — R25.2 SPASTICITY: ICD-10-CM

## 2025-07-18 DIAGNOSIS — G81.94 LEFT HEMIPARESIS: ICD-10-CM

## 2025-07-18 DIAGNOSIS — Z74.09 IMPAIRED MOBILITY AND ADLS: Primary | ICD-10-CM

## 2025-07-18 DIAGNOSIS — R47.01 GLOBAL APHASIA: Primary | ICD-10-CM

## 2025-07-18 DIAGNOSIS — Z74.09 IMPAIRED FUNCTIONAL MOBILITY, BALANCE, GAIT, AND ENDURANCE: Primary | ICD-10-CM

## 2025-07-18 PROCEDURE — 97530 THERAPEUTIC ACTIVITIES: CPT | Mod: PO

## 2025-07-18 PROCEDURE — 97116 GAIT TRAINING THERAPY: CPT | Mod: PO

## 2025-07-18 PROCEDURE — 92507 TX SP LANG VOICE COMM INDIV: CPT | Mod: PO

## 2025-07-18 PROCEDURE — 97110 THERAPEUTIC EXERCISES: CPT | Mod: PO

## 2025-07-18 PROCEDURE — 92609 USE OF SPEECH DEVICE SERVICE: CPT | Mod: PO

## 2025-07-18 PROCEDURE — 97112 NEUROMUSCULAR REEDUCATION: CPT | Mod: PO

## 2025-07-18 PROCEDURE — 97164 PT RE-EVAL EST PLAN CARE: CPT | Mod: PO

## 2025-07-18 NOTE — PROGRESS NOTES
Outpatient Rehab  Speech-Language Pathology Visit    Patient Name: Kateryna Weber  MRN: [id number]  YOB: 1960  Encounter Date: 7/18/2025    Therapy Diagnosis:   Encounter Diagnosis   Name Primary?    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 Patient will identify objects in a field of 3 given the name/function of the object with 75% accuracy following nonverbal cues and requesting repetitions as needed across 2-3 sessions to improve auditory comprehension skills. (Met)    Met         2. Patient will answer 1 unit/personal yes/no questions with 75% accuracy following nonverbal cues and requesting repetitions as needed across 2-3 sessions to improve auditory comprehension skills. (Progressing)    GOAL MET  2 unit yes no: 90% accuracy independently, 100% accuracy given a repetition  3 unit yes no: 60% accuracy independently ,100% accuracy given a repetition  Followed 2 unit body part commands: Not formally addressed    ID body parts: Not formally addressed            3. Patient will name objects with 75% acc given carrier phrase as needed across 3 sessions     (Progressing)    Not formally addressed              4. Patient will participate in the speech production treatment hierarchy with mono-syllabic /m/ words with 75% acc across 3 probe trials.    (Progressing)    See below        5. Patient will use gestures and/or low-tech AAC  (e.g., communication board, yes/no cards, picture symbols) to express basic wants and needs in response to visual/verbal cues with 75% accuracy across 3 consecutive sessions. (Progressing)    Used yes/no onesimo for yes no questions.   No writing trials today    Start:  05/09/25    Expected End:  06/20/25             Matched phrase to a picture in a field of 2 phrases with 60% accuracy independently   Selected word to complete a phrase with 50% accuracy independently given a field of 2  Completed phrases verbally with visual stimuli and a field of three with 20% accuracy independently   Answered questions about a simple sentence read aloud with 0% accuracy independently, 50% accuracy given maximum verbal and visual cues       Additional:  Grandchildren's names Fernando, Rod, Henrry  Cat: Biddy    /m/ one syllable - Bilabial Nasal    Step 1 - Repetition / Minimal Contrast Word Step 2 - Printed Letter or Target Sound with Repetition Step 3- Integral Stimulation Step 4- Articulatory Placement Cues 5 Repetitions of Correct Production   May +    5/5   More +    5/5   Map 0 +   5/5   Man 0 +   5/5   Mean 0 +   5/5   Meat 0 0 0 0 0   Mow 0 0 0 0 0     Time Entry(in minutes):       Assessment & Plan   Assessment          The patient will continue to benefit from skilled outpatient speech therapy in order to address the deficits listed in the problem list on the initial evaluation, provide patient and family education, and maximize the patients level of independence in the home and community environments.     The patient's spiritual, cultural, and educational needs were considered, and the patient is agreeable to the plan of care and goals.      Plan           Goals:   Active       Long Term Goals        1.  She will develop functional cognitive-linguistic based skills and utilize compensatory strategies to communicate wants/needs effectively to different conversational partners, maintain safety, participate socially in  functional living environment.    (Progressing)       Start:  05/09/25    Expected End:  07/18/25            2. Patient will trial compensatory strategies and augmentative-alternative communication (AAC) to communicate wants and needs effectively to different conversational partners, maintain safety, and participate socially in functional living environment.  (Progressing)       Start:  05/09/25    Expected End:  07/18/25               Short Term Goals        1. Patient will identify objects in a field of 3 given the name/function of the object with 75% accuracy following nonverbal cues and requesting repetitions as needed across 2-3 sessions to improve auditory comprehension skills. (Met)       Start:  05/09/25    Expected End:  06/20/25    Resolved:  05/29/25         2. Patient will answer 1 unit/personal yes/no questions with 75% accuracy following nonverbal cues and requesting repetitions as needed across 2-3 sessions to improve auditory comprehension skills. (Progressing)       Start:  05/09/25    Expected End:  06/20/25            3. Patient will name objects with 75% acc given carrier phrase as needed across 3 sessions     (Progressing)       Start:  05/09/25    Expected End:  06/20/25            4. Patient will participate in the speech production treatment hierarchy with mono-syllabic /m/ words with 75% acc across 3 probe trials.    (Progressing)       Start:  05/09/25    Expected End:  06/20/25            5. Patient will use gestures and/or low-tech AAC (e.g., communication board, yes/no cards, picture symbols) to express basic wants and needs in response to visual/verbal cues with 75% accuracy across 3 consecutive sessions. (Progressing)       Start:  05/09/25    Expected End:  06/20/25                JANA Clark-SLP, CCC-SLP

## 2025-07-18 NOTE — PROGRESS NOTES
Outpatient Rehab    Speech-Language Pathology Progress Note : Updated Plan of Care    Patient Name: Kateryna Weber  MRN: [id number]  YOB: 1960  Encounter Date: 7/18/2025    Therapy Diagnosis:   Encounter Diagnosis   Name Primary?    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  Surgical Diagnosis: Not applicable for this Episode   Surgical Date: Not applicable for this Episode  Days Since Last Surgery: Not applicable for this Episode    Visit # / Visits Authorized: 15 / 20   Insurance Authorization Period: 5/9/2025 to 12/31/2025  Date of Evaluation: 5/9/2025   Plan of Care Certification: 5/9/2025 to 7/18/2025, 9/26/2025     Time In: 1115   Time Out: 1200  Total Time (in minutes): 45   Total Billable Time (in minutes): 45    FOTO:  Intake Score (%): 28  Survey Score 2 (%): Not applicable for this Episode  Survey Score 3 (%): Not applicable for this Episode    Precautions:  Additional Precautions and Protocol Details: Standard, Fall, aphasia, h/o cancer    Subjective   doing ok but figueroa of feeling bad. Patient was in inpatient rehab for therapy from 6/30/25 to 7/11/25. New orders for outpatient were placed..  Pain reported as 4/10. all over pain  Family/caregiver present for this visit:       Objective            Treatment  Speech  Activity 1: Patient followed body commands with 30% acc independently, and 100% acc given max models.  Activity 2: Patient identified body parts with 60% acc independently, 100% acc given mod cues and models.  Activity 3: using the yes/no button on iPad, patient answered single yes/no questions with 60% acc ind'ly, 100% acc given min cues.  Activity 4: using the yes/no button on iPad, patient answered 2 unit yes/no questions with 60% acc ind'ly, 80% acc given  "min cues.  Activity 5: Patient named objects given carrier phrase with 0% acc ind'ly, 40% acc given phonemic cues. Phonemic paraphasias were present such as "pup" for "cup", "bistle" for "Whistle" and "powel" for "towel"  Activity 6: Patient matched phrase to a picture in a field of 2 phrases (T[employer] Therapy) with 70% accuracy independently  Activity 7: Patient selected word to complete a phrase (T[employer]) with 70% accuracy independently given a field of 2  Augmentative Device Training  Augmentative Device Speech Generating: Yes (using AAC apps on iPad)  Activity 1: Briefly introduced AAC apps TouchChat and TD Snap for functional communication. Formal AAC evaluation is warranted.    Time Entry(in minutes):  [employer] Services for Speech-Gen Device Time Entry: 10  Speech Treatment (Individual) Time Entry: 30    Assessment & Plan   Assessment   Kateryna is making good progress towards her goals. She has met some of her goals prior to her inpatient rehab stay. She continues to use "Grandma" repeatedly when responded to questions or statements.She continues to exhibit paraphasias and non-meaningful utterances but some spontaneous appropriate speech was present.  Augmentative-Alternative Communication was introduced and will be pursued further.   Evaluation/Treatment Tolerance: Patient tolerated treatment well    The patient will continue to benefit from skilled outpatient speech therapy in order to address the deficits listed in the problem list on the initial evaluation, provide patient and family education, and maximize the patients level of independence in the home and community environments.     The patient's spiritual, cultural, and educational needs were considered, and the patient is agreeable to the plan of care and goals.     Goals:   Active       Additional Short Term Goals       Patient will participate in complete receptive-expressive evaluation (Western Aphasia Battery - Revised (WAB-R)) with specific goals " to be determined.        Start:  07/18/25    Expected End:  09/26/25            Patient will participate in an Augmentative-Alternative Communication evaluation.        Start:  07/18/25    Expected End:  09/26/25            Patient will participate in assessment of reading comprehension and written expression.        Start:  07/18/25    Expected End:  09/26/25               Long Term Goals        1.  She will develop functional cognitive-linguistic based skills and utilize compensatory strategies to communicate wants/needs effectively to different conversational partners, maintain safety, participate socially in functional living environment.    (Progressing)       Start:  05/09/25    Expected End:  09/26/25            2. Patient will trial compensatory strategies and augmentative-alternative communication (AAC) to communicate wants and needs effectively to different conversational partners, maintain safety, and participate socially in functional living environment.  (Progressing)       Start:  05/09/25    Expected End:  09/26/25               Short Term Goals        1. Patient will identify objects in a field of 3 given the name/function of the object with 75% accuracy following nonverbal cues and requesting repetitions as needed across 2-3 sessions to improve auditory comprehension skills. (Met)       Start:  05/09/25    Expected End:  06/20/25    Resolved:  05/29/25         2. Patient will answer 1 unit/personal yes/no questions with 75% accuracy following nonverbal cues and requesting repetitions as needed across 2-3 sessions to improve auditory comprehension skills. (Progressing)       Start:  05/09/25    Expected End:  09/26/25            3. Patient will name objects with 75% acc given carrier phrase as needed across 3 sessions     (Progressing)       Start:  05/09/25    Expected End:  09/26/25            4. Patient will participate in the speech production treatment hierarchy with mono-syllabic /m/ words with  75% acc across 3 probe trials.    (Progressing)       Start:  05/09/25    Expected End:  09/26/25            5. Patient will use gestures and/or low-tech AAC (e.g., communication board, yes/no cards, picture symbols) to express basic wants and needs in response to visual/verbal cues with 75% accuracy across 3 consecutive sessions. (Progressing)       Start:  05/09/25    Expected End:  09/26/25                JACQUES Mancini., L-SLP,CCC-SLP, CBIS  Speech-Language Pathologist  Certified Brain Injury Specialist

## 2025-07-18 NOTE — PROGRESS NOTES
Outpatient Rehab    Physical Therapy Evaluation-- Re-evaluation and Treatment    Patient Name: Kateryna Weber  MRN: [id number]  YOB: 1960  Encounter Date: 7/18/2025    Therapy Diagnosis:   Encounter Diagnosis   Name Primary?    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   FOTO report - see Media section or FOTO account episode details.     Intake Score (%): Not applicable for this Episode    Precautions:  Additional Precautions and Protocol Details: Standard, Fall, aphasia, h/o cancer    Subjective   Patient's  present for session and to provide history.    That she was in [facility] in patient rehab for ~2 weeks to address ongoing spasticity and functional deficits from recent ICH. She has been compliant with ankle dynasplint and is due for another round and more botox injections in September for her RLE.   Denies pain       Past Medical History/Physical Systems Review:   Kateryna Weber  has no past medical history on file.    Kateryna Weber  has a past surgical history that includes Appendectomy; Hysterectomy; Abdominal surgery (2000); Gastric bypass (2007);  Colonoscopy (N/A, 4/8/2019); Interposition arthroplasty of carpometacarpal joints (Left, 8/15/2023); Interposition arthroplasty of carpometacarpal joints (Right, 11/24/2023); and Colonoscopy (N/A, 10/24/2024).    Kateryna has a current medication list which includes the following prescription(s): amlodipine, estradiol, multivitamin, and multivitamin, and the following Facility-Administered Medications: lactated ringers and lidocaine (pf) 10 mg/ml (1%).    Review of patient's allergies indicates:   Allergen Reactions    No known drug allergies         Objective - charged as Re-evaluation  x 27 min   Right ankle PROM with overpressure, knee in flexion: lacking 9 degrees from neutral position (plantarflexor contracture)   Lacking 32 deg from neutral at prior assessment    Strength: manual muscle test grades below   Lower Extremity Strength- performed with patient in sitting  Right LE   Left LE     Hip Flexion: 2-/5 Hip Flexion: At least 3/5- unable to accurately assess resistance due to difficulty following commands   Hip Extension:  2+/5 Hip Extension: At least 3/5- unable to accurately assess resistance due to difficulty following commands   Hip Abduction: 2-/5 Hip Abduction: At least 3/5- unable to accurately assess resistance due to difficulty following commands   Hip Adduction: 2/5 Hip Adduction At least 3/5- unable to accurately assess resistance due to difficulty following commands   Knee Extension: 2+/5 Knee Extension: At least 3/5- unable to accurately assess resistance due to difficulty following commands   Knee Flexion: 2+/5 Knee Flexion: At least 3/5- unable to accurately assess resistance due to difficulty following commands   Ankle Dorsiflexion: 2-/5 Ankle Dorsiflexion: At least 3/5- unable to accurately assess resistance due to difficulty following commands   Ankle Plantarflexion: 2/5 Ankle Plantarflexion: At least 3/5- unable to accurately assess resistance due to difficulty following commands      Functional Transfers:  W/c > mat stand pivot transfer with small based quad cane (SBQC) (recommend use of LBQC for stability) moving to left side, Min A  Sit > supine Mod A for RLE management and adjustment  Supine > sit: rolled to right side SBA, Min A to have LLE move RLE of mat, able to sit up pushing with left arm, SBA-- Min A for transfer  Mat > w/x stand pivot with LBQC moving to right side, Min A for RLE management  Sit <> stand x multiple trials, Min A with LBQC- verbal cues for proper hand placement    Ambulation      Gait belt used for safety. Assistive device: LBQC at LUE, Neuro-ERIC glider on right foot-- emphasis on initiating RLE swing throughout entire trials; tech present on right side to cue upright trunk positioning, w/c follow  Trial: 127 feet    Mod A from PT in front to weight shift, promote right hip extension and upright trunk positioning, and to advance RLE in swing  Deficits: right knee hyperextension and right hip flexion in stance due to ankle plantarflexion contracture; step through pattern; able to initiate hip flexion in swing phase this date  Improved right hip extension, reduced right knee hyperextension, and improved right foot contact with ground compared to prior assessment    W/c negotiation  X 30 feet, LUE to propel, LLE to steer, Mod A to assist with LLE steering      Treatment:   Kateryna received therapeutic exercises to develop strength, endurance, ROM, flexibility, posture, and core stabilization for 08 minutes including:   Supine right knee flexion PROM to reduce tone prior to ambulation trial  Supine right ankle dorsiflexion mobilizations Grade 3 to reduce plantarflexion contracture and tightness  Supine right ankle dorsiflexion PROM with overpressure stretch       Patient participated in neuromuscular re-education activities to improve: Balance, Coordination, and Sense for 00 minutes. The following activities were included:   NP        Patient participated in dynamic  functional therapeutic activities to improve functional performance for 00 minutes. Including:   NP      Patient participated in gait training activities to normalize gait pattern for 10 minutes. The following activities were included:    Additional gait trial completed for endurance and gait mechanics training:   Gait belt used for safety. Assistive device: LBQC at E, Neuro-ERIC glider on right foot-- emphasis on initiating RLE swing throughout entire trials; tech present on right side to cue upright trunk positioning, w/c follow  Trial: 160 feet    Mod A from PT in front to weight shift, promote right hip extension and upright trunk positioning, and to advance RLE in swing  Deficits: right knee hyperextension and right hip flexion in stance due to ankle plantarflexion contracture; step through pattern; able to initiate hip flexion in swing phase this date  Improved right hip extension, reduced right knee hyperextension, and improved right foot contact with ground compared to prior assessment       Time Entry(in minutes):  PT Re-Evaluation Time Entry: 27  Gait Training Time Entry: 10  Therapeutic Exercise Time Entry: 8    Assessment & Plan   Assessment  Kateryna presents with a condition of Low complexity.   Presentation of Symptoms: Stable       Functional Limitations: Activity tolerance, Ambulating on uneven surfaces, Bed mobility, Communication, Decreased ambulation distance/endurance, Completing work/school activities, Completing self-care activities, Maintaining balance, Increased risk of fall, Gait limitations, Functional mobility, Range of motion, Transfers  Impairments: Abnormal gait, Abnormal muscle tone, Impaired balance, Activity intolerance, Abnormal or restricted range of motion, Impaired physical strength    Patient Goal for Therapy (PT): to improve functional mobility  Prognosis: Good  Assessment Details: Patient returns to outpatient PT following ~2 week stay at [facility]  Greatest improvements noted since last outpatient therapy session include reduced right ankle plantarflexion contracture, improved speech and attention, and improved RLE strength. Unable to formally complete lower extremity manual muscle test on left side due to cognition deficits, but patient able to move entire LLE against gravity for all major muscle groups. Mild improvement noted with hip extension, knee extension/flexion, and ankle dorsiflexion throughout right lower extremity. Significant improvement noted with right ankle plantarflexion contracture with limitation reduced from lacking 32 deg to 9 deg. Improvement appears to be combination of recent botox regimen and ankle dynasplint use. Performance with functional transfers remains fairly consistent with prior trials with patient requiring Min A for sit <> stand and stand pivot transfers with LBQC. Min A to Mod A for bed mobility. Ambulation trials remain at Mod A with Neuro-ERIC glider on right foot to reduce burden of having to clear RLE; improved ability to initiate hip flexion and hip extension noted, along with reduced right knee hyperextension compared to prior trials. Plan to trial Swedish knee cage next session to assess improvement of remaining right knee hyperextension. Endurance with ambulation trials remains fairly consistent with prior trials with cues needed to encourage upright posture. W/c negotiation remains limited by cognition and ability to understand steering function of hemiplegic w/c mobility. Functional mobility remains most limited by increased tone/spasticity throughout RLE (particularly ankle and knee regions), RLE strength deficits, and decreased activity tolerance. Patient to benefit from continued PT intervention to address ongoing mobility deficits, to improve independence and safety with functional transfers, and to promote improved gait mechanics with ambulation. PT to extend plan of care (POC) x 8 weeks. Goals updated.      Plan  From a physical therapy perspective, the patient would benefit from: Skilled Rehab Services    Planned therapy interventions include: Therapeutic exercise, Therapeutic activities, Neuromuscular re-education, Manual therapy, ADLs/IADLs, Cognitive functional training, Wheelchair management, Orthotic management and training, and Gait training.            Visit Frequency: 3 times Per Week for 8 Weeks.       This plan was discussed with Patient and Caregiver.   Discussion participants: Agreed Upon Plan of Care  Plan details: Progress functional transfers and ambulation with LBQC.  rep to be present at next session to assess need for personal Swedish knee cage. Recommend continued use of ankle yodit-splint to improve ongoing right ankle contracture.           The patient's spiritual, cultural, and educational needs were considered, and the patient is agreeable to the plan of care and goals.           Goals: updated 07/18/25  Short Term Goals: 4 weeks   Patient to improve sit <> stand transfers to CGA consistently for improved independence with functional mobility. Ongoing  Patient to improve stand pivot transfers with LBQC to CGA consistently for improved independence with functional mobility. Ongoing  Patient to improve supine <> sit transfers to Min A consistently for improved independence with functional mobility. Ongoing  Patient to ambulate at least 200 feet with LBQC with Mod A for improved endurance with ambulation. Ongoing  Patient to propel w/c x 50 feet, with Min A for improved independence with w/c mobility. Ongoing     Long Term Goals: 8 weeks   Patient to improve sit <> stand transfers to SBA consistently for improved independence with functional mobility. Ongoing  Patient to improve stand pivot transfers with LBQC to SBA consistently for improved independence with functional mobility. Ongoing  Patient to improve supine <> sit transfers to SBA consistently for improved independence with  functional mobility. Ongoing  Patient to ambulate at least 200 feet with LBQC with Min A for improved endurance with ambulation. Ongoing      Madhuri Burris, PT

## 2025-07-21 ENCOUNTER — CLINICAL SUPPORT (OUTPATIENT)
Dept: REHABILITATION | Facility: HOSPITAL | Age: 65
End: 2025-07-21
Payer: COMMERCIAL

## 2025-07-21 ENCOUNTER — PATIENT MESSAGE (OUTPATIENT)
Dept: ADMINISTRATIVE | Facility: HOSPITAL | Age: 65
End: 2025-07-21
Payer: COMMERCIAL

## 2025-07-21 DIAGNOSIS — R47.01 GLOBAL APHASIA: Primary | ICD-10-CM

## 2025-07-21 DIAGNOSIS — I69.320 APHASIA AS LATE EFFECT OF CEREBROVASCULAR ACCIDENT: ICD-10-CM

## 2025-07-21 DIAGNOSIS — Z74.09 IMPAIRED FUNCTIONAL MOBILITY, BALANCE, GAIT, AND ENDURANCE: Primary | ICD-10-CM

## 2025-07-21 PROCEDURE — 97530 THERAPEUTIC ACTIVITIES: CPT | Mod: PO

## 2025-07-21 PROCEDURE — 97116 GAIT TRAINING THERAPY: CPT | Mod: PO

## 2025-07-21 PROCEDURE — 97110 THERAPEUTIC EXERCISES: CPT | Mod: PO

## 2025-07-21 PROCEDURE — 96105 ASSESSMENT OF APHASIA: CPT | Mod: PO

## 2025-07-21 NOTE — PROGRESS NOTES
Outpatient Rehab    Physical Therapy Visit    Patient Name: Kateryna Weber  MRN: [id number]  YOB: 1960  Encounter Date: 7/21/2025    Therapy Diagnosis:   Encounter Diagnosis   Name Primary?    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  Surgical Diagnosis: Not applicable for this Episode   Surgical Date: Not applicable for this Episode  Days Since Last Surgery: Not applicable for this Episode    Visit # / Visits Authorized:  22 / 34  Insurance Authorization Period: 5/9/2025 to 12/31/2025  Date of Evaluation: 5/9/2025  Plan of Care Certification: 7/18/2025 to 9/12/2025      PT/PTA: PT   Number of PTA visits since last PT visit:0  Time In: 1115   Time Out: 1200  Total Time (in minutes): 45   Total Billable Time (in minutes):      FOTO:  Intake Score (%): Not applicable for this Episode  Survey Score 2 (%): Not applicable for this Episode  Survey Score 3 (%): Not applicable for this Episode    Precautions:  Additional Precautions and Protocol Details: Standard, Fall, aphasia, h/o cancer      Subjective   patient indicates readiness for therapy..  Pain reported as 0/10.      Patient's  present for therapy session.     Objective    Shadi, rep from  present for initial portion of session to fit right Swedish knee cage.         Treatment:   Kateryna received therapeutic exercises to develop strength, endurance, ROM, flexibility, posture, and core stabilization for 08 minutes including:   Supine right ankle dorsiflexion mobilizations Grade 3 to reduce plantarflexion contracture and tightness  Supine right ankle dorsiflexion PROM with overpressure stretch  Supine right knee flexion to reduce tone prior to ambulation trials     Patient participated in neuromuscular re-education activities to improve:  Balance, Coordination, and Sense for 04 minutes. The following activities were included:   Static standing at edge of mat: static standing with LBQC for fitting of Swedish knee cage, CGA  Weight shifting with Croatian knee cage donned, LBQC        Patient participated in dynamic functional therapeutic activities to improve functional performance for 10 minutes. Including:   W/c > mat stand pivot transfer with LBQC moving to left side, Min A for stand from w/c, CGA for turning  Sit > supine Min A for RLE management  Supine > sit: rolled to right side SBA, Min A for trunk  Sit <> stand x multiple trials, Min A with LBQC- verbal cues for proper hand placement        Patient participated in gait training activities to normalize gait pattern for 23 minutes. The following activities were included:    Gait belt used for safety. Assistive device: LBQC at [facility], Neuro-ERIC glider on right foot, Swedish knee cage donned on right knee to reduce hyperextension- emphasis on initiating RLE swing throughout entire trials; tech present on right side to cue upright trunk positioning, w/c follow    Trial 1: 127 feet  Trial 2: 90 feet  Trial 3: 69 feet     Mod A from PT in front to weight shift, promote right hip extension and upright trunk positioning, and to advance RLE in swing  Deficits: right knee hyperextension and right hip flexion in stance due to ankle plantarflexion contracture; step through pattern; able to initiate hip flexion in swing phase this date  Improved right hip extension, reduced right knee hyperextension, and improved right foot contact with ground compared to prior assessment    Time Entry(in minutes):   See above    Assessment & Plan   Assessment:  Kateryna tolerated therapy session well this morning with good effort throughout. Patient fitted for right Swedish knee cage at beginning of session by Shadi, dank Goode and brace donned for remainder of session. Right knee positioning is improved in stance with [facility]  kristian. Session focused strongly on ambulation endurance and improved upright posture during gait trials. Verbal cues still required for proper technique with transfers. Patient to benefit from continued PT intervention to further progress ongoing mobility deficits.        The patient will continue to benefit from skilled outpatient physical therapy in order to address the deficits listed in the problem list on the initial evaluation, provide patient and family education, and maximize the patients level of independence in the home and community environments.     The patient's spiritual, cultural, and educational needs were considered, and the patient is agreeable to the plan of care and goals.           Plan: Cont with stretching, functional mobility and gait    Goals: updated 07/18/25  Short Term Goals: 4 weeks   Patient to improve sit <> stand transfers to CGA consistently for improved independence with functional mobility. Ongoing  Patient to improve stand pivot transfers with LBQC to CGA consistently for improved independence with functional mobility. Ongoing  Patient to improve supine <> sit transfers to Min A consistently for improved independence with functional mobility. Ongoing  Patient to ambulate at least 200 feet with LBQC with Mod A for improved endurance with ambulation. Ongoing  Patient to propel w/c x 50 feet, with Min A for improved independence with w/c mobility. Ongoing     Long Term Goals: 8 weeks   Patient to improve sit <> stand transfers to SBA consistently for improved independence with functional mobility. Ongoing  Patient to improve stand pivot transfers with LBQC to SBA consistently for improved independence with functional mobility. Ongoing  Patient to improve supine <> sit transfers to SBA consistently for improved independence with functional mobility. Ongoing  Patient to ambulate at least 200 feet with LBQC with Min A for improved endurance with ambulation. Ongoing       Madhuri Burris  PT

## 2025-07-21 NOTE — PROGRESS NOTES
Outpatient Rehab    Speech-Language Pathology Progress Note : Updated Plan of Care    Patient Name: Kateryna Weber  MRN: [id number]  YOB: 1960  Encounter Date: 7/21/2025    Therapy Diagnosis:   No diagnosis found.    Physician: Latisha Garg MD    Physician Orders: Eval and Treat  Medical Diagnosis: Aphasia as late effect of cerebrovascular accident  Surgical Diagnosis: Not applicable for this Episode   Surgical Date: Not applicable for this Episode  Days Since Last Surgery: Not applicable for this Episode    Visit # / Visits Authorized: 16 / 20   Insurance Authorization Period: 5/9/2025 to 12/31/2025  Date of Evaluation:    Plan of Care Certification: , 9/26/2025     Time In:     Time Out:    Total Time (in minutes):     Total Billable Time (in minutes):      FOTO:  Intake Score (%): Not applicable for this Episode  Survey Score 2 (%): Not applicable for this Episode  Survey Score 3 (%): Not applicable for this Episode    Precautions:       Subjective                 Objective            Treatment       Time Entry(in minutes):       Assessment & Plan        The patient will continue to benefit from skilled outpatient speech therapy in order to address the deficits listed in the problem list on the initial evaluation, provide patient and family education, and maximize the patients level of independence in the home and community environments.     The patient's spiritual, cultural, and educational needs were considered, and the patient is agreeable to the plan of care and goals.     Goals:         JACQUES Mancini., L-SLP,CCC-SLP, CBIS  Speech-Language Pathologist  Certified Brain Injury Specialist

## 2025-07-22 NOTE — PROGRESS NOTES
Outpatient Rehab    Speech-Language Pathology Evaluation (only)  Re-assessment    Patient Name: Kateryna Weber  MRN: [id number]  YOB: 1960  Encounter Date: 7/21/2025    Therapy Diagnosis:   Encounter Diagnoses   Name Primary?    Global aphasia Yes    Aphasia as late effect of cerebrovascular accident      Physician: Latisha Garg MD    Physician Orders: Eval and Treat  Medical Diagnosis: Aphasia as late effect of cerebrovascular accident  Surgical Diagnosis: Not applicable for this Episode   Surgical Date: Not applicable for this Episode  Days Since Last Surgery: Not applicable for this Episode    Visit # / Visits Authorized: 16 / 20   Insurance Authorization Period: 5/9/2025 to 12/31/2025  Date of Evaluation: 7/21/2025      Time In: 1030   Time Out: 1115  Total Time (in minutes): 45   Total Billable Time (in minutes): 45    Intake Outcome Measure for FOTO Survey    Therapist reviewed FOTO scores for Kateryna Weber on 7/21/2025.   FOTO report - see Media section or FOTO account episode details.     Intake Score (%): 28    Precautions:  Additional Precautions and Protocol Details: Aphasia, standard, fall    Subjective   History of Present Illness  Kateryna is a 64 y.o. female who reports to Speech-Language Pathology with a chief concern of difficulty communicating.     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                          Prior Level of Function: independent  Current Level of Function: max assistance for most communication, assistance needed for ADL's.    Mrs Kateryna Weber returns to outpatient therapy s/p rehab placement x3 week duration. She was initially seen in outpatient but it was recommended by the outpatient therapy team that she be admitted to inpatient rehab for daily, intense therapy.  Her communication skills remain the same and  characterized by some spontaneous appropriate speech but mostly paraphasic speech.     Living Arrangements  Living Situation  Living Arrangements: Spouse/significant other  Support Systems: Spouse/significant other           Past Medical History/Physical Systems Review:   Kateryna Weber  has no past medical history on file.    Kateryna Weber  has a past surgical history that includes Appendectomy; Hysterectomy; Abdominal surgery (2000); Gastric bypass (2007); Colonoscopy (N/A, 4/8/2019); Interposition arthroplasty of carpometacarpal joints (Left, 8/15/2023); Interposition arthroplasty of carpometacarpal joints (Right, 11/24/2023); and Colonoscopy (N/A, 10/24/2024).    Kateryna has a current medication list which includes the following prescription(s): amlodipine, estradiol, multivitamin, and multivitamin, and the following Facility-Administered Medications: lactated ringers and lidocaine (pf) 10 mg/ml (1%).    Review of patient's allergies indicates:   Allergen Reactions    No known drug allergies         Objective          Formal Assessment:  Western Aphasia Battery - Revised (WAB-R) was administered to evaluate the patient's receptive and expressive language function.The purpose stated in the manual for the WAB-R is to determine the presence, severity, and type of aphasia; measure the patient's level of performance to provide a baseline for detecting change over time; provide a comprehensive assessment of the patients language strengths and deficits in order to guide treatment and management; infer the location and etiology of the lesion causing the aphasia.  The following results were revealed:     Spontaneous Speech Score: 7 / 20  Auditory Comprehension Score: 4 / 10  Repetition Score:   0.4 /10  Naming and Word Finding Score: 1.5/ 10  Aphasia Quotient (AQ):   25.8 / 100  Aphasia Classification: Severe Global Aphasia     Subtest Results:   Information Content: 2 / 10  Fluency, Grammatical Competence, and Paraphasias:  "5 /10  Yes / No Questions:   Auditory Word Recognition:   Sequential Commands: 16   Repetition:   Object Namin  Word Fluency: 0  20  Sentence Completion: 1 /10  Responsive Speech: 1 / 10      Auditory Comprehension:   Patient answered 45/60 (75% accuracy) yes/no questions correctly indicating inconsistent, unreliable yes/no responses. Auditory word recognition for objects, letters, numbers, colors, shapes, and body parts was completed with 32% accuracy. Patient followed simple commands with 20% accuracy; followed moderate to complex commands with 0% accuracy. Overall, auditory comprehension is severely impaired.     Verbal Expression:   Patient named objects with 22% accuracy. Patient required phonemic and tactile cues for objects. She was able to list 0 animals within one minute. Sentence completion was completed with 10% accuracy; responsive naming was completed with 10% accuracy. Patient able to repeat single, simple words with 40% accuracy; as complexity increased she had more difficulty. Patient was able to describe a picture and engage in spontaneous speech with telegraphic, fluent speech but with marked word finding difficulty and paraphasias present. She exhibited spontaneous, automatic speech such as "I don't know", frequently. Picture description: "I don't know. Babe packers, shorts, besze, pal doll. I guess that's good.  Overall verbal expression is severely impaired.     Time Entry(in minutes):  Aphasia Assessment w/ Interp Time Entry: 75    Assessment & Plan   Assessment   Kateryna            Diagnosis and Impressions: Kateryan exhibited a severe global aphasia characterized by reduced auditory comprehension for simple commands and questions, reduced repetition skills, and telegraphic, fluent speech but with marked word finding difficulty and paraphasias present. When comparing her Western Aphasia Battery - Revised (WAB-R) results from initial evaluation on 25, she has " shown minimum improvements in her receptive and expressive language skills. Some of the test scores increased by a few numbers. She would benefit from continued speech-language therapy address her communication skills.   Functional Limitations: severity of deficits    Personal Factors Affecting Prognosis: Fear/anxiety          Patient Goal for Therapy (SLP): increase functional communication  Prognosis: Fair       Plan  From a speech language pathology perspective, the patient would benefit from: Skilled Rehab Services  Planned therapy interventions and modalities include: Swallowing/dysphagia therapy. Augmentative-Alternative Communication             Visit Frequency: 3 times Per Week for 10 Weeks.       This plan was discussed with Patient and Family.   Discussion participants: Agreed Upon Plan of Care  Plan details: [facility]        The patient's spiritual, cultural, and educational needs were considered, and the patient is agreeable to the plan of care and goals.     Goals:   Active       Additional Short Term Goals       Patient will participate in complete receptive-expressive evaluation (Western Aphasia Battery - Revised (WAB-R)) with specific goals to be determined.  (Met)       Start:  07/18/25    Expected End:  09/26/25    Resolved:  07/23/25         Patient will participate in an Augmentative-Alternative Communication evaluation.        Start:  07/18/25    Expected End:  09/26/25            Patient will participate in assessment of reading comprehension and written expression.        Start:  07/18/25    Expected End:  09/26/25               Long Term Goals        1.  She will develop functional cognitive-linguistic based skills and utilize compensatory strategies to communicate wants/needs effectively to different conversational partners, maintain safety, participate  socially in functional living environment.    (Progressing)       Start:  05/09/25    Expected End:  09/26/25            2. Patient will trial compensatory strategies and augmentative-alternative communication (AAC) to communicate wants and needs effectively to different conversational partners, maintain safety, and participate socially in functional living environment.  (Progressing)       Start:  05/09/25    Expected End:  09/26/25               Short Term Goals        1. Patient will identify objects in a field of 3 given the name/function of the object with 75% accuracy following nonverbal cues and requesting repetitions as needed across 2-3 sessions to improve auditory comprehension skills. (Met)       Start:  05/09/25    Expected End:  06/20/25    Resolved:  05/29/25         2. Patient will answer 1 unit/personal yes/no questions with 75% accuracy following nonverbal cues and requesting repetitions as needed across 2-3 sessions to improve auditory comprehension skills. (Progressing)       Start:  05/09/25    Expected End:  09/26/25            3. Patient will name objects with 75% acc given carrier phrase as needed across 3 sessions     (Progressing)       Start:  05/09/25    Expected End:  09/26/25            4. Patient will participate in the speech production treatment hierarchy with mono-syllabic /m/ words with 75% acc across 3 probe trials.    (Progressing)       Start:  05/09/25    Expected End:  09/26/25            5. Patient will use gestures and/or low-tech AAC (e.g., communication board, yes/no cards, picture symbols) to express basic wants and needs in response to visual/verbal cues with 75% accuracy across 3 consecutive sessions. (Progressing)       Start:  05/09/25    Expected End:  09/26/25              JACQUES Mancini., L-SLP,CCC-SLP, CBIS  Speech-Language Pathologist  Certified Brain Injury Specialist

## 2025-07-23 ENCOUNTER — CLINICAL SUPPORT (OUTPATIENT)
Dept: REHABILITATION | Facility: HOSPITAL | Age: 65
End: 2025-07-23
Payer: COMMERCIAL

## 2025-07-23 DIAGNOSIS — Z74.09 IMPAIRED FUNCTIONAL MOBILITY, BALANCE, GAIT, AND ENDURANCE: Primary | ICD-10-CM

## 2025-07-23 PROCEDURE — 97530 THERAPEUTIC ACTIVITIES: CPT

## 2025-07-23 PROCEDURE — 97112 NEUROMUSCULAR REEDUCATION: CPT

## 2025-07-23 PROCEDURE — 97116 GAIT TRAINING THERAPY: CPT

## 2025-07-23 NOTE — PROGRESS NOTES
Outpatient Rehab    Physical Therapy Visit    Patient Name: Kateryna Weber  MRN: [id number]  YOB: 1960  Encounter Date: 7/23/2025    Therapy Diagnosis:   Encounter Diagnosis   Name Primary?    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  Surgical Diagnosis: Not applicable for this Episode   Surgical Date: Not applicable for this Episode  Days Since Last Surgery: Not applicable for this Episode    Visit # / Visits Authorized:  23 / 34  Insurance Authorization Period: 5/9/2025 to 12/31/2025  Date of Evaluation: 5/9/2025  Plan of Care Certification: 7/18/2025 to 9/12/2025      PT/PTA: PT   Number of PTA visits since last PT visit:0  Time In: 1115   Time Out: 1159  Total Time (in minutes): 44   Total Billable Time (in minutes): 44    FOTO:  Intake Score (%): Not applicable for this Episode  Survey Score 2 (%): Not applicable for this Episode  Survey Score 3 (%): Not applicable for this Episode    Precautions:  Additional Precautions and Protocol Details: Aphasia, standard, fall      Subjective             Patient's  present for therapy session.     Objective        Treatment:   Kateryna received therapeutic exercises to develop strength, endurance, ROM, flexibility, posture, and core stabilization for 05 minutes including:   Supine right ankle dorsiflexion mobilizations Grade 3 to reduce plantarflexion contracture and tightness  Supine right ankle dorsiflexion PROM with overpressure stretch  Supine right knee flexion to reduce tone prior to ambulation trials     Patient participated in neuromuscular re-education activities to improve: Balance, Coordination, and Sense for 08 minutes. The following activities were included:   Sit to stand from EOM with LBQC with emphasis on even weightbearing through BLE  - min A        Patient participated in dynamic functional therapeutic activities to improve functional performance for 08 minutes. Including:   W/c > mat stand pivot transfer with LBQC moving to left side, Min A for stand from w/c, CGA for turning  Sit > supine Min A for RLE management  Supine > sit: rolled to right side SBA, Min A for trunk  Sit <> stand x multiple trials from [facility], Min A with LBQC- verbal cues for proper hand placement        Patient participated in gait training activities to normalize gait pattern for 23 minutes. The following activities were included:    Gait belt used for safety. Assistive device: LBQC at [facility] [place] [facility], Neuro-ERIC glider on right foot, Swedish knee cage donned on right knee to reduce hyperextension- emphasis on initiating RLE swing throughout entire trials; tech present on right side to cue upright trunk positioning, w/c follow    Trial 1: 128 feet  Trial 2: 166 feet     Mod A from PT in front to weight shift, promote right hip extension and upright trunk positioning, and to advance RLE in swing  Deficits: right knee hyperextension and right hip flexion in stance due to ankle plantarflexion contracture; step through pattern; able to initiate hip flexion in swing phase this date  Improved right hip extension, reduced right knee hyperextension, and improved right foot contact with ground compared to prior assessment    Time Entry(in minutes):   See above    Assessment & Plan   Assessment:  Kateryna tolerated therapy session well this morning with good effort throughout. Session focused strongly on ambulation endurance and improved upright posture during gait trials. She demonstrated increased gait distance this session with decreased postural fatigue. Verbal cues still required for proper technique with transfers. Patient to benefit from continued PT intervention to further progress ongoing mobility deficits.        The patient will continue to benefit from skilled outpatient physical therapy  in order to address the deficits listed in the problem list on the initial evaluation, provide patient and family education, and maximize the patients level of independence in the home and community environments.     The patient's spiritual, cultural, and educational needs were considered, and the patient is agreeable to the plan of care and goals.           Plan: Cont with stretching, functional mobility and gait    Goals: updated 07/18/25  Short Term Goals: 4 weeks   Patient to improve sit <> stand transfers to CGA consistently for improved independence with functional mobility. Ongoing  Patient to improve stand pivot transfers with LBQC to CGA consistently for improved independence with functional mobility. Ongoing  Patient to improve supine <> sit transfers to Min A consistently for improved independence with functional mobility. Ongoing  Patient to ambulate at least 200 feet with LBQC with Mod A for improved endurance with ambulation. Ongoing  Patient to propel w/c x 50 feet, with Min A for improved independence with w/c mobility. Ongoing     Long Term Goals: 8 weeks   Patient to improve sit <> stand transfers to SBA consistently for improved independence with functional mobility. Ongoing  Patient to improve stand pivot transfers with LBQC to SBA consistently for improved independence with functional mobility. Ongoing  Patient to improve supine <> sit transfers to SBA consistently for improved independence with functional mobility. Ongoing  Patient to ambulate at least 200 feet with LBQC with Min A for improved endurance with ambulation. Ongoing       Jacquelyn Preciado, PT

## 2025-07-25 ENCOUNTER — CLINICAL SUPPORT (OUTPATIENT)
Dept: REHABILITATION | Facility: HOSPITAL | Age: 65
End: 2025-07-25
Payer: COMMERCIAL

## 2025-07-25 DIAGNOSIS — Z74.09 IMPAIRED FUNCTIONAL MOBILITY, BALANCE, GAIT, AND ENDURANCE: Primary | ICD-10-CM

## 2025-07-25 PROCEDURE — 97110 THERAPEUTIC EXERCISES: CPT | Mod: PO

## 2025-07-25 PROCEDURE — 97116 GAIT TRAINING THERAPY: CPT | Mod: PO

## 2025-07-25 PROCEDURE — 97112 NEUROMUSCULAR REEDUCATION: CPT | Mod: PO

## 2025-07-25 NOTE — PROGRESS NOTES
Outpatient Rehab    Physical Therapy Visit    Patient Name: Kateryna Weber  MRN: [id number]  YOB: 1960  Encounter Date: 7/25/2025    Therapy Diagnosis:   Encounter Diagnosis   Name Primary?    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  Surgical Diagnosis: Not applicable for this Episode   Surgical Date: Not applicable for this Episode  Days Since Last Surgery: Not applicable for this Episode    Visit # / Visits Authorized:  24 / 34  Insurance Authorization Period: 5/9/2025 to 12/31/2025  Date of Evaluation: 5/9/2025  Plan of Care Certification: 7/18/2025 to 9/12/2025      PT/PTA: PT   Number of PTA visits since last PT visit:0  Time In: 1030   Time Out: 1115  Total Time (in minutes): 45   Total Billable Time (in minutes):      FOTO:  Intake Score (%): Not applicable for this Episode  Survey Score 2 (%): Not applicable for this Episode  Survey Score 3 (%): Not applicable for this Episode    Precautions:  Additional Precautions and Protocol Details: Aphasia, standard, fall      Subjective      That she is doing well this morning.   Patient's  present for session.   0/10 pain          Patient's  present for therapy session.     Objective            Treatment:   Kateryna received therapeutic exercises to develop strength, endurance, ROM, flexibility, posture, and core stabilization for 08 minutes including:   Supine right ankle dorsiflexion mobilizations Grade 3 to reduce plantarflexion contracture and tightness  Supine right ankle dorsiflexion PROM with overpressure stretch  Supine right knee flexion to reduce tone prior to ambulation trials     Patient participated in neuromuscular re-education activities to improve: Balance, Coordination, and Sense for 08 minutes. The following activities  were included:   Adjustment of Swedish knee cage  X 10 reps sit <> stand from mat, LUE pushing from mat--- emphasis on motor control and proper form, TCs for proper form         Patient participated in dynamic functional therapeutic activities to improve functional performance for 04 minutes. Including:   W/c > mat stand pivot transfer with LBQC moving to left side, Min A for stand from w/c, CGA for turning  Sit > supine Min A for RLE management  Supine > sit: rolled to right side SBA, Min A for trunk  Sit <> stand x multiple trials, Min A with LBQC- verbal cues for proper hand placement        Patient participated in gait training activities to normalize gait pattern for 25 minutes. The following activities were included:    Gait belt used for safety. Assistive device: LBQC at E, [employer] glider on right foot, Swedish knee cage donned on right knee to reduce hyperextension- emphasis on initiating RLE swing throughout entire trials; tech present on right side to cue upright trunk positioning, w/c follow    Trial 1: 127 feet  Trial 2: 155 feet  Trial 3: 155 feet     Mod A from PT in front to weight shift, promote right hip extension and upright trunk positioning, and to advance RLE in swing  Deficits: right knee hyperextension and right hip flexion in stance due to ankle plantarflexion contracture; step through pattern; able to initiate hip flexion in swing phase this date  Improved right hip extension, reduced right knee hyperextension, and improved right foot contact with ground compared to prior assessment    Time Entry(in minutes):   See above    Assessment & Plan   Assessment:  Kateryna tolerated therapy session well this morning with good effort throughout. Increased focus on RLE weight acceptance during sit <> stand activity today. Improved step length noted during ambulation trials, with patient able to complete additional trial. However, fatigue noted throughout 3rd trial with increased forward trunk lean  noted. Verbal cues still required for proper technique with transfers. Patient to benefit from continued PT intervention to further progress ongoing mobility deficits.        The patient will continue to benefit from skilled outpatient physical therapy in order to address the deficits listed in the problem list on the initial evaluation, provide patient and family education, and maximize the patients level of independence in the home and community environments.     The patient's spiritual, cultural, and educational needs were considered, and the patient is agreeable to the plan of care and goals.           Plan: Cont with stretching, functional mobility and gait    Goals: updated 07/18/25  Short Term Goals: 4 weeks   Patient to improve sit <> stand transfers to CGA consistently for improved independence with functional mobility. Ongoing  Patient to improve stand pivot transfers with LBQC to CGA consistently for improved independence with functional mobility. Ongoing  Patient to improve supine <> sit transfers to Min A consistently for improved independence with functional mobility. Ongoing  Patient to ambulate at least 200 feet with LBQC with Mod A for improved endurance with ambulation. Ongoing  Patient to propel w/c x 50 feet, with Min A for improved independence with w/c mobility. Ongoing     Long Term Goals: 8 weeks   Patient to improve sit <> stand transfers to SBA consistently for improved independence with functional mobility. Ongoing  Patient to improve stand pivot transfers with LBQC to SBA consistently for improved independence with functional mobility. Ongoing  Patient to improve supine <> sit transfers to SBA consistently for improved independence with functional mobility. Ongoing  Patient to ambulate at least 200 feet with LBQC with Min A for improved endurance with ambulation. Ongoing       Madhuri Burris, PT

## 2025-07-29 PROBLEM — Z74.09 IMPAIRED MOBILITY AND ADLS: Chronic | Status: ACTIVE | Noted: 2025-05-09

## 2025-07-29 PROBLEM — Z78.9 IMPAIRED MOBILITY AND ADLS: Chronic | Status: ACTIVE | Noted: 2025-05-09

## 2025-07-30 DIAGNOSIS — Z12.31 OTHER SCREENING MAMMOGRAM: ICD-10-CM

## (undated) DEVICE — GOWN POLY REINF BRTH SLV LG

## (undated) DEVICE — PADDING CAST SYN STRL 4INX4YD

## (undated) DEVICE — PACK SURGERY START

## (undated) DEVICE — BLADE SCALP OPHTL BEVEL STR

## (undated) DEVICE — BANDAGE MATRIX HK LOOP 3IN 5YD

## (undated) DEVICE — PADDING CAST SPECIALIST 3X4YD

## (undated) DEVICE — BANDAGE ACE NON LATEX 3IN

## (undated) DEVICE — KIT DISPOSABLE INSTRUMENT MINI

## (undated) DEVICE — SPLINT PLASTER F.S. 3INX15IN

## (undated) DEVICE — SUT FIBERWIRE 2-0 18

## (undated) DEVICE — BLADE SAGITTA 5/BX

## (undated) DEVICE — GLOVE SURG BIOGEL LATEX SZ 7.5

## (undated) DEVICE — DRAPE HAND STERILE

## (undated) DEVICE — STOCKINET 4INX48

## (undated) DEVICE — PADDING CAST 3 X 4YD

## (undated) DEVICE — BLADE SAG MIC FINE 9.5X25.5MM

## (undated) DEVICE — SLING ARM COMFT NAVY BLU LG

## (undated) DEVICE — ELECTRODE REM PLYHSV RETURN 9

## (undated) DEVICE — PAD CAST 2 IN X 4YDS STERILE

## (undated) DEVICE — PAD PREP CUFFED NS 24X48IN

## (undated) DEVICE — SUT VICRYL PLUS 4-0 PS2 27

## (undated) DEVICE — SUT ETHILON 5-0 PS-2 18IN

## (undated) DEVICE — TOWEL OR DISP STRL BLUE 4/PK

## (undated) DEVICE — BANDAGE ACE NON LATEX 2IN

## (undated) DEVICE — DRESSING XEROFORM NONADH 1X8IN

## (undated) DEVICE — COVER TABLE HVY DTY 60X90IN

## (undated) DEVICE — SPONGE GAUZE 16PLY 4X4

## (undated) DEVICE — COVER OVERHEAD SURG LT BLUE

## (undated) DEVICE — DRESSING XEROFORM 1X8IN

## (undated) DEVICE — APPLICATOR CHLORAPREP ORN 26ML

## (undated) DEVICE — SEE L#120831

## (undated) DEVICE — GAUZE SPONGE 4X4 12PLY

## (undated) DEVICE — PAD CAST SPECIALIST 2X4

## (undated) DEVICE — BLADE SURG #15 CARBON STEEL

## (undated) DEVICE — PADDING CAST STERILE 3IN

## (undated) DEVICE — MANIFOLD 4 PORT

## (undated) DEVICE — GOWN POLY REINF BRTH SLV XL

## (undated) DEVICE — PACK BASIC

## (undated) DEVICE — DRAPE STERI INSTRUMENT 1018

## (undated) DEVICE — BANDAGE MATRIX HK LOOP 2IN 5YD

## (undated) DEVICE — ALCOHOL 70% ISOP W/GREEN 16OZ

## (undated) DEVICE — TOURNIQUET SB QC DP 18X4IN

## (undated) DEVICE — BANDAGE ESMARK ELASTIC ST 4X9

## (undated) DEVICE — BANDAGE MATRIX HK LOOP 4IN 5YD

## (undated) DEVICE — INSTRUMENT SUCTION FRAZIER 12F